# Patient Record
Sex: FEMALE | Race: WHITE | NOT HISPANIC OR LATINO | Employment: OTHER | URBAN - METROPOLITAN AREA
[De-identification: names, ages, dates, MRNs, and addresses within clinical notes are randomized per-mention and may not be internally consistent; named-entity substitution may affect disease eponyms.]

---

## 2017-01-25 ENCOUNTER — HOSPITAL ENCOUNTER (OUTPATIENT)
Dept: LAB | Facility: MEDICAL CENTER | Age: 77
End: 2017-01-25
Attending: INTERNAL MEDICINE
Payer: MEDICARE

## 2017-01-25 DIAGNOSIS — Z98.890 HISTORY OF PANCREATIC SURGERY: ICD-10-CM

## 2017-01-25 LAB
ALBUMIN SERPL BCP-MCNC: 4.9 G/DL (ref 3.2–4.9)
ALBUMIN/GLOB SERPL: 1.4 G/DL
ALP SERPL-CCNC: 63 U/L (ref 30–99)
ALT SERPL-CCNC: 30 U/L (ref 2–50)
ANION GAP SERPL CALC-SCNC: 7 MMOL/L (ref 0–11.9)
AST SERPL-CCNC: 21 U/L (ref 12–45)
BILIRUB SERPL-MCNC: 1 MG/DL (ref 0.1–1.5)
BUN SERPL-MCNC: 26 MG/DL (ref 8–22)
CALCIUM SERPL-MCNC: 9.8 MG/DL (ref 8.5–10.5)
CHLORIDE SERPL-SCNC: 106 MMOL/L (ref 96–112)
CO2 SERPL-SCNC: 25 MMOL/L (ref 20–33)
CREAT SERPL-MCNC: 1.2 MG/DL (ref 0.5–1.4)
EST. AVERAGE GLUCOSE BLD GHB EST-MCNC: 143 MG/DL
GLOBULIN SER CALC-MCNC: 3.4 G/DL (ref 1.9–3.5)
GLUCOSE SERPL-MCNC: 140 MG/DL (ref 65–99)
HBA1C MFR BLD: 6.6 % (ref 0–5.6)
POTASSIUM SERPL-SCNC: 4.3 MMOL/L (ref 3.6–5.5)
PROT SERPL-MCNC: 8.3 G/DL (ref 6–8.2)
SODIUM SERPL-SCNC: 138 MMOL/L (ref 135–145)

## 2017-01-25 PROCEDURE — 83036 HEMOGLOBIN GLYCOSYLATED A1C: CPT

## 2017-01-25 PROCEDURE — 36415 COLL VENOUS BLD VENIPUNCTURE: CPT

## 2017-01-25 PROCEDURE — 80053 COMPREHEN METABOLIC PANEL: CPT

## 2017-02-06 ENCOUNTER — OFFICE VISIT (OUTPATIENT)
Dept: MEDICAL GROUP | Facility: PHYSICIAN GROUP | Age: 77
End: 2017-02-06
Payer: MEDICARE

## 2017-02-06 VITALS
SYSTOLIC BLOOD PRESSURE: 150 MMHG | HEART RATE: 96 BPM | RESPIRATION RATE: 16 BRPM | WEIGHT: 169 LBS | HEIGHT: 64 IN | TEMPERATURE: 98.6 F | BODY MASS INDEX: 28.85 KG/M2 | DIASTOLIC BLOOD PRESSURE: 98 MMHG | OXYGEN SATURATION: 96 %

## 2017-02-06 DIAGNOSIS — E03.9 HYPOTHYROIDISM, UNSPECIFIED TYPE: ICD-10-CM

## 2017-02-06 DIAGNOSIS — N18.30 CHRONIC KIDNEY DISEASE (CKD), STAGE III (MODERATE) (HCC): ICD-10-CM

## 2017-02-06 DIAGNOSIS — R73.01 IFG (IMPAIRED FASTING GLUCOSE): ICD-10-CM

## 2017-02-06 PROCEDURE — 3014F SCREEN MAMMO DOC REV: CPT | Performed by: FAMILY MEDICINE

## 2017-02-06 PROCEDURE — G8432 DEP SCR NOT DOC, RNG: HCPCS | Performed by: FAMILY MEDICINE

## 2017-02-06 PROCEDURE — 1101F PT FALLS ASSESS-DOCD LE1/YR: CPT | Performed by: FAMILY MEDICINE

## 2017-02-06 PROCEDURE — G8482 FLU IMMUNIZE ORDER/ADMIN: HCPCS | Performed by: FAMILY MEDICINE

## 2017-02-06 PROCEDURE — 1036F TOBACCO NON-USER: CPT | Performed by: FAMILY MEDICINE

## 2017-02-06 PROCEDURE — 3017F COLORECTAL CA SCREEN DOC REV: CPT | Mod: 8P | Performed by: FAMILY MEDICINE

## 2017-02-06 PROCEDURE — G8420 CALC BMI NORM PARAMETERS: HCPCS | Performed by: FAMILY MEDICINE

## 2017-02-06 PROCEDURE — 4040F PNEUMOC VAC/ADMIN/RCVD: CPT | Performed by: FAMILY MEDICINE

## 2017-02-06 PROCEDURE — 99214 OFFICE O/P EST MOD 30 MIN: CPT | Performed by: FAMILY MEDICINE

## 2017-02-06 NOTE — PROGRESS NOTES
Subjective:     Chief Complaint   Patient presents with   • Follow-Up       Roya Muniz is a 76 y.o. female here today for follow-up on chronic medical conditions.    Impaired fasting glucose: Patient has a history of elevated blood sugars. Most recent A1c was 6.6. Patient denies chest pain, numbness and tingling in hands and feet, change in sensation in hands or feet, sores on feet, change in urine,  or change in vision. Patient states she understands how to eat healthy and will try harder. She would like to wait 3 months and try diet and lifestyle modifications before starting medication. Patient was working out with curves but curves recently closed and she has not exercised. Patient states she'll start new exercise routine.      Patient is currently taking the levothyroxine each morning on a  empty stomach with a glass of water one hour before eating. Patient denies symptoms of weight changes, heat or cold intolerance, diarrhea, constipation, heart palpitations, or loss of hair. TSH 3.180    Patient has a history of CKD stage III. She is not followed by nephrology. Most recent GFR 44, which has been relatively stable. Nephrotoxic medications will continue to be avoided.      PT has order for DEXA and mammo, Pt will get FOB from digestive     Allergies   Allergen Reactions   • Cephalexin [Keflex] Hives, Rash and Itching     Photosensitivity       Current medicines (including changes today)  Current Outpatient Prescriptions   Medication Sig Dispense Refill   • losartan (COZAAR) 100 MG Tab Take 1 Tab by mouth every day. TAKE ONE TABLET BY MOUTH DAILY 90 Tab 1   • amlodipine (NORVASC) 10 MG Tab Take 1 Tab by mouth every day. TAKE ONE TABLET BY MOUTH DAILY 90 Tab 1   • levothyroxine (SYNTHROID) 112 MCG Tab Take 1 Tab by mouth Every morning on an empty stomach. 90 Tab 4   • Loperamide HCl (IMODIUM PO) Take 1 Tab by mouth every evening.     • Psyllium (METAMUCIL PO) Take 1 Packet by mouth every morning.    "    No current facility-administered medications for this visit.     Social History   Substance Use Topics   • Smoking status: Never Smoker    • Smokeless tobacco: Never Used      Comment: avoid all tobacco products   • Alcohol Use: 0.0 oz/week     0 Standard drinks or equivalent per week      Comment: occasionally     Family Status   Relation Status Death Age   • Mother     • Father     • Sister Alive    • Brother       Family History   Problem Relation Age of Onset   • Heart Disease Mother      enlarged heart   • Hyperlipidemia Mother    • Cancer Father      liver   • Cancer Paternal Aunt      breast cancer    • No Known Problems Sister    • Heart Disease Brother    • Stroke Neg Hx    • Hypertension Neg Hx      She    has a past medical history of Hyperlipidemia; History of DVT of lower extremity (2014); and Hypertension.        ROS   GEN: no weight loss, fevers, or chills  HEENT: no blurry vision or change in vision, no ear pain, no difficulty swallowing, no throat pain, no runny nose, no nasal congestion  Resp: no shortness of breath, no cough  CV: no racing heart, no irregular beats, no chest pain  Abd: no nausea, no vomiting, no diarrhea, no constipation, no blood in stool, no dark stools, no incontinence  : no dysuria, no hematuria, no urinary incontinence, no increased frequency  MSK: no muscle aches, no joint pain, no limited motion  Neuro: no headaches, no dizziness, no LOC, no weakness, no numbness/tingling       Objective:     Blood pressure 150/98, pulse 96, temperature 37 °C (98.6 °F), resp. rate 16, height 1.613 m (5' 3.5\"), weight 76.658 kg (169 lb), SpO2 96 %. Body mass index is 29.46 kg/(m^2). Physical Exam:  Constitutional: Alert, no distress.  Skin: Warm, dry, good turgor, no rashes in visible areas.  Eye: Equal, round and reactive, conjunctiva clear, lids normal.  ENMT: Lips without lesions, good dentition, oropharynx non-erythematous, no exudate, moist oral mucosa, " bilateral tympanic membranes: No bulging, no retraction, no fluid, nonerythematous, positive light reflex, external auditory canals: Clear, scant cerumen, nonerythematous  Neck: Trachea midline, no masses, no thyromegaly. No cervical or supraclavicular lymphadenopathy. Full ROM  Respiratory: Unlabored respiratory effort, good air movement, lungs clear to auscultation, no wheezes, no ronchi.  Cardiovascular:RRR, +S1, S2, no murmur, no peripheral edema, pedal and radial pulses equal and intact bilat  Abdomen: Soft, non-tender, no masses, no hepatosplenomegaly.  MSK:5/5 muscle strength in upper extremities as well as lower extremity bilaterally  Psych: Alert and oriented x3, appropriate affect and mood.  Neuro: CN2-12 intact, no gross motor or sensory deficits      Assessment and Plan:   The following treatment plan was discussed    1. IFG (impaired fasting glucose)  Reported by history. Most recent A1c meets criteria for diabetes. This was discussed extensively with patient. As above patient would like to try lifestyle modifications for 3 months. We'll recheck in 3 months. If there is no improvement in A1c will recommend 30 metformin. Risks and benefits before been discussed with patient per patient's elected diet less than modifications.  - HEMOGLOBIN A1C; Future  - COMP METABOLIC PANEL; Future  - CBC WITH DIFFERENTIAL; Future    2. Hypothyroidism, unspecified type  Chronic: Stable continue Synthroid  - TSH WITH REFLEX TO FT4; Future    3. Chronic kidney disease (CKD), stage III (moderate)  Chronic: Stable recommend continuing to avoid nephrotoxic medications.      Followup: Return in about 3 months (around 5/6/2017) for IFG.    Please note that this dictation was created using voice recognition software. I have made every reasonable attempt to correct obvious errors, but I expect that there are errors of grammar and possibly content that I did not discover before finalizing the note.

## 2017-02-06 NOTE — MR AVS SNAPSHOT
"        Roya Muniz   2017 8:00 AM   Office Visit   MRN: [id number]    Department:  [facility]   Dept Phone:  [phone]    Description:  Female : 1940   Provider:  Mayra Grace D.O.           Reason for Visit     Follow-Up           Allergies as of 2017     Allergen Noted Reactions    Cephalexin [Keflex] 2015   Hives, Rash, Itching    Photosensitivity        You were diagnosed with     IFG (impaired fasting glucose)   [128823]       Hypothyroidism, unspecified type   [2900477]       Chronic kidney disease (CKD), stage III (moderate)   [412403]         Vital Signs     Blood Pressure Pulse Temperature Respirations Height Weight    150/98 mmHg 96 37 °C (98.6 °F) 16 1.613 m (5' 3.5\") 76.658 kg (169 lb)    Body Mass Index Oxygen Saturation Smoking Status             29.46 kg/m2 96% Never Smoker          Basic Information     Date Of Birth Sex Race Ethnicity Preferred Language    1940 Female White Non- English      Your appointments     May 23, 2017  9:30 AM   PACER CHECK ONLY with PACER PHONE CHECK   [facility] (--)    [address], [facility] [address]  [place] [address] [phone]              Problem List              ICD-10-CM Priority Class Noted - Resolved    Hyperlipidemia E78.5 Low  10/21/2010 - Present    Chronic kidney disease (CKD), stage III (moderate) N18.3 Medium  2011 - Present    Vitamin D deficiency E55.9   2011 - Present    IFG (impaired fasting glucose) R73.01   2014 - Present    Sinus node dysfunction (CMS-HCC) I49.5 High  2014 - Present    Cardiac pacemaker in situ Z95.0 High  2014 - Present    History of pancreatitis Z87.19   2014 - Present    History of pancreatic surgery Z98.890   2014 - Present    History of DVT of lower extremity Z86.718   2014 - Present    Other chronic pulmonary heart diseases (CMS-HCC) I27.89 High  2015 - Present    Atherosclerosis of aorta " (CMS-HCC) I70.0 High  6/18/2015 - Present    Hypothyroidism E03.9   8/28/2015 - Present      Health Maintenance        Date Due Completion Dates    BONE DENSITY 10/6/2007 ---    IMM INFLUENZA (1) 9/1/2016 11/1/2015, 11/1/2013    COLON CANCER SCREENING ANNUAL FIT 1/16/2017 1/16/2016    MAMMOGRAM 2/24/2017 2/24/2016, 2/4/2015, 12/11/2013, 11/22/2011, 10/6/2010, 9/30/2009, 9/22/2009, 9/22/2009    IMM DTaP/Tdap/Td Vaccine (2 - Td) 10/24/2022 10/24/2012            Current Immunizations     13-VALENT PCV PREVNAR 11/1/2015  1:25 PM    Influenza TIV (IM) 11/1/2013    Influenza Vaccine Adult HD 11/10/2016, 11/1/2015  1:18 PM    Pneumococcal Vaccine (UF)Historical Data 11/1/2013    Pneumococcal polysaccharide vaccine (PPSV-23) 9/26/2014    SHINGLES VACCINE 10/24/2012    Tdap Vaccine 10/24/2012      Below and/or attached are the medications your provider expects you to take. Review all of your home medications and newly ordered medications with your provider and/or pharmacist. Follow medication instructions as directed by your provider and/or pharmacist. Please keep your medication list with you and share with your provider. Update the information when medications are discontinued, doses are changed, or new medications (including over-the-counter products) are added; and carry medication information at all times in the event of emergency situations     Allergies:  CEPHALEXIN - Hives,Rash,Itching               Medications  Valid as of: February 06, 2017 - 10:26 AM    Generic Name Brand Name Tablet Size Instructions for use    AmLODIPine Besylate (Tab) NORVASC 10 MG Take 1 Tab by mouth every day. TAKE ONE TABLET BY MOUTH DAILY        Levothyroxine Sodium (Tab) SYNTHROID 112 MCG Take 1 Tab by mouth Every morning on an empty stomach.        Loperamide HCl   Take 1 Tab by mouth every evening.        Losartan Potassium (Tab) COZAAR 100 MG Take 1 Tab by mouth every day. TAKE ONE TABLET BY MOUTH DAILY        Psyllium   Take 1 Packet  by mouth every morning.        .                 Medicines prescribed today were sent to:     \[facility]\"" [facility] #[id number] - [place] - [address]    [address] [place] [address]    Phone: [phone] Fax: [phone]    Open 24 Hours?: No      Medication refill instructions:       If your prescription bottle indicates you have medication refills left, it is not necessary to call your provider’s office. Please contact your pharmacy and they will refill your medication.    If your prescription bottle indicates you do not have any refills left, you may request refills at any time through one of the following ways: The online Diversion system (except Urgent Care), by calling your provider’s office, or by asking your pharmacy to contact your provider’s office with a refill request. Medication refills are processed only during regular business hours and may not be available until the next business day. Your provider may request additional information or to have a follow-up visit with you prior to refilling your medication.   *Please Note: Medication refills are assigned a new Rx number when refilled electronically. Your pharmacy may indicate that no refills were authorized even though a new prescription for the same medication is available at the pharmacy. Please request the medicine by name with the pharmacy before contacting your provider for a refill.        Your To Do List     Future Labs/Procedures Complete By Expires    CBC WITH DIFFERENTIAL  5/7/2017 2/6/2018    COMP METABOLIC PANEL  5/7/2017 2/6/2018    HEMOGLOBIN A1C  5/7/2017 2/6/2018    TSH WITH REFLEX TO FT4  5/7/2017 2/6/2018         MyChart Access Code: Activation code not generated  Current Diversion Status: Active

## 2017-05-04 ENCOUNTER — PATIENT OUTREACH (OUTPATIENT)
Dept: HEALTH INFORMATION MANAGEMENT | Facility: OTHER | Age: 77
End: 2017-05-04

## 2017-05-04 NOTE — PROGRESS NOTES
Attempt #:2    WebIZ Checked & Epic Updated: yes  HealthConnect Verified: yes  Verify PCP: yes    Communication Preference Obtained: yes     Review Care Team: yes    Annual Wellness Visit Scheduling  1. Scheduling Status:Scheduled        Care Gap Scheduling (Attempt to Schedule EACH Overdue Care Gap!)- Unable to discuss Care Gaps     Health Maintenance Due   Topic Date Due   • BONE DENSITY  10/06/2007   • COLON CANCER SCREENING ANNUAL FIT  01/16/2017   • MAMMOGRAM  02/24/2017         [employer] Activation: already active  [employer] [place]: no  Virtual Visits: no  Opt In to Text Messages: no

## 2017-05-04 NOTE — PROGRESS NOTES
Outcome: Left Message    WebIZ Checked & Epic Updated:  no    HealthConnect Verified: yes    Attempt # 1

## 2017-05-18 RX ORDER — LEVOTHYROXINE SODIUM 112 UG/1
TABLET ORAL
Qty: 90 TAB | Refills: 0 | Status: SHIPPED | OUTPATIENT
Start: 2017-05-18 | End: 2017-08-21 | Stop reason: SDUPTHER

## 2017-05-18 NOTE — TELEPHONE ENCOUNTER
Was the patient seen in the last year in this department? Yes     Does patient have an active prescription for medications requested? No     Received Request Via: Pharmacy      Pt met protocol?: Yes    LAST OV 02/06/2017    LAST TSH 04/28/2016

## 2017-05-30 ENCOUNTER — TELEPHONE (OUTPATIENT)
Dept: MEDICAL GROUP | Facility: PHYSICIAN GROUP | Age: 77
End: 2017-05-30

## 2017-05-30 NOTE — TELEPHONE ENCOUNTER
ANNUAL WELLNESS VISIT PRE-VISIT PLANNING     1.  Reviewed note from last office visit with PCP: YES    2.  If any orders were placed at last visit, do we have Results/Consult Notes?        •  Labs - Labs ordered, NOT completed. Patient advised to complete prior to next appointment.       •  Imaging - Imaging was not ordered at last office visit.       •  Referrals - No referrals were ordered at last office visit.    3.  Immunizations were updated in [facility] using WebIZ?: Yes       •  WebIZ Recommendations: Patient is up to date on all vaccines       •  Is patient due for Tdap? NO       •  Is patient due for Shingles? NO     4.  Patient is due for the following Health Maintenance Topics:   Health Maintenance Due   Topic Date Due   • BONE DENSITY  10/06/2007   • COLON CANCER SCREENING ANNUAL FIT  01/16/2017   • MAMMOGRAM  02/24/2017       5.  Reviewed/Updated the following with patient:       •   Preferred Pharmacy? YES       •   Preferred Lab? YES       •   Medications? YES. Was Abstract Encounter opened and chart updated? YES       •   Social History? YES. Was Abstract Encounter opened and chart updated? YES       •   Family History? YES. Was Abstract Encounter opened and chart updated? YES    6.  Care Team Updated:       •   DME Company (gait device, O2, CPAP, etc.): N\A       •   Other Specialists (eye doctor, derm, GYN, cardiology, endo, etc): YES    7.  Patient has the following Care Path diagnoses on Problem List:  NONE    8.  Specialty Comments was updated with diagnosis information provided by [facility]: YES    9.  Patient was advised: “This is a free wellness visit. The provider will screen for medical conditions to help you stay healthy. If you have other concerns to address you may be asked to discuss these at a separate visit or there may be an additional fee.”     10.  Patient was informed to arrive 15 min prior to their scheduled appointment and bring in their medication bottles?  YES

## 2017-06-02 ENCOUNTER — HOSPITAL ENCOUNTER (OUTPATIENT)
Dept: LAB | Facility: MEDICAL CENTER | Age: 77
End: 2017-06-02
Attending: FAMILY MEDICINE
Payer: MEDICARE

## 2017-06-02 DIAGNOSIS — R73.01 IFG (IMPAIRED FASTING GLUCOSE): ICD-10-CM

## 2017-06-02 DIAGNOSIS — E03.9 HYPOTHYROIDISM, UNSPECIFIED TYPE: ICD-10-CM

## 2017-06-02 LAB
ALBUMIN SERPL BCP-MCNC: 4.4 G/DL (ref 3.2–4.9)
ALBUMIN/GLOB SERPL: 1.4 G/DL
ALP SERPL-CCNC: 65 U/L (ref 30–99)
ALT SERPL-CCNC: 27 U/L (ref 2–50)
ANION GAP SERPL CALC-SCNC: 6 MMOL/L (ref 0–11.9)
AST SERPL-CCNC: 22 U/L (ref 12–45)
BASOPHILS # BLD AUTO: 0.4 % (ref 0–1.8)
BASOPHILS # BLD: 0.02 K/UL (ref 0–0.12)
BILIRUB SERPL-MCNC: 1.3 MG/DL (ref 0.1–1.5)
BUN SERPL-MCNC: 28 MG/DL (ref 8–22)
CALCIUM SERPL-MCNC: 9.5 MG/DL (ref 8.5–10.5)
CHLORIDE SERPL-SCNC: 108 MMOL/L (ref 96–112)
CO2 SERPL-SCNC: 20 MMOL/L (ref 20–33)
CREAT SERPL-MCNC: 1.19 MG/DL (ref 0.5–1.4)
EOSINOPHIL # BLD AUTO: 0.15 K/UL (ref 0–0.51)
EOSINOPHIL NFR BLD: 3.1 % (ref 0–6.9)
ERYTHROCYTE [DISTWIDTH] IN BLOOD BY AUTOMATED COUNT: 44.7 FL (ref 35.9–50)
EST. AVERAGE GLUCOSE BLD GHB EST-MCNC: 128 MG/DL
GFR SERPL CREATININE-BSD FRML MDRD: 44 ML/MIN/1.73 M 2
GLOBULIN SER CALC-MCNC: 3.2 G/DL (ref 1.9–3.5)
GLUCOSE SERPL-MCNC: 96 MG/DL (ref 65–99)
HBA1C MFR BLD: 6.1 % (ref 0–5.6)
HCT VFR BLD AUTO: 45.2 % (ref 37–47)
HGB BLD-MCNC: 15.3 G/DL (ref 12–16)
IMM GRANULOCYTES # BLD AUTO: 0.01 K/UL (ref 0–0.11)
IMM GRANULOCYTES NFR BLD AUTO: 0.2 % (ref 0–0.9)
LYMPHOCYTES # BLD AUTO: 1.46 K/UL (ref 1–4.8)
LYMPHOCYTES NFR BLD: 30.2 % (ref 22–41)
MCH RBC QN AUTO: 31.7 PG (ref 27–33)
MCHC RBC AUTO-ENTMCNC: 33.8 G/DL (ref 33.6–35)
MCV RBC AUTO: 93.8 FL (ref 81.4–97.8)
MONOCYTES # BLD AUTO: 0.51 K/UL (ref 0–0.85)
MONOCYTES NFR BLD AUTO: 10.6 % (ref 0–13.4)
NEUTROPHILS # BLD AUTO: 2.68 K/UL (ref 2–7.15)
NEUTROPHILS NFR BLD: 55.5 % (ref 44–72)
NRBC # BLD AUTO: 0 K/UL
NRBC BLD AUTO-RTO: 0 /100 WBC
PLATELET # BLD AUTO: 237 K/UL (ref 164–446)
PMV BLD AUTO: 10.6 FL (ref 9–12.9)
POTASSIUM SERPL-SCNC: 4 MMOL/L (ref 3.6–5.5)
PROT SERPL-MCNC: 7.6 G/DL (ref 6–8.2)
RBC # BLD AUTO: 4.82 M/UL (ref 4.2–5.4)
SODIUM SERPL-SCNC: 134 MMOL/L (ref 135–145)
TSH SERPL DL<=0.005 MIU/L-ACNC: 3.37 UIU/ML (ref 0.3–3.7)
WBC # BLD AUTO: 4.8 K/UL (ref 4.8–10.8)

## 2017-06-02 PROCEDURE — 36415 COLL VENOUS BLD VENIPUNCTURE: CPT

## 2017-06-02 PROCEDURE — 80053 COMPREHEN METABOLIC PANEL: CPT

## 2017-06-02 PROCEDURE — 85025 COMPLETE CBC W/AUTO DIFF WBC: CPT

## 2017-06-02 PROCEDURE — 84443 ASSAY THYROID STIM HORMONE: CPT

## 2017-06-02 PROCEDURE — 83036 HEMOGLOBIN GLYCOSYLATED A1C: CPT

## 2017-06-05 ENCOUNTER — NON-PROVIDER VISIT (OUTPATIENT)
Dept: CARDIOLOGY | Facility: MEDICAL CENTER | Age: 77
End: 2017-06-05
Payer: MEDICARE

## 2017-06-05 DIAGNOSIS — I49.5 SICK SINUS SYNDROME (HCC): ICD-10-CM

## 2017-06-05 DIAGNOSIS — Z95.0 CARDIAC PACEMAKER IN SITU: ICD-10-CM

## 2017-06-05 PROCEDURE — 93280 PM DEVICE PROGR EVAL DUAL: CPT | Performed by: INTERNAL MEDICINE

## 2017-06-06 ENCOUNTER — OFFICE VISIT (OUTPATIENT)
Dept: MEDICAL GROUP | Facility: PHYSICIAN GROUP | Age: 77
End: 2017-06-06
Payer: MEDICARE

## 2017-06-06 VITALS
DIASTOLIC BLOOD PRESSURE: 70 MMHG | SYSTOLIC BLOOD PRESSURE: 124 MMHG | HEART RATE: 70 BPM | WEIGHT: 160 LBS | BODY MASS INDEX: 27.31 KG/M2 | OXYGEN SATURATION: 96 % | HEIGHT: 64 IN | TEMPERATURE: 98.4 F

## 2017-06-06 DIAGNOSIS — Z00.00 MEDICARE ANNUAL WELLNESS VISIT, SUBSEQUENT: ICD-10-CM

## 2017-06-06 DIAGNOSIS — E03.9 HYPOTHYROIDISM, UNSPECIFIED TYPE: ICD-10-CM

## 2017-06-06 DIAGNOSIS — Z13.6 SCREENING FOR CARDIOVASCULAR CONDITION: ICD-10-CM

## 2017-06-06 DIAGNOSIS — N81.4 PROLAPSED UTERUS: ICD-10-CM

## 2017-06-06 DIAGNOSIS — Z12.31 ENCOUNTER FOR SCREENING MAMMOGRAM FOR MALIGNANT NEOPLASM OF BREAST: ICD-10-CM

## 2017-06-06 DIAGNOSIS — I49.5 SINUS NODE DYSFUNCTION (HCC): ICD-10-CM

## 2017-06-06 DIAGNOSIS — Z12.11 SCREENING FOR COLON CANCER: ICD-10-CM

## 2017-06-06 DIAGNOSIS — Z78.0 POST-MENOPAUSAL: ICD-10-CM

## 2017-06-06 DIAGNOSIS — R73.01 IFG (IMPAIRED FASTING GLUCOSE): ICD-10-CM

## 2017-06-06 DIAGNOSIS — N18.30 CHRONIC KIDNEY DISEASE (CKD), STAGE III (MODERATE) (HCC): ICD-10-CM

## 2017-06-06 DIAGNOSIS — E78.00 PURE HYPERCHOLESTEROLEMIA: ICD-10-CM

## 2017-06-06 DIAGNOSIS — I70.0 ATHEROSCLEROSIS OF AORTA (HCC): ICD-10-CM

## 2017-06-06 DIAGNOSIS — E55.9 VITAMIN D DEFICIENCY: ICD-10-CM

## 2017-06-06 PROCEDURE — G0439 PPPS, SUBSEQ VISIT: HCPCS | Performed by: FAMILY MEDICINE

## 2017-06-06 ASSESSMENT — PAIN SCALES - GENERAL: PAINLEVEL: NO PAIN

## 2017-06-06 ASSESSMENT — PATIENT HEALTH QUESTIONNAIRE - PHQ9: CLINICAL INTERPRETATION OF PHQ2 SCORE: 0

## 2017-06-06 NOTE — PROGRESS NOTES
Chief Complaint   Patient presents with   • Annual Wellness Visit         HPI:  Roya Muniz is a 76 y.o. female here for Medicare Annual Wellness Visit        Patient Active Problem List    Diagnosis Date Noted   • Other chronic pulmonary heart diseases 06/18/2015     Priority: High   • Atherosclerosis of aorta (CMS-HCC) 06/18/2015     Priority: High   • Cardiac pacemaker in situ 02/25/2014     Priority: High   • Sinus node dysfunction (CMS-HCC) 02/24/2014     Priority: High   • Chronic kidney disease (CKD), stage III (moderate) 07/22/2011     Priority: Medium   • Hyperlipidemia 10/21/2010     Priority: Low   • Hypothyroidism 08/28/2015   • History of pancreatitis 06/05/2014   • History of pancreatic surgery 06/05/2014   • History of DVT of lower extremity 06/05/2014   • IFG (impaired fasting glucose) 01/14/2014   • Vitamin D deficiency 11/17/2011       Current Outpatient Prescriptions   Medication Sig Dispense Refill   • levothyroxine (SYNTHROID) 112 MCG Tab TAKE ONE TABLET BY MOUTH EVERY MORNING ON AN EMPTY STOMACH 90 Tab 0   • Loperamide HCl (IMODIUM PO) Take 1 Tab by mouth every evening.     • losartan (COZAAR) 100 MG Tab Take 1 Tab by mouth every day. TAKE ONE TABLET BY MOUTH DAILY 90 Tab 1   • amlodipine (NORVASC) 10 MG Tab Take 1 Tab by mouth every day. TAKE ONE TABLET BY MOUTH DAILY 90 Tab 1   • Psyllium (METAMUCIL PO) Take 1 Packet by mouth every morning.       No current facility-administered medications for this visit.        Patient is taking medications as noted in medication list.  Current supplements as per medication list.   Chronic narcotic pain medicines: no    Allergies: Cephalexin    Current social contact/activities: Gnosticism functions / bunco with friends     Is patient current with immunizations?  Yes.     She  reports that she has never smoked. She has never used smokeless tobacco. She reports that she drinks alcohol. She reports that she does not use illicit drugs.  Counseling  given: Yes        DPA/Advanced Directive:  Patient has Advanced Directive on file.     ROS:    Gait: Uses no assistive device   Ostomy: no   Other tubes: no   Amputations: no   Chronic oxygen use: no   Last eye exam: 2 yrs ago   Wears hearing aids: no   : Denies incontinence.       Depression Screening    Little interest or pleasure in doing things?  0 - not at all  Feeling down, depressed, or hopeless?  0 - not at all  Patient Health Questionnaire Score: 0  If depressive symptoms identified deferred to follow up visit unless specifically addressed in assessment and plan.    Interpretation of PHQ-9 Total Score   Score Severity   1-4 Minimal Depression   5-9 Mild Depression   10-14 Moderate Depression   15-19 Moderately Severe Depression   20-27 Severe Depression    Screening for Cognitive Impairment    Three Minute Recall (banana, sunrise, fence)  2/3    Draws clock face with all 12 numbers and sets the hands to show 10 minutes past 10 o'clock  1 5/5  If cognitive concerns identified deferred to follow up visit unless specifically addressed in assessment and plan.    Fall Risk Assessment    Has the patient had two or more falls in the last year or any fall with injury in the last year?  No  If Fall Risk identified deferred to follow up visit unless specifically addressed in assessment and plan.    Safety Assessment    Throw rugs on floor.  Yes  Handrails on all stairs.  Yes  Good lighting in all hallways.  Yes  Difficulty hearing.  No  Patient counseled about all safety risks that were identified.    Functional Assessment ADLs    Are there any barriers preventing you from cooking for yourself or meeting nutritional needs?  No.    Are there any barriers preventing you from driving safely or obtaining transportation?  No.    Are there any barriers preventing you from using a telephone or calling for help?  No.    Are there any barriers preventing you from shopping?  No.    Are there any barriers preventing you from  taking care of your own finances?  No.    Are there any barriers preventing you from managing your medications?  No.    Are currently engaging any exercise or physical activity?  Yes.  Stationary bike everyday for about 10 mins / hike every evening / yard work    Health Maintenance Summary                BONE DENSITY Overdue 10/6/2007     COLON CANCER SCREENING ANNUAL FIT Overdue 1/16/2017      Done 1/16/2016 OCCULT BLOOD FECES IMMUNOASSAY    MAMMOGRAM Overdue 2/24/2017      Done 2/24/2016 MA-SCREEN MAMMO W/CAD-BILAT     Patient has more history with this topic...    Annual Wellness Visit Next Due 10/4/2017      Done 10/3/2016 Visit Dx: Medicare annual wellness visit, initial    IMM DTaP/Tdap/Td Vaccine Next Due 10/24/2022      Done 10/24/2012 Imm Admin: Tdap Vaccine          Patient Care Team:  Mayra Grace D.O. as PCP - General (Family Medicine)  Quintin Schultz M.D. as Consulting Physician (Cardiology)  Yousfi Islas Jr., M.D. as Consulting Physician (Gastroenterology)    Social History   Substance Use Topics   • Smoking status: Never Smoker    • Smokeless tobacco: Never Used      Comment: avoid all tobacco products   • Alcohol Use: 0.0 oz/week     0 Standard drinks or equivalent per week      Comment: occasionally     Family History   Problem Relation Age of Onset   • Heart Disease Mother      enlarged heart   • Cancer Father      liver   • Cancer Paternal Aunt      breast cancer    • Thyroid Sister    • Heart Disease Brother      heart failure   • Lung Disease Brother      heavy smoker   • Alcohol/Drug Brother      etoh   • Stroke Neg Hx    • Hypertension Neg Hx      She  has a past medical history of Hyperlipidemia; History of DVT of lower extremity (6/5/2014); and Hypertension.   Past Surgical History   Procedure Laterality Date   • Exploratory laparotomy  2/10/2014     Performed by Rigo Garcia M.D. at SURGERY [facility]   • Cholecystectomy  2/10/2014     Performed by  "Rigo Garcia M.D. at SURGERY [facility]   • Pancreatectomy  2/10/2014     Performed by Rigo Garcia M.D. at SURGERY [facility]   • Gastrostomy feeding  2/10/2014     Performed by Rigo Garcia M.D. at SURGERY [facility]   • Gastroscopy-endo  2/20/2014     Performed by Antonio Espinoza M.D. at ENDOSCOPY [facility]   • Other orthopedic surgery       foot surgery   • Gastroscopy-endo  2/21/2014     Performed by Steve Islas Jr., M.D. at ENDOSCOPY [facility]           Exam:     Blood pressure 124/70, pulse 70, temperature 36.9 °C (98.4 °F), height 1.626 m (5' 4\"), weight 72.576 kg (160 lb), SpO2 96 %. Body mass index is 27.45 kg/(m^2).    Hearing fair.    Dentition fair  Alert, oriented in no acute distress.  Eye contact is good, speech goal directed, affect calm      Assessment and Plan. The following treatment and monitoring plan is recommended:   1. Medicare annual wellness visit, subsequent      2. Atherosclerosis of aorta (CMS-HCC)  Chronic: Asymptomatic    3. Chronic kidney disease (CKD), stage III (moderate)  Chronic: Continue to avoid nephrotoxic medications    4. Sinus node dysfunction (CMS-HCC)  Chronic: Recommend continue follow-up with cardiology    5. IFG (impaired fasting glucose)  Labs reviewed: No treatment indicated at this time we'll continue to follow  - COMP METABOLIC PANEL; Future  - HEMOGLOBIN A1C; Future  - LIPID PROFILE; Future    6. Vitamin D deficiency  Chronic: Recommend continuing over-the-counter vitamin D supplementation    7. Hypothyroidism, unspecified type  Chronic: Recommend continuing the levothyroxine. Most recent TSH 3.3    8. Pure hypercholesterolemia  Chronic: Most recent lipid panel from 2015 shows cholesterol well controlled    9. Prolapsed uterus  Reported by patient: Recommend referral to OB/GYN  - REFERRAL TO OB/GYN SURGERY    10. Post-menopausal  - DS-BONE DENSITY STUDY (DEXA); Future    11. Encounter for screening " mammogram for malignant neoplasm of breast  - MA-SCREEN MAMMO W/CAD-BILAT; Future    12. Screening for colon cancer  - OCCULT BLOOD FECES IMMUNOASSAY; Future    13. Screening for cardiovascular condition  - LIPID PROFILE; Future        Services suggested: No services needed at this time  Health Care Screening recommendations as per orders if indicated.  Referrals offered: PT/OT/Nutrition counseling/Behavioral Health/Smoking cessation as per orders if indicated.    Discussion today about general wellness and lifestyle habits:    · Prevent falls and reduce trip hazards; Cautioned about securing or removing rugs.  · Have a working fire alarm and carbon monoxide detector;   · Engage in regular physical activity and social activities       Follow-up: No Follow-up on file.

## 2017-06-06 NOTE — MR AVS SNAPSHOT
"        Roya Muniz   2017 9:20 AM   Office Visit   MRN: [id number]    Department:  [facility]   Dept Phone:  [phone]    Description:  Female : 1940   Provider:  Mayra Grace D.O.; [facility]            Reason for Visit     Annual Wellness Visit           Allergies as of 2017     Allergen Noted Reactions    Cephalexin [Keflex] 2015   Hives, Rash, Itching    Photosensitivity        You were diagnosed with     Medicare annual wellness visit, subsequent   [125974]       Atherosclerosis of aorta (CMS-HCC)   [440.0.ICD-9-CM]       Chronic kidney disease (CKD), stage III (moderate)   [402350]       Sinus node dysfunction (CMS-HCC)   [625992]       IFG (impaired fasting glucose)   [001375]       Vitamin D deficiency   [1717230]       Hypothyroidism, unspecified type   [2936950]       Pure hypercholesterolemia   [272.0.ICD-9-CM]       Prolapsed uterus   [581345]       Post-menopausal   [772000]       Encounter for screening mammogram for malignant neoplasm of breast   [418614]       Screening for colon cancer   [932046]       Screening for cardiovascular condition   [567910]         Vital Signs     Blood Pressure Pulse Temperature Height Weight Body Mass Index    124/70 mmHg 70 36.9 °C (98.4 °F) 1.626 m (5' 4\") 72.576 kg (160 lb) 27.45 kg/m2    Oxygen Saturation Smoking Status                96% Never Smoker           Basic Information     Date Of Birth Sex Race Ethnicity Preferred Language    1940 Female White Non- English      Your appointments     Dec 27, 2017 10:30 AM   PACER CHECK ONLY with PACER CHECK-CAM B   [facility] (--)    [address], [address]  [place] [address]   [phone]            Dec 27, 2017 11:00 AM   FOLLOW UP with Quintin Schultz M.D.   [facility] (--)    [address], [address]  [place] [address]   [phone]              Problem List    "          ICD-10-CM Priority Class Noted - Resolved    Hyperlipidemia E78.5 Low  10/21/2010 - Present    Chronic kidney disease (CKD), stage III (moderate) N18.3 Medium  7/22/2011 - Present    Vitamin D deficiency E55.9   11/17/2011 - Present    IFG (impaired fasting glucose) R73.01   1/14/2014 - Present    Sinus node dysfunction (CMS-HCC) I49.5 High  2/24/2014 - Present    Cardiac pacemaker in situ Z95.0 High  2/25/2014 - Present    History of pancreatitis Z87.19   6/5/2014 - Present    History of pancreatic surgery Z98.890   6/5/2014 - Present    History of DVT of lower extremity Z86.718   6/5/2014 - Present    Other chronic pulmonary heart diseases I27.89 High  6/18/2015 - Present    Atherosclerosis of aorta (CMS-HCC) I70.0 High  6/18/2015 - Present    Hypothyroidism E03.9   8/28/2015 - Present      Health Maintenance        Date Due Completion Dates    BONE DENSITY 10/6/2007 ---    COLON CANCER SCREENING ANNUAL FIT 1/16/2017 1/16/2016    MAMMOGRAM 2/24/2017 2/24/2016, 2/4/2015, 12/11/2013, 11/22/2011, 10/6/2010, 9/30/2009, 9/22/2009, 9/22/2009    IMM DTaP/Tdap/Td Vaccine (2 - Td) 10/24/2022 10/24/2012            Current Immunizations     13-VALENT PCV PREVNAR 11/1/2015  1:25 PM    Influenza TIV (IM) 11/1/2013    Influenza Vaccine Adult HD 11/10/2016, 11/1/2015  1:18 PM    Pneumococcal polysaccharide vaccine (PPSV-23) 9/26/2014, 11/7/2013    SHINGLES VACCINE 10/24/2012    Tdap Vaccine 10/24/2012      Below and/or attached are the medications your provider expects you to take. Review all of your home medications and newly ordered medications with your provider and/or pharmacist. Follow medication instructions as directed by your provider and/or pharmacist. Please keep your medication list with you and share with your provider. Update the information when medications are discontinued, doses are changed, or new medications (including over-the-counter products) are added; and carry medication information at all times  in the event of emergency situations     Allergies:  CEPHALEXIN - Hives,Rash,Itching               Medications  Valid as of: June 06, 2017 - 12:45 PM    Generic Name Brand Name Tablet Size Instructions for use    AmLODIPine Besylate (Tab) NORVASC 10 MG Take 1 Tab by mouth every day. TAKE ONE TABLET BY MOUTH DAILY        Levothyroxine Sodium (Tab) SYNTHROID 112 MCG TAKE ONE TABLET BY MOUTH EVERY MORNING ON AN EMPTY STOMACH        Loperamide HCl   Take 1 Tab by mouth every evening.        Losartan Potassium (Tab) COZAAR 100 MG Take 1 Tab by mouth every day. TAKE ONE TABLET BY MOUTH DAILY        Psyllium   Take 1 Packet by mouth every morning.        .                 Medicines prescribed today were sent to:     [facility] #[id number] - [place] - [address]    [address] [place] [address]    Phone: [phone] Fax: [phone]    Open 24 Hours?: No      Medication refill instructions:       If your prescription bottle indicates you have medication refills left, it is not necessary to call your provider’s office. Please contact your pharmacy and they will refill your medication.    If your prescription bottle indicates you do not have any refills left, you may request refills at any time through one of the following ways: The online [employer] system (except Urgent Care), by calling your provider’s office, or by asking your pharmacy to contact your provider’s office with a refill request. Medication refills are processed only during regular business hours and may not be available until the next business day. Your provider may request additional information or to have a follow-up visit with you prior to refilling your medication.   *Please Note: Medication refills are assigned a new Rx number when refilled electronically. Your pharmacy may indicate that no refills were authorized even though a new prescription for the same medication is available at the pharmacy. Please request the medicine by name with the pharmacy  before contacting your provider for a refill.        Your To Do List     Future Labs/Procedures Complete By Expires    COMP METABOLIC PANEL  12/3/2017 6/6/2018    HEMOGLOBIN A1C  12/3/2017 6/6/2018    LIPID PROFILE  12/3/2017 6/6/2018    DS-BONE DENSITY STUDY (DEXA)  As directed 6/6/2018    MA-SCREEN MAMMO W/CAD-BILAT  As directed 6/6/2018    OCCULT BLOOD FECES IMMUNOASSAY  As directed 6/6/2018      Referral     A referral request has been sent to our patient care coordination department. Please allow 3-5 business days for us to process this request and contact you either by phone or mail. If you do not hear from us by the 5th business day, please call us at [phone].           [employer] Access Code: Activation code not generated  Current [employer] Status: Active

## 2017-06-28 RX ORDER — LOSARTAN POTASSIUM 100 MG/1
TABLET ORAL
Qty: 90 TAB | Refills: 1 | Status: SHIPPED | OUTPATIENT
Start: 2017-06-28 | End: 2017-12-27

## 2017-06-28 RX ORDER — AMLODIPINE BESYLATE 10 MG/1
TABLET ORAL
Qty: 90 TAB | Refills: 1 | Status: SHIPPED | OUTPATIENT
Start: 2017-06-28 | End: 2017-12-26 | Stop reason: SDUPTHER

## 2017-06-28 NOTE — TELEPHONE ENCOUNTER
Was the patient seen in the last year in this department? Yes     Does patient have an active prescription for medications requested? No     Received Request Via: Pharmacy      Pt met protocol?: Yes    BP Readings from Last 1 Encounters:   06/06/17 124/70

## 2017-06-29 NOTE — TELEPHONE ENCOUNTER
Refill X 6 months, sent to pharmacy.Pt. Seen in the last 6 months per protocol.   Lab Results   Component Value Date/Time    SODIUM 134* 06/02/2017 07:44 AM    POTASSIUM 4.0 06/02/2017 07:44 AM    CHLORIDE 108 06/02/2017 07:44 AM    CO2 20 06/02/2017 07:44 AM    GLUCOSE 96 06/02/2017 07:44 AM    BUN 28* 06/02/2017 07:44 AM    CREATININE 1.19 06/02/2017 07:44 AM    BUN-CREATININE RATIO 21 02/10/2011 08:40 AM    GLOM FILT RATE, EST 32* 02/10/2011 08:40 AM

## 2017-07-12 ENCOUNTER — OFFICE VISIT (OUTPATIENT)
Dept: URGENT CARE | Facility: PHYSICIAN GROUP | Age: 77
End: 2017-07-12
Payer: MEDICARE

## 2017-07-12 VITALS
DIASTOLIC BLOOD PRESSURE: 76 MMHG | BODY MASS INDEX: 27.66 KG/M2 | TEMPERATURE: 99 F | RESPIRATION RATE: 18 BRPM | SYSTOLIC BLOOD PRESSURE: 122 MMHG | HEIGHT: 64 IN | OXYGEN SATURATION: 96 % | WEIGHT: 162 LBS | HEART RATE: 77 BPM

## 2017-07-12 DIAGNOSIS — N39.0 URINARY TRACT INFECTION WITHOUT HEMATURIA, SITE UNSPECIFIED: ICD-10-CM

## 2017-07-12 DIAGNOSIS — R30.0 DYSURIA: ICD-10-CM

## 2017-07-12 DIAGNOSIS — R30.9 URINARY PAIN: ICD-10-CM

## 2017-07-12 LAB
APPEARANCE UR: ABNORMAL
BILIRUB UR STRIP-MCNC: NEGATIVE MG/DL
COLOR UR AUTO: YELLOW
GLUCOSE UR STRIP.AUTO-MCNC: NEGATIVE MG/DL
KETONES UR STRIP.AUTO-MCNC: NEGATIVE MG/DL
LEUKOCYTE ESTERASE UR QL STRIP.AUTO: ABNORMAL
NITRITE UR QL STRIP.AUTO: NEGATIVE
PH UR STRIP.AUTO: 6 [PH] (ref 5–8)
PROT UR QL STRIP: NEGATIVE MG/DL
RBC UR QL AUTO: ABNORMAL
SP GR UR STRIP.AUTO: 1.01
UROBILINOGEN UR STRIP-MCNC: NEGATIVE MG/DL

## 2017-07-12 PROCEDURE — 81002 URINALYSIS NONAUTO W/O SCOPE: CPT | Performed by: NURSE PRACTITIONER

## 2017-07-12 PROCEDURE — 99213 OFFICE O/P EST LOW 20 MIN: CPT | Performed by: NURSE PRACTITIONER

## 2017-07-12 RX ORDER — NITROFURANTOIN 25; 75 MG/1; MG/1
100 CAPSULE ORAL EVERY 12 HOURS
Qty: 10 CAP | Refills: 0 | Status: SHIPPED | OUTPATIENT
Start: 2017-07-12 | End: 2017-07-17

## 2017-07-12 NOTE — MR AVS SNAPSHOT
"        Roya Rima Flavio   2017 10:40 AM   Office Visit   MRN: [id number]    Department:  [facility]   Dept Phone:  [phone]    Description:  Female : 1940   Provider:  RAYMUNDO Kumari           Reason for Visit     Urinary Pain C/o dysuria, frequency x2 wks      Allergies as of 2017     Allergen Noted Reactions    Cephalexin [Keflex] 2015   Hives, Rash, Itching    Photosensitivity        You were diagnosed with     Urinary pain   [015510]       Dysuria   [788.1.ICD-9-CM]       Urinary tract infection without hematuria, site unspecified   [1138011]         Vital Signs     Blood Pressure Pulse Temperature Respirations Height Weight    122/76 mmHg 77 37.2 °C (99 °F) 18 1.626 m (5' 4.02\") 73.483 kg (162 lb)    Body Mass Index Oxygen Saturation Smoking Status             27.79 kg/m2 96% Never Smoker          Basic Information     Date Of Birth Sex Race Ethnicity Preferred Language    1940 Female White Non- English      Your appointments     2017  1:00 PM   BONE DENSITY (DEXASCAN) with RBHC BD 1   [facility] ([address])    [address] [place] [address] [address]  [facility] [id number] [phone]           No calcium supplements 24 hours prior to exam, including antacids or multivitamins w/calcium.  Pt may eat and drink normally.  No injection procedures prior to Dexa on the same day.  No barium contrast, no CTs with IV or oral contrast, no Pet/CTs and no Nuc Med injections for 7 days prior to a Dexa (including Barium Swallow, Upper GI, Small Bowel Series).  If scheduling a Dexa on the same day as a CT with IV or oral contrast, any test with barium, Pet/CT or a Nuc Med with injection, always schedule the Dexa before the other study.            2017  1:30 PM   MA SCRN10 with RBHC MG 3   [facility] ([address])    [address]  [facility] [id number] [phone]           No deodorant, powder, " perfume or lotion under the arm or breast area.            Aug 18, 2017  1:30 PM   New Patient with Ivonne Botello M.D.   [facility] ([facility])    [address]  [place] [address]   [phone]            Dec 27, 2017 10:30 AM   PACER CHECK ONLY with PACER CHECK-CAM B   [facility] (--)    [address]  [place] [address]   [phone]            Dec 27, 2017 11:00 AM   FOLLOW UP with Quintin Schultz M.D.   [facility] (--)    [address]  [place] [address]   [phone]              Problem List              ICD-10-CM Priority Class Noted - Resolved    Hyperlipidemia E78.5 Low  10/21/2010 - Present    Chronic kidney disease (CKD), stage III (moderate) N18.3 Medium  7/22/2011 - Present    Vitamin D deficiency E55.9   11/17/2011 - Present    IFG (impaired fasting glucose) R73.01   1/14/2014 - Present    Sinus node dysfunction (CMS-HCC) I49.5 High  2/24/2014 - Present    Cardiac pacemaker in situ Z95.0 High  2/25/2014 - Present    History of pancreatitis Z87.19   6/5/2014 - Present    History of pancreatic surgery Z98.890   6/5/2014 - Present    History of DVT of lower extremity Z86.718   6/5/2014 - Present    Other chronic pulmonary heart diseases I27.89 High  6/18/2015 - Present    Atherosclerosis of aorta (CMS-HCC) I70.0 High  6/18/2015 - Present    Hypothyroidism E03.9   8/28/2015 - Present      Health Maintenance        Date Due Completion Dates    BONE DENSITY 10/6/2007 ---    COLON CANCER SCREENING ANNUAL FIT 1/16/2017 1/16/2016    MAMMOGRAM 2/24/2017 2/24/2016, 2/4/2015, 12/11/2013, 11/22/2011, 10/6/2010, 9/30/2009, 9/22/2009, 9/22/2009    IMM INFLUENZA (1) 9/1/2017 11/10/2016, 11/1/2015, 11/1/2013    IMM DTaP/Tdap/Td Vaccine (2 - Td) 10/24/2022 10/24/2012            Results     POCT Urinalysis      Component Value Standard Range & Units    POC Color Yellow  Negative    POC Appearance Cloudy Negative    POC Leukocyte Esterase 3+ Negative    POC Nitrites Negative Negative    POC Urobiligen Negative Negative (0.2) mg/dL    POC Protein Negative Negative mg/dL    POC Urine PH 6.0 5.0 - 8.0    POC Blood Trace Negative    POC Specific Gravity 1.010 <1.005 - >1.030    POC Ketones Negative Negative mg/dL    POC Biliruben Negative Negative mg/dL    POC Glucose Negative Negative mg/dL                        Current Immunizations     13-VALENT PCV PREVNAR 11/1/2015  1:25 PM    Influenza TIV (IM) 11/1/2013    Influenza Vaccine Adult HD 11/10/2016, 11/1/2015  1:18 PM    Pneumococcal polysaccharide vaccine (PPSV-23) 9/26/2014, 11/7/2013    SHINGLES VACCINE 10/24/2012    Tdap Vaccine 10/24/2012      Below and/or attached are the medications your provider expects you to take. Review all of your home medications and newly ordered medications with your provider and/or pharmacist. Follow medication instructions as directed by your provider and/or pharmacist. Please keep your medication list with you and share with your provider. Update the information when medications are discontinued, doses are changed, or new medications (including over-the-counter products) are added; and carry medication information at all times in the event of emergency situations     Allergies:  CEPHALEXIN - Hives,Rash,Itching               Medications  Valid as of: July 12, 2017 - 11:15 AM    Generic Name Brand Name Tablet Size Instructions for use    AmLODIPine Besylate (Tab) NORVASC 10 MG TAKE ONE TABLET BY MOUTH DAILY        Cranberry-Vitamin C-Probiotic   Take  by mouth.        Levothyroxine Sodium (Tab) SYNTHROID 112 MCG TAKE ONE TABLET BY MOUTH EVERY MORNING ON AN EMPTY STOMACH        Loperamide HCl   Take 1 Tab by mouth every evening.        Losartan Potassium (Tab) COZAAR 100 MG TAKE ONE TABLET BY MOUTH DAILY (GENERIC FOR COZAAR)        Nitrofurantoin Monohyd Macro (Cap) MACROBID 100 MG Take 1 Cap by mouth  every 12 hours for 5 days.        Psyllium   Take 1 Packet by mouth every morning.        .                 Medicines prescribed today were sent to:     [facility] #[id number] - [place] [address]    Phone: [phone] Fax: [phone]    Open 24 Hours?: No      Medication refill instructions:       If your prescription bottle indicates you have medication refills left, it is not necessary to call your provider’s office. Please contact your pharmacy and they will refill your medication.    If your prescription bottle indicates you do not have any refills left, you may request refills at any time through one of the following ways: The online [employer] system (except Urgent Care), by calling your provider’s office, or by asking your pharmacy to contact your provider’s office with a refill request. Medication refills are processed only during regular business hours and may not be available until the next business day. Your provider may request additional information or to have a follow-up visit with you prior to refilling your medication.   *Please Note: Medication refills are assigned a new Rx number when refilled electronically. Your pharmacy may indicate that no refills were authorized even though a new prescription for the same medication is available at the pharmacy. Please request the medicine by name with the pharmacy before contacting your provider for a refill.           [employer] Access Code: Activation code not generated  Current [employer] Status: Active

## 2017-07-12 NOTE — PROGRESS NOTES
"Subjective:      Roya Muniz is a 76 y.o. female who presents with Urinary Pain            HPI New problem. 76 year old female with week long history of burning with urination, mostly at end of stream. She denies fever, chills, abdominal or back pain. She denies urgency, frequency, nausea or vomiting. She has been taking azo for her symptoms with no relief.  Allergies, medications and history reviewed by me today      ROS  Please see HPI         Objective:     /76 mmHg  Pulse 77  Temp(Src) 37.2 °C (99 °F)  Resp 18  Ht 1.626 m (5' 4.02\")  Wt 73.483 kg (162 lb)  BMI 27.79 kg/m2  SpO2 96%     Physical Exam   Constitutional: She is oriented to person, place, and time. She appears well-developed and well-nourished. No distress.   Cardiovascular: Normal rate, regular rhythm and normal heart sounds.    No murmur heard.  Pulmonary/Chest: Effort normal and breath sounds normal. No respiratory distress.   Abdominal: Soft. Bowel sounds are normal. There is tenderness in the suprapubic area. There is no CVA tenderness.   Musculoskeletal: Normal range of motion.   Moves all 4 extremities normally   Neurological: She is alert and oriented to person, place, and time.   Skin: Skin is warm and dry.   Psychiatric: She has a normal mood and affect. Her behavior is normal. Thought content normal.   Nursing note and vitals reviewed.              Assessment/Plan:     1. Urinary pain  POCT Urinalysis   2. Dysuria  CANCELED: POCT Urinalysis   3. Urinary tract infection without hematuria, site unspecified [N39.0]  nitrofurantoin monohydr macro (MACROBID) 100 MG Cap     Differential diagnosis, natural history, supportive care, and indications for immediate follow-up discussed at length.     "

## 2017-07-14 ENCOUNTER — HOSPITAL ENCOUNTER (OUTPATIENT)
Dept: RADIOLOGY | Facility: MEDICAL CENTER | Age: 77
End: 2017-07-14
Attending: FAMILY MEDICINE
Payer: MEDICARE

## 2017-07-14 DIAGNOSIS — Z78.0 POST-MENOPAUSAL: ICD-10-CM

## 2017-07-14 DIAGNOSIS — Z12.31 ENCOUNTER FOR SCREENING MAMMOGRAM FOR MALIGNANT NEOPLASM OF BREAST: ICD-10-CM

## 2017-07-14 PROCEDURE — 77063 BREAST TOMOSYNTHESIS BI: CPT

## 2017-07-14 PROCEDURE — 77080 DXA BONE DENSITY AXIAL: CPT

## 2017-07-17 ENCOUNTER — HOSPITAL ENCOUNTER (OUTPATIENT)
Facility: MEDICAL CENTER | Age: 77
End: 2017-07-17
Attending: FAMILY MEDICINE
Payer: MEDICARE

## 2017-07-17 PROCEDURE — 82274 ASSAY TEST FOR BLOOD FECAL: CPT

## 2017-07-19 DIAGNOSIS — Z12.11 SCREENING FOR COLON CANCER: ICD-10-CM

## 2017-07-19 LAB — HEMOCCULT STL QL IA: NEGATIVE

## 2017-08-18 ENCOUNTER — GYNECOLOGY VISIT (OUTPATIENT)
Dept: OBGYN | Facility: CLINIC | Age: 77
End: 2017-08-18
Payer: MEDICARE

## 2017-08-18 VITALS
WEIGHT: 163 LBS | SYSTOLIC BLOOD PRESSURE: 128 MMHG | HEIGHT: 64 IN | BODY MASS INDEX: 27.83 KG/M2 | DIASTOLIC BLOOD PRESSURE: 78 MMHG

## 2017-08-18 DIAGNOSIS — N81.4 UTERINE PROLAPSE: ICD-10-CM

## 2017-08-18 PROCEDURE — 99203 OFFICE O/P NEW LOW 30 MIN: CPT | Performed by: OBSTETRICS & GYNECOLOGY

## 2017-08-18 ASSESSMENT — ENCOUNTER SYMPTOMS
TINGLING: 0
HEADACHES: 0
BLURRED VISION: 0
COUGH: 0
MYALGIAS: 0
HEARTBURN: 0
WEIGHT LOSS: 0
DOUBLE VISION: 0
BRUISES/BLEEDS EASILY: 0
NAUSEA: 0
CHILLS: 0
FEVER: 0
DIZZINESS: 0
DEPRESSION: 0

## 2017-08-18 NOTE — MR AVS SNAPSHOT
"        Roya Muniz   2017 1:30 PM   Gynecology Visit   MRN: [id number]    Department[facility]  [facility]   Dept Phone:  [phone]    Description:  Female : 1940   Provider:  Ivonne Botello M.D.           Reason for Visit     New Patient           Allergies as of 2017     Allergen Noted Reactions    Cephalexin [Keflex] 2015   Hives, Rash, Itching    Photosensitivity        Vital Signs     Blood Pressure Height Weight Body Mass Index Breastfeeding? Smoking Status    128/78 mmHg 1.626 m (5' 4\") 73.936 kg (163 lb) 27.97 kg/m2 No Never Smoker       Basic Information     Date Of Birth Sex Race Ethnicity Preferred Language    1940 Female White Non- English      Your appointments     Dec 27, 2017 10:30 AM   PACER CHECK ONLY with PACER CHECK-CAM B   [facility] (--)    [address]  [place] [address]   [phone]            Dec 27, 2017 11:00 AM   FOLLOW UP with Quintin Schultz M.D.   [facility] (--)    [address]  [place] [address]   [phone]              Problem List              ICD-10-CM Priority Class Noted - Resolved    Hyperlipidemia E78.5 Low  10/21/2010 - Present    Chronic kidney disease (CKD), stage III (moderate) N18.3 Medium  2011 - Present    Vitamin D deficiency E55.9   2011 - Present    IFG (impaired fasting glucose) R73.01   2014 - Present    Sinus node dysfunction (CMS-HCC) I49.5 High  2014 - Present    Cardiac pacemaker in situ Z95.0 High  2014 - Present    History of pancreatitis Z87.19   2014 - Present    History of pancreatic surgery Z98.890   2014 - Present    History of DVT of lower extremity Z86.718   2014 - Present    Other chronic pulmonary heart diseases I27.89 High  2015 - Present    Atherosclerosis of aorta (CMS-HCC) I70.0 High  2015 - Present    Hypothyroidism E03.9   2015 " - Present      Health Maintenance        Date Due Completion Dates    IMM INFLUENZA (1) 9/1/2017 11/10/2016, 11/1/2015, 11/1/2013    MAMMOGRAM 7/14/2018 7/14/2017, 2/24/2016, 2/4/2015, 12/11/2013, 11/22/2011, 10/6/2010, 9/30/2009, 9/22/2009, 9/22/2009    COLON CANCER SCREENING ANNUAL FIT 7/17/2018 7/17/2017, 1/16/2016    BONE DENSITY 7/14/2022 7/14/2017    IMM DTaP/Tdap/Td Vaccine (2 - Td) 10/24/2022 10/24/2012            Current Immunizations     13-VALENT PCV PREVNAR 11/1/2015  1:25 PM    Influenza TIV (IM) 11/1/2013    Influenza Vaccine Adult HD 11/10/2016, 11/1/2015  1:18 PM    Pneumococcal polysaccharide vaccine (PPSV-23) 9/26/2014, 11/7/2013    SHINGLES VACCINE 10/24/2012    Tdap Vaccine 10/24/2012      Below and/or attached are the medications your provider expects you to take. Review all of your home medications and newly ordered medications with your provider and/or pharmacist. Follow medication instructions as directed by your provider and/or pharmacist. Please keep your medication list with you and share with your provider. Update the information when medications are discontinued, doses are changed, or new medications (including over-the-counter products) are added; and carry medication information at all times in the event of emergency situations     Allergies:  CEPHALEXIN - Hives,Rash,Itching               Medications  Valid as of: August 18, 2017 -  2:09 PM    Generic Name Brand Name Tablet Size Instructions for use    AmLODIPine Besylate (Tab) NORVASC 10 MG TAKE ONE TABLET BY MOUTH DAILY        Cranberry-Vitamin C-Probiotic   Take  by mouth.        Levothyroxine Sodium (Tab) SYNTHROID 112 MCG TAKE ONE TABLET BY MOUTH EVERY MORNING ON AN EMPTY STOMACH        Loperamide HCl   Take 1 Tab by mouth every evening.        Losartan Potassium (Tab) COZAAR 100 MG TAKE ONE TABLET BY MOUTH DAILY (GENERIC FOR COZAAR)        Psyllium   Take 1 Packet by mouth every morning.        .                 Medicines  prescribed today were sent to:     [facility] #[id number] - [place] - [address] [place] [address]    Phone: [phone] Fax: [phone]    Open 24 Hours?: No      Medication refill instructions:       If your prescription bottle indicates you have medication refills left, it is not necessary to call your provider’s office. Please contact your pharmacy and they will refill your medication.    If your prescription bottle indicates you do not have any refills left, you may request refills at any time through one of the following ways: The online [employer] system (except Urgent Care), by calling your provider’s office, or by asking your pharmacy to contact your provider’s office with a refill request. Medication refills are processed only during regular business hours and may not be available until the next business day. Your provider may request additional information or to have a follow-up visit with you prior to refilling your medication.   *Please Note: Medication refills are assigned a new Rx number when refilled electronically. Your pharmacy may indicate that no refills were authorized even though a new prescription for the same medication is available at the pharmacy. Please request the medicine by name with the pharmacy before contacting your provider for a refill.           [employer] Access Code: Activation code not generated  Current [employer] Status: Active

## 2017-08-18 NOTE — PROGRESS NOTES
"Subjective:      Roya Muniz is a 76 y.o. female who presents with New Patient            HPI   76-year-old G3 3 P3 who is here today complaining of uterine prolapse. Patient states she had noticed prolapse for years. She states it seems to cause a little bit worse over the last few years but otherwise she is doing well. She reports no vaginal bleeding and her last menstrual cycle was greater than 25 years ago. Patient denies pelvic pain and pressure or vaginal dryness or vaginal discharge.    Review of Systems   Constitutional: Negative for fever, chills, weight loss and malaise/fatigue.   Eyes: Negative for blurred vision and double vision.   Respiratory: Negative for cough.    Cardiovascular: Negative for chest pain.   Gastrointestinal: Negative for heartburn and nausea.   Genitourinary: Negative for dysuria.   Musculoskeletal: Negative for myalgias.   Skin: Negative for rash.   Neurological: Negative for dizziness, tingling and headaches.   Endo/Heme/Allergies: Does not bruise/bleed easily.   Psychiatric/Behavioral: Negative for depression.          Objective:     /78 mmHg  Ht 1.626 m (5' 4\")  Wt 73.936 kg (163 lb)  BMI 27.97 kg/m2  Breastfeeding? No     Physical Exam   Constitutional: She is oriented to person, place, and time. She appears well-developed and well-nourished.   HENT:   Head: Normocephalic and atraumatic.   Neck: Normal range of motion. Neck supple.   Cardiovascular: Normal rate and regular rhythm.    Pulmonary/Chest: Effort normal and breath sounds normal. Right breast exhibits no inverted nipple, no mass, no nipple discharge, no skin change and no tenderness. Left breast exhibits no inverted nipple, no mass, no nipple discharge, no skin change and no tenderness. Breasts are symmetrical. There is no breast swelling.   Abdominal: Soft. Bowel sounds are normal.   Genitourinary: Rectal exam shows no external hemorrhoid. No breast tenderness, discharge or bleeding. Pelvic exam " was performed with patient supine. No labial fusion. There is no rash, tenderness, lesion or injury on the right labia. There is no rash, tenderness, lesion or injury on the left labia. Uterus is not deviated, not enlarged, not fixed and not tender. Cervix exhibits no motion tenderness, no discharge and no friability. Right adnexum displays no mass, no tenderness and no fullness. Left adnexum displays no mass, no tenderness and no fullness. No erythema, tenderness or bleeding in the vagina. No foreign body around the vagina. No signs of injury around the vagina. No vaginal discharge found.   Patient has second to third-degree apical prolapse with second to third-degree cystocele and rectocele and widened introitus   Lymphadenopathy:        Right: No inguinal adenopathy present.        Left: No inguinal adenopathy present.   Neurological: She is alert and oriented to person, place, and time.   Skin: Skin is warm and dry.   Psychiatric: She has a normal mood and affect.   Nursing note and vitals reviewed.              Assessment/Plan:     1. Uterine prolapse  Discussed with patient urine prolapse and mechanism of prolapse  Also discussed with patient treatments which can be expectant management, pessary, surgical intervention  Detailed discussion was had regarding each management option especially surgical management which would require patient having a total vaginal hysterectomy and A&P repair possible vault suspension and perineoplasty  Patient had an opportunity to ask questions about all management options  Patient will consider management choices at this time  Follow-up as needed

## 2017-08-22 NOTE — TELEPHONE ENCOUNTER
Was the patient seen in the last year in this department? Yes     Does patient have an active prescription for medications requested? No     Received Request Via: Pharmacy      Pt met protocol?: Yes, OV and TSH checked 6/17 (within range)

## 2017-08-23 RX ORDER — LEVOTHYROXINE SODIUM 112 UG/1
TABLET ORAL
Qty: 90 TAB | Refills: 3 | Status: SHIPPED | OUTPATIENT
Start: 2017-08-23 | End: 2018-02-16 | Stop reason: SDUPTHER

## 2017-08-29 ENCOUNTER — OFFICE VISIT (OUTPATIENT)
Dept: MEDICAL GROUP | Facility: PHYSICIAN GROUP | Age: 77
End: 2017-08-29
Payer: MEDICARE

## 2017-08-29 VITALS
WEIGHT: 163 LBS | SYSTOLIC BLOOD PRESSURE: 126 MMHG | HEART RATE: 76 BPM | BODY MASS INDEX: 27.83 KG/M2 | DIASTOLIC BLOOD PRESSURE: 72 MMHG | HEIGHT: 64 IN | OXYGEN SATURATION: 94 % | TEMPERATURE: 97.6 F | RESPIRATION RATE: 16 BRPM

## 2017-08-29 DIAGNOSIS — I10 ESSENTIAL HYPERTENSION: ICD-10-CM

## 2017-08-29 DIAGNOSIS — I49.5 SINUS NODE DYSFUNCTION (HCC): ICD-10-CM

## 2017-08-29 DIAGNOSIS — E03.9 HYPOTHYROIDISM, UNSPECIFIED TYPE: ICD-10-CM

## 2017-08-29 PROCEDURE — 99214 OFFICE O/P EST MOD 30 MIN: CPT | Performed by: FAMILY MEDICINE

## 2017-08-29 NOTE — PROGRESS NOTES
Subjective:     Chief Complaint   Patient presents with   • Other     DMV       Roya Muniz is a 76 y.o. female here today for follow-up on chronic conditions and DMV evaluation. Patient is currently taking all medications as prescribed. She has no difficulty with motor skills, muscle weakness, or memory changes.    Her blood pressure is well controlled. She is taking levothyroxine daily without side effects. She denies any chest pain or heart palpitations. She has a cardiac pacemaker due to sinus node dysfunction.    Allergies   Allergen Reactions   • Cephalexin [Keflex] Hives, Rash and Itching     Photosensitivity       Current medicines (including changes today)  Current Outpatient Prescriptions   Medication Sig Dispense Refill   • levothyroxine (SYNTHROID) 112 MCG Tab TAKE ONE TABLET BY MOUTH EVERY MORNING ON AN EMPTY STOMACH (GENERIC FOR SYNTHROID) 90 Tab 3   • amlodipine (NORVASC) 10 MG Tab TAKE ONE TABLET BY MOUTH DAILY 90 Tab 1   • losartan (COZAAR) 100 MG Tab TAKE ONE TABLET BY MOUTH DAILY (GENERIC FOR COZAAR) 90 Tab 1     No current facility-administered medications for this visit.      Social History   Substance Use Topics   • Smoking status: Never Smoker   • Smokeless tobacco: Never Used      Comment: avoid all tobacco products   • Alcohol use 0.0 oz/week      Comment: occasionally     Family Status   Relation Status   • Mother    • Father    • Sister Alive   • Brother    • Paternal Aunt    • Neg Hx      Family History   Problem Relation Age of Onset   • Heart Disease Mother      enlarged heart   • Cancer Father      liver   • Thyroid Sister    • Heart Disease Brother      heart failure   • Lung Disease Brother      heavy smoker   • Alcohol/Drug Brother      etoh   • Cancer Paternal Aunt      breast cancer    • Stroke Neg Hx    • Hypertension Neg Hx      She    has a past medical history of History of DVT of lower extremity (2014); Hyperlipidemia; and  "Hypertension.        ROS   GEN: no weight loss, fevers, or chills  HEENT: no blurry vision or change in vision, no ear pain, no difficulty swallowing, no throat pain, no runny nose, no nasal congestion  Resp: no shortness of breath, no cough  CV: no racing heart, no irregular beats, no chest pain  Abd: no nausea, no vomiting, no diarrhea, no constipation, no blood in stool, no dark stools, no incontinence  : no dysuria, no hematuria, no urinary incontinence, no increased frequency  MSK: no muscle aches, no joint pain, no limited motion  Neuro: no headaches, no dizziness, no LOC, no weakness, no numbness/tingling       Objective:     Blood pressure 126/72, pulse 76, temperature 36.4 °C (97.6 °F), resp. rate 16, height 1.626 m (5' 4\"), weight 73.9 kg (163 lb), SpO2 94 %. Body mass index is 27.98 kg/m².   Physical Exam:  Constitutional: Alert, no distress.  Skin: Warm, dry, good turgor, no rashes in visible areas.  Eye: Equal, round and reactive, conjunctiva clear, lids normal.  ENMT: Lips without lesions, oropharynx non-erythematous, no exudate, moist oral mucosa, bilateral tympanic membranes: No bulging, no retraction, no fluid, nonerythematous, positive light reflex, external auditory canals: Clear, scant cerumen, nonerythematous  Neck: Trachea midline, no masses, no thyromegaly. No cervical or supraclavicular lymphadenopathy. Full ROM  Respiratory: Unlabored respiratory effort, good air movement, lungs clear to auscultation, no wheezes, no ronchi.  Cardiovascular:RRR, +S1, S2, no murmur, no peripheral edema, pedal and radial pulses equal and intact bilat  Abdomen: Soft, non-tender, no masses, no hepatosplenomegaly.  MSK:5/5 muscle strength in upper extremities as well as lower extremity bilaterally  Psych: Alert and oriented, appropriate affect and mood.  Neuro: CN2-12 intact, no gross motor or sensory deficits      Assessment and Plan:   The following treatment plan was discussed    1. Hypothyroidism, " unspecified type     2. Essential hypertension     3. Sinus node dysfunction (CMS-HCC)       Chronic conditions are well controlled. Patient is on no medications that would inhibit her driving ability. Patient has no chronic medical conditions would inhibit her ability to drive. DMV form signed.      Followup: No Follow-up on file.    Please note that this dictation was created using voice recognition software. I have made every reasonable attempt to correct obvious errors, but I expect that there are errors of grammar and possibly content that I did not discover before finalizing the note.

## 2017-09-20 ENCOUNTER — PATIENT MESSAGE (OUTPATIENT)
Dept: HEALTH INFORMATION MANAGEMENT | Facility: OTHER | Age: 77
End: 2017-09-20

## 2017-12-26 DIAGNOSIS — I10 ESSENTIAL HYPERTENSION: ICD-10-CM

## 2017-12-27 RX ORDER — LOSARTAN POTASSIUM 100 MG/1
TABLET ORAL
Qty: 90 TAB | Refills: 1 | Status: SHIPPED | OUTPATIENT
Start: 2017-12-27 | End: 2018-02-16 | Stop reason: SDUPTHER

## 2017-12-27 RX ORDER — AMLODIPINE BESYLATE 10 MG/1
TABLET ORAL
Qty: 90 TAB | Refills: 1 | Status: SHIPPED | OUTPATIENT
Start: 2017-12-27 | End: 2018-02-16 | Stop reason: SDUPTHER

## 2017-12-27 NOTE — TELEPHONE ENCOUNTER
Was the patient seen in the last year in this department? Yes     Does patient have an active prescription for medications requested? No     Received Request Via: Pharmacy      Pt met protocol?: Yes, OV 8/17   BP Readings from Last 1 Encounters:   08/29/17 126/72

## 2017-12-27 NOTE — TELEPHONE ENCOUNTER
Refill X 6 months, sent to pharmacy.Pt. Seen in the last 6 months per protocol.   Lab Results   Component Value Date/Time    SODIUM 134 (L) 06/02/2017 07:44 AM    POTASSIUM 4.0 06/02/2017 07:44 AM    CHLORIDE 108 06/02/2017 07:44 AM    CO2 20 06/02/2017 07:44 AM    GLUCOSE 96 06/02/2017 07:44 AM    BUN 28 (H) 06/02/2017 07:44 AM    CREATININE 1.19 06/02/2017 07:44 AM    CREATININE 1.59 (H) 02/10/2011 08:40 AM    BUNCREATRAT 21 02/10/2011 08:40 AM    GLOMRATE 32 (L) 02/10/2011 08:40 AM

## 2018-01-03 ENCOUNTER — OFFICE VISIT (OUTPATIENT)
Dept: CARDIOLOGY | Facility: MEDICAL CENTER | Age: 78
End: 2018-01-03
Payer: MEDICARE

## 2018-01-03 ENCOUNTER — NON-PROVIDER VISIT (OUTPATIENT)
Dept: CARDIOLOGY | Facility: MEDICAL CENTER | Age: 78
End: 2018-01-03
Payer: MEDICARE

## 2018-01-03 VITALS
HEIGHT: 64 IN | HEART RATE: 66 BPM | DIASTOLIC BLOOD PRESSURE: 80 MMHG | WEIGHT: 179 LBS | OXYGEN SATURATION: 96 % | SYSTOLIC BLOOD PRESSURE: 150 MMHG | BODY MASS INDEX: 30.56 KG/M2

## 2018-01-03 DIAGNOSIS — Z95.0 CARDIAC PACEMAKER IN SITU: ICD-10-CM

## 2018-01-03 DIAGNOSIS — I10 ESSENTIAL HYPERTENSION: ICD-10-CM

## 2018-01-03 DIAGNOSIS — N18.30 CHRONIC KIDNEY DISEASE (CKD), STAGE III (MODERATE) (HCC): ICD-10-CM

## 2018-01-03 DIAGNOSIS — I49.5 SINUS NODE DYSFUNCTION (HCC): ICD-10-CM

## 2018-01-03 PROCEDURE — 93280 PM DEVICE PROGR EVAL DUAL: CPT | Performed by: INTERNAL MEDICINE

## 2018-01-03 PROCEDURE — 99213 OFFICE O/P EST LOW 20 MIN: CPT | Performed by: INTERNAL MEDICINE

## 2018-01-03 ASSESSMENT — ENCOUNTER SYMPTOMS
SHORTNESS OF BREATH: 0
FEVER: 0
BLOOD IN STOOL: 0
NEUROLOGICAL NEGATIVE: 1
BRUISES/BLEEDS EASILY: 0
CHILLS: 0
WEIGHT LOSS: 0
PALPITATIONS: 0

## 2018-01-03 NOTE — PROGRESS NOTES
Subjective:   Roya Muniz is a 89y.o. female who presents today with for follow-up of permanent pacemaker and high blood pressure. Pacemaker interrogation today demonstrates normal function with battery life of about 8 years. Her blood pressures at home have been in the 130 range. No other interval problems. She is due for lab work in the near future and has a lab slip.  Her  recently lost eyesight in one eye and is having a difficult time coping with this.  Past Medical History:   Diagnosis Date   • History of DVT of lower extremity 6/5/2014   • Hyperlipidemia    • Hypertension      Past Surgical History:   Procedure Laterality Date   • GASTROSCOPY-ENDO  2/21/2014    Performed by Steve Islas Jr., M.D. at ENDOSCOPY [facility]   • GASTROSCOPY-ENDO  2/20/2014    Performed by Antonio Espinoza M.D. at ENDOSCOPY [facility]   • EXPLORATORY LAPAROTOMY  2/10/2014    Performed by Rigo Garcia M.D. at SURGERY [facility]   • CHOLECYSTECTOMY  2/10/2014    Performed by Rigo Garcia M.D. at SURGERY [facility]   • PANCREATECTOMY  2/10/2014    Performed by Rigo Garcia M.D. at SURGERY [facility]   • GASTROSTOMY FEEDING  2/10/2014    Performed by Rigo Garcia M.D. at SURGERY [facility]   • OTHER ORTHOPEDIC SURGERY      foot surgery     Family History   Problem Relation Age of Onset   • Heart Disease Mother      enlarged heart   • Cancer Father      liver   • Thyroid Sister    • Heart Disease Brother      heart failure   • Lung Disease Brother      heavy smoker   • Alcohol/Drug Brother      etoh   • Cancer Paternal Aunt      breast cancer    • Stroke Neg Hx    • Hypertension Neg Hx      History   Smoking Status   • Never Smoker   Smokeless Tobacco   • Never Used     Comment: avoid all tobacco products     Allergies   Allergen Reactions   • Cephalexin [Keflex] Hives, Rash and Itching     Photosensitivity       Outpatient Encounter  "Prescriptions as of 1/3/2018   Medication Sig Dispense Refill   • amlodipine (NORVASC) 10 MG Tab TAKE ONE TABLET BY MOUTH DAILY 90 Tab 1   • losartan (COZAAR) 100 MG Tab TAKE ONE TABLET BY MOUTH DAILY (GENERIC FOR COZAAR) 90 Tab 1   • levothyroxine (SYNTHROID) 112 MCG Tab TAKE ONE TABLET BY MOUTH EVERY MORNING ON AN EMPTY STOMACH (GENERIC FOR SYNTHROID) 90 Tab 3     No facility-administered encounter medications on file as of 1/3/2018.      Review of Systems   Constitutional: Negative for chills, fever, malaise/fatigue and weight loss.   Respiratory: Negative for shortness of breath.    Cardiovascular: Negative for chest pain and palpitations.   Gastrointestinal: Negative for blood in stool.   Neurological: Negative.    Endo/Heme/Allergies: Does not bruise/bleed easily.        Objective:   /80   Pulse 66   Ht 1.626 m (5' 4.02\")   Wt 81.2 kg (179 lb)   SpO2 96%   BMI 30.71 kg/m²     Physical Exam   Constitutional: She is oriented to person, place, and time. She appears well-developed and well-nourished. No distress.   HENT:   Head: Normocephalic and atraumatic.   Eyes: Conjunctivae and EOM are normal. Pupils are equal, round, and reactive to light.   Neck: Neck supple. No JVD present.   Cardiovascular: Normal rate and regular rhythm.    No murmur heard.  Pulmonary/Chest: Effort normal and breath sounds normal. No respiratory distress. She has no wheezes. She has no rales.   Pacemaker present in left upper chest without evidence   of erosion, drainage,or erythema.    Abdominal: Soft. There is no tenderness.   Midline surgical scar   Musculoskeletal: She exhibits no edema.   Neurological: She is alert and oriented to person, place, and time.   Skin: Skin is warm and dry. She is not diaphoretic.   Psychiatric: She has a normal mood and affect.       Assessment:     1. Cardiac pacemaker in situ     2. Sinus node dysfunction (CMS-HCC)     3. Chronic kidney disease (CKD), stage III (moderate)     4. Essential " hypertension         Medical Decision Making:  Today's Assessment / Status / Plan:     Her pacemaker is functioning normally with good battery life. Blood pressure has been under good control. It is a bit higher today. She does have chronic kidney disease stage III and is due for lab work. Her most recent GFR was 44. Return for routine pacemaker surveillance in to see me in one year,

## 2018-02-15 ENCOUNTER — HOSPITAL ENCOUNTER (OUTPATIENT)
Dept: LAB | Facility: MEDICAL CENTER | Age: 78
End: 2018-02-15
Attending: FAMILY MEDICINE
Payer: MEDICARE

## 2018-02-15 DIAGNOSIS — R73.01 IFG (IMPAIRED FASTING GLUCOSE): ICD-10-CM

## 2018-02-15 DIAGNOSIS — Z13.6 SCREENING FOR CARDIOVASCULAR CONDITION: ICD-10-CM

## 2018-02-15 LAB
ALBUMIN SERPL BCP-MCNC: 4.7 G/DL (ref 3.2–4.9)
ALBUMIN/GLOB SERPL: 1.6 G/DL
ALP SERPL-CCNC: 52 U/L (ref 30–99)
ALT SERPL-CCNC: 26 U/L (ref 2–50)
ANION GAP SERPL CALC-SCNC: 9 MMOL/L (ref 0–11.9)
AST SERPL-CCNC: 24 U/L (ref 12–45)
BILIRUB SERPL-MCNC: 1.2 MG/DL (ref 0.1–1.5)
BUN SERPL-MCNC: 31 MG/DL (ref 8–22)
CALCIUM SERPL-MCNC: 9.8 MG/DL (ref 8.5–10.5)
CHLORIDE SERPL-SCNC: 103 MMOL/L (ref 96–112)
CHOLEST SERPL-MCNC: 213 MG/DL (ref 100–199)
CO2 SERPL-SCNC: 22 MMOL/L (ref 20–33)
CREAT SERPL-MCNC: 1.21 MG/DL (ref 0.5–1.4)
EST. AVERAGE GLUCOSE BLD GHB EST-MCNC: 137 MG/DL
GLOBULIN SER CALC-MCNC: 2.9 G/DL (ref 1.9–3.5)
GLUCOSE SERPL-MCNC: 125 MG/DL (ref 65–99)
HBA1C MFR BLD: 6.4 % (ref 0–5.6)
HDLC SERPL-MCNC: 53 MG/DL
LDLC SERPL CALC-MCNC: 133 MG/DL
POTASSIUM SERPL-SCNC: 4.7 MMOL/L (ref 3.6–5.5)
PROT SERPL-MCNC: 7.6 G/DL (ref 6–8.2)
SODIUM SERPL-SCNC: 134 MMOL/L (ref 135–145)
TRIGL SERPL-MCNC: 134 MG/DL (ref 0–149)

## 2018-02-15 PROCEDURE — 80053 COMPREHEN METABOLIC PANEL: CPT

## 2018-02-15 PROCEDURE — 36415 COLL VENOUS BLD VENIPUNCTURE: CPT

## 2018-02-15 PROCEDURE — 83036 HEMOGLOBIN GLYCOSYLATED A1C: CPT

## 2018-02-15 PROCEDURE — 80061 LIPID PANEL: CPT

## 2018-02-16 ENCOUNTER — OFFICE VISIT (OUTPATIENT)
Dept: MEDICAL GROUP | Facility: PHYSICIAN GROUP | Age: 78
End: 2018-02-16
Payer: MEDICARE

## 2018-02-16 VITALS
BODY MASS INDEX: 30.39 KG/M2 | DIASTOLIC BLOOD PRESSURE: 82 MMHG | HEART RATE: 83 BPM | WEIGHT: 178 LBS | OXYGEN SATURATION: 95 % | TEMPERATURE: 98.7 F | HEIGHT: 64 IN | SYSTOLIC BLOOD PRESSURE: 142 MMHG

## 2018-02-16 DIAGNOSIS — N18.30 CHRONIC KIDNEY DISEASE (CKD), STAGE III (MODERATE) (HCC): ICD-10-CM

## 2018-02-16 DIAGNOSIS — Z95.0 CARDIAC PACEMAKER IN SITU: ICD-10-CM

## 2018-02-16 DIAGNOSIS — R73.01 IFG (IMPAIRED FASTING GLUCOSE): ICD-10-CM

## 2018-02-16 DIAGNOSIS — I10 ESSENTIAL HYPERTENSION: ICD-10-CM

## 2018-02-16 DIAGNOSIS — K58.0 IRRITABLE BOWEL SYNDROME WITH DIARRHEA: ICD-10-CM

## 2018-02-16 DIAGNOSIS — E78.00 PURE HYPERCHOLESTEROLEMIA: ICD-10-CM

## 2018-02-16 DIAGNOSIS — E03.9 HYPOTHYROIDISM, UNSPECIFIED TYPE: ICD-10-CM

## 2018-02-16 DIAGNOSIS — I49.5 SINUS NODE DYSFUNCTION (HCC): ICD-10-CM

## 2018-02-16 PROCEDURE — 99214 OFFICE O/P EST MOD 30 MIN: CPT | Performed by: PHYSICIAN ASSISTANT

## 2018-02-16 RX ORDER — LOSARTAN POTASSIUM 100 MG/1
TABLET ORAL
Qty: 90 TAB | Refills: 1 | Status: SHIPPED | OUTPATIENT
Start: 2018-02-16 | End: 2018-12-27 | Stop reason: SDUPTHER

## 2018-02-16 RX ORDER — AMLODIPINE BESYLATE 10 MG/1
10 TABLET ORAL DAILY
Qty: 90 TAB | Refills: 1 | Status: SHIPPED | OUTPATIENT
Start: 2018-02-16 | End: 2018-12-27 | Stop reason: SDUPTHER

## 2018-02-16 RX ORDER — LEVOTHYROXINE SODIUM 112 UG/1
TABLET ORAL
Qty: 90 TAB | Refills: 3 | Status: SHIPPED | OUTPATIENT
Start: 2018-02-16 | End: 2019-02-11 | Stop reason: SDUPTHER

## 2018-02-16 NOTE — PROGRESS NOTES
Subjective:   Roya Muniz is a 77 y.o. female here today for follow-up on hypertension, hypothyroidism and other chronic conditions. Is a new patient to me and is also establishing care today.    Previous PCP: Mayra Grace DO    HPI: Patient has the following current medical problems:    Cardiac pacemaker in situ  History of sinus node dysfunction. Pacemaker placed in 2014. Has checked every 6 months.    Sinus node dysfunction  Follows with Dr. Rosado in [facility] cardiology. Sees annually. Has pacemaker.    Hypothyroidism  Take levothyroxine 112 mg daily. Last TSH in 6/2017. Is needing refills of her levothyroxine.    Chronic kidney disease (CKD), stage III (moderate)  Chronic issue. Patient states she used to follow with Dr. Hilliard but not anymore.     IFG (impaired fasting glucose)  Patient states her blood sugars have been an ongoing issue. Admits to worsening of diet over the holidays, has been trying to get back on track. Plans to start exercise regimen soon.    Hypertension  Patient currently on amlodipine 10 mg daily and losartan 100 mg daily. She checks her blood pressures periodically at home and states they run in the 130s over 70s. She denies any concerns regarding her blood pressures. She specifically denies any blurry vision, headaches, chest pain, palpitations, or dizziness. She is needing refills of both of her blood pressure medications.    Hyperlipidemia  Chronic issue, not currently on any medication.    Irritable bowel syndrome with diarrhea  Patient endorses chronic IBS-D. States she was previously prescribed cholestyramine which was not helpful. Found an OTC probiotic called Digestive Alignment which she states has worked extremely well in controlling her symptoms.       Current medicines (including changes today)  Current Outpatient Prescriptions   Medication Sig Dispense Refill   • aspirin 81 MG tablet Take 81 mg by mouth every day.     • amLODIPine (NORVASC) 10 MG Tab Take 1 Tab by  "mouth every day. 90 Tab 1   • losartan (COZAAR) 100 MG Tab TAKE ONE TABLET BY MOUTH DAILY (GENERIC FOR COZAAR) 90 Tab 1   • levothyroxine (SYNTHROID) 112 MCG Tab TAKE ONE TABLET BY MOUTH EVERY MORNING ON AN EMPTY STOMACH (GENERIC FOR SYNTHROID) 90 Tab 3     No current facility-administered medications for this visit.      She  has a past medical history of Cardiac pacemaker in situ; History of DVT of lower extremity (6/5/2014); Hyperlipidemia; Hypertension; Pancreatic cyst; Pancreatitis; Sinus node dysfunction (CMS-HCC); and Thyroid disease.    ROS  As per HPI.       Objective:     Blood pressure 142/82, pulse 83, temperature 37.1 °C (98.7 °F), height 1.626 m (5' 4.02\"), weight 80.7 kg (178 lb), SpO2 95 %. Body mass index is 30.53 kg/m².   Physical Exam:  Constitutional: Alert, pleasant, well-appearing, no distress.  Skin: No rashes in visible areas.  Eye: Conjunctiva clear, lids normal.  ENMT: Lips without lesions, moist mucus membranes.  Neck: No masses. No submandibular or cervical lymphadenopathy. No palpable thyromegaly.  Respiratory: Unlabored respiratory effort, lungs clear to auscultation, no wheezes, no rhonchi.  Cardiovascular: Normal S1, S2, no murmur, no lower extremity edema.      Assessment and Plan:   The following treatment plan was discussed    1. Essential hypertension  Chronic issue, historically well controlled on current medication regimen. Blood pressure today in the office is 142/82. Continue amlodipine and losartan. Refills ordered. I did review her recent metabolic panel that she had done yesterday. Minimally decreased sodium of 134, elevated fasting glucose, stable renal function, otherwise unremarkable.   - amLODIPine (NORVASC) 10 MG Tab; Take 1 Tab by mouth every day.  Dispense: 90 Tab; Refill: 1  - losartan (COZAAR) 100 MG Tab; TAKE ONE TABLET BY MOUTH DAILY (GENERIC FOR COZAAR)  Dispense: 90 Tab; Refill: 1    2. IFG (impaired fasting glucose)  Chronic issue, not currently on medication. " Patient has been trying to control with lifestyle modification. She is motivated to decrease carbohydrate and sugar consumption in her diet and does have plans to start a routine exercise regimen. Recent fasting glucose was elevated at 125. A1c has gone up to 6.4 from 6.1 back in July of last year. We'll continue to monitor.    3. Hypothyroidism, unspecified type  Chronic issue, well controlled on levothyroxine. TSH from June of last year reviewed which was normal. We'll continue at current dose. Refills ordered.   - levothyroxine (SYNTHROID) 112 MCG Tab; TAKE ONE TABLET BY MOUTH EVERY MORNING ON AN EMPTY STOMACH (GENERIC FOR SYNTHROID)  Dispense: 90 Tab; Refill: 3    4. Chronic kidney disease (CKD), stage III (moderate)  Chronic issue, stable per lab review. No longer seeing nephrology. We'll continue to monitor.     5. Pure hypercholesterolemia  Chronic issue, minimal elevation of total cholesterol and LDL. Not currently on medication. Patient has managed with lifestyle modification.    6. Cardiac pacemaker in situ  Chronic issue, stable, will continue pacemaker checks every 6 months and will continue to follow with cardiology.    7. Sinus node dysfunction (CMS-HCC)  Chronic issue, well-controlled with pacemaker. We'll continue to follow with cardiology.    8. Irritable bowel syndrome with diarrhea  Chronic issue, currently well-controlled since starting OTC probiotic which she plans to continue.      Followup: Return in about 6 months (around 8/16/2018) for DIA Gupta.    Lacey Kruse P.A.-C.

## 2018-02-16 NOTE — ASSESSMENT & PLAN NOTE
Patient endorses chronic IBS-D. States she was previously prescribed cholestyramine which was not helpful. Found an OTC probiotic called Digestive Alignment which she states has worked extremely well in controlling her symptoms.

## 2018-02-16 NOTE — ASSESSMENT & PLAN NOTE
Patient currently on amlodipine 10 mg daily and losartan 100 mg daily. She checks her blood pressures periodically at home and states they run in the 130s over 70s. She denies any concerns regarding her blood pressures. She specifically denies any blurry vision, headaches, chest pain, palpitations, or dizziness. She is needing refills of both of her blood pressure medications.

## 2018-02-16 NOTE — ASSESSMENT & PLAN NOTE
Patient states her blood sugars have been an ongoing issue. Admits to worsening of diet over the holidays, has been trying to get back on track. Plans to start exercise regimen soon.

## 2018-02-22 ENCOUNTER — TELEPHONE (OUTPATIENT)
Dept: MEDICAL GROUP | Facility: PHYSICIAN GROUP | Age: 78
End: 2018-02-22

## 2018-02-22 PROBLEM — R73.03 PREDIABETES: Status: ACTIVE | Noted: 2018-02-22

## 2018-02-22 NOTE — TELEPHONE ENCOUNTER
----- Message from Genie Castrejon M.D. sent at 2/22/2018  1:55 PM PST -----  Please contact pt to schedule follow up appointment with soonest available provider to discuss results

## 2018-07-09 ENCOUNTER — NON-PROVIDER VISIT (OUTPATIENT)
Dept: CARDIOLOGY | Facility: MEDICAL CENTER | Age: 78
End: 2018-07-09
Payer: MEDICARE

## 2018-07-09 DIAGNOSIS — Z95.0 CARDIAC PACEMAKER IN SITU: ICD-10-CM

## 2018-07-09 DIAGNOSIS — I49.5 SINUS NODE DYSFUNCTION (HCC): ICD-10-CM

## 2018-07-09 PROCEDURE — 93280 PM DEVICE PROGR EVAL DUAL: CPT | Performed by: INTERNAL MEDICINE

## 2018-07-20 ENCOUNTER — OFFICE VISIT (OUTPATIENT)
Dept: URGENT CARE | Facility: PHYSICIAN GROUP | Age: 78
End: 2018-07-20
Payer: MEDICARE

## 2018-07-20 ENCOUNTER — HOSPITAL ENCOUNTER (OUTPATIENT)
Facility: MEDICAL CENTER | Age: 78
End: 2018-07-20
Attending: NURSE PRACTITIONER
Payer: MEDICARE

## 2018-07-20 VITALS
BODY MASS INDEX: 28.68 KG/M2 | WEIGHT: 168 LBS | HEIGHT: 64 IN | OXYGEN SATURATION: 95 % | HEART RATE: 87 BPM | SYSTOLIC BLOOD PRESSURE: 140 MMHG | TEMPERATURE: 99.3 F | DIASTOLIC BLOOD PRESSURE: 80 MMHG

## 2018-07-20 DIAGNOSIS — R30.0 DYSURIA: ICD-10-CM

## 2018-07-20 DIAGNOSIS — R35.0 URINARY FREQUENCY: ICD-10-CM

## 2018-07-20 DIAGNOSIS — N30.01 ACUTE CYSTITIS WITH HEMATURIA: ICD-10-CM

## 2018-07-20 DIAGNOSIS — R39.15 URINARY URGENCY: ICD-10-CM

## 2018-07-20 LAB
APPEARANCE UR: NORMAL
BILIRUB UR STRIP-MCNC: NORMAL MG/DL
COLOR UR AUTO: YELLOW
GLUCOSE UR STRIP.AUTO-MCNC: NORMAL MG/DL
KETONES UR STRIP.AUTO-MCNC: NORMAL MG/DL
LEUKOCYTE ESTERASE UR QL STRIP.AUTO: NORMAL
NITRITE UR QL STRIP.AUTO: NORMAL
PH UR STRIP.AUTO: 6 [PH] (ref 5–8)
PROT UR QL STRIP: 300 MG/DL
RBC UR QL AUTO: NORMAL
SP GR UR STRIP.AUTO: 1.02
UROBILINOGEN UR STRIP-MCNC: NORMAL MG/DL

## 2018-07-20 PROCEDURE — 87186 SC STD MICRODIL/AGAR DIL: CPT

## 2018-07-20 PROCEDURE — 99214 OFFICE O/P EST MOD 30 MIN: CPT | Performed by: NURSE PRACTITIONER

## 2018-07-20 PROCEDURE — 81002 URINALYSIS NONAUTO W/O SCOPE: CPT | Performed by: NURSE PRACTITIONER

## 2018-07-20 PROCEDURE — 87077 CULTURE AEROBIC IDENTIFY: CPT

## 2018-07-20 PROCEDURE — 87086 URINE CULTURE/COLONY COUNT: CPT

## 2018-07-20 RX ORDER — CIPROFLOXACIN 500 MG/1
500 TABLET, FILM COATED ORAL EVERY 12 HOURS
Qty: 14 TAB | Refills: 0 | Status: CANCELLED | OUTPATIENT
Start: 2018-07-20 | End: 2018-07-27

## 2018-07-20 RX ORDER — NITROFURANTOIN 25; 75 MG/1; MG/1
100 CAPSULE ORAL EVERY 12 HOURS
Qty: 10 CAP | Refills: 0 | Status: SHIPPED | OUTPATIENT
Start: 2018-07-20 | End: 2018-07-25

## 2018-07-20 ASSESSMENT — ENCOUNTER SYMPTOMS
WEAKNESS: 0
CONSTIPATION: 0
PALPITATIONS: 0
ORTHOPNEA: 0
SHORTNESS OF BREATH: 0
DIARRHEA: 0
FEVER: 0
ABDOMINAL PAIN: 0
CHILLS: 0
VOMITING: 0
DIZZINESS: 0
BACK PAIN: 0
FLANK PAIN: 0
NAUSEA: 0
HEADACHES: 0
MYALGIAS: 0

## 2018-07-20 NOTE — PROGRESS NOTES
"Subjective:      Roya Muniz is a 77 y.o. female who presents with UTI (uncomfortable urination, frequent, urgency, x2 days )            HPI  Roya is here for dysuria, urgency/frequency x 2 days. Denies fever, n/v, abdominal/back pain, hematuria or recent diarrhea. H/o chronic kidney disease. Last UTI last year, placed on Macrobid, requesting same med, states \"knocked it right out\".     PMH:  has a past medical history of Cardiac pacemaker in situ; History of DVT of lower extremity (6/5/2014); Hyperlipidemia; Hypertension; Pancreatic cyst; Pancreatitis; Sinus node dysfunction (HCC); and Thyroid disease.  MEDS:   Current Outpatient Prescriptions:   •  nitrofurantoin monohydr macro (MACROBID) 100 MG Cap, Take 1 Cap by mouth every 12 hours for 5 days., Disp: 10 Cap, Rfl: 0  •  aspirin 81 MG tablet, Take 81 mg by mouth every day., Disp: , Rfl:   •  amLODIPine (NORVASC) 10 MG Tab, Take 1 Tab by mouth every day., Disp: 90 Tab, Rfl: 1  •  losartan (COZAAR) 100 MG Tab, TAKE ONE TABLET BY MOUTH DAILY (GENERIC FOR COZAAR), Disp: 90 Tab, Rfl: 1  •  levothyroxine (SYNTHROID) 112 MCG Tab, TAKE ONE TABLET BY MOUTH EVERY MORNING ON AN EMPTY STOMACH (GENERIC FOR SYNTHROID), Disp: 90 Tab, Rfl: 3  ALLERGIES:   Allergies   Allergen Reactions   • Cephalexin [Keflex] Hives, Rash and Itching     Photosensitivity       SURGHX:   Past Surgical History:   Procedure Laterality Date   • GASTROSCOPY-ENDO  2/21/2014    Performed by Steve Islas Jr., M.D. at ENDOSCOPY [facility]   • GASTROSCOPY-ENDO  2/20/2014    Performed by Antonio Espinoza M.D. at ENDOSCOPY [facility]   • EXPLORATORY LAPAROTOMY  2/10/2014    Performed by Rigo Garcia M.D. at SURGERY [facility]   • CHOLECYSTECTOMY  2/10/2014    Performed by Rigo Garcia M.D. at SURGERY [facility]   • PANCREATECTOMY  2/10/2014    Performed by Rigo Garcia M.D. at SURGERY [facility]   • GASTROSTOMY FEEDING  2/10/2014    " "Performed by Rigo Garcia M.D. at SURGERY [facility]   • OTHER ORTHOPEDIC SURGERY      foot surgery     SOCHX:  reports that she has never smoked. She has never used smokeless tobacco. She reports that she drinks alcohol. She reports that she does not use drugs.  FH: Family history was reviewed, no pertinent findings to report    Review of Systems   Constitutional: Negative for chills, fever and malaise/fatigue.   Respiratory: Negative for shortness of breath.    Cardiovascular: Negative for chest pain, palpitations, orthopnea and leg swelling.   Gastrointestinal: Negative for abdominal pain, constipation, diarrhea, nausea and vomiting.   Genitourinary: Positive for dysuria, frequency and urgency. Negative for flank pain and hematuria.   Musculoskeletal: Negative for back pain and myalgias.   Neurological: Negative for dizziness, weakness and headaches.   All other systems reviewed and are negative.         Objective:     /80   Pulse 87   Temp 37.4 °C (99.3 °F)   Ht 1.626 m (5' 4.02\")   Wt 76.2 kg (168 lb)   SpO2 95%   BMI 28.82 kg/m²      Physical Exam   Constitutional: She is oriented to person, place, and time. Vital signs are normal. She appears well-developed and well-nourished. She is active and cooperative.  Non-toxic appearance. She does not have a sickly appearance. She does not appear ill. No distress.   HENT:   Head: Normocephalic.   Eyes: Conjunctivae and EOM are normal. Pupils are equal, round, and reactive to light.   Neck: Normal range of motion. Neck supple.   Cardiovascular: Normal rate and regular rhythm.    Pulmonary/Chest: Effort normal and breath sounds normal.   Abdominal: Soft. Bowel sounds are normal. She exhibits no distension. There is no tenderness. There is no rigidity, no rebound, no guarding and no CVA tenderness.   Musculoskeletal: Normal range of motion.   Neurological: She is alert and oriented to person, place, and time.   Skin: Skin is warm and dry. She " is not diaphoretic.   Vitals reviewed.    POC Color YELLOW  Negative Final   POC Appearance CLOUDY  Negative Final   POC Leukocyte Esterase MOD  Negative Final   POC Nitrites NEG  Negative Final   POC Urobiligen NEG  Negative (0.2) mg/dL Final   POC Protein 300  Negative mg/dL Final   POC Urine PH 6.0  5.0 - 8.0 Final   POC Blood LARGE, HEMO  Negative Final   POC Specific Gravity 1.025  <1.005 - >1.030 Final   POC Ketones NEG  Negative mg/dL Final   POC Bilirubin NEG  Negative mg/dL Final   POC Glucose NEG  Negative mg/dL Final             -Similar findings on last UA with comparable GFR value from last year- will treat with Macrobid.  - Does not see an urologist at this time, managed through primary care for chronic kidney disease.  -Due for follow up with primary per last note in 2/2018 with blood work- notified patient of this and to schedule this appointment at d/c and send PCP request for follow up blood work prior to appt via [employer]- patient agrees to this plan of care.  Assessment/Plan:     1. Dysuria    - POCT Urinalysis    2. Urinary frequency    - POCT Urinalysis    3. Urinary urgency    - POCT Urinalysis    4. Acute cystitis with hematuria    - URINE CULTURE(NEW); Future  - nitrofurantoin monohydr macro (MACROBID) 100 MG Cap; Take 1 Cap by mouth every 12 hours for 5 days.  Dispense: 10 Cap; Refill: 0    Increase water intake  Urinate more frequently and empty bladder completely  Practice good toileting hygiene after bowel movements and sexual intercourse, refrain from sexual intercourse until infection cleared  Monitor for back/flank pain, difficulty urinating, blood in urine- need re-evaluation    May leave message on number regarding urine culture

## 2018-07-21 DIAGNOSIS — N30.01 ACUTE CYSTITIS WITH HEMATURIA: ICD-10-CM

## 2018-07-23 ENCOUNTER — TELEPHONE (OUTPATIENT)
Dept: URGENT CARE | Facility: PHYSICIAN GROUP | Age: 78
End: 2018-07-23

## 2018-07-23 LAB
BACTERIA UR CULT: ABNORMAL
BACTERIA UR CULT: ABNORMAL
SIGNIFICANT IND 70042: ABNORMAL
SITE SITE: ABNORMAL
SOURCE SOURCE: ABNORMAL

## 2018-07-23 NOTE — TELEPHONE ENCOUNTER
Called and left message on phone number regarding urine culture results to be E. Coli, on appropriate abx, finish completely.

## 2018-07-30 ENCOUNTER — TELEPHONE (OUTPATIENT)
Dept: MEDICAL GROUP | Facility: PHYSICIAN GROUP | Age: 78
End: 2018-07-30

## 2018-07-30 NOTE — TELEPHONE ENCOUNTER
Future Appointments       Provider Department Center    8/10/2018 10:45 AM Lacey Kruse P.A.-C.; [facility]  [facility] [place]    12/6/2018 9:45 AM PACER CHECK-[facility]     12/6/2018 10:20 AM Quintin Schultz M.D. [facility]         ANNUAL WELLNESS VISIT PRE-VISIT PLANNING WITHOUT OUTREACH    1.  Reviewed note from last office visit with PCP: YES    2.  If any orders were placed at last visit, do we have Results/Consult Notes?        •  Labs - Labs were not ordered at last office visit.       •  Imaging - Imaging ordered, completed and results are in chart.       •  Referrals - Referral ordered, patient has NOT been seen.    3.  Immunizations were updated in [employer] using WebIZ?: Yes       •  WebIZ Recommendations: FLU, TD and SHINGRIX (Shingles)        •  Is patient due for Tdap? NO       •  Is patient due for Shingles? YES. Patient was notified of copay/out of pocket cost.     4.  Patient is due for the following Health Maintenance Topics:   Health Maintenance Due   Topic Date Due   • IMM ZOSTER VACCINES (2 of 3) 12/19/2012   • Annual Wellness Visit  06/07/2018   • MAMMOGRAM  07/14/2018   • COLON CANCER SCREENING ANNUAL FIT  07/17/2018       5.  Reviewed/Updated the following with patient:       •   Preferred Pharmacy? YES       •   Preferred Lab? YES       •   Preferred Communication? YES       •   Allergies? YES       •   Medications? YES. Was Abstract Encounter opened and chart updated? YES       •   Social History? YES. Was Abstract Encounter opened and chart updated? YES       •   Family History (document living status of immediate family members and if + hx of  cancer, diabetes, hypertension, hyperlipidemia, heart attack, stroke) YES. Was Abstract Encounter opened and chart updated? YES    6.  Care Team Updated:       •   DME Company (gait device, O2, CPAP, etc.): YES       •    Other Specialists (eye doctor, derm, GYN, cardiology, endo, etc): YES    7.  Patient has the following Care Path diagnoses on Problem List:  NONE    8.  MDX printed and highlighted for Provider? YES    9.  Patient was advised: “This is a free wellness visit. The provider will screen for medical conditions to help you stay healthy. If you have other concerns to address you may be asked to discuss these at a separate visit or there may be an additional fee.”     10.  Patient was informed to arrive 15 min prior to their scheduled appointment and bring in their medication bottles.

## 2018-08-10 ENCOUNTER — OFFICE VISIT (OUTPATIENT)
Dept: MEDICAL GROUP | Facility: PHYSICIAN GROUP | Age: 78
End: 2018-08-10
Payer: MEDICARE

## 2018-08-10 VITALS
OXYGEN SATURATION: 96 % | TEMPERATURE: 98.3 F | WEIGHT: 167 LBS | SYSTOLIC BLOOD PRESSURE: 124 MMHG | HEART RATE: 69 BPM | DIASTOLIC BLOOD PRESSURE: 78 MMHG | BODY MASS INDEX: 28.51 KG/M2 | HEIGHT: 64 IN

## 2018-08-10 DIAGNOSIS — Z23 NEED FOR VACCINATION: ICD-10-CM

## 2018-08-10 DIAGNOSIS — Z95.0 CARDIAC PACEMAKER IN SITU: ICD-10-CM

## 2018-08-10 DIAGNOSIS — I49.5 SINUS NODE DYSFUNCTION (HCC): ICD-10-CM

## 2018-08-10 DIAGNOSIS — R73.03 PREDIABETES: ICD-10-CM

## 2018-08-10 DIAGNOSIS — E03.9 HYPOTHYROIDISM, UNSPECIFIED TYPE: ICD-10-CM

## 2018-08-10 DIAGNOSIS — I70.0 ATHEROSCLEROSIS OF AORTA (HCC): ICD-10-CM

## 2018-08-10 DIAGNOSIS — I10 ESSENTIAL HYPERTENSION: ICD-10-CM

## 2018-08-10 DIAGNOSIS — Z00.00 MEDICARE ANNUAL WELLNESS VISIT, SUBSEQUENT: ICD-10-CM

## 2018-08-10 DIAGNOSIS — E55.9 VITAMIN D DEFICIENCY: ICD-10-CM

## 2018-08-10 DIAGNOSIS — I27.20 PULMONARY HYPERTENSION (HCC): ICD-10-CM

## 2018-08-10 DIAGNOSIS — Z12.11 SCREENING FOR COLORECTAL CANCER: ICD-10-CM

## 2018-08-10 DIAGNOSIS — N18.30 CHRONIC KIDNEY DISEASE (CKD), STAGE III (MODERATE) (HCC): ICD-10-CM

## 2018-08-10 DIAGNOSIS — R73.01 IFG (IMPAIRED FASTING GLUCOSE): ICD-10-CM

## 2018-08-10 DIAGNOSIS — E78.00 PURE HYPERCHOLESTEROLEMIA: ICD-10-CM

## 2018-08-10 DIAGNOSIS — K58.0 IRRITABLE BOWEL SYNDROME WITH DIARRHEA: ICD-10-CM

## 2018-08-10 DIAGNOSIS — Z12.12 SCREENING FOR COLORECTAL CANCER: ICD-10-CM

## 2018-08-10 PROCEDURE — G0439 PPPS, SUBSEQ VISIT: HCPCS | Performed by: PHYSICIAN ASSISTANT

## 2018-08-10 PROCEDURE — 99213 OFFICE O/P EST LOW 20 MIN: CPT | Mod: 25 | Performed by: PHYSICIAN ASSISTANT

## 2018-08-10 RX ORDER — AMITRIPTYLINE HYDROCHLORIDE 10 MG/1
10 TABLET, FILM COATED ORAL
Qty: 30 TAB | Refills: 1 | Status: SHIPPED | OUTPATIENT
Start: 2018-08-10 | End: 2018-09-07 | Stop reason: SINTOL

## 2018-08-10 ASSESSMENT — PATIENT HEALTH QUESTIONNAIRE - PHQ9: CLINICAL INTERPRETATION OF PHQ2 SCORE: 0

## 2018-08-10 ASSESSMENT — ACTIVITIES OF DAILY LIVING (ADL): BATHING_REQUIRES_ASSISTANCE: 0

## 2018-08-10 ASSESSMENT — ENCOUNTER SYMPTOMS: GENERAL WELL-BEING: EXCELLENT

## 2018-08-10 NOTE — PROGRESS NOTES
Chief Complaint   Patient presents with   • Annual Wellness Visit         HPI:  Roya is a 77 y.o. here for Medicare Annual Wellness Visit. Is an established patient of mine.        Patient Active Problem List    Diagnosis Date Noted   • Pulmonary hypertension (HCC) 06/18/2015     Priority: High   • Atherosclerosis of aorta (HCC) 06/18/2015     Priority: High   • Cardiac pacemaker in situ 02/25/2014     Priority: High   • Sinus node dysfunction (HCC) 02/24/2014     Priority: High   • Chronic kidney disease (CKD), stage III (moderate) 07/22/2011     Priority: Medium   • Hyperlipidemia 10/21/2010     Priority: Low   • Prediabetes 02/22/2018   • Irritable bowel syndrome with diarrhea 02/16/2018   • Hypertension    • Hypothyroidism 08/28/2015   • IFG (impaired fasting glucose) 01/14/2014   • Vitamin D deficiency 11/17/2011       Current Outpatient Prescriptions   Medication Sig Dispense Refill   • NON SPECIFIED 0.5 mL by Intramuscular route Once for 1 dose. 1 Each 1   • amitriptyline (ELAVIL) 10 MG Tab Take 1 Tab by mouth every bedtime. 30 Tab 1   • aspirin 81 MG tablet Take 81 mg by mouth every day.     • amLODIPine (NORVASC) 10 MG Tab Take 1 Tab by mouth every day. 90 Tab 1   • losartan (COZAAR) 100 MG Tab TAKE ONE TABLET BY MOUTH DAILY (GENERIC FOR COZAAR) 90 Tab 1   • levothyroxine (SYNTHROID) 112 MCG Tab TAKE ONE TABLET BY MOUTH EVERY MORNING ON AN EMPTY STOMACH (GENERIC FOR SYNTHROID) 90 Tab 3     No current facility-administered medications for this visit.         Patient is taking medications as noted in medication list.  Current supplements as per medication list.     Allergies: Cephalexin [keflex]    Current social contact/activities: Visit with family and friends, sarah     Is patient current with immunizations? No, due for SHINGRIX (Shingles). Patient is interested in receiving NONE today.    She  reports that she has never smoked. She has never used smokeless tobacco. She reports that she drinks alcohol.  She reports that she does not use drugs.  Counseling given: Not Answered        DPA/Advanced directive: Patient has Advanced Directive, but it is not on file. Instructed to bring in a copy to scan into their chart.    ROS:    Gait: Uses no assistive device   Ostomy: No   Other tubes: No   Amputations: No   Chronic oxygen use No   Last eye exam 1 year    Wears hearing aids: No   : Denies any urinary leakage during the last 6 months    Depression Screening    Little interest or pleasure in doing things?  0 - not at all  Feeling down, depressed, or hopeless? 0 - not at all  Patient Health Questionnaire Score: 0    If depressive symptoms identified deferred to follow up visit unless specifically addressed in assessment and plan.    Interpretation of PHQ-9 Total Score   Score Severity   1-4 No Depression   5-9 Mild Depression   10-14 Moderate Depression   15-19 Moderately Severe Depression   20-27 Severe Depression    Screening for Cognitive Impairment    Three Minute Recall (leader, season, table)  2/3 Leader season table   [place] clock face with all 12 numbers and set the hands to show 10 past 11.  Yes 11:10 5/5  If cognitive concerns identified, deferred for follow up unless specifically addressed in assessment and plan.    Fall Risk Assessment    Has the patient had two or more falls in the last year or any fall with injury in the last year?  No  If fall risk identified, deferred for follow up unless specifically addressed in assessment and plan.    Safety Assessment    Throw rugs on floor.  Yes  Handrails on all stairs.  Yes  Good lighting in all hallways.  Yes  Difficulty hearing.  No  Patient counseled about all safety risks that were identified.    Functional Assessment ADLs    Are there any barriers preventing you from cooking for yourself or meeting nutritional needs?  No.    Are there any barriers preventing you from driving safely or obtaining transportation?  No.    Are there any barriers preventing you from  using a telephone or calling for help?  No.    Are there any barriers preventing you from shopping?  No.    Are there any barriers preventing you from taking care of your own finances?  No.    Are there any barriers preventing you from managing your medications?  No.    Are there any barriers preventing you from showering, bathing or dressing yourself?  No.    Are you currently engaging in any exercise or physical activity?  Yes.  Biking, gardening, house keeping   What is your perception of your health?  Excellent.    Health Maintenance Summary                IMM ZOSTER VACCINES Overdue 12/19/2012      Done 10/24/2012 Imm Admin: Zoster Vaccine Live (ZVL) (Zostavax)    Annual Wellness Visit Overdue 6/7/2018      Done 6/6/2017 Visit Dx: Medicare annual wellness visit, subsequent     Patient has more history with this topic...    MAMMOGRAM Overdue 7/14/2018      Done 7/14/2017 MA-MAMMO SCREENING BILAT W/TOMOSYNTHESIS W/CAD     Patient has more history with this topic...    COLON CANCER SCREENING ANNUAL FIT Overdue 7/17/2018      Done 7/17/2017 OCCULT BLOOD FECES IMMUNOASSAY     Patient has more history with this topic...    IMM INFLUENZA Next Due 9/1/2018      Done 10/18/2017 Imm Admin: Influenza Vaccine Adult HD     Patient has more history with this topic...    BONE DENSITY Next Due 7/14/2022      Done 7/14/2017 DS-BONE DENSITY STUDY (DEXA)    IMM DTaP/Tdap/Td Vaccine Next Due 10/24/2022      Done 10/24/2012 Imm Admin: Tdap Vaccine          Patient Care Team:  Lacey Kruse P.A.-C. as PCP - General (Physician Assistant)  Quintin Schultz M.D. as Consulting Physician (Cardiology)  Yousif Islas Jr., M.D. as Consulting Physician (Gastroenterology)    Social History   Substance Use Topics   • Smoking status: Never Smoker   • Smokeless tobacco: Never Used      Comment: avoid all tobacco products   • Alcohol use 0.0 oz/week      Comment: occasionally     Family History   Problem Relation Age of Onset  "  • Heart Disease Mother         enlarged heart   • Cancer Father         liver   • Thyroid Sister    • Heart Disease Brother         heart failure   • Lung Disease Brother         heavy smoker   • Alcohol/Drug Brother         etoh   • Cancer Paternal Aunt         breast cancer    • Cancer Maternal Grandmother         liver    • Heart Disease Maternal Grandfather         CHF    • No Known Problems Paternal Grandmother    • No Known Problems Paternal Grandfather    • Stroke Neg Hx      She  has a past medical history of Cardiac pacemaker in situ; History of DVT of lower extremity (6/5/2014); Hyperlipidemia; Hypertension; Pancreatic cyst; Pancreatitis; Sinus node dysfunction (HCC); and Thyroid disease.   Past Surgical History:   Procedure Laterality Date   • GASTROSCOPY-ENDO  2/21/2014    Performed by Steve Islas Jr., M.D. at ENDOSCOPY [facility]   • GASTROSCOPY-ENDO  2/20/2014    Performed by Antonio Espinoza M.D. at ENDOSCOPY [facility]   • EXPLORATORY LAPAROTOMY  2/10/2014    Performed by Rigo Garcia M.D. at SURGERY [facility]   • CHOLECYSTECTOMY  2/10/2014    Performed by Rigo Garcia M.D. at SURGERY [facility]   • PANCREATECTOMY  2/10/2014    Performed by Rigo Garcia M.D. at SURGERY [facility]   • GASTROSTOMY FEEDING  2/10/2014    Performed by Rigo Garcia M.D. at SURGERY [facility]   • OTHER ORTHOPEDIC SURGERY      foot surgery           Exam:     Blood pressure 124/78, pulse 69, temperature 36.8 °C (98.3 °F), height 1.626 m (5' 4\"), weight 75.8 kg (167 lb), SpO2 96 %. Body mass index is 28.67 kg/m².    Hearing good.    Dentition good  Alert, oriented in no acute distress.  Eye contact is good, speech goal directed, affect calm  Moist mucus membranes  Normal S1/S2, RRR  Lungs CTA bilat  Normoactive bowel sounds. Abdomen is soft, non-distended, non-tender throughout      Assessment and Plan. The following treatment and monitoring " plan is recommended:      Cardiac pacemaker in situ  Chronic issue, stable. Inserted d/t sinus node dysfunction. Has checked every 6 months. Due next in 10/2018. Continue current management.    Sinus node dysfunction  Chronic issue, well-controlled with pacemaker. Follows annually with cardiology. Continue current management.    Atherosclerosis of aorta  Chronic issue noted on previous imaging. Compliant with daily baby aspirin. Continue current management.    Chronic kidney disease (CKD), stage III (moderate)  Chronic issue, stable per review. Will continue to monitor.    Hyperlipidemia  Chronic issue, stable, not currently on medication and managing with lifestyle. Most recent lipid panel shows elevation of total cholesterol and LDL. Will discuss medication at next office visit.    Cholesterol,Tot 213   100 - 199 mg/dL Final   Triglycerides 134  0 - 149 mg/dL Final   HDL 53  >=40 mg/dL Final      <100 mg/dL Final         Hypertension  Chronic issue, well controlled with daily amlodipine and losartan. Blood pressure today in the office is 124/78. Continue current management.    Hypothyroidism  Chronic issue, well controlled with daily levothyroxine. Last TSH done in 6/2017 and normal. Due for repeat TSH which I have ordered. In the meantime, continue current dose of levothyroxine.    IFG (impaired fasting glucose)  Chronic issue, stable, known prediabetes. Last A1c reviewed which was 6.4. Due for repeat which I have ordered. We'll discuss results at next office visit.    Prediabetes  Chronic issue, stable. Last A1c reviewed which was 6.4. Due for repeat which I have ordered. We'll discuss results at next office visit.    Vitamin D deficiency  History of deficiency on prior lab work. Patient not currently on vitamin D supplement which I recommended she start. Advised 5000 international units daily.    Irritable bowel syndrome with diarrhea  Chronic issue which is currently uncontrolled. Patient would like to  discuss management of this today. She was previously using cholestyramine and Imodium, both of which she did not find helpful at all. For a while, she was taking an over-the-counter probiotic which was helping quite a bit, but in recent months, this has stopped working. She endorses frequent diarrhea and fecal urgency. She occasionally has leakage and has to wear a pad. This has been interfering with her lifestyle, as she is sometimes afraid to leave the house due to the diarrhea. She denies any significant abdominal discomfort associated with the diarrhea. Has not had any problems with constipation. Denies any nausea, vomiting, or anorexia. No chest pain or shortness of breath. She is willing to try another medication that may help her symptoms. Recommend initiation of low-dose amitriptyline. Side effects discussed. Patient was provided with a handout on the medication to read through. Recommend follow-up with me in 4-6 weeks to reevaluate symptoms. Also discussed keeping a food journal to keep track of certain foods that may exacerbate the diarrhea.    Pulmonary hypertension (HCC)  Noted on previous ECHO from 1/2014. RV systolic pressure of 36-46 mmHg. Asymptomatic, clinically stable, will continue to follow with cardiology.        Services suggested: No services needed at this time  Health Care Screening recommendations as per orders if indicated.  Referrals offered: PT/OT/Nutrition counseling/Behavioral Health/Smoking cessation as per orders if indicated.    Discussion today about general wellness and lifestyle habits:    · Prevent falls and reduce trip hazards; Cautioned about securing or removing rugs.  · Have a working fire alarm and carbon monoxide detector;   · Engage in regular physical activity and social activities       Follow-up: Return in about 6 weeks (around 9/21/2018) for f/u IBS-D, labs; Short.     Lacey Kruse P.A.-C.

## 2018-08-10 NOTE — ASSESSMENT & PLAN NOTE
Chronic issue, well-controlled with pacemaker. Follows annually with cardiology. Continue current management.

## 2018-08-10 NOTE — ASSESSMENT & PLAN NOTE
Chronic issue, stable. Inserted d/t sinus node dysfunction. Has checked every 6 months. Due next in 10/2018. Continue current management.

## 2018-08-10 NOTE — ASSESSMENT & PLAN NOTE
Chronic issue, stable. Last A1c reviewed which was 6.4. Due for repeat which I have ordered. We'll discuss results at next office visit.

## 2018-08-10 NOTE — ASSESSMENT & PLAN NOTE
Chronic issue, well controlled with daily levothyroxine. Last TSH done in 6/2017 and normal. Due for repeat TSH which I have ordered. In the meantime, continue current dose of levothyroxine.

## 2018-08-10 NOTE — ASSESSMENT & PLAN NOTE
Chronic issue, stable, known prediabetes. Last A1c reviewed which was 6.4. Due for repeat which I have ordered. We'll discuss results at next office visit.

## 2018-08-10 NOTE — PATIENT INSTRUCTIONS
Amitriptyline tablets  What is this medicine?  AMITRIPTYLINE (a deborah TRIP ti jacquelin) is used to treat depression.  This medicine may be used for other purposes; ask your health care provider or pharmacist if you have questions.  COMMON BRAND NAME(S): León Guzman  What should I tell my health care provider before I take this medicine?  They need to know if you have any of these conditions:  -an alcohol problem  -asthma, difficulty breathing  -bipolar disorder or schizophrenia  -difficulty passing urine, prostate trouble  -glaucoma  -heart disease or previous heart attack  -liver disease  -over active thyroid  -seizures  -thoughts or plans of suicide, a previous suicide attempt, or family history of suicide attempt  -an unusual or allergic reaction to amitriptyline, other medicines, foods, dyes, or preservatives  -pregnant or trying to get pregnant  -breast-feeding  How should I use this medicine?  Take this medicine by mouth with a drink of water. Follow the directions on the prescription label. You can take the tablets with or without food. Take your medicine at regular intervals. Do not take it more often than directed. Do not stop taking this medicine suddenly except upon the advice of your doctor. Stopping this medicine too quickly may cause serious side effects or your condition may worsen.  A special MedGuide will be given to you by the pharmacist with each prescription and refill. Be sure to read this information carefully each time.  Talk to your pediatrician regarding the use of this medicine in children. Special care may be needed.  Overdosage: If you think you have taken too much of this medicine contact a poison control center or emergency room at once.  NOTE: This medicine is only for you. Do not share this medicine with others.  What if I miss a dose?  If you miss a dose, take it as soon as you can. If it is almost time for your next dose, take only that dose. Do not take double or extra doses.  What  may interact with this medicine?  Do not take this medicine with any of the following medications:  -arsenic trioxide  -certain medicines used to regulate abnormal heartbeat or to treat other heart conditions  -cisapride  -droperidol  -halofantrine  -linezolid  -MAOIs like Carbex, Eldepryl, Marplan, Nardil, and Parnate  -methylene blue  -other medicines for mental depression  -phenothiazines like perphenazine, thioridazine and chlorpromazine  -pimozide  -probucol  -procarbazine  -sparfloxacin  -Shelly's Wort  -ziprasidone  This medicine may also interact with the following medications:  -atropine and related drugs like hyoscyamine, scopolamine, tolterodine and others  -barbiturate medicines for inducing sleep or treating seizures, like phenobarbital  -cimetidine  -disulfiram  -ethchlorvynol  -thyroid hormones such as levothyroxine  This list may not describe all possible interactions. Give your health care provider a list of all the medicines, herbs, non-prescription drugs, or dietary supplements you use. Also tell them if you smoke, drink alcohol, or use illegal drugs. Some items may interact with your medicine.  What should I watch for while using this medicine?  Tell your doctor if your symptoms do not get better or if they get worse. Visit your doctor or health care professional for regular checks on your progress. Because it may take several weeks to see the full effects of this medicine, it is important to continue your treatment as prescribed by your doctor.  Patients and their families should watch out for new or worsening thoughts of suicide or depression. Also watch out for sudden changes in feelings such as feeling anxious, agitated, panicky, irritable, hostile, aggressive, impulsive, severely restless, overly excited and hyperactive, or not being able to sleep. If this happens, especially at the beginning of treatment or after a change in dose, call your health care professional.  You may get drowsy or  dizzy. Do not drive, use machinery, or do anything that needs mental alertness until you know how this medicine affects you. Do not stand or sit up quickly, especially if you are an older patient. This reduces the risk of dizzy or fainting spells. Alcohol may interfere with the effect of this medicine. Avoid alcoholic drinks.  Do not treat yourself for coughs, colds, or allergies without asking your doctor or health care professional for advice. Some ingredients can increase possible side effects.  Your mouth may get dry. Chewing sugarless gum or sucking hard candy, and drinking plenty of water will help. Contact your doctor if the problem does not go away or is severe.  This medicine may cause dry eyes and blurred vision. If you wear contact lenses you may feel some discomfort. Lubricating drops may help. See your eye doctor if the problem does not go away or is severe.  This medicine can cause constipation. Try to have a bowel movement at least every 2 to 3 days. If you do not have a bowel movement for 3 days, call your doctor or health care professional.  This medicine can make you more sensitive to the sun. Keep out of the sun. If you cannot avoid being in the sun, wear protective clothing and use sunscreen. Do not use sun lamps or tanning beds/booths.  What side effects may I notice from receiving this medicine?  Side effects that you should report to your doctor or health care professional as soon as possible:  -allergic reactions like skin rash, itching or hives, swelling of the face, lips, or tongue  -anxious  -breathing problems  -changes in vision  -confusion  -elevated mood, decreased need for sleep, racing thoughts, impulsive behavior  -eye pain  -fast, irregular heartbeat  -feeling faint or lightheaded, falls  -feeling agitated, angry, or irritable  -fever with increased sweating  -hallucination, loss of contact with reality  -seizures  -stiff muscles  -suicidal thoughts or other mood  changes  -tingling, pain, or numbness in the feet or hands  -trouble passing urine or change in the amount of urine  -trouble sleeping  -unusually weak or tired  -vomiting  -yellowing of the eyes or skin  Side effects that usually do not require medical attention (report to your doctor or health care professional if they continue or are bothersome):  -change in sex drive or performance  -change in appetite or weight  -constipation  -dizziness  -dry mouth  -nausea  -tired  -tremors  -upset stomach  This list may not describe all possible side effects. Call your doctor for medical advice about side effects. You may report side effects to FDA at 4-633-FDA-8373.  Where should I keep my medicine?  Keep out of the reach of children.  Store at room temperature between 20 and 25 degrees C (68 and 77 degrees F). Throw away any unused medicine after the expiration date.  NOTE: This sheet is a summary. It may not cover all possible information. If you have questions about this medicine, talk to your doctor, pharmacist, or health care provider.  © 2018 [employer]/Gold Standard (2017-05-19 12:14:15)

## 2018-08-10 NOTE — ASSESSMENT & PLAN NOTE
Chronic issue which is currently uncontrolled. Patient would like to discuss management of this today. She was previously using cholestyramine and Imodium, both of which she did not find helpful at all. For a while, she was taking an over-the-counter probiotic which was helping quite a bit, but in recent months, this has stopped working. She endorses frequent diarrhea and fecal urgency. She occasionally has leakage and has to wear a pad. This has been interfering with her lifestyle, as she is sometimes afraid to leave the house due to the diarrhea. She denies any significant abdominal discomfort associated with the diarrhea. Has not had any problems with constipation. Denies any nausea, vomiting, or anorexia. No chest pain or shortness of breath. She is willing to try another medication that may help her symptoms. Recommend initiation of low-dose amitriptyline. Side effects discussed. Patient was provided with a handout on the medication to read through. Recommend follow-up with me in 4-6 weeks to reevaluate symptoms. Also discussed keeping a food journal to keep track of certain foods that may exacerbate the diarrhea.

## 2018-08-10 NOTE — ASSESSMENT & PLAN NOTE
History of deficiency on prior lab work. Patient not currently on vitamin D supplement which I recommended she start. Advised 5000 international units daily.

## 2018-08-10 NOTE — ASSESSMENT & PLAN NOTE
Chronic issue noted on previous imaging. Compliant with daily baby aspirin. Continue current management.

## 2018-08-10 NOTE — ASSESSMENT & PLAN NOTE
Chronic issue, well controlled with daily amlodipine and losartan. Blood pressure today in the office is 124/78. Continue current management.

## 2018-08-10 NOTE — ASSESSMENT & PLAN NOTE
Noted on previous ECHO from 1/2014. RV systolic pressure of 36-46 mmHg. Asymptomatic, clinically stable, will continue to follow with cardiology.

## 2018-08-10 NOTE — ASSESSMENT & PLAN NOTE
Chronic issue, stable, not currently on medication and managing with lifestyle. Most recent lipid panel shows elevation of total cholesterol and LDL. Will discuss medication at next office visit.    Cholesterol,Tot 213   100 - 199 mg/dL Final   Triglycerides 134  0 - 149 mg/dL Final   HDL 53  >=40 mg/dL Final      <100 mg/dL Final

## 2018-08-15 ENCOUNTER — HOSPITAL ENCOUNTER (OUTPATIENT)
Facility: MEDICAL CENTER | Age: 78
End: 2018-08-15
Attending: PHYSICIAN ASSISTANT
Payer: MEDICARE

## 2018-08-15 PROCEDURE — 82274 ASSAY TEST FOR BLOOD FECAL: CPT

## 2018-08-17 ENCOUNTER — OFFICE VISIT (OUTPATIENT)
Dept: URGENT CARE | Facility: PHYSICIAN GROUP | Age: 78
End: 2018-08-17
Payer: MEDICARE

## 2018-08-17 ENCOUNTER — HOSPITAL ENCOUNTER (OUTPATIENT)
Facility: MEDICAL CENTER | Age: 78
End: 2018-08-17
Attending: PHYSICIAN ASSISTANT
Payer: MEDICARE

## 2018-08-17 ENCOUNTER — HOSPITAL ENCOUNTER (OUTPATIENT)
Dept: LAB | Facility: MEDICAL CENTER | Age: 78
End: 2018-08-17
Attending: PHYSICIAN ASSISTANT
Payer: MEDICARE

## 2018-08-17 ENCOUNTER — TELEPHONE (OUTPATIENT)
Dept: URGENT CARE | Facility: PHYSICIAN GROUP | Age: 78
End: 2018-08-17

## 2018-08-17 VITALS
BODY MASS INDEX: 28.51 KG/M2 | SYSTOLIC BLOOD PRESSURE: 132 MMHG | DIASTOLIC BLOOD PRESSURE: 76 MMHG | WEIGHT: 167 LBS | HEIGHT: 64 IN | HEART RATE: 80 BPM | OXYGEN SATURATION: 98 % | TEMPERATURE: 98.8 F

## 2018-08-17 DIAGNOSIS — E03.9 HYPOTHYROIDISM, UNSPECIFIED TYPE: ICD-10-CM

## 2018-08-17 DIAGNOSIS — N30.00 ACUTE CYSTITIS WITHOUT HEMATURIA: ICD-10-CM

## 2018-08-17 DIAGNOSIS — R73.01 IFG (IMPAIRED FASTING GLUCOSE): ICD-10-CM

## 2018-08-17 DIAGNOSIS — R73.03 PREDIABETES: ICD-10-CM

## 2018-08-17 LAB
APPEARANCE UR: NORMAL
BILIRUB UR STRIP-MCNC: NORMAL MG/DL
COLOR UR AUTO: YELLOW
EST. AVERAGE GLUCOSE BLD GHB EST-MCNC: 148 MG/DL
GLUCOSE UR STRIP.AUTO-MCNC: NORMAL MG/DL
HBA1C MFR BLD: 6.8 % (ref 0–5.6)
KETONES UR STRIP.AUTO-MCNC: NORMAL MG/DL
LEUKOCYTE ESTERASE UR QL STRIP.AUTO: NORMAL
NITRITE UR QL STRIP.AUTO: NORMAL
PH UR STRIP.AUTO: 5.5 [PH] (ref 5–8)
PROT UR QL STRIP: NORMAL MG/DL
RBC UR QL AUTO: NORMAL
SP GR UR STRIP.AUTO: <1.005
TSH SERPL DL<=0.005 MIU/L-ACNC: 3.25 UIU/ML (ref 0.38–5.33)
UROBILINOGEN UR STRIP-MCNC: 0.2 MG/DL

## 2018-08-17 PROCEDURE — 87186 SC STD MICRODIL/AGAR DIL: CPT

## 2018-08-17 PROCEDURE — 36415 COLL VENOUS BLD VENIPUNCTURE: CPT

## 2018-08-17 PROCEDURE — 87086 URINE CULTURE/COLONY COUNT: CPT

## 2018-08-17 PROCEDURE — 99214 OFFICE O/P EST MOD 30 MIN: CPT | Performed by: PHYSICIAN ASSISTANT

## 2018-08-17 PROCEDURE — 81002 URINALYSIS NONAUTO W/O SCOPE: CPT | Performed by: PHYSICIAN ASSISTANT

## 2018-08-17 PROCEDURE — 83036 HEMOGLOBIN GLYCOSYLATED A1C: CPT

## 2018-08-17 PROCEDURE — 87077 CULTURE AEROBIC IDENTIFY: CPT

## 2018-08-17 PROCEDURE — 84443 ASSAY THYROID STIM HORMONE: CPT

## 2018-08-17 RX ORDER — SULFAMETHOXAZOLE AND TRIMETHOPRIM 800; 160 MG/1; MG/1
1 TABLET ORAL 2 TIMES DAILY
Qty: 10 TAB | Refills: 0 | Status: SHIPPED | OUTPATIENT
Start: 2018-08-17 | End: 2018-08-22

## 2018-08-17 ASSESSMENT — ENCOUNTER SYMPTOMS
VOMITING: 0
FEVER: 0
CHILLS: 0
ABDOMINAL PAIN: 0
FLANK PAIN: 0
DIARRHEA: 0
MUSCULOSKELETAL NEGATIVE: 1
SHORTNESS OF BREATH: 0
NAUSEA: 0
DIZZINESS: 0

## 2018-08-17 NOTE — TELEPHONE ENCOUNTER
Please mail patient the information I have printed. Too many pages to attach to [employer].  Lacey Kruse P.A.-C.

## 2018-08-17 NOTE — PROGRESS NOTES
"Subjective:      Roya Muniz is a 77 y.o. female who presents with Dysuria (Was seen for UTI on 7/20/18, Pt feels like UTI has returned )            Dysuria    This is a new problem. The current episode started in the past 7 days (4 days). The problem occurs every urination. The problem has been unchanged. The quality of the pain is described as burning. The pain is at a severity of 2/10. The pain is mild. There has been no fever. She is not sexually active. There is no history of pyelonephritis. Associated symptoms include frequency and urgency. Pertinent negatives include no chills, flank pain, hematuria, nausea, possible pregnancy or vomiting. She has tried nothing for the symptoms. There is no history of catheterization, kidney stones, recurrent UTIs or a urological procedure.     Patient was seen in urgent care for the same problem 1 month ago. She was started on a 5 day course of macrobid and all symptoms resolved. Urine culture from that time was positive for E coli, pan sensitive. Her symptoms returned about 4 days ago. No fevers, chills, body aches, nausea, vomiting, abdominal pain or flank pain.     Review of Systems   Constitutional: Negative for chills and fever.   HENT: Negative for congestion.    Respiratory: Negative for shortness of breath.    Cardiovascular: Negative for chest pain.   Gastrointestinal: Negative for abdominal pain, diarrhea, nausea and vomiting.   Genitourinary: Positive for dysuria, frequency and urgency. Negative for flank pain and hematuria.   Musculoskeletal: Negative.    Skin: Negative for rash.   Neurological: Negative for dizziness.        Objective:     /76   Pulse 80   Temp 37.1 °C (98.8 °F)   Ht 1.626 m (5' 4\")   Wt 75.8 kg (167 lb)   SpO2 98%   BMI 28.67 kg/m²      Physical Exam   Constitutional: She is oriented to person, place, and time. She appears well-developed and well-nourished. No distress.   HENT:   Head: Normocephalic and atraumatic. "   Eyes: Pupils are equal, round, and reactive to light.   Neck: Normal range of motion.   Cardiovascular: Normal rate.    Pulmonary/Chest: Effort normal.   Abdominal: Soft. Bowel sounds are normal. She exhibits no distension. There is no tenderness. There is no guarding.   No CVAT bilaterally   Musculoskeletal: Normal range of motion.   Neurological: She is alert and oriented to person, place, and time.   Skin: Skin is warm and dry. She is not diaphoretic.   Psychiatric: She has a normal mood and affect. Her behavior is normal.   Nursing note and vitals reviewed.         PMH:  has a past medical history of Cardiac pacemaker in situ; History of DVT of lower extremity (6/5/2014); Hyperlipidemia; Hypertension; Pancreatic cyst; Pancreatitis; Sinus node dysfunction (HCC); and Thyroid disease.  MEDS:   Current Outpatient Prescriptions:   •  sulfamethoxazole-trimethoprim (BACTRIM DS) 800-160 MG tablet, Take 1 Tab by mouth 2 times a day for 5 days., Disp: 10 Tab, Rfl: 0  •  aspirin 81 MG tablet, Take 81 mg by mouth every day., Disp: , Rfl:   •  amLODIPine (NORVASC) 10 MG Tab, Take 1 Tab by mouth every day., Disp: 90 Tab, Rfl: 1  •  losartan (COZAAR) 100 MG Tab, TAKE ONE TABLET BY MOUTH DAILY (GENERIC FOR COZAAR), Disp: 90 Tab, Rfl: 1  •  levothyroxine (SYNTHROID) 112 MCG Tab, TAKE ONE TABLET BY MOUTH EVERY MORNING ON AN EMPTY STOMACH (GENERIC FOR SYNTHROID), Disp: 90 Tab, Rfl: 3  •  amitriptyline (ELAVIL) 10 MG Tab, Take 1 Tab by mouth every bedtime., Disp: 30 Tab, Rfl: 1  ALLERGIES:   Allergies   Allergen Reactions   • Cephalexin [Keflex] Hives, Rash and Itching     Photosensitivity       SURGHX:   Past Surgical History:   Procedure Laterality Date   • GASTROSCOPY-ENDO  2/21/2014    Performed by Steve Islas Jr., M.D. at ENDOSCOPY [facility]   • GASTROSCOPY-ENDO  2/20/2014    Performed by Antonio Espinoza M.D. at ENDOSCOPY [facility]   • EXPLORATORY LAPAROTOMY  2/10/2014    Performed by Rigo Garcia  M.D. at SURGERY [facility]   • CHOLECYSTECTOMY  2/10/2014    Performed by Rigo Garcia M.D. at SURGERY [facility]   • PANCREATECTOMY  2/10/2014    Performed by Rigo Garcia M.D. at SURGERY [facility]   • GASTROSTOMY FEEDING  2/10/2014    Performed by Rigo Garcia M.D. at SURGERY [facility]   • OTHER ORTHOPEDIC SURGERY      foot surgery     SOCHX:  reports that she has never smoked. She has never used smokeless tobacco. She reports that she drinks alcohol. She reports that she does not use drugs.  FH: family history includes Alcohol/Drug in her brother; Cancer in her father, maternal grandmother, and paternal aunt; Heart Disease in her brother, maternal grandfather, and mother; Lung Disease in her brother; No Known Problems in her paternal grandfather and paternal grandmother; Thyroid in her sister.    POCT Urinalysis:  Component Results     Component Value Ref Range & Units Status   POC Color Yellow  Negative Final   POC Appearance Cloudy  Negative Final   POC Leukocyte Esterase Mod  Negative Final   POC Nitrites Neg  Negative Final   POC Urobiligen 0.2  Negative (0.2) mg/dL Final   POC Protein Trace  Negative mg/dL Final   POC Urine PH 5.5  5.0 - 8.0 Final   POC Blood Mod  Negative Final   POC Specific Gravity <1.005  <1.005 - >1.030 Final   POC Ketones Neg  Negative mg/dL Final   POC Bilirubin Neg  Negative mg/dL Final   POC Glucose Neg  Negative mg/dL Final   Last Resulted Time   Fri Aug 17, 2018  9:54 AM          Assessment/Plan:     1. Acute cystitis without hematuria  - POCT Urinalysis --> + leuks, + blood   - URINE CULTURE(NEW); Future  - sulfamethoxazole-trimethoprim (BACTRIM DS) 800-160 MG tablet; Take 1 Tab by mouth 2 times a day for 5 days.  Dispense: 10 Tab; Refill: 0   - Complete full course of antibiotics as prescribed     - Pt educated on preventative measures for avoiding future UTIs  - Advised to increase fluid intake  - OTC Pyridium (Azo) for  symptomatic relief, advised that it will turn urine orange in color  - Pending urine culture  - ER precautions given regarding pyelonephritis including fevers greater than 101 and, vomiting and dehydration, increased back pain.

## 2018-08-17 NOTE — TELEPHONE ENCOUNTER
1. Caller Name: blayne                                         Call Back Number: [phone] (home)         Patient approves a detailed voicemail message: N\A Pt states it is ok to send [employer] message    Pt states at her last appointment you were going to give her a handout on IBS and a good diet to follow. She is requesting you send her this information through [employer]. Please advise.

## 2018-08-20 ENCOUNTER — TELEPHONE (OUTPATIENT)
Dept: URGENT CARE | Facility: PHYSICIAN GROUP | Age: 78
End: 2018-08-20

## 2018-08-20 DIAGNOSIS — Z12.12 SCREENING FOR COLORECTAL CANCER: ICD-10-CM

## 2018-08-20 DIAGNOSIS — Z12.11 SCREENING FOR COLORECTAL CANCER: ICD-10-CM

## 2018-08-20 LAB — AMBIGUOUS DTTM AMBI4: NORMAL

## 2018-08-20 NOTE — TELEPHONE ENCOUNTER
Left message for patient informing her of positive urine culture results for E. Coli, susceptible to current course of Bactrim. Advised to finish full course of antibiotics and to return my call with any questions or concerns.

## 2018-08-21 LAB — HEMOCCULT STL QL IA: NEGATIVE

## 2018-08-25 ENCOUNTER — HOSPITAL ENCOUNTER (OUTPATIENT)
Dept: RADIOLOGY | Facility: MEDICAL CENTER | Age: 78
End: 2018-08-25
Attending: PHYSICIAN ASSISTANT
Payer: MEDICARE

## 2018-08-25 DIAGNOSIS — Z12.31 SCREENING MAMMOGRAM, ENCOUNTER FOR: ICD-10-CM

## 2018-08-25 PROCEDURE — 77067 SCR MAMMO BI INCL CAD: CPT

## 2018-08-28 ENCOUNTER — HOSPITAL ENCOUNTER (OUTPATIENT)
Dept: RADIOLOGY | Facility: MEDICAL CENTER | Age: 78
End: 2018-08-28
Attending: PHYSICIAN ASSISTANT
Payer: MEDICARE

## 2018-08-28 DIAGNOSIS — R92.8 ABNORMAL MAMMOGRAM: ICD-10-CM

## 2018-08-28 DIAGNOSIS — R92.8 ABNORMAL FINDING ON BREAST IMAGING: ICD-10-CM

## 2018-08-28 PROCEDURE — 77066 DX MAMMO INCL CAD BI: CPT

## 2018-08-28 PROCEDURE — 76642 ULTRASOUND BREAST LIMITED: CPT | Mod: RT

## 2018-09-05 DIAGNOSIS — R92.8 ABNORMAL FINDING ON BREAST IMAGING: ICD-10-CM

## 2018-09-06 ENCOUNTER — TELEPHONE (OUTPATIENT)
Dept: MEDICAL GROUP | Facility: PHYSICIAN GROUP | Age: 78
End: 2018-09-06

## 2018-09-06 NOTE — TELEPHONE ENCOUNTER
Future Appointments       Provider Department Center    9/7/2018 11:05 AM Lacey Kruse P.A.-C. [facility]    12/4/2018 9:40 AM Quintin Schultz M.D. [facility]     12/6/2018 9:45 AM PACER CHECK-[facility] 2 [facility]         ESTABLISHED PATIENT PRE-VISIT PLANNING     Note: Patient will not be contacted if there is no indication to call.     1.  Reviewed notes from the last few office visits within the medical group: Yes    2.  If any orders were placed at last visit or intended to be done for this visit (i.e. 6 mos follow-up), do we have Results/Consult Notes?        •  Labs - Labs ordered, completed on 08/20/18 and results are in chart.       •  Imaging - Imaging ordered, NOT completed. Patient advised to complete prior to next appointment.       •  Referrals - No referrals were ordered at last office visit.    3. Is this appointment scheduled as a Hospital Follow-Up? No    4.  Immunizations were updated in [facility] using Web[employer]?: Yes       •  Web [employer] Recommendations: FLU, TD and ZOSTAVAX (Shingles)    5.  Patient is due for the following Health Maintenance Topics:   Health Maintenance Due   Topic Date Due   • IMM ZOSTER VACCINES (2 of 3) 12/19/2012   • IMM INFLUENZA (1) 09/01/2018       6.  MDX printed for Provider? NO complete and compliant on 08/10/18    7.  Patient was informed to arrive 15 min prior to their scheduled appointment and bring in their medication bottles. Confirmed through automated call

## 2018-09-07 ENCOUNTER — OFFICE VISIT (OUTPATIENT)
Dept: MEDICAL GROUP | Facility: PHYSICIAN GROUP | Age: 78
End: 2018-09-07
Payer: MEDICARE

## 2018-09-07 VITALS
HEART RATE: 80 BPM | BODY MASS INDEX: 28.56 KG/M2 | RESPIRATION RATE: 16 BRPM | TEMPERATURE: 97.8 F | OXYGEN SATURATION: 97 % | HEIGHT: 64 IN | WEIGHT: 167.3 LBS | SYSTOLIC BLOOD PRESSURE: 132 MMHG | DIASTOLIC BLOOD PRESSURE: 70 MMHG

## 2018-09-07 DIAGNOSIS — K58.0 IRRITABLE BOWEL SYNDROME WITH DIARRHEA: ICD-10-CM

## 2018-09-07 DIAGNOSIS — F32.A ANXIETY AND DEPRESSION: ICD-10-CM

## 2018-09-07 DIAGNOSIS — E11.9 TYPE 2 DIABETES MELLITUS WITHOUT COMPLICATION, WITHOUT LONG-TERM CURRENT USE OF INSULIN (HCC): ICD-10-CM

## 2018-09-07 DIAGNOSIS — F41.9 ANXIETY AND DEPRESSION: ICD-10-CM

## 2018-09-07 DIAGNOSIS — E78.00 PURE HYPERCHOLESTEROLEMIA: ICD-10-CM

## 2018-09-07 PROCEDURE — 99214 OFFICE O/P EST MOD 30 MIN: CPT | Performed by: PHYSICIAN ASSISTANT

## 2018-09-07 RX ORDER — VITAMIN E 200 UNIT
CAPSULE ORAL
COMMUNITY
End: 2020-08-10

## 2018-09-07 RX ORDER — ESCITALOPRAM OXALATE 5 MG/1
5 TABLET ORAL DAILY
Qty: 30 TAB | Refills: 2 | Status: SHIPPED | OUTPATIENT
Start: 2018-09-07 | End: 2019-02-04

## 2018-09-07 NOTE — ASSESSMENT & PLAN NOTE
Here today for follow-up on IBS-D. Patient had expressed concerns at her recent wellness visit about uncontrolled diarrhea unresponsive to cholestyramine and loperamide. I started her on low-dose amitriptyline at the last visit, 10 mg at bedtime. Patient states that she tried the medication for 4 nights, but was unable to tolerate side effects. States that the medication did not help at all with the diarrhea but rather made it worse. She does not want to continue to medication and would like to know if there are any alternatives.

## 2018-09-07 NOTE — ASSESSMENT & PLAN NOTE
New problem. Recent A1c last month came back elevated at 6.8. Patient not currently on medication for diabetes.

## 2018-09-07 NOTE — PATIENT INSTRUCTIONS
Escitalopram tablets  What is this medicine?  ESCITALOPRAM (es sye SONDRA oh pram) is used to treat depression and certain types of anxiety.  This medicine may be used for other purposes; ask your health care provider or pharmacist if you have questions.  COMMON BRAND NAME(S): Lexapro  What should I tell my health care provider before I take this medicine?  They need to know if you have any of these conditions:  -bipolar disorder or a family history of bipolar disorder  -diabetes  -glaucoma  -heart disease  -kidney or liver disease  -receiving electroconvulsive therapy  -seizures (convulsions)  -suicidal thoughts, plans, or attempt by you or a family member  -an unusual or allergic reaction to escitalopram, the related drug citalopram, other medicines, foods, dyes, or preservatives  -pregnant or trying to become pregnant  -breast-feeding  How should I use this medicine?  Take this medicine by mouth with a glass of water. Follow the directions on the prescription label. You can take it with or without food. If it upsets your stomach, take it with food. Take your medicine at regular intervals. Do not take it more often than directed. Do not stop taking this medicine suddenly except upon the advice of your doctor. Stopping this medicine too quickly may cause serious side effects or your condition may worsen.  A special MedGuide will be given to you by the pharmacist with each prescription and refill. Be sure to read this information carefully each time.  Talk to your pediatrician regarding the use of this medicine in children. Special care may be needed.  Overdosage: If you think you have taken too much of this medicine contact a poison control center or emergency room at once.  NOTE: This medicine is only for you. Do not share this medicine with others.  What if I miss a dose?  If you miss a dose, take it as soon as you can. If it is almost time for your next dose, take only that dose. Do not take double or extra  doses.  What may interact with this medicine?  Do not take this medicine with any of the following medications:  -certain medicines for fungal infections like fluconazole, itraconazole, ketoconazole, posaconazole, voriconazole  -cisapride  -citalopram  -dofetilide  -dronedarone  -linezolid  -MAOIs like Carbex, Eldepryl, Marplan, Nardil, and Parnate  -methylene blue (injected into a vein)  -pimozide  -thioridazine  -ziprasidone  This medicine may also interact with the following medications:  -alcohol  -amphetamines  -aspirin and aspirin-like medicines  -carbamazepine  -certain medicines for depression, anxiety, or psychotic disturbances  -certain medicines for migraine headache like almotriptan, eletriptan, frovatriptan, naratriptan, rizatriptan, sumatriptan, zolmitriptan  -certain medicines for sleep  -certain medicines that treat or prevent blood clots like warfarin, enoxaparin, dalteparin  -cimetidine  -diuretics  -fentanyl  -furazolidone  -isoniazid  -lithium  -metoprolol  -NSAIDs, medicines for pain and inflammation, like ibuprofen or naproxen  -other medicines that prolong the QT interval (cause an abnormal heart rhythm)  -procarbazine  -rasagiline  -supplements like [place]'s wort, kava kava, valerian  -tramadol  -tryptophan  This list may not describe all possible interactions. Give your health care provider a list of all the medicines, herbs, non-prescription drugs, or dietary supplements you use. Also tell them if you smoke, drink alcohol, or use illegal drugs. Some items may interact with your medicine.  What should I watch for while using this medicine?  Tell your doctor if your symptoms do not get better or if they get worse. Visit your doctor or health care professional for regular checks on your progress. Because it may take several weeks to see the full effects of this medicine, it is important to continue your treatment as prescribed by your doctor.  Patients and their families should watch out for  new or worsening thoughts of suicide or depression. Also watch out for sudden changes in feelings such as feeling anxious, agitated, panicky, irritable, hostile, aggressive, impulsive, severely restless, overly excited and hyperactive, or not being able to sleep. If this happens, especially at the beginning of treatment or after a change in dose, call your health care professional.  You may get drowsy or dizzy. Do not drive, use machinery, or do anything that needs mental alertness until you know how this medicine affects you. Do not stand or sit up quickly, especially if you are an older patient. This reduces the risk of dizzy or fainting spells. Alcohol may interfere with the effect of this medicine. Avoid alcoholic drinks.  Your mouth may get dry. Chewing sugarless gum or sucking hard candy, and drinking plenty of water may help. Contact your doctor if the problem does not go away or is severe.  What side effects may I notice from receiving this medicine?  Side effects that you should report to your doctor or health care professional as soon as possible:  -allergic reactions like skin rash, itching or hives, swelling of the face, lips, or tongue  -anxious  -black, tarry stools  -changes in vision  -confusion  -elevated mood, decreased need for sleep, racing thoughts, impulsive behavior  -eye pain  -fast, irregular heartbeat  -feeling faint or lightheaded, falls  -feeling agitated, angry, or irritable  -hallucination, loss of contact with reality  -loss of balance or coordination  -loss of memory  -painful or prolonged erections  -restlessness, pacing, inability to keep still  -seizures  -stiff muscles  -suicidal thoughts or other mood changes  -trouble sleeping  -unusual bleeding or bruising  -unusually weak or tired  -vomiting  Side effects that usually do not require medical attention (report to your doctor or health care professional if they continue or are bothersome):  -changes in appetite  -change in sex  drive or performance  -headache  -increased sweating  -indigestion, nausea  -tremors  This list may not describe all possible side effects. Call your doctor for medical advice about side effects. You may report side effects to FDA at 6-847-WRQ-0709.  Where should I keep my medicine?  Keep out of reach of children.  Store at room temperature between 15 and 30 degrees C (59 and 86 degrees F). Throw away any unused medicine after the expiration date.  NOTE: This sheet is a summary. It may not cover all possible information. If you have questions about this medicine, talk to your doctor, pharmacist, or health care provider.  © 2018 [employer]vier/Gold Standard (2017-05-22 13:20:23)

## 2018-09-10 NOTE — ASSESSMENT & PLAN NOTE
Elevated but not currently on medication. Trying to manage with lifestyle. Last lipid panel in 2/2018.

## 2018-09-10 NOTE — PROGRESS NOTES
Subjective:   Roya Muniz is a 77 y.o. female here today for follow-up IBS, discuss lab results. Is an established patient of mine.    HPI:     Type 2 diabetes mellitus without complication, without long-term current use of insulin ([facility])  New problem. Recent A1c last month came back elevated at 6.8. Patient not currently on medication for diabetes.     Irritable bowel syndrome with diarrhea  Here today for follow-up on IBS-D. Patient had expressed concerns at her recent wellness visit about uncontrolled diarrhea unresponsive to cholestyramine and loperamide. I started her on low-dose amitriptyline at the last visit, 10 mg at bedtime. Patient states that she tried the medication for 4 nights, but was unable to tolerate side effects. States that the medication did not help at all with the diarrhea but rather made it worse. She does not want to continue to medication and would like to know if there are any alternatives.    Hyperlipidemia  Elevated but not currently on medication. Trying to manage with lifestyle. Last lipid panel in 2/2018.    Anxiety and depression  Patient tells me today that she feels her IBS symptoms have historically coincided with when her stress and anxiety are at their highest. She has struggled with anxiety as well as depression for quite awhile. She is not currently on any medication for symptoms.        Current medicines (including changes today)  Current Outpatient Prescriptions   Medication Sig Dispense Refill   • MEGARED OMEGA-3 KRILL  MG Cap Take  by mouth.     • escitalopram (LEXAPRO) 5 MG tablet Take 1 Tab by mouth every day. 30 Tab 2   • aspirin 81 MG tablet Take 81 mg by mouth every day.     • amLODIPine (NORVASC) 10 MG Tab Take 1 Tab by mouth every day. 90 Tab 1   • losartan (COZAAR) 100 MG Tab TAKE ONE TABLET BY MOUTH DAILY (GENERIC FOR COZAAR) 90 Tab 1   • levothyroxine (SYNTHROID) 112 MCG Tab TAKE ONE TABLET BY MOUTH EVERY MORNING ON AN EMPTY STOMACH (GENERIC  "FOR SYNTHROID) 90 Tab 3     No current facility-administered medications for this visit.      She  has a past medical history of Cardiac pacemaker in situ; History of DVT of lower extremity (6/5/2014); Hyperlipidemia; Hypertension; Pancreatic cyst; Pancreatitis; Sinus node dysfunction (HCC); and Thyroid disease.    ROS  Gastrointestinal ROS: Positive for diarrhea   Psychiatric ROS: Positive for anxiety, depression. No suicidal ideation       Objective:     Blood pressure 132/70, pulse 80, temperature 36.6 °C (97.8 °F), resp. rate 16, height 1.626 m (5' 4\"), weight 75.9 kg (167 lb 4.8 oz), SpO2 97 %. Body mass index is 28.72 kg/m².     Physical Exam:  Constitutional: Alert, well-appearing, no distress.  Psychiatric: Fully oriented with fluent speech. Pleasant and conversational. Normal affect with euthymic mood.  Skin: No rashes in visible areas.  Eye: Conjunctiva clear, lids normal.  ENMT: Lips without lesions, moist mucus membranes.        Assessment and Plan:   The following treatment plan was discussed    1. Irritable bowel syndrome with diarrhea  Established problem, still uncontrolled. Will d/c amitriptyline given unable to tolerate. Since there seems to be strong psychologic component to her symptoms, will trial on SSRI. Lexapro prescribed. Advised patient to start with 1/2 tablet and increase to full tablet after 2 weeks if tolerating OK. Possible side effects discussed. F/u in 2 months.  - escitalopram (LEXAPRO) 5 MG tablet; Take 1 Tab by mouth every day.  Dispense: 30 Tab; Refill: 2    2. Type 2 diabetes mellitus without complication, without long-term current use of insulin (HCC)  New problem. Recent A1c was 6.8 which I explained to patient is consistent with type 2 diabetes. Discussed importance of lifestyle modification to control BS, including carb/sweet limitation and regular exercise. Will hold off on medication right now. Metformin in particular likely to exacerbate her diarrhea. Repeat A1c in 6 " months.  - HEMOGLOBIN A1C; Future    3. Pure hypercholesterolemia  Established problem, uncontrolled per last lipid panel. Will have her repeat in 6 months along with A1c, and discuss further at that time. Not currently on medication, but would recommend given new diabetic status.  - LIPID PROFILE; Future    4. Anxiety and depression  New problems to me, but per patient have been ongoing for quite awhile. Will be trialing Lexapro in the hopes that it will improve IBS-D as well as her anxiety and depression. Will re-assess symptoms at next office visit.  - escitalopram (LEXAPRO) 5 MG tablet; Take 1 Tab by mouth every day.  Dispense: 30 Tab; Refill: 2      Followup: Return in about 2 months (around 11/7/2018) for f/u IBS; Short.    Lacey Kruse P.A.-C.

## 2018-09-10 NOTE — ASSESSMENT & PLAN NOTE
Patient tells me today that she feels her IBS symptoms have historically coincided with when her stress and anxiety are at their highest. She has struggled with anxiety as well as depression for quite awhile. She is not currently on any medication for symptoms.

## 2018-10-15 ENCOUNTER — PATIENT OUTREACH (OUTPATIENT)
Dept: HEALTH INFORMATION MANAGEMENT | Facility: OTHER | Age: 78
End: 2018-10-15

## 2018-10-15 NOTE — PROGRESS NOTES
Completed AWV and will be receiving her influenza today at [facility] pharmacy and ask about the zoster vaccine as well if she is able to receive the hep B. She stated she is not diabetic, she stated she is on verge of being diabetic but Lacey has ordered some labs for her to complete to just make sure.    Please transfer to Patient Outreach Team at [phone] when patient returns call.    WebIZ Checked & Epic Updated:  yes  Td (adult), adsorbed   Influenza w/preserv.   Zoster [place] (Shingrix)     HealthConnect Verified: yes  Effective Date: 1/1/2018     Attempt # 1    .

## 2018-12-04 ENCOUNTER — OFFICE VISIT (OUTPATIENT)
Dept: CARDIOLOGY | Facility: MEDICAL CENTER | Age: 78
End: 2018-12-04
Payer: MEDICARE

## 2018-12-04 ENCOUNTER — NON-PROVIDER VISIT (OUTPATIENT)
Dept: CARDIOLOGY | Facility: MEDICAL CENTER | Age: 78
End: 2018-12-04
Payer: MEDICARE

## 2018-12-04 VITALS
HEIGHT: 64 IN | OXYGEN SATURATION: 97 % | HEART RATE: 83 BPM | DIASTOLIC BLOOD PRESSURE: 80 MMHG | SYSTOLIC BLOOD PRESSURE: 138 MMHG | WEIGHT: 167 LBS | BODY MASS INDEX: 28.51 KG/M2

## 2018-12-04 DIAGNOSIS — Z95.0 CARDIAC PACEMAKER IN SITU: ICD-10-CM

## 2018-12-04 DIAGNOSIS — I10 ESSENTIAL HYPERTENSION: ICD-10-CM

## 2018-12-04 DIAGNOSIS — I49.5 SINUS NODE DYSFUNCTION (HCC): ICD-10-CM

## 2018-12-04 DIAGNOSIS — I27.20 PULMONARY HYPERTENSION (HCC): ICD-10-CM

## 2018-12-04 PROCEDURE — 93280 PM DEVICE PROGR EVAL DUAL: CPT | Performed by: INTERNAL MEDICINE

## 2018-12-04 PROCEDURE — 99213 OFFICE O/P EST LOW 20 MIN: CPT | Performed by: INTERNAL MEDICINE

## 2018-12-04 ASSESSMENT — ENCOUNTER SYMPTOMS
FEVER: 0
CHILLS: 0
SHORTNESS OF BREATH: 0
WEIGHT LOSS: 0
BRUISES/BLEEDS EASILY: 0
BLURRED VISION: 0
PALPITATIONS: 0
NEUROLOGICAL NEGATIVE: 1
BLOOD IN STOOL: 0

## 2018-12-04 NOTE — PROGRESS NOTES
Chief Complaint   Patient presents with   • HTN (Uncontrolled)     F/V HTN       Subjective:   Roya Muniz is a 78 y.o. female who presents today primarily for follow-up of pacemaker.  She has history of hypertension.  She also has history of mildly elevated pulmonary pressures by echo in 2014.  At that time her PA pressure was approximately 40 mmHg.  She has no exertional dyspnea, no chest pain and no palpitations.  She is due for pacer check today.  Her blood pressure at home generally in the 120 range.  As low as 110 and as high as 130.   Past Medical History:   Diagnosis Date   • Cardiac pacemaker in situ    • History of DVT of lower extremity 6/5/2014   • Hyperlipidemia    • Hypertension    • Pancreatic cyst    • Pancreatitis    • Sinus node dysfunction (HCC)    • Thyroid disease     hypothyroidism     Past Surgical History:   Procedure Laterality Date   • GASTROSCOPY-ENDO  2/21/2014    Performed by Steve Islas Jr., M.D. at ENDOSCOPY [facility]   • GASTROSCOPY-ENDO  2/20/2014    Performed by Antonio Espinoza M.D. at ENDOSCOPY [facility]   • EXPLORATORY LAPAROTOMY  2/10/2014    Performed by Rigo Garcia M.D. at SURGERY [facility]   • CHOLECYSTECTOMY  2/10/2014    Performed by Rigo Garcia M.D. at SURGERY [facility]   • PANCREATECTOMY  2/10/2014    Performed by Rigo Garcia M.D. at SURGERY [facility]   • GASTROSTOMY FEEDING  2/10/2014    Performed by Rigo Garcia M.D. at SURGERY [facility]   • OTHER ORTHOPEDIC SURGERY      foot surgery     Family History   Problem Relation Age of Onset   • Heart Disease Mother         enlarged heart   • Cancer Father         liver   • Thyroid Sister    • Heart Disease Brother         heart failure   • Lung Disease Brother         heavy smoker   • Alcohol/Drug Brother         etoh   • Cancer Paternal Aunt         breast cancer    • Cancer Maternal Grandmother         liver    • Heart  Disease Maternal Grandfather         CHF    • No Known Problems Paternal Grandmother    • No Known Problems Paternal Grandfather    • Stroke Neg Hx      Social History     Social History   • Marital status:      Spouse name: N/A   • Number of children: N/A   • Years of education: N/A     Occupational History   • Not on file.     Social History Main Topics   • Smoking status: Never Smoker   • Smokeless tobacco: Never Used      Comment: avoid all tobacco products   • Alcohol use 0.0 oz/week      Comment: occasionally   • Drug use: No   • Sexual activity: Yes     Partners: Male     Other Topics Concern   • Not on file     Social History Narrative   • No narrative on file     Allergies   Allergen Reactions   • Cephalexin [Keflex] Hives, Rash and Itching     Photosensitivity       Outpatient Encounter Prescriptions as of 12/4/2018   Medication Sig Dispense Refill   • vitamin D (CHOLECALCIFEROL) 1000 UNIT Tab Take 1,000 Units by mouth every day.     • MEGARED OMEGA-3 KRILL  MG Cap Take  by mouth.     • escitalopram (LEXAPRO) 5 MG tablet Take 1 Tab by mouth every day. 30 Tab 2   • aspirin 81 MG tablet Take 81 mg by mouth every day.     • amLODIPine (NORVASC) 10 MG Tab Take 1 Tab by mouth every day. 90 Tab 1   • losartan (COZAAR) 100 MG Tab TAKE ONE TABLET BY MOUTH DAILY (GENERIC FOR COZAAR) 90 Tab 1   • levothyroxine (SYNTHROID) 112 MCG Tab TAKE ONE TABLET BY MOUTH EVERY MORNING ON AN EMPTY STOMACH (GENERIC FOR SYNTHROID) 90 Tab 3     No facility-administered encounter medications on file as of 12/4/2018.      Review of Systems   Constitutional: Negative for chills, fever, malaise/fatigue and weight loss.   HENT: Negative.    Eyes: Negative for blurred vision.   Respiratory: Negative for shortness of breath.    Cardiovascular: Negative for chest pain and palpitations.   Gastrointestinal: Negative for blood in stool.   Neurological: Negative.    Endo/Heme/Allergies: Does not bruise/bleed easily.         "Objective:   /80 (BP Location: Left arm, Patient Position: Sitting, BP Cuff Size: Adult)   Pulse 83   Ht 1.626 m (5' 4\")   Wt 75.8 kg (167 lb)   SpO2 97%   BMI 28.67 kg/m²     Physical Exam   Constitutional: She is oriented to person, place, and time. No distress.   HENT:   Head: Normocephalic and atraumatic.   Eyes: Pupils are equal, round, and reactive to light. No scleral icterus.   Neck: No JVD present.   Cardiovascular: Normal rate and regular rhythm.    Murmur heard.   Systolic murmur is present with a grade of 1/6   Pulmonary/Chest: No respiratory distress. She has no wheezes.   Pacemaker present in left upper chest without evidence   of erosion, drainage,or erythema.   Abdominal: Soft. She exhibits no distension. There is no tenderness.   Musculoskeletal: She exhibits no edema.   Neurological: She is alert and oriented to person, place, and time.   Skin: Skin is warm.   Psychiatric: She has a normal mood and affect.       Assessment:     1. Cardiac pacemaker in situ     2. Pulmonary hypertension (HCC)     3. Sinus node dysfunction (HCC)     4. Essential hypertension         Medical Decision Making:  Today's Assessment / Status / Plan:   Status post permanent pacemaker: Patient will have her pacemaker interrogated today.    Essential hypertension: Blood pressure at home is been under good control.    Poorly hypertension: Mild by prior echo.  Consider repeat echo next visit.    Aortic sclerosis: Patient is murmur very sclerosis this was noted on prior echo.  "

## 2018-12-27 DIAGNOSIS — I10 ESSENTIAL HYPERTENSION: ICD-10-CM

## 2018-12-31 RX ORDER — LOSARTAN POTASSIUM 100 MG/1
TABLET ORAL
Qty: 90 TAB | Refills: 1 | Status: SHIPPED | OUTPATIENT
Start: 2018-12-31 | End: 2019-06-25 | Stop reason: SDUPTHER

## 2018-12-31 RX ORDER — AMLODIPINE BESYLATE 10 MG/1
TABLET ORAL
Qty: 90 TAB | Refills: 1 | Status: SHIPPED | OUTPATIENT
Start: 2018-12-31 | End: 2019-06-25 | Stop reason: SDUPTHER

## 2018-12-31 NOTE — TELEPHONE ENCOUNTER
Refill X 6 months, sent to pharmacy.Pt. Seen in the last 6 months per protocol.   Lab Results   Component Value Date/Time    SODIUM 134 (L) 02/15/2018 08:29 AM    POTASSIUM 4.7 02/15/2018 08:29 AM    CHLORIDE 103 02/15/2018 08:29 AM    CO2 22 02/15/2018 08:29 AM    GLUCOSE 125 (H) 02/15/2018 08:29 AM    BUN 31 (H) 02/15/2018 08:29 AM    CREATININE 1.21 02/15/2018 08:29 AM    CREATININE 1.59 (H) 02/10/2011 08:40 AM    BUNCREATRAT 21 02/10/2011 08:40 AM    GLOMRATE 32 (L) 02/10/2011 08:40 AM

## 2019-01-30 ENCOUNTER — HOSPITAL ENCOUNTER (OUTPATIENT)
Dept: LAB | Facility: MEDICAL CENTER | Age: 79
End: 2019-01-30
Attending: PHYSICIAN ASSISTANT
Payer: MEDICARE

## 2019-01-30 DIAGNOSIS — E11.9 TYPE 2 DIABETES MELLITUS WITHOUT COMPLICATION, WITHOUT LONG-TERM CURRENT USE OF INSULIN (HCC): ICD-10-CM

## 2019-01-30 DIAGNOSIS — E78.00 PURE HYPERCHOLESTEROLEMIA: ICD-10-CM

## 2019-01-30 LAB
CHOLEST SERPL-MCNC: 202 MG/DL (ref 100–199)
EST. AVERAGE GLUCOSE BLD GHB EST-MCNC: 146 MG/DL
HBA1C MFR BLD: 6.7 % (ref 0–5.6)
HDLC SERPL-MCNC: 61 MG/DL
LDLC SERPL CALC-MCNC: 119 MG/DL
TRIGL SERPL-MCNC: 112 MG/DL (ref 0–149)

## 2019-01-30 PROCEDURE — 83036 HEMOGLOBIN GLYCOSYLATED A1C: CPT

## 2019-01-30 PROCEDURE — 36415 COLL VENOUS BLD VENIPUNCTURE: CPT

## 2019-01-30 PROCEDURE — 80061 LIPID PANEL: CPT

## 2019-02-01 ENCOUNTER — TELEPHONE (OUTPATIENT)
Dept: MEDICAL GROUP | Facility: PHYSICIAN GROUP | Age: 79
End: 2019-02-01

## 2019-02-01 NOTE — TELEPHONE ENCOUNTER
Future Appointments       Provider Department Center    2/4/2019 8:45 AM Lacey Kruse P.A.-C. [facility] [place]    5/29/2019 9:15 AM PACER CHECK-[facility] 2 [facility]     5/29/2019 10:00 AM Quintin Schultz M.D. [facility]         ESTABLISHED PATIENT PRE-VISIT PLANNING     Patient was contacted to complete PVP.     Note: Patient will not be contacted if there is no indication to call.     1.  Reviewed notes from the last few office visits within the medical group: Yes    2.  If any orders were placed at last visit or intended to be done for this visit (i.e. 6 mos follow-up), do we have Results/Consult Notes?        •  Labs - Labs ordered, completed on 01/30/19 and results are in chart.       •  Imaging - Imaging ordered, NOT completed. Patient advised to complete prior to next appointment.       •  Referrals - No referrals were ordered at last office visit.    3. Is this appointment scheduled as a Hospital Follow-Up? No    4.  Immunizations were updated in [employer] using WebIZ?: Yes       •  Web Iz Recommendations: TD and SHINGRIX (Shingles)    5.  Patient is due for the following Health Maintenance Topics:   Health Maintenance Due   Topic Date Due   • DIABETES MONOFILAMENT / LE EXAM  04/06/1941   • RETINAL SCREENING  10/06/1960   • URINE ACR / MICROALBUMIN  10/06/1960   • IMM HEP B VACCINE (1 of 3 - Risk 3-dose series) 10/06/1961   • IMM ZOSTER VACCINES (2 of 3) 12/19/2012       6. Orders for overdue Health Maintenance topics pended in Pre-Charting? YES    7.  AHA (MDX) form printed for Provider? YES    8.  Patient was informed to arrive 15 min prior to their scheduled appointment and bring in their medication bottles.

## 2019-02-04 ENCOUNTER — OFFICE VISIT (OUTPATIENT)
Dept: MEDICAL GROUP | Facility: PHYSICIAN GROUP | Age: 79
End: 2019-02-04
Payer: MEDICARE

## 2019-02-04 VITALS
DIASTOLIC BLOOD PRESSURE: 92 MMHG | OXYGEN SATURATION: 94 % | TEMPERATURE: 99 F | BODY MASS INDEX: 28.34 KG/M2 | WEIGHT: 166 LBS | HEART RATE: 72 BPM | HEIGHT: 64 IN | SYSTOLIC BLOOD PRESSURE: 132 MMHG

## 2019-02-04 DIAGNOSIS — E11.9 TYPE 2 DIABETES MELLITUS WITHOUT COMPLICATION, WITHOUT LONG-TERM CURRENT USE OF INSULIN (HCC): ICD-10-CM

## 2019-02-04 DIAGNOSIS — E78.00 PURE HYPERCHOLESTEROLEMIA: ICD-10-CM

## 2019-02-04 DIAGNOSIS — I70.0 ATHEROSCLEROSIS OF AORTA (HCC): ICD-10-CM

## 2019-02-04 DIAGNOSIS — R92.8 ABNORMAL FINDING ON BREAST IMAGING: ICD-10-CM

## 2019-02-04 DIAGNOSIS — I27.20 PULMONARY HYPERTENSION (HCC): ICD-10-CM

## 2019-02-04 DIAGNOSIS — K58.0 IRRITABLE BOWEL SYNDROME WITH DIARRHEA: ICD-10-CM

## 2019-02-04 DIAGNOSIS — N18.30 CHRONIC KIDNEY DISEASE (CKD), STAGE III (MODERATE) (HCC): ICD-10-CM

## 2019-02-04 DIAGNOSIS — Z23 NEED FOR VACCINATION: ICD-10-CM

## 2019-02-04 LAB — RETINAL SCREEN: NEGATIVE

## 2019-02-04 PROCEDURE — 92250 FUNDUS PHOTOGRAPHY W/I&R: CPT | Mod: TC | Performed by: PHYSICIAN ASSISTANT

## 2019-02-04 PROCEDURE — 99214 OFFICE O/P EST MOD 30 MIN: CPT | Mod: 25 | Performed by: PHYSICIAN ASSISTANT

## 2019-02-04 PROCEDURE — 8041 PR SCP AHA: Performed by: PHYSICIAN ASSISTANT

## 2019-02-04 PROCEDURE — 90746 HEPB VACCINE 3 DOSE ADULT IM: CPT | Performed by: PHYSICIAN ASSISTANT

## 2019-02-04 PROCEDURE — G0010 ADMIN HEPATITIS B VACCINE: HCPCS | Performed by: PHYSICIAN ASSISTANT

## 2019-02-04 RX ORDER — MONTELUKAST SODIUM 4 MG/1
2 TABLET, CHEWABLE ORAL 2 TIMES DAILY
Qty: 60 TAB | Refills: 5 | Status: SHIPPED | OUTPATIENT
Start: 2019-02-04 | End: 2019-09-03

## 2019-02-04 ASSESSMENT — PATIENT HEALTH QUESTIONNAIRE - PHQ9
SUM OF ALL RESPONSES TO PHQ QUESTIONS 1-9: 2
7. TROUBLE CONCENTRATING ON THINGS, SUCH AS READING THE NEWSPAPER OR WATCHING TELEVISION: SEVERAL DAYS
2. FEELING DOWN, DEPRESSED, IRRITABLE, OR HOPELESS: SEVERAL DAYS
6. FEELING BAD ABOUT YOURSELF - OR THAT YOU ARE A FAILURE OR HAVE LET YOURSELF OR YOUR FAMILY DOWN: NOT AL ALL
1. LITTLE INTEREST OR PLEASURE IN DOING THINGS: NOT AT ALL
4. FEELING TIRED OR HAVING LITTLE ENERGY: NOT AT ALL
8. MOVING OR SPEAKING SO SLOWLY THAT OTHER PEOPLE COULD HAVE NOTICED. OR THE OPPOSITE, BEING SO FIGETY OR RESTLESS THAT YOU HAVE BEEN MOVING AROUND A LOT MORE THAN USUAL: NOT AT ALL
3. TROUBLE FALLING OR STAYING ASLEEP OR SLEEPING TOO MUCH: NOT AT ALL
5. POOR APPETITE OR OVEREATING: NOT AT ALL
SUM OF ALL RESPONSES TO PHQ9 QUESTIONS 1 AND 2: 1
9. THOUGHTS THAT YOU WOULD BE BETTER OFF DEAD, OR OF HURTING YOURSELF: NOT AT ALL

## 2019-02-04 NOTE — PROGRESS NOTES
Subjective:     Roya Muniz is a 78 y.o. female here today for breast exam, recent lab results, IBS, and Annual Health Assessment. Is an established patient of mine.    HPI:    Patient presents today needing breast exam.  When she had breast imaging done back in August, she was found to have some asymmetry of the right breast along with complicated cyst of the left breast.  Repeat diagnostic right mammogram and left ultrasound are recommended in 6 months which she has not yet scheduled.  Patient has not noticed any changes to her breasts.  She denies any palpable lumps, discomfort, or any other concerns.    She continues to have problems with her IBS-D. States that diarrhea is waking her up up 4:30 every morning and she will continue to wake up every 45 minutes until the afternoon. I started her on Lexapro at previous appointment which she is still taking, but does not think it has helped with her symptoms at all. At this point, in addition to the Lexapro, she has tried amitriptyline, cholestyramine, colestipol, and Imodium AD--none of which she believes were very helpful with the exception of colestipol which did greatly help her for a period of time but she believes the effects worse off. She has seen GI in the past but not for several years. She does endorse several food triggers including beef which she avoids. She does not feel her diarrhea has necessarily gotten any worse--it just hasn't gotten any better which is frustrating for her.      Health Maintenance Summary                RETINAL SCREENING Overdue 10/6/1960     URINE ACR / MICROALBUMIN Overdue 10/6/1960     IMM ZOSTER VACCINES Overdue 12/19/2012      Done 10/24/2012 Imm Admin: Zoster Vaccine Live (ZVL) (Zostavax)    SERUM CREATININE Next Due 2/15/2019      Done 2/15/2018 COMP METABOLIC PANEL      Patient has more history with this topic...    IMM HEP B VACCINE Next Due 3/4/2019      Done 2/4/2019 Imm Admin: Hepatitis B Vaccine Recombivax  (Adol/Adult)    A1C SCREENING Next Due 7/30/2019      Done 1/30/2019 HEMOGLOBIN A1C      Patient has more history with this topic...    Annual Wellness Visit Next Due 8/11/2019      Done 8/10/2018 Visit Dx: Medicare annual wellness visit, subsequent     Patient has more history with this topic...    COLON CANCER SCREENING ANNUAL FIT Next Due 8/15/2019      Done 8/15/2018 OCCULT BLOOD FECES IMMUNOASSAY     Patient has more history with this topic...    MAMMOGRAM Next Due 8/28/2019      Done 8/28/2018 MA-DIAGNOSTIC MAMMO W/O CAD-BILAT     Patient has more history with this topic...    FASTING LIPID PROFILE Next Due 1/30/2020      Done 1/30/2019 LIPID PROFILE      Patient has more history with this topic...    DIABETES MONOFILAMENT / LE EXAM Next Due 2/4/2020      Done 2/4/2019 AMB DIABETIC MONOFILAMENT LOWER EXTREMITY EXAM    BONE DENSITY Next Due 7/14/2022      Done 7/14/2017 DS-BONE DENSITY STUDY (DEXA)    IMM DTaP/Tdap/Td Vaccine Next Due 10/24/2022      Done 10/24/2012 Imm Admin: Tdap Vaccine           Annual Health Assessment Questions:     1.  Are you currently engaging in any exercise or physical activity? Yes (Gardening, walking)    2.  How would you describe your mood or emotional well-being today? anxious    3.  Have you had any falls in the last year? No    4.  Have you noticed any problems with your balance or had difficulty walking? No    5.  In the last six months have you experienced any leakage of urine? No    6. DPA/Advanced Directive: Patient has Advanced Directive, but it is not on file. Instructed to bring in a copy to scan into their chart.    Current medicines (including changes today)  Current Outpatient Prescriptions   Medication Sig Dispense Refill   • colestipol (COLESTID) 1 GM Tab Take 2 Tabs by mouth 2 times a day. 60 Tab 5   • losartan (COZAAR) 100 MG Tab TAKE ONE TABLET BY MOUTH DAILY 90 Tab 1   • amLODIPine (NORVASC) 10 MG Tab TAKE ONE TABLET BY MOUTH DAILY 90 Tab 1   • vitamin D  "(CHOLECALCIFEROL) 1000 UNIT Tab Take 1,000 Units by mouth every day.     • MEGARED OMEGA-3 KRILL  MG Cap Take  by mouth.     • aspirin 81 MG tablet Take 81 mg by mouth every day.     • levothyroxine (SYNTHROID) 112 MCG Tab TAKE ONE TABLET BY MOUTH EVERY MORNING ON AN EMPTY STOMACH (GENERIC FOR SYNTHROID) 90 Tab 3     No current facility-administered medications for this visit.        She  has a past medical history of Cardiac pacemaker in situ; History of DVT of lower extremity (6/5/2014); Hyperlipidemia; Hypertension; Pancreatic cyst; Pancreatitis; Sinus node dysfunction (HCC); and Thyroid disease.    Cephalexin [keflex]    She  reports that she has never smoked. She has never used smokeless tobacco. She reports that she drinks alcohol. She reports that she does not use drugs.  Counseling given: Not Answered      ROS   No chest pain, no shortness of breath       Objective:     Physical Exam:  Blood pressure 132/92, pulse 72, temperature 37.2 °C (99 °F), height 1.626 m (5' 4\"), weight 75.3 kg (166 lb), SpO2 94 %, not currently breastfeeding. Body mass index is 28.49 kg/m².     Constitutional: Alert, well-appearing, no distress.  Skin: Warm, dry, good turgor, no rashes in visible areas.  Eye: Equal, round and reactive, conjunctiva clear, lids normal.  ENMT: Lips without lesions, good dentition, oropharynx clear.  Breast: Breasts are normal-appearing and symmetric bilaterally. No skin abnormalities visualized. There is poorly-defined area of induration palpable at 11-12 o'clock position left breast which is mildly tender. No definite solid mass palpated. No other masses palpated, No visible nipple discharge. No axillary or supraclavicular lymphadenopathy palpated.  Respiratory: Unlabored respiratory effort, lungs clear to auscultation, no wheezes, no rhonchi.  Cardiovascular: Normal S1, S2, no murmur, no edema.  Psych: Alert and oriented x3, normal affect and mood.    A chaperone was offered to the patient " during today's exam. Patient declined chaperone.      Assessment and Plan:     1. Abnormal finding on breast imaging  Established problem, stable per exam.  I have previously ordered the recommended repeat breast imaging which patient was advised to schedule.    2. Irritable bowel syndrome with diarrhea  Chronic issue, still uncontrolled.  I am recommending that she restart colestipol which was previously very helpful for her.  She has tried the other options I would recommend without much improvement.  If the colestipol does not seem to be helping, advised patient to follow-up again with her GI provider.  - colestipol (COLESTID) 1 GM Tab; Take 2 Tabs by mouth 2 times a day.  Dispense: 60 Tab; Refill: 5    3. Pure hypercholesterolemia  Chronic issue, improving but LDL still above goal given her type 2 diabetes.  Had discussion with patient today regarding medication which she would like to avoid.  She will continue to work on lifestyle modification.    4. Type 2 diabetes mellitus without complication, without long-term current use of insulin (HCC)  Chronic issue, well controlled without the use of diabetic medication at this time.  She is due for several preventative screenings which I have ordered.  - Diabetic Monofilament LE Exam  - POCT Retinal Eye Exam  - MICROALBUMIN CREAT RATIO URINE; Future  - COMP METABOLIC PANEL; Future    5. Atherosclerosis of aorta (HCC)  Chronic, stable. Incidental finding on previous imaging. Continue aspirin.    6. Pulmonary hypertension (HCC)  Chronic issue, stable per cardiology who will continue to monitor via echocardiogram.    7. Chronic kidney disease (CKD), stage III (moderate) (HCC)  Chronic issue, stable. Most recent GFR in 2/2018 was 43. Due for re-check which I have ordered.    8. Need for vaccination  - Hepatitis B Vaccine Adult IM      Discussion today about general wellness and lifestyle habits:    · Engage in regular physical activity and social activities.  · Prevent  falls and reduce trip hazards; using ambulatory aides, hearing and vision testing if appropriate.  · Steps to improve urinary incontinence.  · Advanced care planning.    Follow-Up: Return in about 6 months (around 8/4/2019).         PLEASE NOTE: This dictation was created using voice recognition software. I have made every reasonable attempt to correct obvious errors, but I expect that there are errors of grammar and possibly content that I did not discover before finalizing the note.

## 2019-02-04 NOTE — ASSESSMENT & PLAN NOTE
Chronic issue, stable and well-controlled without medication. Most recent A1c last month was 6.7. Continue lifestyle management.

## 2019-02-11 DIAGNOSIS — E03.9 HYPOTHYROIDISM, UNSPECIFIED TYPE: ICD-10-CM

## 2019-02-13 RX ORDER — LEVOTHYROXINE SODIUM 112 UG/1
TABLET ORAL
Qty: 90 TAB | Refills: 1 | Status: SHIPPED | OUTPATIENT
Start: 2019-02-13 | End: 2019-08-04 | Stop reason: SDUPTHER

## 2019-02-20 ENCOUNTER — HOSPITAL ENCOUNTER (OUTPATIENT)
Dept: LAB | Facility: MEDICAL CENTER | Age: 79
End: 2019-02-20
Attending: PHYSICIAN ASSISTANT
Payer: MEDICARE

## 2019-02-20 DIAGNOSIS — E11.9 TYPE 2 DIABETES MELLITUS WITHOUT COMPLICATION, WITHOUT LONG-TERM CURRENT USE OF INSULIN (HCC): ICD-10-CM

## 2019-02-20 LAB
ALBUMIN SERPL BCP-MCNC: 4.6 G/DL (ref 3.2–4.9)
ALBUMIN/GLOB SERPL: 1.6 G/DL
ALP SERPL-CCNC: 58 U/L (ref 30–99)
ALT SERPL-CCNC: 23 U/L (ref 2–50)
ANION GAP SERPL CALC-SCNC: 8 MMOL/L (ref 0–11.9)
AST SERPL-CCNC: 18 U/L (ref 12–45)
BILIRUB SERPL-MCNC: 1.4 MG/DL (ref 0.1–1.5)
BUN SERPL-MCNC: 23 MG/DL (ref 8–22)
CALCIUM SERPL-MCNC: 9.8 MG/DL (ref 8.5–10.5)
CHLORIDE SERPL-SCNC: 107 MMOL/L (ref 96–112)
CO2 SERPL-SCNC: 23 MMOL/L (ref 20–33)
CREAT SERPL-MCNC: 1.24 MG/DL (ref 0.5–1.4)
CREAT UR-MCNC: 151.1 MG/DL
GLOBULIN SER CALC-MCNC: 2.9 G/DL (ref 1.9–3.5)
GLUCOSE SERPL-MCNC: 118 MG/DL (ref 65–99)
MICROALBUMIN UR-MCNC: 3.8 MG/DL
MICROALBUMIN/CREAT UR: 25 MG/G (ref 0–30)
POTASSIUM SERPL-SCNC: 4.2 MMOL/L (ref 3.6–5.5)
PROT SERPL-MCNC: 7.5 G/DL (ref 6–8.2)
SODIUM SERPL-SCNC: 138 MMOL/L (ref 135–145)

## 2019-02-20 PROCEDURE — 82043 UR ALBUMIN QUANTITATIVE: CPT

## 2019-02-20 PROCEDURE — 80053 COMPREHEN METABOLIC PANEL: CPT

## 2019-02-20 PROCEDURE — 82570 ASSAY OF URINE CREATININE: CPT

## 2019-02-20 PROCEDURE — 36415 COLL VENOUS BLD VENIPUNCTURE: CPT

## 2019-03-13 ENCOUNTER — HOSPITAL ENCOUNTER (OUTPATIENT)
Dept: RADIOLOGY | Facility: MEDICAL CENTER | Age: 79
End: 2019-03-13
Attending: PHYSICIAN ASSISTANT
Payer: MEDICARE

## 2019-03-13 DIAGNOSIS — R92.8 ABNORMAL FINDING ON BREAST IMAGING: ICD-10-CM

## 2019-03-13 PROCEDURE — 76642 ULTRASOUND BREAST LIMITED: CPT | Mod: LT

## 2019-03-13 PROCEDURE — G0279 TOMOSYNTHESIS, MAMMO: HCPCS

## 2019-05-29 ENCOUNTER — OFFICE VISIT (OUTPATIENT)
Dept: CARDIOLOGY | Facility: MEDICAL CENTER | Age: 79
End: 2019-05-29
Payer: MEDICARE

## 2019-05-29 ENCOUNTER — NON-PROVIDER VISIT (OUTPATIENT)
Dept: CARDIOLOGY | Facility: MEDICAL CENTER | Age: 79
End: 2019-05-29
Payer: MEDICARE

## 2019-05-29 VITALS
DIASTOLIC BLOOD PRESSURE: 72 MMHG | WEIGHT: 170 LBS | BODY MASS INDEX: 29.02 KG/M2 | HEART RATE: 74 BPM | SYSTOLIC BLOOD PRESSURE: 128 MMHG | HEIGHT: 64 IN

## 2019-05-29 DIAGNOSIS — E78.2 MIXED HYPERLIPIDEMIA: ICD-10-CM

## 2019-05-29 DIAGNOSIS — I10 ESSENTIAL HYPERTENSION: ICD-10-CM

## 2019-05-29 DIAGNOSIS — Z95.0 CARDIAC PACEMAKER IN SITU: ICD-10-CM

## 2019-05-29 DIAGNOSIS — I49.5 SICK SINUS SYNDROME (HCC): ICD-10-CM

## 2019-05-29 DIAGNOSIS — E11.9 TYPE 2 DIABETES MELLITUS WITHOUT COMPLICATION, WITHOUT LONG-TERM CURRENT USE OF INSULIN (HCC): ICD-10-CM

## 2019-05-29 PROCEDURE — 99214 OFFICE O/P EST MOD 30 MIN: CPT | Mod: 25 | Performed by: INTERNAL MEDICINE

## 2019-05-29 PROCEDURE — 93280 PM DEVICE PROGR EVAL DUAL: CPT | Performed by: INTERNAL MEDICINE

## 2019-05-29 RX ORDER — ATORVASTATIN CALCIUM 10 MG/1
10 TABLET, FILM COATED ORAL DAILY
Qty: 90 TAB | Refills: 3 | Status: SHIPPED | OUTPATIENT
Start: 2019-05-29 | End: 2019-09-03 | Stop reason: SINTOL

## 2019-05-29 ASSESSMENT — ENCOUNTER SYMPTOMS
BLOOD IN STOOL: 0
BLURRED VISION: 0
FEVER: 0
ABDOMINAL PAIN: 1
NEUROLOGICAL NEGATIVE: 1
BRUISES/BLEEDS EASILY: 0
SHORTNESS OF BREATH: 0
CHILLS: 0
WEIGHT LOSS: 0
PALPITATIONS: 0

## 2019-05-29 NOTE — PROGRESS NOTES
Chief Complaint   Patient presents with   • HTN (Controlled)       Subjective:   Roya Muniz is a 78 y.o. female who presents today primarily for pacemaker check.  The pacemaker was implanted in January, 2014 for sick sinus syndrome.  Interrogation today demonstrates that the device is functioning normally and there is good battery life left estimated at 5 and half years.  Recent lab was reviewed.  LDL is 119.  Her glycohemoglobin is elevated at 6.7 and she has evidence of chronic kidney disease stage III.  The patient has hypertension that is treated with losartan.  Past Medical History:   Diagnosis Date   • Cardiac pacemaker in situ    • History of DVT of lower extremity 6/5/2014   • Hyperlipidemia    • Hypertension    • Pancreatic cyst    • Pancreatitis    • Sinus node dysfunction (HCC)    • Thyroid disease     hypothyroidism     Past Surgical History:   Procedure Laterality Date   • GASTROSCOPY-ENDO  2/21/2014    Performed by Steve Islas Jr., M.D. at ENDOSCOPY [facility]   • GASTROSCOPY-ENDO  2/20/2014    Performed by Antonio Espinoza M.D. at ENDOSCOPY [facility]   • EXPLORATORY LAPAROTOMY  2/10/2014    Performed by Rigo Garcia M.D. at SURGERY [facility]   • CHOLECYSTECTOMY  2/10/2014    Performed by Rigo Garcia M.D. at SURGERY [facility]   • PANCREATECTOMY  2/10/2014    Performed by Rigo Garcia M.D. at SURGERY [facility]   • GASTROSTOMY FEEDING  2/10/2014    Performed by Rigo Garcia M.D. at SURGERY [facility]   • OTHER ORTHOPEDIC SURGERY      foot surgery     Family History   Problem Relation Age of Onset   • Heart Disease Mother         enlarged heart   • Cancer Father         liver   • Thyroid Sister    • Heart Disease Brother         heart failure   • Lung Disease Brother         heavy smoker   • Alcohol/Drug Brother         etoh   • Cancer Paternal Aunt         breast cancer    • Cancer Maternal Grandmother          liver    • Heart Disease Maternal Grandfather         CHF    • No Known Problems Paternal Grandmother    • No Known Problems Paternal Grandfather    • Stroke Neg Hx      Social History     Social History   • Marital status:      Spouse name: N/A   • Number of children: N/A   • Years of education: N/A     Occupational History   • Not on file.     Social History Main Topics   • Smoking status: Never Smoker   • Smokeless tobacco: Never Used      Comment: avoid all tobacco products   • Alcohol use 0.0 oz/week      Comment: occasionally   • Drug use: No   • Sexual activity: Yes     Partners: Male     Other Topics Concern   • Not on file     Social History Narrative   • No narrative on file     Allergies   Allergen Reactions   • Cephalexin [Keflex] Hives, Rash and Itching     Photosensitivity       Outpatient Encounter Prescriptions as of 5/29/2019   Medication Sig Dispense Refill   • atorvastatin (LIPITOR) 10 MG Tab Take 1 Tab by mouth every day. 90 Tab 3   • levothyroxine (SYNTHROID) 112 MCG Tab TAKE ONE TABLET BY MOUTH EVERY MORNING ON AN EMPTY STOMACH 90 Tab 1   • colestipol (COLESTID) 1 GM Tab Take 2 Tabs by mouth 2 times a day. 60 Tab 5   • losartan (COZAAR) 100 MG Tab TAKE ONE TABLET BY MOUTH DAILY 90 Tab 1   • amLODIPine (NORVASC) 10 MG Tab TAKE ONE TABLET BY MOUTH DAILY 90 Tab 1   • vitamin D (CHOLECALCIFEROL) 1000 UNIT Tab Take 1,000 Units by mouth every day.     • aspirin 81 MG tablet Take 81 mg by mouth every day.     • MEGARED OMEGA-3 KRILL  MG Cap Take  by mouth.       No facility-administered encounter medications on file as of 5/29/2019.      Review of Systems   Constitutional: Negative for chills, fever, malaise/fatigue and weight loss.   HENT: Negative.    Eyes: Negative for blurred vision.   Respiratory: Negative for shortness of breath.    Cardiovascular: Negative for chest pain and palpitations.   Gastrointestinal: Positive for abdominal pain. Negative for blood in stool.         "History of irritable bowel   Neurological: Negative.    Endo/Heme/Allergies: Does not bruise/bleed easily.        Objective:   /72 (BP Location: Left arm, Patient Position: Sitting, BP Cuff Size: Adult)   Pulse 74   Ht 1.626 m (5' 4\")   Wt 77.1 kg (170 lb)   BMI 29.18 kg/m²     Physical Exam    Assessment:     1. Cardiac pacemaker in situ  Lipid Profile    Comp Metabolic Panel    HEMOGLOBIN A1C (Glycohemoglobin GHB Total/A1C with MBG Estimate)   2. Type 2 diabetes mellitus without complication, without long-term current use of insulin (HCC)  Lipid Profile    Comp Metabolic Panel    HEMOGLOBIN A1C (Glycohemoglobin GHB Total/A1C with MBG Estimate)   3. Mixed hyperlipidemia  Lipid Profile    Comp Metabolic Panel    HEMOGLOBIN A1C (Glycohemoglobin GHB Total/A1C with MBG Estimate)   4. Essential hypertension         Medical Decision Making:  Today's Assessment / Status / Plan:   Status post permanent pacemaker: Pacemaker is functioning normally.  Thresholds are good.  Battery life is over 5 years.    Hypertension: Under good control on current regimen.    Hyperlipidemia: Modestly elevated.  She is on colestipol for irritable bowel.  Since it appears that she is diabetic with a hemoglobin A1c of 6.7, she should be on statin therapy to reduce her LDL less than 100 per ACC/AHA guidelines.  To be started on atorvastatin 10 mg a day with lab in about 2 months.  Routine pacemaker surveillance again in 6 months and follow-up with me in 1 year.  "

## 2019-05-29 NOTE — LETTER
[facility]   [address]  [place] [address]  Phone: [phone]  Fax: [phone]              Roya Muniz  1940    Encounter Date: 5/29/2019    Quintin Schultz M.D.          PROGRESS NOTE:  Chief Complaint   Patient presents with   • HTN (Controlled)       Subjective:   Roya Muniz is a 78 y.o. female who presents today primarily for pacemaker check.  The pacemaker was implanted in January, 2014 for sick sinus syndrome.  Interrogation today demonstrates that the device is functioning normally and there is good battery life left estimated at 5 and half years.  Recent lab was reviewed.  LDL is 119.  Her glycohemoglobin is elevated at 6.7 and she has evidence of chronic kidney disease stage III.  The patient has hypertension that is treated with losartan.  Past Medical History:   Diagnosis Date   • Cardiac pacemaker in situ    • History of DVT of lower extremity 6/5/2014   • Hyperlipidemia    • Hypertension    • Pancreatic cyst    • Pancreatitis    • Sinus node dysfunction (HCC)    • Thyroid disease     hypothyroidism     Past Surgical History:   Procedure Laterality Date   • GASTROSCOPY-ENDO  2/21/2014    Performed by Steve Islas Jr., M.D. at ENDOSCOPY [facility]   • GASTROSCOPY-ENDO  2/20/2014    Performed by Antonio Espinoza M.D. at ENDOSCOPY [facility]   • EXPLORATORY LAPAROTOMY  2/10/2014    Performed by Rigo Garcia M.D. at SURGERY [facility]   • CHOLECYSTECTOMY  2/10/2014    Performed by Rigo Garcia M.D. at SURGERY [facility]   • PANCREATECTOMY  2/10/2014    Performed by Rigo Garcia M.D. at SURGERY [facility]   • GASTROSTOMY FEEDING  2/10/2014    Performed by Rigo Garcia M.D. at SURGERY [facility]   • OTHER ORTHOPEDIC SURGERY      foot surgery     Family History   Problem Relation Age of Onset   • Heart Disease Mother         enlarged  heart   • Cancer Father         liver   • Thyroid Sister    • Heart Disease Brother         heart failure   • Lung Disease Brother         heavy smoker   • Alcohol/Drug Brother         etoh   • Cancer Paternal Aunt         breast cancer    • Cancer Maternal Grandmother         liver    • Heart Disease Maternal Grandfather         CHF    • No Known Problems Paternal Grandmother    • No Known Problems Paternal Grandfather    • Stroke Neg Hx      Social History     Social History   • Marital status:      Spouse name: N/A   • Number of children: N/A   • Years of education: N/A     Occupational History   • Not on file.     Social History Main Topics   • Smoking status: Never Smoker   • Smokeless tobacco: Never Used      Comment: avoid all tobacco products   • Alcohol use 0.0 oz/week      Comment: occasionally   • Drug use: No   • Sexual activity: Yes     Partners: Male     Other Topics Concern   • Not on file     Social History Narrative   • No narrative on file     Allergies   Allergen Reactions   • Cephalexin [Keflex] Hives, Rash and Itching     Photosensitivity       Outpatient Encounter Prescriptions as of 5/29/2019   Medication Sig Dispense Refill   • atorvastatin (LIPITOR) 10 MG Tab Take 1 Tab by mouth every day. 90 Tab 3   • levothyroxine (SYNTHROID) 112 MCG Tab TAKE ONE TABLET BY MOUTH EVERY MORNING ON AN EMPTY STOMACH 90 Tab 1   • colestipol (COLESTID) 1 GM Tab Take 2 Tabs by mouth 2 times a day. 60 Tab 5   • losartan (COZAAR) 100 MG Tab TAKE ONE TABLET BY MOUTH DAILY 90 Tab 1   • amLODIPine (NORVASC) 10 MG Tab TAKE ONE TABLET BY MOUTH DAILY 90 Tab 1   • vitamin D (CHOLECALCIFEROL) 1000 UNIT Tab Take 1,000 Units by mouth every day.     • aspirin 81 MG tablet Take 81 mg by mouth every day.     • MEGARED OMEGA-3 KRILL  MG Cap Take  by mouth.       No facility-administered encounter medications on file as of 5/29/2019.      Review of Systems   Constitutional: Negative for chills, fever,  "malaise/fatigue and weight loss.   HENT: Negative.    Eyes: Negative for blurred vision.   Respiratory: Negative for shortness of breath.    Cardiovascular: Negative for chest pain and palpitations.   Gastrointestinal: Positive for abdominal pain. Negative for blood in stool.        History of irritable bowel   Neurological: Negative.    Endo/Heme/Allergies: Does not bruise/bleed easily.        Objective:   /72 (BP Location: Left arm, Patient Position: Sitting, BP Cuff Size: Adult)   Pulse 74   Ht 1.626 m (5' 4\")   Wt 77.1 kg (170 lb)   BMI 29.18 kg/m²      Physical Exam    Assessment:     1. Cardiac pacemaker in situ  Lipid Profile    Comp Metabolic Panel    HEMOGLOBIN A1C (Glycohemoglobin GHB Total/A1C with MBG Estimate)   2. Type 2 diabetes mellitus without complication, without long-term current use of insulin (HCC)  Lipid Profile    Comp Metabolic Panel    HEMOGLOBIN A1C (Glycohemoglobin GHB Total/A1C with MBG Estimate)   3. Mixed hyperlipidemia  Lipid Profile    Comp Metabolic Panel    HEMOGLOBIN A1C (Glycohemoglobin GHB Total/A1C with MBG Estimate)   4. Essential hypertension         Medical Decision Making:  Today's Assessment / Status / Plan:   Status post permanent pacemaker: Pacemaker is functioning normally.  Thresholds are good.  Battery life is over 5 years.    Hypertension: Under good control on current regimen.    Hyperlipidemia: Modestly elevated.  She is on colestipol for irritable bowel.  Since it appears that she is diabetic with a hemoglobin A1c of 6.7, she should be on statin therapy to reduce her LDL less than 100 per ACC/AHA guidelines.  To be started on atorvastatin 10 mg a day with lab in about 2 months.  Routine pacemaker surveillance again in 6 months and follow-up with me in 1 year.      ELIOT Pisano.-C.  [address]  [place] [address]  VIA In Basket                 "

## 2019-06-25 DIAGNOSIS — I10 ESSENTIAL HYPERTENSION: ICD-10-CM

## 2019-06-25 NOTE — TELEPHONE ENCOUNTER
**100 DAY SUPPLY**    Was the patient seen in the last year in this department? Yes    Does patient have an active prescription for medications requested? No     Received Request Via: Pharmacy      Pt met protocol?: Yes, last ov 2/19  Last labs 2/19  BP Readings from Last 1 Encounters:   05/29/19 128/72

## 2019-06-26 RX ORDER — AMLODIPINE BESYLATE 10 MG/1
TABLET ORAL
Qty: 100 TAB | Refills: 1 | Status: SHIPPED | OUTPATIENT
Start: 2019-06-26 | End: 2019-12-24

## 2019-06-26 RX ORDER — LOSARTAN POTASSIUM 100 MG/1
TABLET ORAL
Qty: 100 TAB | Refills: 1 | Status: SHIPPED | OUTPATIENT
Start: 2019-06-26 | End: 2020-01-15

## 2019-06-26 NOTE — TELEPHONE ENCOUNTER
Refill X 6 months, sent to pharmacy.Pt. Seen in the last 6 months per protocol.   Lab Results   Component Value Date/Time    SODIUM 138 02/20/2019 06:45 AM    POTASSIUM 4.2 02/20/2019 06:45 AM    CHLORIDE 107 02/20/2019 06:45 AM    CO2 23 02/20/2019 06:45 AM    GLUCOSE 118 (H) 02/20/2019 06:45 AM    BUN 23 (H) 02/20/2019 06:45 AM    CREATININE 1.24 02/20/2019 06:45 AM    CREATININE 1.59 (H) 02/10/2011 08:40 AM    BUNCREATRAT 21 02/10/2011 08:40 AM    GLOMRATE 32 (L) 02/10/2011 08:40 AM

## 2019-08-04 DIAGNOSIS — E03.9 HYPOTHYROIDISM, UNSPECIFIED TYPE: ICD-10-CM

## 2019-08-05 NOTE — TELEPHONE ENCOUNTER
Was the patient seen in the last year in this department? Yes    Does patient have an active prescription for medications requested? No     Received Request Via: Pharmacy      Pt met protocol?: Yes    LAST OV 02/04/2019    Lab Results   Component Value Date/Time    TSHULTRASEN 3.250 08/17/2018 1031     Lab Results   Component Value Date/Time    TSH 1.980 02/10/2011 0840

## 2019-08-06 RX ORDER — LEVOTHYROXINE SODIUM 112 UG/1
TABLET ORAL
Qty: 90 TAB | Refills: 1 | Status: SHIPPED | OUTPATIENT
Start: 2019-08-06 | End: 2020-02-10

## 2019-08-28 ENCOUNTER — TELEPHONE (OUTPATIENT)
Dept: MEDICAL GROUP | Facility: PHYSICIAN GROUP | Age: 79
End: 2019-08-28

## 2019-08-28 ENCOUNTER — HOSPITAL ENCOUNTER (OUTPATIENT)
Dept: LAB | Facility: MEDICAL CENTER | Age: 79
End: 2019-08-28
Attending: INTERNAL MEDICINE
Payer: MEDICARE

## 2019-08-28 DIAGNOSIS — Z95.0 CARDIAC PACEMAKER IN SITU: ICD-10-CM

## 2019-08-28 DIAGNOSIS — E78.2 MIXED HYPERLIPIDEMIA: ICD-10-CM

## 2019-08-28 DIAGNOSIS — E11.9 TYPE 2 DIABETES MELLITUS WITHOUT COMPLICATION, WITHOUT LONG-TERM CURRENT USE OF INSULIN (HCC): ICD-10-CM

## 2019-08-28 LAB
ALBUMIN SERPL BCP-MCNC: 4.7 G/DL (ref 3.2–4.9)
ALBUMIN/GLOB SERPL: 1.4 G/DL
ALP SERPL-CCNC: 71 U/L (ref 30–99)
ALT SERPL-CCNC: 21 U/L (ref 2–50)
ANION GAP SERPL CALC-SCNC: 11 MMOL/L (ref 0–11.9)
AST SERPL-CCNC: 18 U/L (ref 12–45)
BILIRUB SERPL-MCNC: 1.4 MG/DL (ref 0.1–1.5)
BUN SERPL-MCNC: 22 MG/DL (ref 8–22)
CALCIUM SERPL-MCNC: 10.1 MG/DL (ref 8.5–10.5)
CHLORIDE SERPL-SCNC: 105 MMOL/L (ref 96–112)
CHOLEST SERPL-MCNC: 227 MG/DL (ref 100–199)
CO2 SERPL-SCNC: 20 MMOL/L (ref 20–33)
CREAT SERPL-MCNC: 1.19 MG/DL (ref 0.5–1.4)
EST. AVERAGE GLUCOSE BLD GHB EST-MCNC: 143 MG/DL
FASTING STATUS PATIENT QL REPORTED: NORMAL
GLOBULIN SER CALC-MCNC: 3.4 G/DL (ref 1.9–3.5)
GLUCOSE SERPL-MCNC: 122 MG/DL (ref 65–99)
HBA1C MFR BLD: 6.6 % (ref 0–5.6)
HDLC SERPL-MCNC: 55 MG/DL
LDLC SERPL CALC-MCNC: 124 MG/DL
POTASSIUM SERPL-SCNC: 3.7 MMOL/L (ref 3.6–5.5)
PROT SERPL-MCNC: 8.1 G/DL (ref 6–8.2)
SODIUM SERPL-SCNC: 136 MMOL/L (ref 135–145)
TRIGL SERPL-MCNC: 241 MG/DL (ref 0–149)

## 2019-08-28 PROCEDURE — 36415 COLL VENOUS BLD VENIPUNCTURE: CPT

## 2019-08-28 PROCEDURE — 83036 HEMOGLOBIN GLYCOSYLATED A1C: CPT

## 2019-08-28 PROCEDURE — 80061 LIPID PANEL: CPT

## 2019-08-28 PROCEDURE — 80053 COMPREHEN METABOLIC PANEL: CPT

## 2019-08-28 NOTE — TELEPHONE ENCOUNTER
Future Appointments       Provider Department Center    9/3/2019 1:45 PM Lacey Kruse P.A.-C. [facility], located within [facility]    11/27/2019 9:00 AM PACER CHECK-[facility]         ESTABLISHED PATIENT PRE-VISIT PLANNING     Patient was NOT contacted to complete PVP.     Note: Patient will not be contacted if there is no indication to call.     1.  Reviewed notes from the last few office visits within the medical group: Yes    2.  If any orders were placed at last visit or intended to be done for this visit (i.e. 6 mos follow-up), do we have Results/Consult Notes?        •  Labs - Labs ordered, completed on 02/20/2019 and results are in chart.   Note: If patient appointment is for lab review and patient did not complete labs, check with provider if OK to reschedule patient until labs completed.       •  Imaging - Imaging ordered, completed and results are in chart.       •  Referrals - No referrals were ordered at last office visit.    3. Is this appointment scheduled as a Hospital Follow-Up? No    4.  Immunizations were updated in [employer] using Web[employer]?: Yes       •  [employer] Recommendations: FLU, HEPATITIS A , HEPATITIS B, TD, VARICELLA (Chicken Pox)  and SHINGRIX (Shingles)    5.  Patient is due for the following Health Maintenance Topics:   Health Maintenance Due   Topic Date Due   • IMM ZOSTER VACCINES (2 of 3) 12/19/2012   • IMM HEP B VACCINE (2 of 3 - Risk 3-dose series) 03/04/2019   • A1C SCREENING  07/30/2019   • Annual Wellness Visit  08/11/2019   • COLON CANCER SCREENING ANNUAL FIT  08/15/2019       6. Orders for overdue Health Maintenance topics pended in Pre-Charting? YES    7.  AHA (MDX) form printed for Provider? No, already completed    8.  Patient was informed to arrive 15 min prior to their scheduled appointment and bring in their medication bottles.

## 2019-08-30 ENCOUNTER — TELEPHONE (OUTPATIENT)
Dept: CARDIOLOGY | Facility: MEDICAL CENTER | Age: 79
End: 2019-08-30

## 2019-08-30 NOTE — TELEPHONE ENCOUNTER
----- Message from Quintin Schultz M.D. sent at 8/30/2019 10:08 AM PDT -----  LDL is elevated at 124.  As she has elevated fasting glucose.  Her target for LDL should be 100.  Recommend she increase her atorvastatin to 20 mg a day.

## 2019-08-30 NOTE — TELEPHONE ENCOUNTER
"Called pt. To advise. Anton fernandez she syopped Atorvastatin 10mg in June \"because I felt like my head was going to explode\". Headache resolved when she stopped med.  Fyi to Dr. Schultz. Can she try another med?  "

## 2019-09-03 ENCOUNTER — OFFICE VISIT (OUTPATIENT)
Dept: MEDICAL GROUP | Facility: PHYSICIAN GROUP | Age: 79
End: 2019-09-03
Payer: MEDICARE

## 2019-09-03 VITALS
OXYGEN SATURATION: 95 % | SYSTOLIC BLOOD PRESSURE: 122 MMHG | TEMPERATURE: 99 F | BODY MASS INDEX: 28.17 KG/M2 | DIASTOLIC BLOOD PRESSURE: 78 MMHG | HEART RATE: 75 BPM | HEIGHT: 64 IN | WEIGHT: 165 LBS

## 2019-09-03 DIAGNOSIS — R92.8 ABNORMAL FINDING ON BREAST IMAGING: ICD-10-CM

## 2019-09-03 DIAGNOSIS — E78.2 MIXED HYPERLIPIDEMIA: ICD-10-CM

## 2019-09-03 DIAGNOSIS — E11.9 TYPE 2 DIABETES MELLITUS WITHOUT COMPLICATION, WITHOUT LONG-TERM CURRENT USE OF INSULIN (HCC): ICD-10-CM

## 2019-09-03 PROCEDURE — 99214 OFFICE O/P EST MOD 30 MIN: CPT | Performed by: PHYSICIAN ASSISTANT

## 2019-09-03 RX ORDER — ROSUVASTATIN CALCIUM 10 MG/1
10 TABLET, COATED ORAL EVERY EVENING
Qty: 30 TAB | Refills: 11 | Status: SHIPPED | OUTPATIENT
Start: 2019-09-03 | End: 2019-09-05 | Stop reason: SDUPTHER

## 2019-09-03 NOTE — TELEPHONE ENCOUNTER
Left message with  for pt. To call.    Patient Call      Quintin Schultz M.D.  You 3 days ago      Try rosuvastatin 10 mg  A day

## 2019-09-03 NOTE — PROGRESS NOTES
Subjective:   Roya Muniz is a 78 y.o. female here today for follow-up on hyperlipidemia, breast exam. Is an established patient of mine.    HPI:    Patient presents to the office today for routine follow-up.  She would like to discuss her recent lab results.  I last saw her back in February.  She was started on low-dose atorvastatin 10 mg daily back in May by her cardiologist.  She stopped taking this in June because she felt it was causing her to have a bad headache which has since gone away. Recent lipid profile done last month shows LDL to be elevated at 124. She states she contacted her cardiologist regarding an alternative medication but has not yet heard back.    She has type 2 diabetes. No current medication. Recent A1c last month was 6.6.    She also is needing breast exam today.  She has a complicated cyst in her left breast.  Has been undergoing six-month follow-ups in the breast center recommended that she have a clinical breast exam by her PCP prior to having the imaging done again. She has not noticed any new lumps in her breasts. Sometimes feels a bit of tenderness.      Current medicines (including changes today)  Current Outpatient Medications   Medication Sig Dispense Refill   • rosuvastatin (CRESTOR) 10 MG Tab Take 1 Tab by mouth every evening. 30 Tab 11   • levothyroxine (SYNTHROID) 112 MCG Tab TAKE ONE TABLET BY MOUTH EVERY MORNING ON AN EMPTY STOMACH 90 Tab 1   • losartan (COZAAR) 100 MG Tab TAKE ONE TABLET BY MOUTH DAILY 100 Tab 1   • amLODIPine (NORVASC) 10 MG Tab TAKE ONE TABLET BY MOUTH DAILY 100 Tab 1   • vitamin D (CHOLECALCIFEROL) 1000 UNIT Tab Take 1,000 Units by mouth every day.     • MEGARED OMEGA-3 KRILL  MG Cap Take  by mouth.     • aspirin 81 MG tablet Take 81 mg by mouth every day.       No current facility-administered medications for this visit.      She  has a past medical history of Cardiac pacemaker in situ, History of DVT of lower extremity (6/5/2014),  "Hyperlipidemia, Hypertension, Pancreatic cyst, Pancreatitis, Sinus node dysfunction (HCC), and Thyroid disease.    ROS  As per HPI.       Objective:     /78 (BP Location: Left arm, Patient Position: Sitting, BP Cuff Size: Adult)   Pulse 75   Temp 37.2 °C (99 °F)   Ht 1.626 m (5' 4\")   Wt 74.8 kg (165 lb)   SpO2 95%  Body mass index is 28.32 kg/m².     Physical Exam:  Constitutional: Alert, well-appearing, very pleasant, no distress.  Skin: Warm, dry, good turgor, no rashes in visible areas.  Eye: Pupils are equal and round, conjunctiva clear, lids normal.  ENMT: Lips without lesions, moist mucus membranes.  Breast: Breasts are normal-appearing and symmetric bilaterally. No skin abnormalities visualized. There are no masses palpated, no tenderness. No visible nipple discharge. No axillary or supraclavicular lymphadenopathy palpated.      Assessment and Plan:   The following treatment plan was discussed    1. Mixed hyperlipidemia  Established problem, uncontrolled given no current statin. Relayed message from Dr. Schultz to patient regarding recommendation to try Crestor as alternative. I have prescribed this for her. Will re-check lipids and LFTs in 6 months.  - rosuvastatin (CRESTOR) 10 MG Tab; Take 1 Tab by mouth every evening.  Dispense: 30 Tab; Refill: 11  - Comp Metabolic Panel; Future  - Lipid Profile; Future    2. Type 2 diabetes mellitus without complication, without long-term current use of insulin (HCC)  Established problem, well-controlled without medications. A1c has been stable, even slightly improved on last check. Advised to continue diabetic diet/lifestyle management. Will re-check labs including A1c in 6 months.  - HEMOGLOBIN A1C; Future  - Comp Metabolic Panel; Future  - MICROALBUMIN CREAT RATIO URINE; Future    3. Abnormal finding on breast imaging  Established problem needing follow-up. Breast exam today is normal. I have ordered her needed breast ultrasound to re-evaluate cyst.  - " US-BREAST LIMITED-LEFT; Future      Followup: Return for AWV.    Lacey Kruse P.A.-C.

## 2019-09-05 DIAGNOSIS — E78.2 MIXED HYPERLIPIDEMIA: ICD-10-CM

## 2019-09-05 NOTE — TELEPHONE ENCOUNTER
**100 DAY SUPPLY**    Was the patient seen in the last year in this department? Yes    Does patient have an active prescription for medications requested? Yes    Received Request Via: Pharmacy      Pt met protocol?: Yes, last ov 9/19  Last labs 8/19  Lab Results   Component Value Date/Time    CHOLSTRLTOT 227 (H) 08/28/2019 0804    TRIGLYCERIDE 241 (H) 08/28/2019 0804    HDL 55 08/28/2019 0804     (H) 08/28/2019 0804     [facility]

## 2019-09-05 NOTE — TELEPHONE ENCOUNTER
Spoke with pt. She saw PCP, Lacey Kruse, on 9-3-19 who RX'r Rosuvastatin 10mg daily. Pt. Will call if she has any problems.

## 2019-09-06 RX ORDER — ROSUVASTATIN CALCIUM 10 MG/1
10 TABLET, COATED ORAL EVERY EVENING
Qty: 100 TAB | Refills: 1 | Status: SHIPPED | OUTPATIENT
Start: 2019-09-06 | End: 2020-11-06

## 2019-09-06 NOTE — TELEPHONE ENCOUNTER
Resending Rx from 9/3/19 for #100 w/ 1 RF. [facility] Lacey aguilera waiting for Dr. Schultz's opinion.

## 2019-09-10 ENCOUNTER — HOSPITAL ENCOUNTER (OUTPATIENT)
Dept: LAB | Facility: MEDICAL CENTER | Age: 79
End: 2019-09-10
Attending: INTERNAL MEDICINE
Payer: MEDICARE

## 2019-09-10 PROCEDURE — 36415 COLL VENOUS BLD VENIPUNCTURE: CPT

## 2019-09-10 PROCEDURE — 82784 ASSAY IGA/IGD/IGG/IGM EACH: CPT

## 2019-09-10 PROCEDURE — 83516 IMMUNOASSAY NONANTIBODY: CPT

## 2019-09-12 LAB
IGA SERPL-MCNC: 174 MG/DL (ref 68–408)
TTG IGA SER IA-ACNC: 1 U/ML (ref 0–3)

## 2019-09-26 ENCOUNTER — HOSPITAL ENCOUNTER (OUTPATIENT)
Dept: RADIOLOGY | Facility: MEDICAL CENTER | Age: 79
End: 2019-09-26
Attending: PHYSICIAN ASSISTANT
Payer: MEDICARE

## 2019-09-26 DIAGNOSIS — R92.8 ABNORMAL FINDING ON BREAST IMAGING: ICD-10-CM

## 2019-09-26 PROCEDURE — 76642 ULTRASOUND BREAST LIMITED: CPT | Mod: LT

## 2019-09-30 ENCOUNTER — HOSPITAL ENCOUNTER (OUTPATIENT)
Facility: MEDICAL CENTER | Age: 79
End: 2019-09-30
Attending: INTERNAL MEDICINE
Payer: MEDICARE

## 2019-09-30 LAB
C DIFF DNA SPEC QL NAA+PROBE: NEGATIVE
C DIFF TOX GENS STL QL NAA+PROBE: NEGATIVE
G LAMBLIA+C PARVUM AG STL QL RAPID: NORMAL
SIGNIFICANT IND 70042: NORMAL
SITE SITE: NORMAL
SOURCE SOURCE: NORMAL

## 2019-09-30 PROCEDURE — 87046 STOOL CULTR AEROBIC BACT EA: CPT

## 2019-09-30 PROCEDURE — 87899 AGENT NOS ASSAY W/OPTIC: CPT

## 2019-09-30 PROCEDURE — 87493 C DIFF AMPLIFIED PROBE: CPT

## 2019-09-30 PROCEDURE — 87329 GIARDIA AG IA: CPT

## 2019-09-30 PROCEDURE — 87328 CRYPTOSPORIDIUM AG IA: CPT

## 2019-09-30 PROCEDURE — 87045 FECES CULTURE AEROBIC BACT: CPT

## 2019-09-30 PROCEDURE — 83520 IMMUNOASSAY QUANT NOS NONAB: CPT

## 2019-10-01 LAB
E COLI SXT1+2 STL IA: NORMAL
SIGNIFICANT IND 70042: NORMAL
SITE SITE: NORMAL
SOURCE SOURCE: NORMAL

## 2019-10-03 LAB
BACTERIA STL CULT: NORMAL
E COLI SXT1+2 STL IA: NORMAL
ELASTASE PANC STL-MCNT: 138 UG/G
SIGNIFICANT IND 70042: NORMAL
SITE SITE: NORMAL
SOURCE SOURCE: NORMAL

## 2019-11-27 ENCOUNTER — NON-PROVIDER VISIT (OUTPATIENT)
Dept: CARDIOLOGY | Facility: MEDICAL CENTER | Age: 79
End: 2019-11-27
Payer: MEDICARE

## 2019-11-27 DIAGNOSIS — Z95.0 CARDIAC PACEMAKER IN SITU: ICD-10-CM

## 2019-11-27 PROCEDURE — 93280 PM DEVICE PROGR EVAL DUAL: CPT | Performed by: INTERNAL MEDICINE

## 2019-12-22 DIAGNOSIS — I10 ESSENTIAL HYPERTENSION: ICD-10-CM

## 2019-12-24 RX ORDER — AMLODIPINE BESYLATE 10 MG/1
TABLET ORAL
Qty: 90 TAB | Refills: 1 | Status: SHIPPED | OUTPATIENT
Start: 2019-12-24 | End: 2020-06-18

## 2019-12-24 NOTE — TELEPHONE ENCOUNTER
Refill X 6 months, sent to pharmacy.Pt. Seen in the last 6 months per protocol.   Lab Results   Component Value Date/Time    SODIUM 136 08/28/2019 08:04 AM    POTASSIUM 3.7 08/28/2019 08:04 AM    CHLORIDE 105 08/28/2019 08:04 AM    CO2 20 08/28/2019 08:04 AM    GLUCOSE 122 (H) 08/28/2019 08:04 AM    BUN 22 08/28/2019 08:04 AM    CREATININE 1.19 08/28/2019 08:04 AM    CREATININE 1.59 (H) 02/10/2011 08:40 AM    BUNCREATRAT 21 02/10/2011 08:40 AM    GLOMRATE 32 (L) 02/10/2011 08:40 AM

## 2020-01-13 DIAGNOSIS — I10 ESSENTIAL HYPERTENSION: ICD-10-CM

## 2020-01-15 RX ORDER — LOSARTAN POTASSIUM 100 MG/1
TABLET ORAL
Qty: 100 TAB | Refills: 1 | Status: SHIPPED | OUTPATIENT
Start: 2020-01-15 | End: 2020-08-03

## 2020-01-15 NOTE — TELEPHONE ENCOUNTER
*MEDICATION IS ON SHORTAGE**PER NEW INS PROTOCOL, NEEDS TO BE DONE FOR 100DAYS SUPPLY*    Was the patient seen in the last year in this department? Yes    Does patient have an active prescription for medications requested? No     Received Request Via: Pharmacy      Pt met protocol?: Yes    LAST OV 09/03/2019    BP Readings from Last 1 Encounters:   09/03/19 122/78     Lab Results   Component Value Date/Time    CHOLSTRLTOT 227 (H) 08/28/2019 08:04 AM     (H) 08/28/2019 08:04 AM    HDL 55 08/28/2019 08:04 AM    TRIGLYCERIDE 241 (H) 08/28/2019 08:04 AM       Lab Results   Component Value Date/Time    SODIUM 136 08/28/2019 08:04 AM    POTASSIUM 3.7 08/28/2019 08:04 AM    CHLORIDE 105 08/28/2019 08:04 AM    CO2 20 08/28/2019 08:04 AM    GLUCOSE 122 (H) 08/28/2019 08:04 AM    BUN 22 08/28/2019 08:04 AM    CREATININE 1.19 08/28/2019 08:04 AM    CREATININE 1.59 (H) 02/10/2011 08:40 AM    BUNCREATRAT 21 02/10/2011 08:40 AM    GLOMRATE 32 (L) 02/10/2011 08:40 AM     Lab Results   Component Value Date/Time    ALKPHOSPHAT 71 08/28/2019 08:04 AM    ASTSGOT 18 08/28/2019 08:04 AM    ALTSGPT 21 08/28/2019 08:04 AM    TBILIRUBIN 1.4 08/28/2019 08:04 AM

## 2020-01-29 ENCOUNTER — TELEPHONE (OUTPATIENT)
Dept: MEDICAL GROUP | Facility: PHYSICIAN GROUP | Age: 80
End: 2020-01-29

## 2020-01-29 RX ORDER — INFLUENZA A VIRUS A/MICHIGAN/45/2015 X-275 (H1N1) ANTIGEN (FORMALDEHYDE INACTIVATED), INFLUENZA A VIRUS A/SINGAPORE/INFIMH-16-0019/2016 IVR-186 (H3N2) ANTIGEN (FORMALDEHYDE INACTIVATED), AND INFLUENZA B VIRUS B/MARYLAND/15/2016 BX-69A (A B/COLORADO/6/2017-LIKE VIRUS) ANTIGEN (FORMALDEHYDE INACTIVATED) 60; 60; 60 UG/.5ML; UG/.5ML; UG/.5ML
INJECTION, SUSPENSION INTRAMUSCULAR
COMMUNITY
Start: 2019-11-15 | End: 2021-03-12

## 2020-01-29 NOTE — TELEPHONE ENCOUNTER
ANNUAL WELLNESS VISIT PRE-VISIT PLANNING WITHOUT OUTREACH    1.  Reviewed note from last office visit with PCP: YES    2.  If any orders were placed at last visit, do we have Results/Consult Notes?        •  Labs - Labs ordered, NOT completed. Patient advised to complete prior to next appointment.       •  Imaging - Imaging ordered, completed and results are in chart.       •  Referrals - No referrals were ordered at last office visit.  3.  Immunizations were updated in [facility] using WebIZ?: Yes       •  WebIZ Recommendations: HEPATITIS A , HEPATITIS B, TD, SHINGRIX (Shingles) and Varicella        •  Is patient due for Tdap? NO       •  Is patient due for Shingrix? YES. Patient was not notified of copay/out of pocket cost.     4.  Patient is due for the following Health Maintenance Topics:   Health Maintenance Due   Topic Date Due   • IMM ZOSTER VACCINES (2 of 3) 12/19/2012   • IMM HEP B VACCINE (2 of 3 - Risk 3-dose series) 03/04/2019   • Annual Wellness Visit  08/11/2019   • COLON CANCER SCREENING ANNUAL FIT  08/15/2019   • RETINAL SCREENING  02/04/2020   • DIABETES MONOFILAMENT / LE EXAM  02/04/2020   • URINE ACR / MICROALBUMIN  02/20/2020   • A1C SCREENING  02/28/2020     5.  Reviewed/Updated the following with patient:       •   Preferred Pharmacy? YES       •   Preferred Lab? YES       •   Preferred Communication? YES       •   Allergies? YES       •   Medications? YES. Was Abstract Encounter opened and chart updated? YES       •   Social History? YES. Was Abstract Encounter opened and chart updated? YES       •   Family History (document living status of immediate family members and if + hx of  cancer, diabetes, hypertension, hyperlipidemia, heart attack, stroke) YES. Was Abstract Encounter opened and chart updated? YES    6.  Care Team Updated:       •   DME Company (gait device, O2, CPAP, etc.): YES       •   Other Specialists (eye doctor, derm, GYN, cardiology, endo, etc): YES    7. Orders for overdue Health  Maintenance topics pended in Pre-Charting? NO    8.  Patient has the following Care Path diagnoses on Problem List:  DIABETES    DEPRESSION     9.  Patient was advised: “This is a free wellness visit. The provider will screen for medical conditions to help you stay healthy. If you have other concerns to address you may be asked to discuss these at a separate visit or there may be an additional fee.”     10.  Patient was informed to arrive 15 min prior to their scheduled appointment and bring in their medication bottles.

## 2020-01-31 ENCOUNTER — OFFICE VISIT (OUTPATIENT)
Dept: MEDICAL GROUP | Facility: PHYSICIAN GROUP | Age: 80
End: 2020-01-31
Payer: MEDICARE

## 2020-01-31 VITALS
DIASTOLIC BLOOD PRESSURE: 78 MMHG | RESPIRATION RATE: 16 BRPM | TEMPERATURE: 98.2 F | SYSTOLIC BLOOD PRESSURE: 148 MMHG | HEIGHT: 64 IN | HEART RATE: 84 BPM | WEIGHT: 167.3 LBS | BODY MASS INDEX: 28.56 KG/M2 | OXYGEN SATURATION: 96 %

## 2020-01-31 DIAGNOSIS — N18.30 CHRONIC KIDNEY DISEASE (CKD), STAGE III (MODERATE): ICD-10-CM

## 2020-01-31 DIAGNOSIS — L84 CALLUS OF FOOT: ICD-10-CM

## 2020-01-31 DIAGNOSIS — E11.9 TYPE 2 DIABETES MELLITUS WITHOUT COMPLICATION, WITHOUT LONG-TERM CURRENT USE OF INSULIN (HCC): ICD-10-CM

## 2020-01-31 DIAGNOSIS — E55.9 VITAMIN D DEFICIENCY: ICD-10-CM

## 2020-01-31 DIAGNOSIS — E78.2 MIXED HYPERLIPIDEMIA: ICD-10-CM

## 2020-01-31 DIAGNOSIS — I27.20 PULMONARY HYPERTENSION (HCC): ICD-10-CM

## 2020-01-31 DIAGNOSIS — K58.0 IRRITABLE BOWEL SYNDROME WITH DIARRHEA: ICD-10-CM

## 2020-01-31 DIAGNOSIS — I10 ESSENTIAL HYPERTENSION: ICD-10-CM

## 2020-01-31 DIAGNOSIS — F32.A ANXIETY AND DEPRESSION: ICD-10-CM

## 2020-01-31 DIAGNOSIS — Z00.00 MEDICARE ANNUAL WELLNESS VISIT, SUBSEQUENT: ICD-10-CM

## 2020-01-31 DIAGNOSIS — H02.9 LESION OF LEFT UPPER EYELID: ICD-10-CM

## 2020-01-31 DIAGNOSIS — B35.1 TOENAIL FUNGUS: ICD-10-CM

## 2020-01-31 DIAGNOSIS — F41.9 ANXIETY AND DEPRESSION: ICD-10-CM

## 2020-01-31 DIAGNOSIS — Z95.0 CARDIAC PACEMAKER IN SITU: ICD-10-CM

## 2020-01-31 DIAGNOSIS — I70.0 ATHEROSCLEROSIS OF AORTA (HCC): ICD-10-CM

## 2020-01-31 DIAGNOSIS — E03.9 HYPOTHYROIDISM, UNSPECIFIED TYPE: ICD-10-CM

## 2020-01-31 PROBLEM — R92.8 ABNORMAL FINDING ON BREAST IMAGING: Status: RESOLVED | Noted: 2018-08-28 | Resolved: 2020-01-31

## 2020-01-31 PROCEDURE — G0439 PPPS, SUBSEQ VISIT: HCPCS | Performed by: PHYSICIAN ASSISTANT

## 2020-01-31 PROCEDURE — 8041 PR SCP AHA: Performed by: PHYSICIAN ASSISTANT

## 2020-01-31 ASSESSMENT — PATIENT HEALTH QUESTIONNAIRE - PHQ9: CLINICAL INTERPRETATION OF PHQ2 SCORE: 0

## 2020-01-31 ASSESSMENT — ACTIVITIES OF DAILY LIVING (ADL): BATHING_REQUIRES_ASSISTANCE: 0

## 2020-01-31 ASSESSMENT — ENCOUNTER SYMPTOMS: GENERAL WELL-BEING: GOOD

## 2020-01-31 NOTE — PROGRESS NOTES
Chief Complaint   Patient presents with   • Annual Wellness Visit         HPI:  Roya is a 79 y.o. here for Medicare Annual Wellness Visit. Is an established patient of mine.    HPI:      Patient Active Problem List    Diagnosis Date Noted   • Pulmonary hypertension (HCC) 06/18/2015     Priority: High   • Atherosclerosis of aorta (HCC) 06/18/2015     Priority: High   • Cardiac pacemaker in situ 02/25/2014     Priority: High   • Chronic kidney disease (CKD), stage III (moderate) ([facility]) 07/22/2011     Priority: Medium   • Hyperlipidemia 10/21/2010     Priority: Low   • Lesion of left upper eyelid 01/31/2020   • Callus of foot 01/31/2020   • Toenail fungus 01/31/2020   • Anxiety and depression 09/07/2018   • Type 2 diabetes mellitus without complication, without long-term current use of insulin ([facility]) 02/22/2018   • Irritable bowel syndrome with diarrhea 02/16/2018   • Hypertension    • Hypothyroidism 08/28/2015   • Vitamin D deficiency 11/17/2011       Current Outpatient Medications   Medication Sig Dispense Refill   • FLUZONE HIGH-DOSE 0.5 ML Suspension Prefilled Syringe injection      • losartan (COZAAR) 100 MG Tab TAKE ONE TABLET BY MOUTH DAILY 100 Tab 1   • amLODIPine (NORVASC) 10 MG Tab TAKE ONE TABLET BY MOUTH DAILY 90 Tab 1   • rosuvastatin (CRESTOR) 10 MG Tab Take 1 Tab by mouth every evening. 100 Tab 1   • levothyroxine (SYNTHROID) 112 MCG Tab TAKE ONE TABLET BY MOUTH EVERY MORNING ON AN EMPTY STOMACH 90 Tab 1   • vitamin D (CHOLECALCIFEROL) 1000 UNIT Tab Take 1,000 Units by mouth every day.     • MEGARED OMEGA-3 KRILL  MG Cap Take  by mouth.     • aspirin 81 MG tablet Take 81 mg by mouth every day.       No current facility-administered medications for this visit.         Patient is taking medications as noted in medication list.  Current supplements as per medication list.     Allergies: Cephalexin [keflex]    Current social contact/activities:  Visiting with friends and family, women group,  ceremonies, Zoroastrian    Is patient current with immunizations? No, due for HEPATITIS B. Patient is interested in receiving NONE today.    She  reports that she has never smoked. She has never used smokeless tobacco. She reports previous alcohol use. She reports that she does not use drugs.  Counseling given: Not Answered  Comment: avoid all tobacco products        DPA/Advanced directive: Patient has Advanced Directive on file.     ROS:    Gait: Uses no assistive device   Ostomy: No   Other tubes: No   Amputations: No   Chronic oxygen use No   Last eye exam 2019   Wears hearing aids: No   : Denies any urinary leakage during the last 6 months    Annual Health Assessment Questions:    1.  Are you currently engaging in any exercise or physical activity? Yes    2.  How would you describe your mood or emotional well-being today? good    3.  Have you had any falls in the last year? No    4.  Have you noticed any problems with your balance or had difficulty walking? No    5.  In the last six months have you experienced any leakage of urine? No    6. DPA/Advanced Directive: Patient has Advanced Directive on file.     Screening:      Depression Screening    Little interest or pleasure in doing things?  0 - not at all  Feeling down, depressed, or hopeless? 0 - not at all  Patient Health Questionnaire Score: 0    If depressive symptoms identified deferred to follow up visit unless specifically addressed in assessment and plan.    Interpretation of PHQ-9 Total Score   Score Severity   1-4 No Depression   5-9 Mild Depression   10-14 Moderate Depression   15-19 Moderately Severe Depression   20-27 Severe Depression    Screening for Cognitive Impairment    Three Minute Recall (village, kitchen, baby)  3/3 Village Kitchen Baby  [place] clock face with all 12 numbers and set the hands to show 10 past 10.  Yes 10:10  5/5  If cognitive concerns identified, deferred for follow up unless specifically addressed in assessment and  plan.    Fall Risk Assessment    Has the patient had two or more falls in the last year or any fall with injury in the last year?  No  If fall risk identified, deferred for follow up unless specifically addressed in assessment and plan.    Safety Assessment    Throw rugs on floor.  Yes  Handrails on all stairs.  Yes  Good lighting in all hallways.  Yes  Difficulty hearing.  No  Patient counseled about all safety risks that were identified.    Functional Assessment ADLs    Are there any barriers preventing you from cooking for yourself or meeting nutritional needs?  No.    Are there any barriers preventing you from driving safely or obtaining transportation?  No.    Are there any barriers preventing you from using a telephone or calling for help?  No.    Are there any barriers preventing you from shopping?  No.    Are there any barriers preventing you from taking care of your own finances?  No.    Are there any barriers preventing you from managing your medications?  No.    Are there any barriers preventing you from showering, bathing or dressing yourself?  No.    Are you currently engaging in any exercise or physical activity?  Yes.  Stationary Bike, yard work  What is your perception of your health?  Good.    Health Maintenance Summary                IMM ZOSTER VACCINES Overdue 12/19/2012      Done 10/24/2012 Imm Admin: Zoster Vaccine Live (ZVL) (Zostavax)    IMM HEP B VACCINE Overdue 3/4/2019      Done 2/4/2019 Imm Admin: Hepatitis B Vaccine Recombivax (Adol/Adult)    Annual Wellness Visit Overdue 8/11/2019      Done 8/10/2018 Visit Dx: Medicare annual wellness visit, subsequent     Patient has more history with this topic...    RETINAL SCREENING Next Due 2/4/2020      Done 2/4/2019 POCT RETINAL EYE EXAM    DIABETES MONOFILAMENT / LE EXAM Next Due 2/4/2020      Done 2/4/2019 AMB DIABETIC MONOFILAMENT LOWER EXTREMITY EXAM    URINE ACR / MICROALBUMIN Next Due 2/20/2020      Done 2/20/2019 MICROALBUMIN CREAT RATIO  URINE    A1C SCREENING Next Due 2/28/2020      Done 8/28/2019 HEMOGLOBIN A1C     Patient has more history with this topic...    MAMMOGRAM Next Due 3/13/2020      Done 3/13/2019 MA-DIAGNOSTIC MAMMO BILAT W/TOMOSYNTHESIS W/CAD     Patient has more history with this topic...    FASTING LIPID PROFILE Next Due 8/28/2020      Done 8/28/2019 LIPID PROFILE     Patient has more history with this topic...    SERUM CREATININE Next Due 8/28/2020      Done 8/28/2019 COMP METABOLIC PANEL     Patient has more history with this topic...    BONE DENSITY Next Due 7/14/2022      Done 7/14/2017 DS-BONE DENSITY STUDY (DEXA)    IMM DTaP/Tdap/Td Vaccine Next Due 10/24/2022      Done 10/24/2012 Imm Admin: Tdap Vaccine    COLONOSCOPY Next Due 10/18/2029      Done 10/18/2019 REFERRAL TO GI FOR COLONOSCOPY          Patient Care Team:  Lacey Kruse P.A.-C. as PCP - General (Physician Assistant)  Quintin Schultz M.D. as Consulting Physician (Cardiology)  Yousif Islas Jr., M.D. as Consulting Physician (Gastroenterology)  [facility]    Social History     Tobacco Use   • Smoking status: Never Smoker   • Smokeless tobacco: Never Used   • Tobacco comment: avoid all tobacco products   Substance Use Topics   • Alcohol use: Not Currently     Alcohol/week: 0.0 oz     Comment: occasionally   • Drug use: No     Family History   Problem Relation Age of Onset   • Heart Disease Mother         enlarged heart, heart failure   • Cancer Father         liver   • No Known Problems Sister    • Heart Disease Brother         heart failure   • Lung Disease Brother         heavy smoker   • Alcohol/Drug Brother         etoh   • Cancer Paternal Aunt         breast cancer    • Cancer Maternal Grandmother         liver    • Heart Disease Maternal Grandfather         CHF    • No Known Problems Paternal Grandmother    • No Known Problems Paternal Grandfather    • No Known Problems Sister    • No Known Problems Sister    • No Known  "Problems Sister    • No Known Problems Son    • No Known Problems Son    • No Known Problems Son    • Stroke Neg Hx      She  has a past medical history of Cardiac pacemaker in situ, History of DVT of lower extremity (6/5/2014), Hyperlipidemia, Hypertension, Pancreatic cyst, Pancreatitis, Sinus node dysfunction (HCC), and Thyroid disease.   Past Surgical History:   Procedure Laterality Date   • GASTROSCOPY-ENDO  2/21/2014    Performed by Steve Islas Jr., M.D. at ENDOSCOPY [facility]   • GASTROSCOPY-ENDO  2/20/2014    Performed by Antonio Espinoza M.D. at ENDOSCOPY [facility]   • EXPLORATORY LAPAROTOMY  2/10/2014    Performed by Rigo Garcia M.D. at SURGERY [facility]   • CHOLECYSTECTOMY  2/10/2014    Performed by Rigo Garcia M.D. at SURGERY [facility]   • PANCREATECTOMY  2/10/2014    Performed by Rigo Garcia M.D. at SURGERY [facility]   • GASTROSTOMY FEEDING  2/10/2014    Performed by Rigo Garcia M.D. at SURGERY [facility]   • OTHER ORTHOPEDIC SURGERY      foot surgery           Exam:     /78 (BP Location: Left arm, Patient Position: Sitting, BP Cuff Size: Adult)   Pulse 84   Temp 36.8 °C (98.2 °F) (Temporal)   Resp 16   Ht 1.626 m (5' 4\")   Wt 75.9 kg (167 lb 4.8 oz)   SpO2 96%  Body mass index is 28.72 kg/m².    Hearing good.    Dentition good  Alert, oriented in no acute distress.  Eye contact is good, speech goal directed, affect calm    Monofilament testing with a 10 gram force: sensation intact: intact bilaterally  Visual Inspection: Feet with extensive callus formation bilaterally, including on the bottoms of the feet, sides of the great toes, and dorsal aspects of the bilateral fifth toes.  Bilateral bunions are present.  Toenails appear thickened and mycotic.  No maceration, ulcers, fissures.  Pedal pulses: intact bilaterally      Assessment and Plan. The following treatment and monitoring plan is recommended:  "     Anxiety and depression  Established problem, well-controlled without medication per patient. Denies functional limitation. Feels improved when she stays active. Will continue to monitor.    Atherosclerosis of aorta  Established problem, presumed stable. Found incidentally on prior imaging. Recommend continuation of baby aspirin.    Cardiac pacemaker in situ  Established problem, stable. H/o sinus node dysfunction. Will continue to follow with cardiology for pacer checks.     Chronic kidney disease (CKD), stage III (moderate)  Established problem, stable per review. Most recent eGFR in 8/2019 was 44. Will continue to monitor at least annually.    Hyperlipidemia  Established problem, well-controlled with Crestor 10 mg daily. Has not yet had lipids re-checked since medication change. Reminded her to have labs done.    Hypertension  Established problem, well-controlled with amlodipine 10 mg daily and losartan 100 mg daily. BP today is 148/78. Typically runs lower--states runs 120s-130 systolic at home. Continue current management.    Hypothyroidism  Established problem, well-controlled with levothyroxine 112 mcg daily. Last TSH in 8/2018 was normal. Due for re-check which I have ordered. Continue current management in the meantime.    Irritable bowel syndrome with diarrhea  Established problem, uncontrolled but trying to manage with diet. Has seen GI. 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 Had colonoscopy in 10/2019 which was unremarkable. Will continue dietary control.    Pulmonary hypertension (HCC)  Established problem, stable. Follows with cardiology. Will have periodic echocardiogram monitoring.    Type 2 diabetes mellitus without complication, without long-term current use of insulin (HCC)  Established problem, stable per review. Last A1c in 8/2019 was 6.6. Continue lifestyle modification.    Vitamin D  deficiency  Established problem, well-controlled with OTC supplement 2000 IU daily. Continue current management.    Callus of foot  Patient requesting referral to podiatry for evaluation/treatment of multiple calluses and toenail fungus. Referral ordered.    Toenail fungus  Patient requesting referral to podiatry for evaluation/treatment of multiple calluses and toenail fungus. Referral ordered.    Lesion of left upper eyelid  Patient requesting referral to ophthalmology for evaluation of lesion on left medial upper eyelid. States has been there for years, but has noticed growth and starting to cause pain and discomfort. Referral ordered.        Services suggested: No services needed at this time  Health Care Screening recommendations as per orders if indicated.  Referrals offered: PT/OT/Nutrition counseling/Behavioral Health/Smoking cessation as per orders if indicated.    Discussion today about general wellness and lifestyle habits:    · Prevent falls and reduce trip hazards; Cautioned about securing or removing rugs.  · Have a working fire alarm and carbon monoxide detector;   · Engage in regular physical activity and social activities       Follow-up: Return in about 6 months (around 7/31/2020).     Lacey Kruse P.A.-C.

## 2020-01-31 NOTE — ASSESSMENT & PLAN NOTE
Established problem, uncontrolled but trying to manage with diet. Has seen GI. 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 Had colonoscopy in 10/2019 which was unremarkable. Will continue dietary control.

## 2020-01-31 NOTE — ASSESSMENT & PLAN NOTE
Patient requesting referral to ophthalmology for evaluation of lesion on left medial upper eyelid. States has been there for years, but has noticed growth and starting to cause pain and discomfort. Referral ordered.

## 2020-01-31 NOTE — ASSESSMENT & PLAN NOTE
Established problem, well-controlled with levothyroxine 112 mcg daily. Last TSH in 8/2018 was normal. Due for re-check which I have ordered. Continue current management in the meantime.

## 2020-01-31 NOTE — ASSESSMENT & PLAN NOTE
Established problem, stable per review. Most recent eGFR in 8/2019 was 44. Will continue to monitor at least annually.

## 2020-01-31 NOTE — ASSESSMENT & PLAN NOTE
Established problem, well-controlled without medication per patient. Denies functional limitation. Feels improved when she stays active. Will continue to monitor.

## 2020-01-31 NOTE — ASSESSMENT & PLAN NOTE
Established problem, stable. H/o sinus node dysfunction. Will continue to follow with cardiology for pacer checks.

## 2020-01-31 NOTE — ASSESSMENT & PLAN NOTE
Established problem, well-controlled with Crestor 10 mg daily. Has not yet had lipids re-checked since medication change. Reminded her to have labs done.

## 2020-01-31 NOTE — ASSESSMENT & PLAN NOTE
Established problem, presumed stable. Found incidentally on prior imaging. Recommend continuation of baby aspirin.

## 2020-01-31 NOTE — ASSESSMENT & PLAN NOTE
Established problem, stable. Follows with cardiology. Will have periodic echocardiogram monitoring.

## 2020-01-31 NOTE — ASSESSMENT & PLAN NOTE
Patient requesting referral to podiatry for evaluation/treatment of multiple calluses and toenail fungus. Referral ordered.

## 2020-01-31 NOTE — ASSESSMENT & PLAN NOTE
Established problem, well-controlled with amlodipine 10 mg daily and losartan 100 mg daily. BP today is 148/78. Typically runs lower--states runs 120s-130 systolic at home. Continue current management.

## 2020-01-31 NOTE — ASSESSMENT & PLAN NOTE
Established problem, stable per review. Last A1c in 8/2019 was 6.6. Continue lifestyle modification.

## 2020-01-31 NOTE — ASSESSMENT & PLAN NOTE
Established problem, well-controlled with OTC supplement 2000 IU daily. Continue current management.

## 2020-02-07 DIAGNOSIS — E03.9 HYPOTHYROIDISM, UNSPECIFIED TYPE: ICD-10-CM

## 2020-02-10 RX ORDER — LEVOTHYROXINE SODIUM 112 UG/1
TABLET ORAL
Qty: 90 TAB | Refills: 0 | Status: SHIPPED | OUTPATIENT
Start: 2020-02-10 | End: 2020-05-05

## 2020-02-10 NOTE — TELEPHONE ENCOUNTER
Was the patient seen in the last year in this department? Yes    Does patient have an active prescription for medications requested? No     Received Request Via: Pharmacy      Pt met protocol?: Yes, OV last month, TSH checked 8/18

## 2020-03-06 ENCOUNTER — HOSPITAL ENCOUNTER (OUTPATIENT)
Dept: LAB | Facility: MEDICAL CENTER | Age: 80
End: 2020-03-06
Attending: PODIATRIST
Payer: MEDICARE

## 2020-03-06 ENCOUNTER — HOSPITAL ENCOUNTER (OUTPATIENT)
Dept: LAB | Facility: MEDICAL CENTER | Age: 80
End: 2020-03-06
Attending: PHYSICIAN ASSISTANT
Payer: MEDICARE

## 2020-03-06 DIAGNOSIS — E78.2 MIXED HYPERLIPIDEMIA: ICD-10-CM

## 2020-03-06 DIAGNOSIS — E03.9 HYPOTHYROIDISM, UNSPECIFIED TYPE: ICD-10-CM

## 2020-03-06 DIAGNOSIS — E11.9 TYPE 2 DIABETES MELLITUS WITHOUT COMPLICATION, WITHOUT LONG-TERM CURRENT USE OF INSULIN (HCC): ICD-10-CM

## 2020-03-06 LAB
ALBUMIN SERPL BCP-MCNC: 4.4 G/DL (ref 3.2–4.9)
ALBUMIN SERPL BCP-MCNC: 4.6 G/DL (ref 3.2–4.9)
ALBUMIN/GLOB SERPL: 1.4 G/DL
ALP SERPL-CCNC: 61 U/L (ref 30–99)
ALP SERPL-CCNC: 61 U/L (ref 30–99)
ALT SERPL-CCNC: 25 U/L (ref 2–50)
ALT SERPL-CCNC: 27 U/L (ref 2–50)
ANION GAP SERPL CALC-SCNC: 12 MMOL/L (ref 0–11.9)
AST SERPL-CCNC: 22 U/L (ref 12–45)
AST SERPL-CCNC: 25 U/L (ref 12–45)
BILIRUB CONJ SERPL-MCNC: 0.2 MG/DL (ref 0.1–0.5)
BILIRUB INDIRECT SERPL-MCNC: 0.9 MG/DL (ref 0–1)
BILIRUB SERPL-MCNC: 1.1 MG/DL (ref 0.1–1.5)
BILIRUB SERPL-MCNC: 1.1 MG/DL (ref 0.1–1.5)
BUN SERPL-MCNC: 28 MG/DL (ref 8–22)
CALCIUM SERPL-MCNC: 9.6 MG/DL (ref 8.5–10.5)
CHLORIDE SERPL-SCNC: 108 MMOL/L (ref 96–112)
CHOLEST SERPL-MCNC: 141 MG/DL (ref 100–199)
CO2 SERPL-SCNC: 18 MMOL/L (ref 20–33)
CREAT SERPL-MCNC: 1.41 MG/DL (ref 0.5–1.4)
CREAT UR-MCNC: 208.3 MG/DL
EST. AVERAGE GLUCOSE BLD GHB EST-MCNC: 174 MG/DL
FASTING STATUS PATIENT QL REPORTED: NORMAL
GLOBULIN SER CALC-MCNC: 3.2 G/DL (ref 1.9–3.5)
GLUCOSE SERPL-MCNC: 149 MG/DL (ref 65–99)
HBA1C MFR BLD: 7.7 % (ref 0–5.6)
HDLC SERPL-MCNC: 56 MG/DL
LDLC SERPL CALC-MCNC: 56 MG/DL
MICROALBUMIN UR-MCNC: 3.8 MG/DL
MICROALBUMIN/CREAT UR: 18 MG/G (ref 0–30)
POTASSIUM SERPL-SCNC: 4.1 MMOL/L (ref 3.6–5.5)
PROT SERPL-MCNC: 7.6 G/DL (ref 6–8.2)
PROT SERPL-MCNC: 7.7 G/DL (ref 6–8.2)
SODIUM SERPL-SCNC: 138 MMOL/L (ref 135–145)
TRIGL SERPL-MCNC: 147 MG/DL (ref 0–149)
TSH SERPL DL<=0.005 MIU/L-ACNC: 3.4 UIU/ML (ref 0.38–5.33)

## 2020-03-06 PROCEDURE — 84443 ASSAY THYROID STIM HORMONE: CPT

## 2020-03-06 PROCEDURE — 80076 HEPATIC FUNCTION PANEL: CPT

## 2020-03-06 PROCEDURE — 80061 LIPID PANEL: CPT

## 2020-03-06 PROCEDURE — 83036 HEMOGLOBIN GLYCOSYLATED A1C: CPT

## 2020-03-06 PROCEDURE — 36415 COLL VENOUS BLD VENIPUNCTURE: CPT

## 2020-03-06 PROCEDURE — 82043 UR ALBUMIN QUANTITATIVE: CPT

## 2020-03-06 PROCEDURE — 80053 COMPREHEN METABOLIC PANEL: CPT

## 2020-03-06 PROCEDURE — 82570 ASSAY OF URINE CREATININE: CPT

## 2020-05-03 DIAGNOSIS — E03.9 HYPOTHYROIDISM, UNSPECIFIED TYPE: ICD-10-CM

## 2020-05-05 RX ORDER — LEVOTHYROXINE SODIUM 112 UG/1
TABLET ORAL
Qty: 90 TAB | Refills: 3 | Status: SHIPPED | OUTPATIENT
Start: 2020-05-05 | End: 2021-05-07 | Stop reason: SDUPTHER

## 2020-05-05 NOTE — TELEPHONE ENCOUNTER
Was the patient seen in the last year in this department? Yes    Does patient have an active prescription for medications requested? No     Received Request Via: Pharmacy      Pt met protocol?: Yes    LAST OV 01/31/2020    Lab Results   Component Value Date/Time    TSHULTRASEN 3.400 03/06/2020 0813     Lab Results   Component Value Date/Time    TSH 1.980 02/10/2011 0840

## 2020-06-02 ENCOUNTER — NON-PROVIDER VISIT (OUTPATIENT)
Dept: CARDIOLOGY | Facility: MEDICAL CENTER | Age: 80
End: 2020-06-02
Payer: MEDICARE

## 2020-06-02 DIAGNOSIS — Z95.0 CARDIAC PACEMAKER IN SITU: ICD-10-CM

## 2020-06-02 PROCEDURE — 93280 PM DEVICE PROGR EVAL DUAL: CPT | Performed by: INTERNAL MEDICINE

## 2020-06-03 ENCOUNTER — PATIENT OUTREACH (OUTPATIENT)
Dept: HEALTH INFORMATION MANAGEMENT | Facility: OTHER | Age: 80
End: 2020-06-03

## 2020-06-03 SDOH — ECONOMIC STABILITY: FOOD INSECURITY: WITHIN THE PAST 12 MONTHS, YOU WORRIED THAT YOUR FOOD WOULD RUN OUT BEFORE YOU GOT MONEY TO BUY MORE.: NEVER TRUE

## 2020-06-03 SDOH — ECONOMIC STABILITY: TRANSPORTATION INSECURITY
IN THE PAST 12 MONTHS, HAS THE LACK OF TRANSPORTATION KEPT YOU FROM MEDICAL APPOINTMENTS OR FROM GETTING MEDICATIONS?: NO

## 2020-06-03 SDOH — ECONOMIC STABILITY: FOOD INSECURITY: WITHIN THE PAST 12 MONTHS, THE FOOD YOU BOUGHT JUST DIDN'T LAST AND YOU DIDN'T HAVE MONEY TO GET MORE.: NEVER TRUE

## 2020-06-03 SDOH — ECONOMIC STABILITY: TRANSPORTATION INSECURITY
IN THE PAST 12 MONTHS, HAS LACK OF TRANSPORTATION KEPT YOU FROM MEETINGS, WORK, OR FROM GETTING THINGS NEEDED FOR DAILY LIVING?: NO

## 2020-06-03 NOTE — PROGRESS NOTES
1. [facility] Verified: yes    2. Verify PCP: yes    3. Review and add  to Care Team: yes      5. Reviewed/Updated the following with patient:       •   Communication Preference Obtained? YES  • MyChart Activation: already active       •   E-Mail Address Obtained? YES       •   Appointment Day and Time Preferences? YES       •   Preferred Pharmacy? YES       •   Preferred Lab? YES      Called member to introduce myself as their SCP  and schedule AHA/AWV. Member had no questions at this time, direct phone number given for future contact.

## 2020-06-17 DIAGNOSIS — I10 ESSENTIAL HYPERTENSION: ICD-10-CM

## 2020-06-18 RX ORDER — AMLODIPINE BESYLATE 10 MG/1
TABLET ORAL
Qty: 90 TAB | Refills: 1 | Status: SHIPPED | OUTPATIENT
Start: 2020-06-18 | End: 2020-11-09

## 2020-06-18 NOTE — TELEPHONE ENCOUNTER
Last seen by PCP 01/31/2020.   Last Blood Pressure reading was 148/78 on 1/31/2020    Lab Results   Component Value Date/Time    SODIUM 138 03/06/2020 08:13 AM    POTASSIUM 4.1 03/06/2020 08:13 AM    CHLORIDE 108 03/06/2020 08:13 AM    CO2 18 (L) 03/06/2020 08:13 AM    GLUCOSE 149 (H) 03/06/2020 08:13 AM    BUN 28 (H) 03/06/2020 08:13 AM    CREATININE 1.41 (H) 03/06/2020 08:13 AM    CREATININE 1.59 (H) 02/10/2011 08:40 AM    BUNCREATRAT 21 02/10/2011 08:40 AM    GLOMRATE 32 (L) 02/10/2011 08:40 AM       Will send 6 month(s) to the pharmacy.

## 2020-07-24 ENCOUNTER — TELEPHONE (OUTPATIENT)
Dept: MEDICAL GROUP | Facility: PHYSICIAN GROUP | Age: 80
End: 2020-07-24

## 2020-07-24 NOTE — TELEPHONE ENCOUNTER
ESTABLISHED PATIENT PRE-VISIT PLANNING     Patient was NOT contacted to complete PVP.    1.  Reviewed notes from the last few office visits within the medical group: Yes    2.  If any orders were placed at last visit or intended to be done for this visit (i.e. 6 mos follow-up), do we have Results/Consult Notes?        •  Labs - Labs ordered, completed on 3/6/20 and results are in chart.       •  Imaging - Imaging was not ordered at last office visit.       •  Referrals - Referral ordered, patient was seen and consult notes are in chart. Care Teams updated  YES.    3. Is this appointment scheduled as a Hospital Follow-Up? No    4.  Immunizations were updated in Epic using WebIZ?: UNABLE TO CHECK WEBIZ       •  Web Iz Recommendations:     5.  Patient is due for the following Health Maintenance Topics:   Health Maintenance Due   Topic Date Due   • IMM ZOSTER VACCINES (2 of 3) 12/19/2012   • RETINAL SCREENING  02/04/2020   • MAMMOGRAM  03/13/2020       6. Orders for overdue Health Maintenance topics pended in Pre-Charting? NO    7.  AHA (MDX) form printed for Provider? No, already completed    8.  Patient was NOT informed to arrive 15 min prior to their scheduled appointment and bring in their medication bottles.

## 2020-07-31 DIAGNOSIS — I10 ESSENTIAL HYPERTENSION: ICD-10-CM

## 2020-08-03 RX ORDER — LOSARTAN POTASSIUM 100 MG/1
TABLET ORAL
Qty: 100 TAB | Refills: 0 | Status: SHIPPED | OUTPATIENT
Start: 2020-08-03 | End: 2020-11-09

## 2020-08-10 ENCOUNTER — OFFICE VISIT (OUTPATIENT)
Dept: MEDICAL GROUP | Facility: PHYSICIAN GROUP | Age: 80
End: 2020-08-10
Payer: MEDICARE

## 2020-08-10 VITALS
DIASTOLIC BLOOD PRESSURE: 70 MMHG | HEIGHT: 64 IN | TEMPERATURE: 97.8 F | OXYGEN SATURATION: 95 % | WEIGHT: 162.3 LBS | BODY MASS INDEX: 27.71 KG/M2 | HEART RATE: 82 BPM | SYSTOLIC BLOOD PRESSURE: 110 MMHG

## 2020-08-10 DIAGNOSIS — E03.9 HYPOTHYROIDISM, UNSPECIFIED TYPE: ICD-10-CM

## 2020-08-10 DIAGNOSIS — H02.9 LESION OF LEFT UPPER EYELID: ICD-10-CM

## 2020-08-10 DIAGNOSIS — Z12.31 ENCOUNTER FOR SCREENING MAMMOGRAM FOR MALIGNANT NEOPLASM OF BREAST: ICD-10-CM

## 2020-08-10 DIAGNOSIS — N18.30 CHRONIC KIDNEY DISEASE (CKD), STAGE III (MODERATE): ICD-10-CM

## 2020-08-10 DIAGNOSIS — E78.2 MIXED HYPERLIPIDEMIA: ICD-10-CM

## 2020-08-10 DIAGNOSIS — E11.9 TYPE 2 DIABETES MELLITUS WITHOUT COMPLICATION, WITHOUT LONG-TERM CURRENT USE OF INSULIN (HCC): ICD-10-CM

## 2020-08-10 DIAGNOSIS — I10 ESSENTIAL HYPERTENSION: ICD-10-CM

## 2020-08-10 PROCEDURE — 99214 OFFICE O/P EST MOD 30 MIN: CPT | Performed by: PHYSICIAN ASSISTANT

## 2020-08-10 NOTE — PROGRESS NOTES
Subjective:   Roya Muniz is a 79 y.o. female here today for follow-up on hypertension and other medical problems. Is an established patient of mine.    HPI:    Patient presents to the office today for routine follow-up on chronic medical problems.  Last visit with me was in January 2020. Since that time, she did follow-up with ophthalmology regarding the lesion on her left upper eyelid.  She was referred to another eye doctor in [facility] who offered to remove, but said that it would need to be done as an outpatient surgery.  Given that the lesion is close to her tear duct, it would involve likely having a drain in place for a while.  She is not sure yet if she wants to go through with this or not. She otherwise denies any new health concerns. States she's been feeling really well.    Hypertension is treated with losartan 100 mg daily and amlodipine 10 mg daily. BP today in the office is 110/70.  She does check her blood pressure regularly at home and states it runs around 135 over low 80s. She states that she has noticed improvement in her blood pressures since she started taking both of her medications in the evening (rather than in the morning) ~3 months ago.    She has type 2 diabetes which historically has been diet-controlled. No current medications. Last A1c in 3/2020 showed A1c of 7.7.  She does not believe her diet has significantly changed at all.  She does admit to more than ideal carbohydrate consumption.    Has stage 3 CKD. Last renal labs in 3/2020 showed mild worsening with jump in creatinine to 1.41 and decrease in GFR to 36.  She already avoids NSAIDs and only takes Tylenol if she needs to take something for pain.    Hyperlipidemia is treated with rosuvastatin 10 mg daily. Last lipid profile in 3/2020 was normal.    Finally, she continues on levothyroxine 112 mcg daily for hypothyroidism.  TSH done in 3/2020 was normal.    Current medicines (including changes today)  Current Outpatient  "Medications   Medication Sig Dispense Refill   • losartan (COZAAR) 100 MG Tab TAKE ONE TABLET BY MOUTH DAILY 100 Tab 0   • amLODIPine (NORVASC) 10 MG Tab TAKE ONE TABLET BY MOUTH DAILY 90 Tab 1   • levothyroxine (SYNTHROID) 112 MCG Tab TAKE ONE TABLET BY MOUTH EVERY MORNING ON AN EMPTY STOMACH 90 Tab 3   • rosuvastatin (CRESTOR) 10 MG Tab Take 1 Tab by mouth every evening. 100 Tab 1   • vitamin D (CHOLECALCIFEROL) 1000 UNIT Tab Take 1,000 Units by mouth every day.     • aspirin 81 MG tablet Take 81 mg by mouth every day.     • FLUZONE HIGH-DOSE 0.5 ML Suspension Prefilled Syringe injection        No current facility-administered medications for this visit.      She  has a past medical history of Cardiac pacemaker in situ, History of DVT of lower extremity (6/5/2014), Hyperlipidemia, Hypertension, Pancreatic cyst, Pancreatitis, Sinus node dysfunction (HCC), and Thyroid disease.    ROS  No chest pain, edema  No shortness of breath  No dizziness/lightheadedness       Objective:     /70 (BP Location: Left arm, Patient Position: Sitting, BP Cuff Size: Adult)   Pulse 82   Temp 36.6 °C (97.8 °F) (Temporal)   Ht 1.626 m (5' 4\")   Wt 73.6 kg (162 lb 4.8 oz)   SpO2 95%  Body mass index is 27.86 kg/m².     Physical Exam:  Constitutional: Alert, well-appearing, very pleasant, no distress.  Skin: Warm, dry, good turgor, no rashes in visible areas.  Eye: Pupils are equal and round, conjunctiva clear, lids normal.  ENMT: Lips without lesions, moist mucus membranes.  Neck: No masses. No submandibular or cervical lymphadenopathy. No palpable thyromegaly.  Respiratory: Unlabored respiratory effort, lungs clear to auscultation, no wheezes, no rhonchi.  Cardiovascular: Normal S1, S2, no murmur, no lower extremity edema.      Assessment and Plan:   The following treatment plan was discussed    1. Essential hypertension  Established problem, well-controlled with losartan and amlodipine.  Blood pressure remains at goal.  " Continue current management.    2. Type 2 diabetes mellitus without complication, without long-term current use of insulin (HCC)  Established problem, uncontrolled.  Has been worsening in her A1c from 6.6-7.7 since last year.  She does not believe she is significantly changed her diet at all but she does admit to high carbohydrate consumption.  She will work on decreasing this.  In the meantime, I would like her to have another A1c done as she is due.  If it has gone up any further, would recommend starting medication.  - HEMOGLOBIN A1C; Future    3. Lesion of left upper eyelid  Established problem, uncontrolled.  She is still contemplating whether or not she wants to go through with eyelid surgery.  If she decides to pursue this, will follow-up with ophthalmology.    4. Chronic kidney disease (CKD), stage III (moderate) (HCC)  Established problem with slight worsening since last visit.  Is already compliant with NSAID avoidance.  Recommend continued checks every 6 months.    5. Mixed hyperlipidemia  Established problem, well-controlled with rosuvastatin 10 mg daily.  Her lipids are at goal.  Continue current management.    6. Hypothyroidism, unspecified type  Established problem, well-controlled with levothyroxine 112 mcg daily.  Recent TSH is normal so patient advised to continue current dose.    7. Encounter for screening mammogram for malignant neoplasm of breast  - MA-SCREENING MAMMO BILAT W/CAD; Future    Patient declines Shingrix vaccination at this time. She believes she can get without co-pay at her pharmacy.      Followup: Return for establish care with new PCP.    Lacey Kruse P.A.-C.

## 2020-10-21 ENCOUNTER — HOSPITAL ENCOUNTER (OUTPATIENT)
Dept: RADIOLOGY | Facility: MEDICAL CENTER | Age: 80
End: 2020-10-21
Attending: PHYSICIAN ASSISTANT
Payer: MEDICARE

## 2020-10-21 DIAGNOSIS — Z12.31 ENCOUNTER FOR SCREENING MAMMOGRAM FOR MALIGNANT NEOPLASM OF BREAST: ICD-10-CM

## 2020-10-21 PROCEDURE — 77067 SCR MAMMO BI INCL CAD: CPT

## 2020-11-04 DIAGNOSIS — E78.2 MIXED HYPERLIPIDEMIA: ICD-10-CM

## 2020-11-06 RX ORDER — ROSUVASTATIN CALCIUM 10 MG/1
TABLET, COATED ORAL
Qty: 100 TAB | Refills: 0 | Status: SHIPPED | OUTPATIENT
Start: 2020-11-06 | End: 2021-02-16 | Stop reason: SDUPTHER

## 2020-11-06 NOTE — TELEPHONE ENCOUNTER
Lab Results   Component Value Date/Time    CHOLSTRLTOT 141 03/06/2020 08:13 AM    LDL 56 03/06/2020 08:13 AM    HDL 56 03/06/2020 08:13 AM    TRIGLYCERIDE 147 03/06/2020 08:13 AM       Lab Results   Component Value Date/Time    ALKPHOSPHAT 61 03/06/2020 08:13 AM    ALKPHOSPHAT 61 03/06/2020 08:13 AM    ASTSGOT 22 03/06/2020 08:13 AM    ASTSGOT 25 03/06/2020 08:13 AM    ALTSGPT 25 03/06/2020 08:13 AM    ALTSGPT 27 03/06/2020 08:13 AM    TBILIRUBIN 1.1 03/06/2020 08:13 AM    TBILIRUBIN 1.1 03/06/2020 08:13 AM      Last seen by PCP 08/10/2020. Will send 3 month(s) to the pharmacy.  Needs new pcp, Pt to make appt prior to more refills.

## 2020-11-08 DIAGNOSIS — I10 ESSENTIAL HYPERTENSION: ICD-10-CM

## 2020-11-10 RX ORDER — LOSARTAN POTASSIUM 100 MG/1
TABLET ORAL
Qty: 100 TAB | Refills: 1 | Status: SHIPPED | OUTPATIENT
Start: 2020-11-10 | End: 2021-03-12 | Stop reason: SDUPTHER

## 2020-11-10 RX ORDER — AMLODIPINE BESYLATE 10 MG/1
TABLET ORAL
Qty: 90 TAB | Refills: 1 | Status: SHIPPED | OUTPATIENT
Start: 2020-11-10 | End: 2021-03-12 | Stop reason: SDUPTHER

## 2020-11-10 NOTE — TELEPHONE ENCOUNTER
Refill X 6 months, sent to pharmacy.Pt. Seen in the last 6 months per protocol.   Lab Results   Component Value Date/Time    SODIUM 138 03/06/2020 08:13 AM    POTASSIUM 4.1 03/06/2020 08:13 AM    CHLORIDE 108 03/06/2020 08:13 AM    CO2 18 (L) 03/06/2020 08:13 AM    GLUCOSE 149 (H) 03/06/2020 08:13 AM    BUN 28 (H) 03/06/2020 08:13 AM    CREATININE 1.41 (H) 03/06/2020 08:13 AM    CREATININE 1.59 (H) 02/10/2011 08:40 AM    BUNCREATRAT 21 02/10/2011 08:40 AM    GLOMRATE 32 (L) 02/10/2011 08:40 AM

## 2020-11-20 ENCOUNTER — HOSPITAL ENCOUNTER (OUTPATIENT)
Dept: LAB | Facility: MEDICAL CENTER | Age: 80
End: 2020-11-20
Attending: PHYSICIAN ASSISTANT
Payer: MEDICARE

## 2020-11-20 DIAGNOSIS — E11.9 TYPE 2 DIABETES MELLITUS WITHOUT COMPLICATION, WITHOUT LONG-TERM CURRENT USE OF INSULIN (HCC): ICD-10-CM

## 2020-11-20 PROCEDURE — 83036 HEMOGLOBIN GLYCOSYLATED A1C: CPT

## 2020-11-20 PROCEDURE — 36415 COLL VENOUS BLD VENIPUNCTURE: CPT

## 2020-11-22 LAB
EST. AVERAGE GLUCOSE BLD GHB EST-MCNC: 163 MG/DL
HBA1C MFR BLD: 7.3 % (ref 0–5.6)

## 2020-11-24 ENCOUNTER — NON-PROVIDER VISIT (OUTPATIENT)
Dept: CARDIOLOGY | Facility: MEDICAL CENTER | Age: 80
End: 2020-11-24
Payer: MEDICARE

## 2020-11-24 ENCOUNTER — OFFICE VISIT (OUTPATIENT)
Dept: CARDIOLOGY | Facility: MEDICAL CENTER | Age: 80
End: 2020-11-24
Payer: MEDICARE

## 2020-11-24 VITALS
WEIGHT: 164.6 LBS | RESPIRATION RATE: 14 BRPM | HEART RATE: 82 BPM | DIASTOLIC BLOOD PRESSURE: 86 MMHG | BODY MASS INDEX: 28.1 KG/M2 | OXYGEN SATURATION: 97 % | HEIGHT: 64 IN | SYSTOLIC BLOOD PRESSURE: 138 MMHG

## 2020-11-24 DIAGNOSIS — Z95.0 CARDIAC PACEMAKER IN SITU: ICD-10-CM

## 2020-11-24 DIAGNOSIS — E78.2 MIXED HYPERLIPIDEMIA: ICD-10-CM

## 2020-11-24 DIAGNOSIS — I10 ESSENTIAL HYPERTENSION: ICD-10-CM

## 2020-11-24 PROCEDURE — 99214 OFFICE O/P EST MOD 30 MIN: CPT | Mod: 25 | Performed by: INTERNAL MEDICINE

## 2020-11-24 PROCEDURE — 93280 PM DEVICE PROGR EVAL DUAL: CPT | Performed by: INTERNAL MEDICINE

## 2020-11-24 RX ORDER — A/SINGAPORE/GP1908/2015 IVR-180 (AN A/MICHIGAN/45/2015 (H1N1)PDM09-LIKE VIRUS, A/HONG KONG/4801/2014, NYMC X-263B (H3N2) (AN A/HONG KONG/4801/2014-LIKE VIRUS), AND B/BRISBANE/60/2008, WILD TYPE (A B/BRISBANE/60/2008-LIKE VIRUS) 15; 15; 15 UG/.5ML; UG/.5ML; UG/.5ML
INJECTION, SUSPENSION INTRAMUSCULAR
COMMUNITY
Start: 2020-10-03 | End: 2021-03-12

## 2020-11-24 ASSESSMENT — ENCOUNTER SYMPTOMS
CONSTITUTIONAL NEGATIVE: 1
MUSCULOSKELETAL NEGATIVE: 1
GASTROINTESTINAL NEGATIVE: 1
CARDIOVASCULAR NEGATIVE: 1
RESPIRATORY NEGATIVE: 1
NEUROLOGICAL NEGATIVE: 1

## 2020-11-24 NOTE — PROGRESS NOTES
Chief Complaint   Patient presents with   • Hyperlipidemia   • Hypertension       Subjective:   Roya Muniz is a 80 y.o. female who presents today for follow-up of permanent pacer replacement in 2014, hypertension and hyperlipidemia.  She has had an uneventful year.  Pacemaker was checked today and is functioning normally.  Thresholds are good as his battery life.  Lab from March, 2020 was reviewed.  LDL is 56.  Most recent glycohemoglobin from November 20 was elevated at 7.3.    Past Medical History:   Diagnosis Date   • Cardiac pacemaker in situ    • History of DVT of lower extremity 6/5/2014   • Hyperlipidemia    • Hypertension    • Pancreatic cyst    • Pancreatitis    • Sinus node dysfunction (HCC)    • Thyroid disease     hypothyroidism     Past Surgical History:   Procedure Laterality Date   • GASTROSCOPY-ENDO  2/21/2014    Performed by Steve Islas Jr., M.D. at ENDOSCOPY [facility] [place]   • GASTROSCOPY-ENDO  2/20/2014    Performed by Antonio Espinoza M.D. at ENDOSCOPY [facility] [place]   • EXPLORATORY LAPAROTOMY  2/10/2014    Performed by Rigo Garcia M.D. at SURGERY [facility]   • CHOLECYSTECTOMY  2/10/2014    Performed by Rigo Garcia M.D. at SURGERY [facility]   • PANCREATECTOMY  2/10/2014    Performed by Rigo Garcia M.D. at SURGERY [facility]   • GASTROSTOMY FEEDING  2/10/2014    Performed by Rigo Garcia M.D. at SURGERY [facility]   • OTHER ORTHOPEDIC SURGERY      foot surgery     Family History   Problem Relation Age of Onset   • Heart Disease Mother         enlarged heart, heart failure   • Cancer Father         liver   • No Known Problems Sister    • Heart Disease Brother         heart failure   • Lung Disease Brother         heavy smoker   • Alcohol/Drug Brother         etoh   • Cancer Paternal Aunt         breast cancer    • Cancer Maternal Grandmother         liver    • Heart Disease Maternal Grandfather         CHF     • No Known Problems Paternal Grandmother    • No Known Problems Paternal Grandfather    • No Known Problems Sister    • No Known Problems Sister    • No Known Problems Sister    • No Known Problems Son    • No Known Problems Son    • No Known Problems Son    • Stroke Neg Hx      Social History     Socioeconomic History   • Marital status:      Spouse name: Not on file   • Number of children: Not on file   • Years of education: Not on file   • Highest education level: Not on file   Occupational History   • Not on file   Social Needs   • Financial resource strain: Not on file   • Food insecurity     Worry: Never true     Inability: Never true   • Transportation needs     Medical: No     Non-medical: No   Tobacco Use   • Smoking status: Never Smoker   • Smokeless tobacco: Never Used   • Tobacco comment: avoid all tobacco products   Substance and Sexual Activity   • Alcohol use: Not Currently     Alcohol/week: 0.0 oz     Comment: occasionally   • Drug use: No   • Sexual activity: Yes     Partners: Male   Lifestyle   • Physical activity     Days per week: Not on file     Minutes per session: Not on file   • Stress: Not on file   Relationships   • Social connections     Talks on phone: Not on file     Gets together: Not on file     Attends Yarsani service: Not on file     Active member of club or organization: Not on file     Attends meetings of clubs or organizations: Not on file     Relationship status: Not on file   • Intimate partner violence     Fear of current or ex partner: Not on file     Emotionally abused: Not on file     Physically abused: Not on file     Forced sexual activity: Not on file   Other Topics Concern   • Not on file   Social History Narrative   • Not on file     Allergies   Allergen Reactions   • Cephalexin [Keflex] Hives, Rash and Itching     Photosensitivity       Outpatient Encounter Medications as of 11/24/2020   Medication Sig Dispense Refill   • FLUAD QUADRIVALENT 0.5 ML Prefilled  "Syringe PHARMACY ADMINISTERED     • losartan (COZAAR) 100 MG Tab TAKE ONE TABLET BY MOUTH DAILY 100 Tab 1   • amLODIPine (NORVASC) 10 MG Tab TAKE ONE TABLET BY MOUTH DAILY 90 Tab 1   • levothyroxine (SYNTHROID) 112 MCG Tab TAKE ONE TABLET BY MOUTH EVERY MORNING ON AN EMPTY STOMACH 90 Tab 3   • FLUZONE HIGH-DOSE 0.5 ML Suspension Prefilled Syringe injection      • vitamin D (CHOLECALCIFEROL) 1000 UNIT Tab Take 1,000 Units by mouth every day.     • aspirin 81 MG tablet Take 81 mg by mouth every day.     • rosuvastatin (CRESTOR) 10 MG Tab TAKE ONE TABLET BY MOUTH EVERY EVENING 100 Tab 0     No facility-administered encounter medications on file as of 11/24/2020.      Review of Systems   Constitutional: Negative.    HENT: Negative.    Respiratory: Negative.    Cardiovascular: Negative.    Gastrointestinal: Negative.    Musculoskeletal: Negative.    Skin: Negative.    Neurological: Negative.    Endo/Heme/Allergies: Negative.         Objective:   /86 (BP Location: Left arm, Patient Position: Sitting, BP Cuff Size: Adult)   Pulse 82   Resp 14   Ht 1.626 m (5' 4\")   Wt 74.7 kg (164 lb 9.6 oz)   SpO2 97%   BMI 28.25 kg/m²     Physical Exam   Constitutional: She is oriented to person, place, and time. No distress.   HENT:   Head: Normocephalic and atraumatic.   Eyes: Pupils are equal, round, and reactive to light. No scleral icterus.   Neck: No JVD present.   Cardiovascular: Normal rate and regular rhythm.   No murmur heard.  Pulmonary/Chest: No respiratory distress. She has no wheezes.   Permanent pacer is present left upper chest without erosion or erythema.  No pain to palpation.   Abdominal: Soft. She exhibits no distension. There is no abdominal tenderness.   Musculoskeletal:         General: No edema.   Neurological: She is alert and oriented to person, place, and time.   Skin: Skin is warm.   Psychiatric: She has a normal mood and affect.       Assessment:     1. Cardiac pacemaker in situ     2. Essential " hypertension     3. Mixed hyperlipidemia         Medical Decision Making:  Today's Assessment / Status / Plan:   Status post permanent pacemaker: Pacemaker functioning normally.  Battery life is good thresholds are stable    Hypertension: Under good control on current medications.    Hyperlipidemia: LDL treated to target on current medications.    Return for routine pacemaker surveillance 6 months return for face-to-face visit 1 year

## 2021-01-11 DIAGNOSIS — Z23 NEED FOR VACCINATION: ICD-10-CM

## 2021-02-16 DIAGNOSIS — E78.2 MIXED HYPERLIPIDEMIA: ICD-10-CM

## 2021-02-16 RX ORDER — ROSUVASTATIN CALCIUM 10 MG/1
TABLET, COATED ORAL
Qty: 100 TABLET | Refills: 0 | Status: SHIPPED | OUTPATIENT
Start: 2021-02-16 | End: 2021-03-12 | Stop reason: SDUPTHER

## 2021-03-12 ENCOUNTER — OFFICE VISIT (OUTPATIENT)
Dept: MEDICAL GROUP | Facility: PHYSICIAN GROUP | Age: 81
End: 2021-03-12
Payer: MEDICARE

## 2021-03-12 VITALS
BODY MASS INDEX: 27.25 KG/M2 | SYSTOLIC BLOOD PRESSURE: 152 MMHG | OXYGEN SATURATION: 95 % | DIASTOLIC BLOOD PRESSURE: 92 MMHG | TEMPERATURE: 97.9 F | HEIGHT: 64 IN | WEIGHT: 159.6 LBS | HEART RATE: 91 BPM

## 2021-03-12 DIAGNOSIS — I27.20 PULMONARY HYPERTENSION (HCC): ICD-10-CM

## 2021-03-12 DIAGNOSIS — L98.9 SKIN LESION: ICD-10-CM

## 2021-03-12 DIAGNOSIS — I10 ESSENTIAL HYPERTENSION: ICD-10-CM

## 2021-03-12 DIAGNOSIS — Z95.0 CARDIAC PACEMAKER IN SITU: ICD-10-CM

## 2021-03-12 DIAGNOSIS — I70.0 ATHEROSCLEROSIS OF AORTA (HCC): ICD-10-CM

## 2021-03-12 DIAGNOSIS — Z11.59 NEED FOR HEPATITIS C SCREENING TEST: ICD-10-CM

## 2021-03-12 DIAGNOSIS — R93.1 ABNORMAL ECHOCARDIOGRAM: ICD-10-CM

## 2021-03-12 DIAGNOSIS — I49.5 SICK SINUS SYNDROME (HCC): ICD-10-CM

## 2021-03-12 DIAGNOSIS — B35.1 TOENAIL FUNGUS: ICD-10-CM

## 2021-03-12 DIAGNOSIS — E11.9 TYPE 2 DIABETES MELLITUS WITHOUT COMPLICATION, WITHOUT LONG-TERM CURRENT USE OF INSULIN (HCC): ICD-10-CM

## 2021-03-12 DIAGNOSIS — H02.9 LESION OF LEFT UPPER EYELID: ICD-10-CM

## 2021-03-12 DIAGNOSIS — E78.2 MIXED HYPERLIPIDEMIA: ICD-10-CM

## 2021-03-12 DIAGNOSIS — M85.859 OSTEOPENIA OF NECK OF FEMUR, UNSPECIFIED LATERALITY: ICD-10-CM

## 2021-03-12 DIAGNOSIS — N18.32 STAGE 3B CHRONIC KIDNEY DISEASE: ICD-10-CM

## 2021-03-12 DIAGNOSIS — R22.1 NECK MASS: ICD-10-CM

## 2021-03-12 DIAGNOSIS — H54.7 VISION PROBLEM: ICD-10-CM

## 2021-03-12 DIAGNOSIS — N18.32 TYPE 2 DIABETES MELLITUS WITH STAGE 3B CHRONIC KIDNEY DISEASE, WITHOUT LONG-TERM CURRENT USE OF INSULIN (HCC): ICD-10-CM

## 2021-03-12 DIAGNOSIS — E03.9 HYPOTHYROIDISM, UNSPECIFIED TYPE: ICD-10-CM

## 2021-03-12 DIAGNOSIS — Z86.010 HISTORY OF COLON POLYPS: ICD-10-CM

## 2021-03-12 DIAGNOSIS — E11.22 TYPE 2 DIABETES MELLITUS WITH STAGE 3B CHRONIC KIDNEY DISEASE, WITHOUT LONG-TERM CURRENT USE OF INSULIN (HCC): ICD-10-CM

## 2021-03-12 PROBLEM — M85.80 OSTEOPENIA: Status: ACTIVE | Noted: 2021-03-12

## 2021-03-12 PROBLEM — Z86.0100 HISTORY OF COLON POLYPS: Status: ACTIVE | Noted: 2021-03-12

## 2021-03-12 PROCEDURE — G2212 PROLONG OUTPT/OFFICE VIS: HCPCS | Performed by: FAMILY MEDICINE

## 2021-03-12 PROCEDURE — 99215 OFFICE O/P EST HI 40 MIN: CPT | Performed by: FAMILY MEDICINE

## 2021-03-12 RX ORDER — AMLODIPINE BESYLATE 10 MG/1
TABLET ORAL
Qty: 90 TABLET | Refills: 1 | Status: SHIPPED | OUTPATIENT
Start: 2021-03-12 | End: 2021-11-03 | Stop reason: SDUPTHER

## 2021-03-12 RX ORDER — ROSUVASTATIN CALCIUM 10 MG/1
TABLET, COATED ORAL
Qty: 100 TABLET | Refills: 2 | Status: SHIPPED | OUTPATIENT
Start: 2021-03-12 | End: 2021-12-20

## 2021-03-12 RX ORDER — LOSARTAN POTASSIUM 100 MG/1
TABLET ORAL
Qty: 100 TABLET | Refills: 1 | Status: SHIPPED | OUTPATIENT
Start: 2021-03-12 | End: 2021-09-01

## 2021-03-12 ASSESSMENT — PATIENT HEALTH QUESTIONNAIRE - PHQ9
5. POOR APPETITE OR OVEREATING: NOT AT ALL
2. FEELING DOWN, DEPRESSED, IRRITABLE, OR HOPELESS: NOT AT ALL
8. MOVING OR SPEAKING SO SLOWLY THAT OTHER PEOPLE COULD HAVE NOTICED. OR THE OPPOSITE, BEING SO FIGETY OR RESTLESS THAT YOU HAVE BEEN MOVING AROUND A LOT MORE THAN USUAL: NOT AT ALL
SUM OF ALL RESPONSES TO PHQ9 QUESTIONS 1 AND 2: 0
1. LITTLE INTEREST OR PLEASURE IN DOING THINGS: NOT AT ALL
SUM OF ALL RESPONSES TO PHQ QUESTIONS 1-9: 2
6. FEELING BAD ABOUT YOURSELF - OR THAT YOU ARE A FAILURE OR HAVE LET YOURSELF OR YOUR FAMILY DOWN: NOT AL ALL
3. TROUBLE FALLING OR STAYING ASLEEP OR SLEEPING TOO MUCH: NOT AT ALL
7. TROUBLE CONCENTRATING ON THINGS, SUCH AS READING THE NEWSPAPER OR WATCHING TELEVISION: SEVERAL DAYS
9. THOUGHTS THAT YOU WOULD BE BETTER OFF DEAD, OR OF HURTING YOURSELF: NOT AT ALL
4. FEELING TIRED OR HAVING LITTLE ENERGY: SEVERAL DAYS

## 2021-03-12 NOTE — ASSESSMENT & PLAN NOTE
Wears glasses. Vision seems worse past 2wk, feels a little blurry but it improves when removes her glasses. Thinks she ruined her lenses when cleaned with dishsoap. I recommend she get in for an eye exam.

## 2021-03-12 NOTE — ASSESSMENT & PLAN NOTE
2014.  CONCLUSIONS     Left ventricle is small in size.   Normal left ventricular systolic function.   Grade I diastolic dysfunction is present. Mild concentric left   ventricular hypertrophy.   Left ventricular ejection fraction is 65% to 70%.   Significantly Enlarged right ventricular size.    Mildly  Reduced right ventricular systolic function.   Right ventricular systolic pressure is estimated to be 36-46mmHg.   Moderate pulmonary hypertension.   Trace mitral regurgitation.   Aortic annular calcification. Aortic sclerosis. No stenosis or   regurgitation seen.   Moderate tricuspid regurgitation.   Normal pericardium without effusion.     Ascending aorta not well visualized.   Echo findings suggestive of possible pulmonary embolus or hypertension.   Clinical correlation is recommended.

## 2021-03-12 NOTE — ASSESSMENT & PLAN NOTE
'17 dexa  FINDINGS:  The mean bone mineral density for the lumbar spine is 1.079 g/cm2, which corresponds to a T score of -0.8 and a Z score of 0.9.     The proximal left femur has a mean bone mineral density of 0.814 g/cm2, with a T score of -1.5 and a Z score of 0.2.

## 2021-03-12 NOTE — PROGRESS NOTES
Annual Health Assessment Questions:    1.  Are you currently engaging in any exercise or physical activity? Yes biking and gardening     2.  How would you describe your mood or emotional well-being today? fair    3.  Have you had any falls in the last year? No    4.  Have you noticed any problems with your balance or had difficulty walking? Yes    5.  In the last six months have you experienced any leakage of urine? No    6. DPA/Advanced Directive: Patient has Advanced Directive, but it is not on file. Instructed to bring in a copy to scan into their chart.

## 2021-03-12 NOTE — ASSESSMENT & PLAN NOTE
Sees her cardiologist yearly, gets her pacemaker checked every 6 months.  Was told it has about 8 years left on the battery.  This was placed during her hospitalization in 2014 when she had gallstone pancreatitis.

## 2021-03-12 NOTE — ASSESSMENT & PLAN NOTE
Saw an opthamologist Dr. Roach, she also saw another doctor in that office, didn't follow up in the past regarding this. Would like to refer back, bothers her though doesn't affect her vision.

## 2021-03-12 NOTE — PROGRESS NOTES
Subjective:     CC:    Chief Complaint   Patient presents with   • Establish Care   • Diabetes Follow-up       HISTORY OF THE PRESENT ILLNESS: Patient is a 80 y.o. female. This pleasant patient is here today to establish care and discuss her routine PMH. Her prior PCP was Lacey Kruse who left [facility].    Chronic kidney disease (CKD), stage III (moderate)  stage 3B chronic kidney disease.  Has seen a nephrologist Dr. Hilliard, her last visit was in 2013.  Denies any history of regular use of NSAIDs or heavy alcohol.  Is on losartan 100 mg with amlodipine 10 mg daily.  She is agreeable today to follow-up with her nephrologist.    Pulmonary hypertension (HCC)  Stable, continue current plan of care.   Moderate - Noted on '14 echo. She was unaware of this diagnosis. I will forward my note today to her cardiologist to make sure he doesn't thinks she needs further evaluation of this as she does not have any symptoms of sleep apnea.      Abnormal echocardiogram  2014.  CONCLUSIONS     Left ventricle is small in size.   Normal left ventricular systolic function.   Grade I diastolic dysfunction is present. Mild concentric left   ventricular hypertrophy.   Left ventricular ejection fraction is 65% to 70%.   Significantly Enlarged right ventricular size.    Mildly  Reduced right ventricular systolic function.   Right ventricular systolic pressure is estimated to be 36-46mmHg.   Moderate pulmonary hypertension.   Trace mitral regurgitation.   Aortic annular calcification. Aortic sclerosis. No stenosis or   regurgitation seen.   Moderate tricuspid regurgitation.   Normal pericardium without effusion.     Ascending aorta not well visualized.   Echo findings suggestive of possible pulmonary embolus or hypertension.   Clinical correlation is recommended.    Cardiac pacemaker in situ  Sees her cardiologist yearly, gets her pacemaker checked every 6 months.  Was told it has about 8 years left on the battery.  This was placed during her  hospitalization in 2014 when she had gallstone pancreatitis.    Atherosclerosis of aorta  Stable, continue current plan of care  LDL < 70 on statin    Osteopenia  '17 dexa  FINDINGS:  The mean bone mineral density for the lumbar spine is 1.079 g/cm2, which corresponds to a T score of -0.8 and a Z score of 0.9.     The proximal left femur has a mean bone mineral density of 0.814 g/cm2, with a T score of -1.5 and a Z score of 0.2.    Toenail fungus  Cleared w/ oral terbinafine per podiatry.     Lesion of left upper eyelid  Saw an opthamologist Dr. Roach, she also saw another doctor in that office, didn't follow up in the past regarding this. Would like to refer back, bothers her though doesn't affect her vision.     Hyperlipidemia  LDL 56 3/20 on crestor 10mg    Type 2 diabetes mellitus without complication, without long-term current use of insulin (HCC)  Has never been on a med for this. 7.3 a1c 11/20. She avoids sugars and carbs as much as she can. Uses a stationary bike 3x/wk for 1/2hr total daily.     Hypothyroidism  On 112mcg/d synthroid.     History of colon polyps  '19 had a colonoscopy, polyps removed, told she wouldn't need any further colonoscopies or other CRC testing.     Vision problem  Wears glasses. Vision seems worse past 2wk, feels a little blurry but it improves when removes her glasses. Thinks she ruined her lenses when cleaned with dishsoap. I recommend she get in for an eye exam.     Neck mass  Onset about 10d ago, bothersome, no warmth or redness.     Skin lesion  Hairdresser noted on R ear lobe. Bothers some as sleeps on that side.     Type 2 diabetes mellitus with stage 3b chronic kidney disease, without long-term current use of insulin (HCC)  Stable, continue current plan of care      Sick sinus syndrome (HCC)  Stable, continue current plan of care        Allergies: Cephalexin [keflex]    Current Outpatient Medications Ordered in Epic   Medication Sig Dispense Refill   • rosuvastatin (CRESTOR) 10  MG Tab TAKE ONE TABLET BY MOUTH EVERY EVENING 100 tablet 2   • amLODIPine (NORVASC) 10 MG Tab TAKE ONE TABLET BY MOUTH DAILY for Blood pressure 90 tablet 1   • losartan (COZAAR) 100 MG Tab TAKE ONE TABLET BY MOUTH DAILY for Blood Pressure 100 tablet 1   • levothyroxine (SYNTHROID) 112 MCG Tab TAKE ONE TABLET BY MOUTH EVERY MORNING ON AN EMPTY STOMACH 90 Tab 3   • vitamin D (CHOLECALCIFEROL) 1000 UNIT Tab Take 1,000 Units by mouth every day.     • aspirin 81 MG tablet Take 81 mg by mouth every day.       No current [facility]-ordered facility-administered medications on file.       Past Medical History:   Diagnosis Date   • Cardiac pacemaker in situ    • Diabetes (HCC)    • History of DVT of lower extremity 6/5/2014   • Hyperlipidemia    • Hypertension    • Pancreatic cyst    • Pancreatitis    • Sinus node dysfunction (HCC)    • Thyroid disease     hypothyroidism       Past Surgical History:   Procedure Laterality Date   • GASTROSCOPY-ENDO  2/21/2014    Performed by Steve Islas Jr., M.D. at ENDOSCOPY [facility]   • GASTROSCOPY-ENDO  2/20/2014    Performed by Antonio Espinoza M.D. at ENDOSCOPY [facility]   • EXPLORATORY LAPAROTOMY  2/10/2014    Performed by Rigo Garcia M.D. at SURGERY [facility]   • CHOLECYSTECTOMY  2/10/2014    Performed by Rigo Garcia M.D. at SURGERY [facility]   • PANCREATECTOMY  2/10/2014    Performed by Rigo Garcia M.D. at SURGERY [facility]   • GASTROSTOMY FEEDING  2/10/2014    Performed by Rigo Garcia M.D. 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 Is , has a son in [place], one in [place] and one in [place]. She enjoys riding quads with her  and gardening. She wears a helmet on her quad. No tob, drugs and only rare etoh.        Family History   Problem Relation Age of Onset   • Heart Disease Mother         enlarged heart, heart failure   • Cancer Father         liver   • No Known Problems Sister    • Heart Disease Brother         heart failure   • Lung Disease Brother         heavy smoker   • Alcohol/Drug Brother         etoh   • Cancer Paternal Aunt         breast cancer    • Cancer Maternal Grandmother         liver    • Heart Disease Maternal Grandfather         CHF    • No Known Problems Paternal Grandmother    • No Known Problems Paternal Grandfather    • No Known Problems Sister    • No Known Problems Sister    • Stroke Sister    • No Known Problems Son    • No Known Problems Son    • No Known Problems Son        Health Maintenance: Completed    ROS:   See HPI  Gen: no fevers/chills, no nightsweats, no changes in weight  Eyes: no changes in vision, no dry eyes  ENT: no sore throat, no dysphagia, no hearing loss, no bloody nose  Pulm: no sob, no cough, no wheezing  CV: no chest pain, no palpitations, no syncope  GI: no nausea/vomiting, no diarrhea/constipation, no abdominal pain, no blood in stool  : no dysuria, no incontinence  MSk: no myalgias, no weakness, no falls  Skin: no rash  Neuro: no headaches, no numbness/tingling  Heme/Lymph: no easy bruising  Psych: no depression, no anxiety, no hallucinations, no insomnia, no memory concerns      Objective:     Exam:   /92 (BP Location: Left arm, Patient Position: Sitting, BP Cuff Size: Adult) Comment (BP Cuff Size): Second Blood Pressure  Pulse 91   Temp 36.6 °C (97.9 °F) (Temporal)   Ht 1.626 m (5' 4\")   Wt 72.4 kg (159 lb 9.6 oz)   SpO2 95%   BMI 27.40 kg/m²   Body mass index is 27.4 kg/m².  Wt Readings from Last 4 " Encounters:   03/12/21 72.4 kg (159 lb 9.6 oz)   11/24/20 74.7 kg (164 lb 9.6 oz)   08/10/20 73.6 kg (162 lb 4.8 oz)   01/31/20 75.9 kg (167 lb 4.8 oz)     General: Normal appearing. No distress. Appropriately groomed.  HEENT: Normocephalic. Eyes conjunctiva clear lids without ptosis, pupils equal and reactive to light accommodation, ears normal shape and contour, canals are clear bilaterally, tympanic membranes are benign,   Neck: Supple without JVD or bruit. Thyroid is not enlarged. Does have a smooth mobile  Mass on bck of R neck 1.5x1.5cm  Pulmonary: Clear to ausculation.  Normal effort. No rales, ronchi, or wheezing.  Cardiovascular: Regular rate and rhythm without murmur. Carotid and radial pulses are intact and equal bilaterally.  Abdomen: Soft, nontender, nondistended. Normal bowel sounds. Liver and spleen are not palpable  Neurologic: Grossly nonfocal  Lymph: No cervical or supraclavicular lymph nodes are palpable  Skin: Warm and dry.  Tiny red lesion on R earlobe c/w AK  Musculoskeletal: Normal gait. No extremity cyanosis, clubbing, or edema.  Psych: Normal mood and affect. Alert and oriented x3. Judgment and insight is normal.    Monofilament testing with a 10 gram force: sensation intact: intact bilaterally  Visual Inspection: Feet without maceration, ulcers, fissures. Does have corns on both feet.  Pedal pulses: intact bilaterally      Assessment & Plan:   80 y.o. female with the following -  She doesn't want to adjust her BP meds today bc does fluctuate at home. Can re-establish w/ nephrology.  She is due for some blood work and a bone scan.  I will have her see a dermatologist to consider freezing what might be an AK on her right earlobe.  I am hesitant to do this because of the fragility of her skin there.  I would also like a second opinion on what appears to be a cyst at the back of her neck and whether it would be wise to remove it in dermatology clinic.  I did not notice any cervical or bilateral  axillary lymphadenopathy on exam.  I suggested she increase her exercise on the stationary bike to at least once daily if not several times daily.  She can wait about a month to recheck her A1c.    1. Stage 3b chronic kidney disease  - REFERRAL TO NEPHROLOGY    2. Pulmonary hypertension (HCC)    3. Abnormal echocardiogram    4. Cardiac pacemaker in situ    5. Atherosclerosis of aorta (HCC)    6. Osteopenia of neck of femur, unspecified laterality  - DS-BONE DENSITY STUDY (DEXA); Future  - VITAMIN D,25 HYDROXY; Future    7. Toenail fungus    8. Lesion of left upper eyelid  - REFERRAL TO OPTOMETRY    9. Mixed hyperlipidemia  - rosuvastatin (CRESTOR) 10 MG Tab; TAKE ONE TABLET BY MOUTH EVERY EVENING  Dispense: 100 tablet; Refill: 2    10. Type 2 diabetes mellitus without complication, without long-term current use of insulin (HCC)  - Diabetic Monofilament LE Exam  - CBC WITH DIFFERENTIAL; Future  - Comp Metabolic Panel; Future  - Lipid Profile; Future  - MICROALBUMIN CREAT RATIO URINE; Future  - HEMOGLOBIN A1C; Future    11. Hypothyroidism, unspecified type  - TSH; Future    12. History of colon polyps    13. Need for hepatitis C screening test  - HEP C VIRUS ANTIBODY; Future    14. Vision problem  - REFERRAL TO OPTOMETRY    15. Neck mass  - REFERRAL TO DERMATOLOGY    16. Skin lesion  - REFERRAL TO DERMATOLOGY    17. Essential hypertension  - amLODIPine (NORVASC) 10 MG Tab; TAKE ONE TABLET BY MOUTH DAILY for Blood pressure  Dispense: 90 tablet; Refill: 1  - losartan (COZAAR) 100 MG Tab; TAKE ONE TABLET BY MOUTH DAILY for Blood Pressure  Dispense: 100 tablet; Refill: 1    18. Type 2 diabetes mellitus with stage 3b chronic kidney disease, without long-term current use of insulin (HCC)    19. Sick sinus syndrome (HCC)     [id number] d/w patient re: above.   Return in about 6 weeks (around 4/23/2021), or dm.    Please note that this dictation was created using voice recognition software. I have made every reasonable attempt  to correct obvious errors, but I expect that there are errors of grammar and possibly content that I did not discover before finalizing the note.

## 2021-03-12 NOTE — ASSESSMENT & PLAN NOTE
stage 3B chronic kidney disease.  Has seen a nephrologist Dr. Hilliard, her last visit was in 2013.  Denies any history of regular use of NSAIDs or heavy alcohol.  Is on losartan 100 mg with amlodipine 10 mg daily.  She is agreeable today to follow-up with her nephrologist.

## 2021-03-12 NOTE — ASSESSMENT & PLAN NOTE
Stable, continue current plan of care.   Moderate - Noted on '14 echo. She was unaware of this diagnosis. I will forward my note today to her cardiologist to make sure he doesn't thinks she needs further evaluation of this as she does not have any symptoms of sleep apnea.

## 2021-03-12 NOTE — ASSESSMENT & PLAN NOTE
'19 had a colonoscopy, polyps removed, told she wouldn't need any further colonoscopies or other CRC testing.

## 2021-03-12 NOTE — ASSESSMENT & PLAN NOTE
Has never been on a med for this. 7.3 a1c 11/20. She avoids sugars and carbs as much as she can. Uses a stationary bike 3x/wk for 1/2hr total daily.

## 2021-03-17 PROBLEM — I49.5 SICK SINUS SYNDROME (HCC): Status: ACTIVE | Noted: 2021-03-17

## 2021-03-29 ENCOUNTER — HOSPITAL ENCOUNTER (OUTPATIENT)
Facility: MEDICAL CENTER | Age: 81
End: 2021-03-29
Attending: NURSE PRACTITIONER
Payer: MEDICARE

## 2021-03-29 ENCOUNTER — OFFICE VISIT (OUTPATIENT)
Dept: DERMATOLOGY | Facility: IMAGING CENTER | Age: 81
End: 2021-03-29
Payer: MEDICARE

## 2021-03-29 DIAGNOSIS — L08.9 SOFT TISSUE INFECTION: ICD-10-CM

## 2021-03-29 PROCEDURE — 87070 CULTURE OTHR SPECIMN AEROBIC: CPT

## 2021-03-29 PROCEDURE — 10060 I&D ABSCESS SIMPLE/SINGLE: CPT | Performed by: NURSE PRACTITIONER

## 2021-03-29 PROCEDURE — 87205 SMEAR GRAM STAIN: CPT

## 2021-03-29 RX ORDER — DOXYCYCLINE HYCLATE 100 MG
100 TABLET ORAL 2 TIMES DAILY
Qty: 20 TABLET | Refills: 0 | Status: SHIPPED | OUTPATIENT
Start: 2021-03-29 | End: 2021-04-06

## 2021-03-29 NOTE — PROGRESS NOTES
DERMATOLOGY NOTE  NEW VISIT       Chief complaint: Skin Lesion and Establish Care   HPI: cyst , swollen and drainage   Location: neck   Time present: first part of the month around 3/2/2021  Painful lesion: yes  Itching lesion: No  Enlarging lesion: yes  Anything make it better or worse?no     Has history of cysts of face    History of skin cancer: No  History of precancers/actinic keratoses: No  History of biopsies:No  History of blistering/severe sunburns:No  Family history of skin cancer:No  Family history of atypical moles:No          Allergies   Allergen Reactions   • Cephalexin [Keflex] Hives, Rash and Itching     Photosensitivity          MEDICATIONS:  Medications relevant to specialty reviewed.       REVIEW OF SYSTEMS:   Positive for skin (see HPI)  Negative for fevers and chills       EXAM:  There were no vitals taken for this visit.  Constitutional: Well-developed, well-nourished, and in no distress.     A focused skin exam was performed including the affected areas of the neck and face around mask. Notable findings on exam today listed below and/or in assessment/plan.     3cm in diameter right post neck-inflamed cyst    IMPRESSION / PLAN:    1. Soft tissue infection-infection of EIC  After appropriate consent, written and verbal    Procedure: I&D    Performed in normal sterile fashion with size 10 blade, 2% lidocaine with Epi  ~10ml of puss material oozed out  -packed with iodine gauze tape-pressure dressing applied  Patient tollerated procedure well.    Wound culture obtained.     - start doxycycline 100mg BID for 10 days. Instructed to take with food & water. Side effects including, but not limited to, GI upset, photosensitivity (and need for photoprotection) discussed.       - doxycycline (VIBRAMYCIN) 100 MG Tab; Take 1 tablet by mouth 2 times a day for 10 days. 1 tablet twice daily for 6 weeks, then decrease to 1 tablet daily  Dispense: 20 tablet; Refill: 0  - CULTURE WOUND W/ GRAM STAIN; Future        Return to clinic in: Return in about 2 days (around 3/31/2021) for wound check. and as needed for any new or changing skin lesions.

## 2021-03-30 LAB
GRAM STN SPEC: NORMAL
SIGNIFICANT IND 70042: NORMAL
SITE SITE: NORMAL
SOURCE SOURCE: NORMAL

## 2021-03-31 ENCOUNTER — OFFICE VISIT (OUTPATIENT)
Dept: DERMATOLOGY | Facility: IMAGING CENTER | Age: 81
End: 2021-03-31
Payer: MEDICARE

## 2021-03-31 DIAGNOSIS — L08.9 SOFT TISSUE INFECTION: ICD-10-CM

## 2021-03-31 PROCEDURE — 99024 POSTOP FOLLOW-UP VISIT: CPT | Performed by: NURSE PRACTITIONER

## 2021-03-31 NOTE — PROGRESS NOTES
DERMATOLOGY NOTE  FOLLOW UP VISIT       Chief complaint: Wound Check    Patient returns today for wound check. I&D of cyst at neck.  States she started taking Doxycycline two days ago. Improving.  changed bandage last night and possible pulled out packing.          From previous note:   HPI: cyst , swollen and drainage   Location: neck   Time present: first part of the month around 3/2/2021  Painful lesion: yes  Itching lesion: No  Enlarging lesion: yes  Anything make it better or worse?no     Has history of cysts of face    History of skin cancer: No  History of precancers/actinic keratoses: No  History of biopsies:No  History of blistering/severe sunburns:No  Family history of skin cancer:No  Family history of atypical moles:No          Allergies   Allergen Reactions   • Cephalexin [Keflex] Hives, Rash and Itching     Photosensitivity          MEDICATIONS:  Medications relevant to specialty reviewed.       REVIEW OF SYSTEMS:   Positive for skin (see HPI)  Negative for fevers and chills       EXAM:  There were no vitals taken for this visit.  Constitutional: Well-developed, well-nourished, and in no distress.     A focused skin exam was performed including the affected areas of the neck and face around mask. Notable findings on exam today listed below and/or in assessment/plan.     2.5 cm in diameter right posterior neck bruised mass(clinically consisitent with cyst) with small incision at center. Small amount of bloody purulent drainage noted  I&D done two days ago  IMPRESSION / PLAN:    1. Soft tissue infection-infection of EIC-improving after I&D with abx    New packing placed into incision and clean dressing placed.  rtc in 2 days for wound check and dressing change  Still waiting for C&S from lab.     - doxycycline (VIBRAMYCIN) 100 MG Tab; Take 1 tablet by mouth 2 times a day for 10 days. 1 tablet twice daily for 6 weeks, then decrease to 1 tablet daily  Dispense: 20 tablet; Refill: 0  - CULTURE WOUND  W/ GRAM STAIN; Future       Return to clinic in: Return in about 2 days (around 4/2/2021) for woudn check. and as needed for any new or changing skin lesions.

## 2021-04-02 ENCOUNTER — OFFICE VISIT (OUTPATIENT)
Dept: DERMATOLOGY | Facility: IMAGING CENTER | Age: 81
End: 2021-04-02
Payer: MEDICARE

## 2021-04-02 DIAGNOSIS — L08.9 SOFT TISSUE INFECTION: ICD-10-CM

## 2021-04-02 LAB
BACTERIA WND AEROBE CULT: NORMAL
GRAM STN SPEC: NORMAL
SIGNIFICANT IND 70042: NORMAL
SITE SITE: NORMAL
SOURCE SOURCE: NORMAL

## 2021-04-02 PROCEDURE — 99024 POSTOP FOLLOW-UP VISIT: CPT | Performed by: NURSE PRACTITIONER

## 2021-04-02 NOTE — PROGRESS NOTES
DERMATOLOGY NOTE  FOLLOW UP VISIT       Chief complaint: Wound Check    Patient returns today for wound check. I&D of cyst at neck on 3/29/2021 . Patient is taking Doxycycline 100mg BID is this is a 10 day course.    From previous note:   HPI: cyst , swollen and drainage   Location: neck   Time present: first part of the month around 3/2/2021  Painful lesion: yes  Itching lesion: No  Enlarging lesion: yes  Anything make it better or worse?no     Has history of cysts of face    History of skin cancer: No  History of precancers/actinic keratoses: No  History of biopsies:No  History of blistering/severe sunburns:No  Family history of skin cancer:No  Family history of atypical moles:No          Allergies   Allergen Reactions   • Cephalexin [Keflex] Hives, Rash and Itching     Photosensitivity          MEDICATIONS:  Medications relevant to specialty reviewed.       REVIEW OF SYSTEMS:   Positive for skin (see HPI)  Negative for fevers and chills       EXAM:  There were no vitals taken for this visit.  Constitutional: Well-developed, well-nourished, and in no distress.     A focused skin exam was performed including the affected areas of the neck and face around mask. Notable findings on exam today listed below and/or in assessment/plan.     2.0 cm in diameter right posterior neck bruised mass(clinically consisitent with cyst) with small incision at center. Improved from last visit.      IMPRESSION / PLAN:    1. Soft tissue infection-infection of EIC-improving after I&D and oral  abx    Stable and improving.   Continue abx as rx'd below  Dressing changed  Plan on cyst excision in one month    - doxycycline (VIBRAMYCIN) 100 MG Tab; Take 1 tablet by mouth 2 times a day for 10 days. 1 tablet twice daily for 6 weeks, then decrease to 1 tablet daily  Dispense: 20 tablet; Refill: 0  - CULTURE WOUND W/ GRAM STAIN; Future     I have performed a physical exam and reviewed and updated ROS and Plan today (4/2/2021). In review of  dermatology visit (3/31/2021), there are no changes except as documented above.         Return to clinic in: Return if symptoms worsen or fail to improve. and as needed for any new or changing skin lesions.

## 2021-04-05 ENCOUNTER — TELEPHONE (OUTPATIENT)
Dept: DERMATOLOGY | Facility: IMAGING CENTER | Age: 81
End: 2021-04-05

## 2021-04-05 NOTE — TELEPHONE ENCOUNTER
"Patient called for Vanda DE LA [facility]ARZA to report that her wound from previous I&D on 3/29/21. She reports that it is \"great\" with no pain although it is still not entirely closed yet.     She is wondering if doxycycline can have a side effect of altering her taste however? She states since starting the medication that she has a metallic taste in her mouth, even when drinking water. Is this a normal side effect? Please advise.   "

## 2021-04-06 RX ORDER — DOXYCYCLINE HYCLATE 100 MG
100 TABLET ORAL DAILY
Qty: 30 TABLET | Refills: 0 | Status: SHIPPED | OUTPATIENT
Start: 2021-04-06 | End: 2021-04-09

## 2021-04-06 NOTE — TELEPHONE ENCOUNTER
Please let her know that metallic taste is a side effect of doxy for some pts. She will need to stay on doxy 100 mg daily until her appt with Dr. Perkins for cyst removal. I have sent new rx to pharm on file.

## 2021-04-06 NOTE — TELEPHONE ENCOUNTER
Spoke with patient to inform her that it is a common side effect and that Vanda has sent a month long prescription of the doxy. Patient states she is having a very difficult time eating/ drinking anything due to the side effect. She is having difficulty drinking water because of the taste in her mouth and she is wondering if it is possible to be on a different antibiotic? Please advise.

## 2021-04-08 NOTE — TELEPHONE ENCOUNTER
Spoke with patient in regards to discontinuing doxy. She will call office if skin starts to look inflamed or infected again and she will call to start a different ABX.

## 2021-04-09 ENCOUNTER — HOSPITAL ENCOUNTER (INPATIENT)
Facility: MEDICAL CENTER | Age: 81
LOS: 3 days | DRG: 638 | End: 2021-04-12
Attending: EMERGENCY MEDICINE | Admitting: HOSPITALIST
Payer: MEDICARE

## 2021-04-09 ENCOUNTER — APPOINTMENT (OUTPATIENT)
Dept: RADIOLOGY | Facility: MEDICAL CENTER | Age: 81
DRG: 638 | End: 2021-04-09
Attending: EMERGENCY MEDICINE
Payer: MEDICARE

## 2021-04-09 DIAGNOSIS — E11.10 DIABETIC KETOACIDOSIS WITHOUT COMA ASSOCIATED WITH TYPE 2 DIABETES MELLITUS (HCC): ICD-10-CM

## 2021-04-09 PROBLEM — E87.0 HYPERNATREMIA: Status: ACTIVE | Noted: 2021-04-09

## 2021-04-09 PROBLEM — N17.9 AKI (ACUTE KIDNEY INJURY) (HCC): Status: ACTIVE | Noted: 2021-04-09

## 2021-04-09 LAB
ALBUMIN SERPL BCP-MCNC: 4.8 G/DL (ref 3.2–4.9)
ALBUMIN/GLOB SERPL: 1.3 G/DL
ALP SERPL-CCNC: 117 U/L (ref 30–99)
ALT SERPL-CCNC: 30 U/L (ref 2–50)
ANION GAP SERPL CALC-SCNC: 10 MMOL/L (ref 7–16)
ANION GAP SERPL CALC-SCNC: 22 MMOL/L (ref 7–16)
ANION GAP SERPL CALC-SCNC: 22 MMOL/L (ref 7–16)
ANION GAP SERPL CALC-SCNC: 30 MMOL/L (ref 7–16)
ANION GAP SERPL CALC-SCNC: 8 MMOL/L (ref 7–16)
APPEARANCE UR: CLEAR
AST SERPL-CCNC: 17 U/L (ref 12–45)
B-OH-BUTYR SERPL-MCNC: >8 MMOL/L (ref 0.02–0.27)
BASE EXCESS BLDV CALC-SCNC: -9 MMOL/L
BASOPHILS # BLD AUTO: 0.2 % (ref 0–1.8)
BASOPHILS # BLD: 0.02 K/UL (ref 0–0.12)
BILIRUB SERPL-MCNC: 1.3 MG/DL (ref 0.1–1.5)
BILIRUB UR QL STRIP.AUTO: NEGATIVE
BODY TEMPERATURE: ABNORMAL CENTIGRADE
BUN SERPL-MCNC: 28 MG/DL (ref 8–22)
BUN SERPL-MCNC: 29 MG/DL (ref 8–22)
BUN SERPL-MCNC: 30 MG/DL (ref 8–22)
BUN SERPL-MCNC: 33 MG/DL (ref 8–22)
BUN SERPL-MCNC: 37 MG/DL (ref 8–22)
CALCIUM SERPL-MCNC: 10.7 MG/DL (ref 8.5–10.5)
CALCIUM SERPL-MCNC: 9.1 MG/DL (ref 8.5–10.5)
CALCIUM SERPL-MCNC: 9.2 MG/DL (ref 8.5–10.5)
CALCIUM SERPL-MCNC: 9.3 MG/DL (ref 8.5–10.5)
CALCIUM SERPL-MCNC: 9.3 MG/DL (ref 8.5–10.5)
CHLORIDE SERPL-SCNC: 103 MMOL/L (ref 96–112)
CHLORIDE SERPL-SCNC: 103 MMOL/L (ref 96–112)
CHLORIDE SERPL-SCNC: 110 MMOL/L (ref 96–112)
CHLORIDE SERPL-SCNC: 113 MMOL/L (ref 96–112)
CHLORIDE SERPL-SCNC: 90 MMOL/L (ref 96–112)
CO2 SERPL-SCNC: 15 MMOL/L (ref 20–33)
CO2 SERPL-SCNC: 17 MMOL/L (ref 20–33)
CO2 SERPL-SCNC: 17 MMOL/L (ref 20–33)
CO2 SERPL-SCNC: 23 MMOL/L (ref 20–33)
CO2 SERPL-SCNC: 24 MMOL/L (ref 20–33)
COLOR UR: YELLOW
CREAT SERPL-MCNC: 1.2 MG/DL (ref 0.5–1.4)
CREAT SERPL-MCNC: 1.37 MG/DL (ref 0.5–1.4)
CREAT SERPL-MCNC: 1.38 MG/DL (ref 0.5–1.4)
CREAT SERPL-MCNC: 1.41 MG/DL (ref 0.5–1.4)
CREAT SERPL-MCNC: 1.59 MG/DL (ref 0.5–1.4)
EOSINOPHIL # BLD AUTO: 0 K/UL (ref 0–0.51)
EOSINOPHIL NFR BLD: 0 % (ref 0–6.9)
EPI CELLS #/AREA URNS HPF: ABNORMAL /HPF
ERYTHROCYTE [DISTWIDTH] IN BLOOD BY AUTOMATED COUNT: 42.7 FL (ref 35.9–50)
EST. AVERAGE GLUCOSE BLD GHB EST-MCNC: 479 MG/DL
GLOBULIN SER CALC-MCNC: 3.6 G/DL (ref 1.9–3.5)
GLUCOSE BLD-MCNC: 131 MG/DL (ref 65–99)
GLUCOSE BLD-MCNC: 268 MG/DL (ref 65–99)
GLUCOSE BLD-MCNC: 318 MG/DL (ref 65–99)
GLUCOSE BLD-MCNC: 439 MG/DL (ref 65–99)
GLUCOSE BLD-MCNC: 485 MG/DL (ref 65–99)
GLUCOSE BLD-MCNC: 515 MG/DL (ref 65–99)
GLUCOSE BLD-MCNC: 539 MG/DL (ref 65–99)
GLUCOSE BLD-MCNC: 573 MG/DL (ref 65–99)
GLUCOSE SERPL-MCNC: 143 MG/DL (ref 65–99)
GLUCOSE SERPL-MCNC: 352 MG/DL (ref 65–99)
GLUCOSE SERPL-MCNC: 560 MG/DL (ref 65–99)
GLUCOSE SERPL-MCNC: 610 MG/DL (ref 65–99)
GLUCOSE SERPL-MCNC: 853 MG/DL (ref 65–99)
GLUCOSE UR STRIP.AUTO-MCNC: >=1000 MG/DL
HBA1C MFR BLD: 18.3 % (ref 4–5.6)
HCO3 BLDV-SCNC: 16 MMOL/L (ref 24–28)
HCT VFR BLD AUTO: 50.3 % (ref 37–47)
HGB BLD-MCNC: 17.3 G/DL (ref 12–16)
IMM GRANULOCYTES # BLD AUTO: 0.11 K/UL (ref 0–0.11)
IMM GRANULOCYTES NFR BLD AUTO: 0.9 % (ref 0–0.9)
KETONES UR STRIP.AUTO-MCNC: 40 MG/DL
LACTATE BLD-SCNC: 2.4 MMOL/L (ref 0.5–2)
LACTATE BLD-SCNC: 2.7 MMOL/L (ref 0.5–2)
LACTATE BLD-SCNC: 3.2 MMOL/L (ref 0.5–2)
LEUKOCYTE ESTERASE UR QL STRIP.AUTO: NEGATIVE
LYMPHOCYTES # BLD AUTO: 1.07 K/UL (ref 1–4.8)
LYMPHOCYTES NFR BLD: 9.1 % (ref 22–41)
MAGNESIUM SERPL-MCNC: 2.3 MG/DL (ref 1.5–2.5)
MAGNESIUM SERPL-MCNC: 2.8 MG/DL (ref 1.5–2.5)
MCH RBC QN AUTO: 32.3 PG (ref 27–33)
MCHC RBC AUTO-ENTMCNC: 34.4 G/DL (ref 33.6–35)
MCV RBC AUTO: 94 FL (ref 81.4–97.8)
MICRO URNS: ABNORMAL
MONOCYTES # BLD AUTO: 0.7 K/UL (ref 0–0.85)
MONOCYTES NFR BLD AUTO: 5.9 % (ref 0–13.4)
NEUTROPHILS # BLD AUTO: 9.87 K/UL (ref 2–7.15)
NEUTROPHILS NFR BLD: 83.9 % (ref 44–72)
NITRITE UR QL STRIP.AUTO: NEGATIVE
NRBC # BLD AUTO: 0 K/UL
NRBC BLD-RTO: 0 /100 WBC
PCO2 BLDV: 32.2 MMHG (ref 41–51)
PH BLDV: 7.31 [PH] (ref 7.31–7.45)
PH UR STRIP.AUTO: 5 [PH] (ref 5–8)
PHOSPHATE SERPL-MCNC: 1.7 MG/DL (ref 2.5–4.5)
PHOSPHATE SERPL-MCNC: 6.6 MG/DL (ref 2.5–4.5)
PLATELET # BLD AUTO: 136 K/UL (ref 164–446)
PMV BLD AUTO: 12.9 FL (ref 9–12.9)
PO2 BLDV: 41.8 MMHG (ref 25–40)
POTASSIUM SERPL-SCNC: 3.8 MMOL/L (ref 3.6–5.5)
POTASSIUM SERPL-SCNC: 4 MMOL/L (ref 3.6–5.5)
POTASSIUM SERPL-SCNC: 4.2 MMOL/L (ref 3.6–5.5)
POTASSIUM SERPL-SCNC: 4.3 MMOL/L (ref 3.6–5.5)
POTASSIUM SERPL-SCNC: 5.2 MMOL/L (ref 3.6–5.5)
PROCALCITONIN SERPL-MCNC: <0.05 NG/ML
PROT SERPL-MCNC: 8.4 G/DL (ref 6–8.2)
PROT UR QL STRIP: 30 MG/DL
RBC # BLD AUTO: 5.35 M/UL (ref 4.2–5.4)
RBC # URNS HPF: ABNORMAL /HPF
RBC UR QL AUTO: NEGATIVE
SAO2 % BLDV: 75.6 %
SARS-COV-2 RNA RESP QL NAA+PROBE: NOTDETECTED
SODIUM SERPL-SCNC: 135 MMOL/L (ref 135–145)
SODIUM SERPL-SCNC: 142 MMOL/L (ref 135–145)
SODIUM SERPL-SCNC: 142 MMOL/L (ref 135–145)
SODIUM SERPL-SCNC: 143 MMOL/L (ref 135–145)
SODIUM SERPL-SCNC: 145 MMOL/L (ref 135–145)
SP GR UR STRIP.AUTO: 1.03
SPECIMEN SOURCE: NORMAL
TSH SERPL DL<=0.005 MIU/L-ACNC: 1.03 UIU/ML (ref 0.38–5.33)
UROBILINOGEN UR STRIP.AUTO-MCNC: 0.2 MG/DL
WBC # BLD AUTO: 11.8 K/UL (ref 4.8–10.8)
WBC #/AREA URNS HPF: ABNORMAL /HPF
YEAST BUDDING URNS QL: PRESENT /HPF

## 2021-04-09 PROCEDURE — 700105 HCHG RX REV CODE 258: Performed by: INTERNAL MEDICINE

## 2021-04-09 PROCEDURE — 83605 ASSAY OF LACTIC ACID: CPT | Mod: 91

## 2021-04-09 PROCEDURE — 71045 X-RAY EXAM CHEST 1 VIEW: CPT

## 2021-04-09 PROCEDURE — U0005 INFEC AGEN DETEC AMPLI PROBE: HCPCS

## 2021-04-09 PROCEDURE — 82010 KETONE BODYS QUAN: CPT

## 2021-04-09 PROCEDURE — 99291 CRITICAL CARE FIRST HOUR: CPT | Performed by: INTERNAL MEDICINE

## 2021-04-09 PROCEDURE — 700111 HCHG RX REV CODE 636 W/ 250 OVERRIDE (IP): Performed by: PSYCHIATRY & NEUROLOGY

## 2021-04-09 PROCEDURE — 700102 HCHG RX REV CODE 250 W/ 637 OVERRIDE(OP): Performed by: EMERGENCY MEDICINE

## 2021-04-09 PROCEDURE — 85025 COMPLETE CBC W/AUTO DIFF WBC: CPT

## 2021-04-09 PROCEDURE — 96375 TX/PRO/DX INJ NEW DRUG ADDON: CPT

## 2021-04-09 PROCEDURE — 99291 CRITICAL CARE FIRST HOUR: CPT

## 2021-04-09 PROCEDURE — U0003 INFECTIOUS AGENT DETECTION BY NUCLEIC ACID (DNA OR RNA); SEVERE ACUTE RESPIRATORY SYNDROME CORONAVIRUS 2 (SARS-COV-2) (CORONAVIRUS DISEASE [COVID-19]), AMPLIFIED PROBE TECHNIQUE, MAKING USE OF HIGH THROUGHPUT TECHNOLOGIES AS DESCRIBED BY CMS-2020-01-R: HCPCS

## 2021-04-09 PROCEDURE — 700111 HCHG RX REV CODE 636 W/ 250 OVERRIDE (IP): Performed by: INTERNAL MEDICINE

## 2021-04-09 PROCEDURE — 83735 ASSAY OF MAGNESIUM: CPT | Mod: 91

## 2021-04-09 PROCEDURE — 700102 HCHG RX REV CODE 250 W/ 637 OVERRIDE(OP): Performed by: INTERNAL MEDICINE

## 2021-04-09 PROCEDURE — 82962 GLUCOSE BLOOD TEST: CPT | Mod: 91

## 2021-04-09 PROCEDURE — 81001 URINALYSIS AUTO W/SCOPE: CPT

## 2021-04-09 PROCEDURE — 84145 PROCALCITONIN (PCT): CPT

## 2021-04-09 PROCEDURE — 83036 HEMOGLOBIN GLYCOSYLATED A1C: CPT

## 2021-04-09 PROCEDURE — 700105 HCHG RX REV CODE 258: Performed by: EMERGENCY MEDICINE

## 2021-04-09 PROCEDURE — 84443 ASSAY THYROID STIM HORMONE: CPT

## 2021-04-09 PROCEDURE — 770022 HCHG ROOM/CARE - ICU (200)

## 2021-04-09 PROCEDURE — 80048 BASIC METABOLIC PNL TOTAL CA: CPT | Mod: 91

## 2021-04-09 PROCEDURE — 82803 BLOOD GASES ANY COMBINATION: CPT

## 2021-04-09 PROCEDURE — 700102 HCHG RX REV CODE 250 W/ 637 OVERRIDE(OP): Performed by: HOSPITALIST

## 2021-04-09 PROCEDURE — 99223 1ST HOSP IP/OBS HIGH 75: CPT | Mod: AI | Performed by: HOSPITALIST

## 2021-04-09 PROCEDURE — A9270 NON-COVERED ITEM OR SERVICE: HCPCS | Performed by: HOSPITALIST

## 2021-04-09 PROCEDURE — 84100 ASSAY OF PHOSPHORUS: CPT | Mod: 91

## 2021-04-09 PROCEDURE — 700111 HCHG RX REV CODE 636 W/ 250 OVERRIDE (IP): Performed by: EMERGENCY MEDICINE

## 2021-04-09 PROCEDURE — 80053 COMPREHEN METABOLIC PANEL: CPT

## 2021-04-09 PROCEDURE — 96374 THER/PROPH/DIAG INJ IV PUSH: CPT

## 2021-04-09 RX ORDER — SODIUM CHLORIDE, SODIUM LACTATE, POTASSIUM CHLORIDE, CALCIUM CHLORIDE 600; 310; 30; 20 MG/100ML; MG/100ML; MG/100ML; MG/100ML
1000 INJECTION, SOLUTION INTRAVENOUS ONCE
Status: COMPLETED | OUTPATIENT
Start: 2021-04-09 | End: 2021-04-10

## 2021-04-09 RX ORDER — DEXTROSE, SODIUM CHLORIDE, SODIUM LACTATE, POTASSIUM CHLORIDE, AND CALCIUM CHLORIDE 5; .6; .31; .03; .02 G/100ML; G/100ML; G/100ML; G/100ML; G/100ML
INJECTION, SOLUTION INTRAVENOUS CONTINUOUS
Status: DISCONTINUED | OUTPATIENT
Start: 2021-04-09 | End: 2021-04-09

## 2021-04-09 RX ORDER — POTASSIUM CHLORIDE 7.45 MG/ML
10 INJECTION INTRAVENOUS ONCE
Status: COMPLETED | OUTPATIENT
Start: 2021-04-09 | End: 2021-04-09

## 2021-04-09 RX ORDER — MAGNESIUM SULFATE HEPTAHYDRATE 40 MG/ML
4 INJECTION, SOLUTION INTRAVENOUS
Status: DISCONTINUED | OUTPATIENT
Start: 2021-04-09 | End: 2021-04-09

## 2021-04-09 RX ORDER — SODIUM CHLORIDE, SODIUM LACTATE, POTASSIUM CHLORIDE, AND CALCIUM CHLORIDE .6; .31; .03; .02 G/100ML; G/100ML; G/100ML; G/100ML
2000 INJECTION, SOLUTION INTRAVENOUS ONCE
Status: DISCONTINUED | OUTPATIENT
Start: 2021-04-09 | End: 2021-04-09

## 2021-04-09 RX ORDER — POTASSIUM CHLORIDE 7.45 MG/ML
10 INJECTION INTRAVENOUS
Status: COMPLETED | OUTPATIENT
Start: 2021-04-09 | End: 2021-04-09

## 2021-04-09 RX ORDER — ACETAMINOPHEN 325 MG/1
650 TABLET ORAL EVERY 6 HOURS PRN
Status: DISCONTINUED | OUTPATIENT
Start: 2021-04-09 | End: 2021-04-09

## 2021-04-09 RX ORDER — SODIUM CHLORIDE, SODIUM LACTATE, POTASSIUM CHLORIDE, CALCIUM CHLORIDE 600; 310; 30; 20 MG/100ML; MG/100ML; MG/100ML; MG/100ML
INJECTION, SOLUTION INTRAVENOUS CONTINUOUS
Status: DISCONTINUED | OUTPATIENT
Start: 2021-04-09 | End: 2021-04-11

## 2021-04-09 RX ORDER — ONDANSETRON 4 MG/1
4 TABLET, ORALLY DISINTEGRATING ORAL EVERY 4 HOURS PRN
Status: DISCONTINUED | OUTPATIENT
Start: 2021-04-09 | End: 2021-04-09

## 2021-04-09 RX ORDER — ONDANSETRON 2 MG/ML
4 INJECTION INTRAMUSCULAR; INTRAVENOUS ONCE
Status: COMPLETED | OUTPATIENT
Start: 2021-04-09 | End: 2021-04-09

## 2021-04-09 RX ORDER — ONDANSETRON 2 MG/ML
4 INJECTION INTRAMUSCULAR; INTRAVENOUS EVERY 4 HOURS PRN
Status: DISCONTINUED | OUTPATIENT
Start: 2021-04-09 | End: 2021-04-12 | Stop reason: HOSPADM

## 2021-04-09 RX ORDER — DEXTROSE AND SODIUM CHLORIDE 10; .45 G/100ML; G/100ML
INJECTION, SOLUTION INTRAVENOUS CONTINUOUS
Status: ACTIVE | OUTPATIENT
Start: 2021-04-09 | End: 2021-04-10

## 2021-04-09 RX ORDER — ROSUVASTATIN CALCIUM 10 MG/1
10 TABLET, COATED ORAL EVERY EVENING
Status: DISCONTINUED | OUTPATIENT
Start: 2021-04-09 | End: 2021-04-12 | Stop reason: HOSPADM

## 2021-04-09 RX ORDER — ACETAMINOPHEN 325 MG/1
650 TABLET ORAL EVERY 6 HOURS PRN
Status: DISCONTINUED | OUTPATIENT
Start: 2021-04-09 | End: 2021-04-12 | Stop reason: HOSPADM

## 2021-04-09 RX ORDER — ENALAPRILAT 1.25 MG/ML
1.25 INJECTION INTRAVENOUS EVERY 6 HOURS PRN
Status: DISCONTINUED | OUTPATIENT
Start: 2021-04-09 | End: 2021-04-12 | Stop reason: HOSPADM

## 2021-04-09 RX ORDER — POTASSIUM CHLORIDE 7.45 MG/ML
10 INJECTION INTRAVENOUS
Status: COMPLETED | OUTPATIENT
Start: 2021-04-10 | End: 2021-04-10

## 2021-04-09 RX ORDER — MAGNESIUM SULFATE HEPTAHYDRATE 40 MG/ML
2 INJECTION, SOLUTION INTRAVENOUS
Status: DISCONTINUED | OUTPATIENT
Start: 2021-04-09 | End: 2021-04-09

## 2021-04-09 RX ORDER — AMLODIPINE BESYLATE 10 MG/1
10 TABLET ORAL
Status: DISCONTINUED | OUTPATIENT
Start: 2021-04-09 | End: 2021-04-12 | Stop reason: HOSPADM

## 2021-04-09 RX ORDER — BISACODYL 10 MG
10 SUPPOSITORY, RECTAL RECTAL
Status: DISCONTINUED | OUTPATIENT
Start: 2021-04-09 | End: 2021-04-12 | Stop reason: HOSPADM

## 2021-04-09 RX ORDER — INSULIN GLARGINE 100 [IU]/ML
10 INJECTION, SOLUTION SUBCUTANEOUS EVERY EVENING
Status: DISCONTINUED | OUTPATIENT
Start: 2021-04-09 | End: 2021-04-10

## 2021-04-09 RX ORDER — BISACODYL 10 MG
10 SUPPOSITORY, RECTAL RECTAL
Status: DISCONTINUED | OUTPATIENT
Start: 2021-04-09 | End: 2021-04-09

## 2021-04-09 RX ORDER — ONDANSETRON 4 MG/1
4 TABLET, ORALLY DISINTEGRATING ORAL EVERY 4 HOURS PRN
Status: DISCONTINUED | OUTPATIENT
Start: 2021-04-09 | End: 2021-04-12 | Stop reason: HOSPADM

## 2021-04-09 RX ORDER — LEVOTHYROXINE SODIUM 112 UG/1
112 TABLET ORAL
Status: DISCONTINUED | OUTPATIENT
Start: 2021-04-09 | End: 2021-04-12 | Stop reason: HOSPADM

## 2021-04-09 RX ORDER — AMOXICILLIN 250 MG
2 CAPSULE ORAL 2 TIMES DAILY
Status: DISCONTINUED | OUTPATIENT
Start: 2021-04-09 | End: 2021-04-12 | Stop reason: HOSPADM

## 2021-04-09 RX ORDER — ONDANSETRON 2 MG/ML
4 INJECTION INTRAMUSCULAR; INTRAVENOUS EVERY 4 HOURS PRN
Status: DISCONTINUED | OUTPATIENT
Start: 2021-04-09 | End: 2021-04-09

## 2021-04-09 RX ORDER — AMOXICILLIN 250 MG
2 CAPSULE ORAL 2 TIMES DAILY
Status: DISCONTINUED | OUTPATIENT
Start: 2021-04-09 | End: 2021-04-09

## 2021-04-09 RX ORDER — POLYETHYLENE GLYCOL 3350 17 G/17G
1 POWDER, FOR SOLUTION ORAL
Status: DISCONTINUED | OUTPATIENT
Start: 2021-04-09 | End: 2021-04-09

## 2021-04-09 RX ORDER — SODIUM CHLORIDE 9 MG/ML
2000 INJECTION, SOLUTION INTRAVENOUS ONCE
Status: DISCONTINUED | OUTPATIENT
Start: 2021-04-09 | End: 2021-04-09

## 2021-04-09 RX ORDER — SODIUM CHLORIDE 9 MG/ML
1000 INJECTION, SOLUTION INTRAVENOUS ONCE
Status: COMPLETED | OUTPATIENT
Start: 2021-04-09 | End: 2021-04-09

## 2021-04-09 RX ORDER — POLYETHYLENE GLYCOL 3350 17 G/17G
1 POWDER, FOR SOLUTION ORAL
Status: DISCONTINUED | OUTPATIENT
Start: 2021-04-09 | End: 2021-04-12 | Stop reason: HOSPADM

## 2021-04-09 RX ORDER — DEXTROSE MONOHYDRATE 25 G/50ML
50 INJECTION, SOLUTION INTRAVENOUS
Status: DISCONTINUED | OUTPATIENT
Start: 2021-04-09 | End: 2021-04-10

## 2021-04-09 RX ORDER — SODIUM CHLORIDE 9 MG/ML
INJECTION, SOLUTION INTRAVENOUS CONTINUOUS
Status: DISCONTINUED | OUTPATIENT
Start: 2021-04-09 | End: 2021-04-09

## 2021-04-09 RX ORDER — LABETALOL HYDROCHLORIDE 5 MG/ML
10 INJECTION, SOLUTION INTRAVENOUS EVERY 4 HOURS PRN
Status: DISCONTINUED | OUTPATIENT
Start: 2021-04-09 | End: 2021-04-12 | Stop reason: HOSPADM

## 2021-04-09 RX ORDER — SODIUM CHLORIDE, SODIUM LACTATE, POTASSIUM CHLORIDE, CALCIUM CHLORIDE 600; 310; 30; 20 MG/100ML; MG/100ML; MG/100ML; MG/100ML
INJECTION, SOLUTION INTRAVENOUS CONTINUOUS
Status: DISCONTINUED | OUTPATIENT
Start: 2021-04-09 | End: 2021-04-09

## 2021-04-09 RX ORDER — LOSARTAN POTASSIUM 50 MG/1
100 TABLET ORAL DAILY
Status: DISCONTINUED | OUTPATIENT
Start: 2021-04-09 | End: 2021-04-12 | Stop reason: HOSPADM

## 2021-04-09 RX ADMIN — LEVOTHYROXINE SODIUM 112 MCG: 0.11 TABLET ORAL at 13:01

## 2021-04-09 RX ADMIN — INSULIN HUMAN 10 UNITS: 100 INJECTION, SOLUTION PARENTERAL at 10:56

## 2021-04-09 RX ADMIN — SODIUM CHLORIDE, SODIUM LACTATE, POTASSIUM CHLORIDE, CALCIUM CHLORIDE AND DEXTROSE MONOHYDRATE: 5; 600; 310; 30; 20 INJECTION, SOLUTION INTRAVENOUS at 19:16

## 2021-04-09 RX ADMIN — ROSUVASTATIN CALCIUM 10 MG: 10 TABLET, FILM COATED ORAL at 17:32

## 2021-04-09 RX ADMIN — DEXTROSE AND SODIUM CHLORIDE: 10; .45 INJECTION, SOLUTION INTRAVENOUS at 20:59

## 2021-04-09 RX ADMIN — SODIUM CHLORIDE 4 UNITS/HR: 9 INJECTION, SOLUTION INTRAVENOUS at 12:49

## 2021-04-09 RX ADMIN — SODIUM CHLORIDE 8 UNITS/HR: 9 INJECTION, SOLUTION INTRAVENOUS at 21:39

## 2021-04-09 RX ADMIN — POTASSIUM CHLORIDE 10 MEQ: 7.46 INJECTION, SOLUTION INTRAVENOUS at 23:42

## 2021-04-09 RX ADMIN — LOSARTAN POTASSIUM 100 MG: 50 TABLET, FILM COATED ORAL at 12:39

## 2021-04-09 RX ADMIN — Medication 81 MG: at 12:39

## 2021-04-09 RX ADMIN — POTASSIUM CHLORIDE 10 MEQ: 7.46 INJECTION, SOLUTION INTRAVENOUS at 18:51

## 2021-04-09 RX ADMIN — POTASSIUM CHLORIDE 10 MEQ: 7.46 INJECTION, SOLUTION INTRAVENOUS at 14:15

## 2021-04-09 RX ADMIN — ONDANSETRON 4 MG: 2 INJECTION INTRAMUSCULAR; INTRAVENOUS at 09:15

## 2021-04-09 RX ADMIN — INSULIN GLARGINE 10 UNITS: 100 INJECTION, SOLUTION SUBCUTANEOUS at 23:29

## 2021-04-09 RX ADMIN — POTASSIUM CHLORIDE 10 MEQ: 7.46 INJECTION, SOLUTION INTRAVENOUS at 20:22

## 2021-04-09 RX ADMIN — SODIUM CHLORIDE 1000 ML: 9 INJECTION, SOLUTION INTRAVENOUS at 11:15

## 2021-04-09 RX ADMIN — SODIUM CHLORIDE, POTASSIUM CHLORIDE, SODIUM LACTATE AND CALCIUM CHLORIDE: 600; 310; 30; 20 INJECTION, SOLUTION INTRAVENOUS at 12:55

## 2021-04-09 RX ADMIN — SODIUM CHLORIDE: 9 INJECTION, SOLUTION INTRAVENOUS at 09:15

## 2021-04-09 RX ADMIN — AMLODIPINE BESYLATE 10 MG: 10 TABLET ORAL at 12:39

## 2021-04-09 RX ADMIN — POTASSIUM CHLORIDE 10 MEQ: 7.46 INJECTION, SOLUTION INTRAVENOUS at 15:17

## 2021-04-09 RX ADMIN — POTASSIUM CHLORIDE 10 MEQ: 7.46 INJECTION, SOLUTION INTRAVENOUS at 12:39

## 2021-04-09 RX ADMIN — DOCUSATE SODIUM 50 MG AND SENNOSIDES 8.6 MG 2 TABLET: 8.6; 5 TABLET, FILM COATED ORAL at 17:32

## 2021-04-09 ASSESSMENT — LIFESTYLE VARIABLES
CONSUMPTION TOTAL: INCOMPLETE
DOES PATIENT WANT TO STOP DRINKING: NO
TOTAL SCORE: 0
TOTAL SCORE: 0
HAVE YOU EVER FELT YOU SHOULD CUT DOWN ON YOUR DRINKING: NO
ALCOHOL_USE: YES
CONSUMPTION TOTAL: NEGATIVE
HAVE PEOPLE ANNOYED YOU BY CRITICIZING YOUR DRINKING: NO
EVER HAD A DRINK FIRST THING IN THE MORNING TO STEADY YOUR NERVES TO GET RID OF A HANGOVER: NO
HAVE YOU EVER FELT YOU SHOULD CUT DOWN ON YOUR DRINKING: NO
HAVE PEOPLE ANNOYED YOU BY CRITICIZING YOUR DRINKING: NO
DO YOU DRINK ALCOHOL: NO
TOTAL SCORE: 0
TOTAL SCORE: 0
EVER FELT BAD OR GUILTY ABOUT YOUR DRINKING: NO
HOW MANY TIMES IN THE PAST YEAR HAVE YOU HAD 5 OR MORE DRINKS IN A DAY: 0
EVER FELT BAD OR GUILTY ABOUT YOUR DRINKING: NO
TOTAL SCORE: 0
DOES PATIENT WANT TO STOP DRINKING: NO
EVER HAD A DRINK FIRST THING IN THE MORNING TO STEADY YOUR NERVES TO GET RID OF A HANGOVER: NO
SUBSTANCE_ABUSE: 0
ON A TYPICAL DAY WHEN YOU DRINK ALCOHOL HOW MANY DRINKS DO YOU HAVE: 0
AVERAGE NUMBER OF DAYS PER WEEK YOU HAVE A DRINK CONTAINING ALCOHOL: 0
TOTAL SCORE: 0

## 2021-04-09 ASSESSMENT — ENCOUNTER SYMPTOMS
BACK PAIN: 0
FLANK PAIN: 0
WHEEZING: 0
FOCAL WEAKNESS: 0
NECK PAIN: 0
BLURRED VISION: 1
CLAUDICATION: 0
NAUSEA: 1
SPUTUM PRODUCTION: 0
BLOOD IN STOOL: 0
EYE DISCHARGE: 0
LOSS OF CONSCIOUSNESS: 0
NERVOUS/ANXIOUS: 0
SORE THROAT: 0
VOMITING: 1
ORTHOPNEA: 0
COUGH: 0
FEVER: 0
MYALGIAS: 0
SEIZURES: 0
HALLUCINATIONS: 0
ABDOMINAL PAIN: 0
DIARRHEA: 0
VOMITING: 0
DIAPHORESIS: 0
DOUBLE VISION: 0
HEMOPTYSIS: 0
EYE REDNESS: 0
HEADACHES: 0
PHOTOPHOBIA: 0
EYE PAIN: 0
SPEECH CHANGE: 0
CHILLS: 0
STRIDOR: 0
BRUISES/BLEEDS EASILY: 0
PALPITATIONS: 0
SHORTNESS OF BREATH: 0
PND: 0
NAUSEA: 0
CONSTIPATION: 0
SENSORY CHANGE: 0

## 2021-04-09 ASSESSMENT — COGNITIVE AND FUNCTIONAL STATUS - GENERAL
SUGGESTED CMS G CODE MODIFIER DAILY ACTIVITY: CK
EATING MEALS: A LITTLE
STANDING UP FROM CHAIR USING ARMS: A LOT
MOVING FROM LYING ON BACK TO SITTING ON SIDE OF FLAT BED: A LOT
MOBILITY SCORE: 14
DRESSING REGULAR LOWER BODY CLOTHING: A LITTLE
MOVING TO AND FROM BED TO CHAIR: A LITTLE
HELP NEEDED FOR BATHING: A LITTLE
DRESSING REGULAR UPPER BODY CLOTHING: A LITTLE
TURNING FROM BACK TO SIDE WHILE IN FLAT BAD: A LITTLE
CLIMB 3 TO 5 STEPS WITH RAILING: A LOT
DAILY ACTIVITIY SCORE: 18
SUGGESTED CMS G CODE MODIFIER MOBILITY: CL
TOILETING: A LITTLE
PERSONAL GROOMING: A LITTLE
WALKING IN HOSPITAL ROOM: A LOT

## 2021-04-09 ASSESSMENT — PAIN DESCRIPTION - PAIN TYPE
TYPE: ACUTE PAIN
TYPE: ACUTE PAIN

## 2021-04-09 ASSESSMENT — PATIENT HEALTH QUESTIONNAIRE - PHQ9
SUM OF ALL RESPONSES TO PHQ9 QUESTIONS 1 AND 2: 0
1. LITTLE INTEREST OR PLEASURE IN DOING THINGS: NOT AT ALL
2. FEELING DOWN, DEPRESSED, IRRITABLE, OR HOPELESS: NOT AT ALL

## 2021-04-09 ASSESSMENT — FIBROSIS 4 INDEX: FIB4 SCORE: 1.78

## 2021-04-09 NOTE — ASSESSMENT & PLAN NOTE
-s/p IV fluid resuscitation and IV insulin drip  -A1c 18 (last A1c 4 months ago was 7)  -Transition to basal bolus insulin  -diabetic diet  -Accu-Chek/hypoglycemia protocol  -Replete potassium, phosphorus and magnesium based upon electrolyte determinations  -Diabetic education prior to discharge  -Conside rechecking A1c

## 2021-04-09 NOTE — PROGRESS NOTES
Lab called with critical result of blood glucose of 610 at 1350. Critical lab result read back to lab. MD Glynn notified. Patient is on DKA protocol. This RN check BG at bedside. Result was 515. Insulin drip increased to 6 units/hr per DKA protocol.

## 2021-04-09 NOTE — CONSULTS
PULMONARY AND CRITICAL CARE MEDICINE CONSULTATION    Date of Consultation:  4/9/2021    Requesting Physician:  Olegario Day MD    Consulting Physician:  Walter Pepper MD    Reason for Consultation: Critical care management in lady with acute DKA    Chief Complaint: Nausea, vomiting, weakness    History of Present Illness:    I was kindly asked to see and evaluate Roya Muniz, a 80 y.o. female for evaluation and management of the above problem.    This lady has a history of DM type II on no medications, stage III CKD, prior permanent pacemaker placement, dyslipidemia and hypothyroidism.  She comes into the emergency department with weakness, nausea and vomiting.  Her vision has been blurry for about 2 weeks.  She has no fever, chills, sweats, myalgias, arthralgias, sore throat, angina, palpitations, syncope, cough, sputum production, wheezing, hemoptysis, shortness of breath, abdominal pain, diarrhea, dysuria, urgency, frequency, hematuria or flank pain.  Laboratory evaluation reveals DKA and she is admitted to the ICU.    Medications Prior to Admission:    No current facility-administered medications on file prior to encounter.     Current Outpatient Medications on File Prior to Encounter   Medication Sig Dispense Refill   • doxycycline (VIBRAMYCIN) 100 MG Tab Take 1 tablet by mouth every day for 30 days. (Patient not taking: Reported on 4/9/2021) 30 tablet 0   • rosuvastatin (CRESTOR) 10 MG Tab TAKE ONE TABLET BY MOUTH EVERY EVENING 100 tablet 2   • amLODIPine (NORVASC) 10 MG Tab TAKE ONE TABLET BY MOUTH DAILY for Blood pressure 90 tablet 1   • losartan (COZAAR) 100 MG Tab TAKE ONE TABLET BY MOUTH DAILY for Blood Pressure 100 tablet 1   • levothyroxine (SYNTHROID) 112 MCG Tab TAKE ONE TABLET BY MOUTH EVERY MORNING ON AN EMPTY STOMACH 90 Tab 3   • vitamin D (CHOLECALCIFEROL) 1000 UNIT Tab Take 1,000 Units by mouth every morning.     • aspirin 81 MG tablet Take 81 mg by mouth every morning.          Current Medications:      Current Facility-Administered Medications:   •  NS infusion, , Intravenous, Continuous, Olegario Day M.D., Stopped at 04/09/21 1050  •  D10%-0.45% NaCl infusion, , Intravenous, Continuous, Walter Pepper M.D.  •  MD ALERT-PHARMACY TO CONSULT FOR DKA MONITORING 1 Each, 1 Each, Other, PRN, Walter Pepper M.D.  •  magnesium sulfate IVPB premix 2 g, 2 g, Intravenous, Once PRN **OR** magnesium sulfate IVPB premix 4 g, 4 g, Intravenous, Once PRN, Walter Pepper M.D.  •  potassium phosphate 30 mmol in  mL ivpb, 30 mmol, Intravenous, Once PRN **OR** sodium phosphate 30 mmol in 1/2  mL ivpb, 30 mmol, Intravenous, Once PRN, Walter Pepper M.D.  •  Adult DKA potassium(K+) replacement scale, 1 Each, Intravenous, Q4HRS, Walter Pepper M.D.  •  lactated ringers infusion (BOLUS), 2,000 mL, Intravenous, Once **OR** NS (BOLUS) infusion 2,000 mL, 2,000 mL, Intravenous, Once, Walter Pepper M.D.  •  lactated ringers infusion, , Intravenous, Continuous, Walter Pepper M.D.  •  D5LR infusion, , Intravenous, Continuous, Walter Pepper M.D.  •  MD ALERT-INSULIN DRIP INITIAL RATE BY PHARMACY AT 0.05 UNITS/KG/HR 1 Each, 1 Each, Other, PRN, Walter Pepper M.D.  •  senna-docusate (PERICOLACE or SENOKOT S) 8.6-50 MG per tablet 2 tablet, 2 tablet, Oral, BID **AND** polyethylene glycol/lytes (MIRALAX) PACKET 1 Packet, 1 Packet, Oral, QDAY PRN **AND** magnesium hydroxide (MILK OF MAGNESIA) suspension 30 mL, 30 mL, Oral, QDAY PRN **AND** bisacodyl (DULCOLAX) suppository 10 mg, 10 mg, Rectal, QDAY PRN, TAMMY Baltazar.D.  •  enoxaparin (LOVENOX) inj 40 mg, 40 mg, Subcutaneous, DAILY, Walter Pepper M.D.  •  acetaminophen (Tylenol) tablet 650 mg, 650 mg, Oral, Q6HRS PRN, Walter Pepper M.D.  •  enalaprilat (VASOTEC) injection 1.25 mg, 1.25 mg, Intravenous, Q6HRS PRN, Walter Pepper M.D.  •   labetalol (NORMODYNE/TRANDATE) injection 10 mg, 10 mg, Intravenous, Q4HRS PRN, Walter Pepper M.D.  •  ondansetron (ZOFRAN) syringe/vial injection 4 mg, 4 mg, Intravenous, Q4HRS PRN, Walter Pepper M.D.  •  ondansetron (ZOFRAN ODT) dispertab 4 mg, 4 mg, Oral, Q4HRS PRN, Walter Pepper M.D.    Current Outpatient Medications:   •  doxycycline (VIBRAMYCIN) 100 MG Tab, Take 1 tablet by mouth every day for 30 days. (Patient not taking: Reported on 4/9/2021), Disp: 30 tablet, Rfl: 0  •  rosuvastatin (CRESTOR) 10 MG Tab, TAKE ONE TABLET BY MOUTH EVERY EVENING, Disp: 100 tablet, Rfl: 2  •  amLODIPine (NORVASC) 10 MG Tab, TAKE ONE TABLET BY MOUTH DAILY for Blood pressure, Disp: 90 tablet, Rfl: 1  •  losartan (COZAAR) 100 MG Tab, TAKE ONE TABLET BY MOUTH DAILY for Blood Pressure, Disp: 100 tablet, Rfl: 1  •  levothyroxine (SYNTHROID) 112 MCG Tab, TAKE ONE TABLET BY MOUTH EVERY MORNING ON AN EMPTY STOMACH, Disp: 90 Tab, Rfl: 3  •  vitamin D (CHOLECALCIFEROL) 1000 UNIT Tab, Take 1,000 Units by mouth every morning., Disp: , Rfl:   •  aspirin 81 MG tablet, Take 81 mg by mouth every morning., Disp: , Rfl:     Allergies:    Cephalexin [keflex]    Past Surgical History:    Past Surgical History:   Procedure Laterality Date   • GASTROSCOPY-ENDO  2/21/2014    Performed by Steve Islas Jr., M.D. at ENDOSCOPY [facility]   • GASTROSCOPY-ENDO  2/20/2014    Performed by Antonio Espinoza M.D. at ENDOSCOPY [facility]   • EXPLORATORY LAPAROTOMY  2/10/2014    Performed by Rigo Garcia M.D. at SURGERY [facility]   • CHOLECYSTECTOMY  2/10/2014    Performed by Rigo Garcia M.D. at SURGERY [facility]   • PANCREATECTOMY  2/10/2014    Performed by Rigo Garcia M.D. at SURGERY [facility]   • GASTROSTOMY FEEDING  2/10/2014    Performed by Rigo Garcia M.D. at SURGERY [facility]   • OTHER ORTHOPEDIC SURGERY      foot surgery       Past Medical  History:    Past Medical History:   Diagnosis Date   • Cardiac pacemaker in situ    • Diabetes (HCC)    • History of DVT of lower extremity 6/5/2014   • Hyperlipidemia    • Hypertension    • Pancreatic cyst    • Pancreatitis    • Sinus node dysfunction (HCC)    • Thyroid disease     hypothyroidism       Social History:    Social History     Socioeconomic History   • Marital status:      Spouse name: Not on file   • Number of children: Not on file   • Years of education: Not on file   • Highest education level: Not on file   Occupational History   • Not on file   Tobacco Use   • Smoking status: Never Smoker   • Smokeless tobacco: Never Used   • Tobacco comment: avoid all tobacco products   Substance and Sexual Activity   • Alcohol use: Not Currently     Alcohol/week: 0.0 oz     Comment: occasionally   • Drug use: No   • Sexual activity: Yes     Partners: Male   Other Topics Concern   • Not on file   Social History Narrative    Worked as a , 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 Is , has a son in [place], one in [place] and one in [place]. She enjoys riding quads with her  and gardening. She wears a helmet on her quad. No tob, drugs and only rare etoh.      Social Determinants of Health     Financial Resource Strain:    • Difficulty of Paying Living Expenses:    Food Insecurity: No Food Insecurity   • Worried About Running Out of Food in the Last Year: Never true   • Ran Out of Food in the Last Year: Never true   Transportation Needs: No Transportation Needs   • Lack of Transportation (Medical): No   • Lack of Transportation (Non-Medical): No   Physical Activity:    • Days of Exercise per Week:    • Minutes of Exercise per Session:    Stress:    • Feeling of Stress :    Social Connections:    • Frequency of Communication with Friends and Family:    • Frequency of Social Gatherings with Friends and Family:    • Attends Confucianism Services:    • Active Member of Clubs or  Organizations:    • Attends Club or Organization Meetings:    • Marital Status:    Intimate Partner Violence:    • Fear of Current or Ex-Partner:    • Emotionally Abused:    • Physically Abused:    • Sexually Abused:        Family History:    Family History   Problem Relation Age of Onset   • Heart Disease Mother         enlarged heart, heart failure   • Cancer Father         liver   • No Known Problems Sister    • Heart Disease Brother         heart failure   • Lung Disease Brother         heavy smoker   • Alcohol/Drug Brother         etoh   • Cancer Paternal Aunt         breast cancer    • Cancer Maternal Grandmother         liver    • Heart Disease Maternal Grandfather         CHF    • No Known Problems Paternal Grandmother    • No Known Problems Paternal Grandfather    • No Known Problems Sister    • No Known Problems Sister    • Stroke Sister    • No Known Problems Son    • No Known Problems Son    • No Known Problems Son        Review of System:    Review of Systems   Constitutional: Positive for malaise/fatigue. Negative for chills, diaphoresis and fever.   HENT: Negative for ear discharge, ear pain, nosebleeds, sore throat and tinnitus.    Eyes: Positive for blurred vision. Negative for double vision, photophobia, pain, discharge and redness.   Respiratory: Negative for cough, hemoptysis, sputum production, shortness of breath, wheezing and stridor.    Cardiovascular: Negative for chest pain, palpitations, orthopnea, claudication and PND.   Gastrointestinal: Positive for nausea and vomiting. Negative for abdominal pain, blood in stool, constipation, diarrhea and melena.   Genitourinary: Negative for dysuria, flank pain, frequency, hematuria and urgency.   Musculoskeletal: Negative for back pain, myalgias and neck pain.   Skin: Negative for itching and rash.   Neurological: Negative for sensory change, speech change, focal weakness, seizures, loss of consciousness and headaches.   Endo/Heme/Allergies: Does  "not bruise/bleed easily.   Psychiatric/Behavioral: Negative for hallucinations, substance abuse and suicidal ideas. The patient is not nervous/anxious.        Physical Examination:    BP (!) 187/81   Pulse 94   Temp 36.3 °C (97.4 °F) (Temporal)   Resp (!) 23   Ht 1.626 m (5' 4\")   Wt 71.2 kg (157 lb)   SpO2 96%   BMI 26.95 kg/m²   Physical Exam   Constitutional: She is oriented to person, place, and time.   HENT:   Head: Normocephalic and atraumatic.   Right Ear: External ear normal.   Left Ear: External ear normal.   Nose: Nose normal.   Mouth/Throat: No oropharyngeal exudate.   Dry mucous membranes   Eyes: Pupils are equal, round, and reactive to light. Conjunctivae are normal. Right eye exhibits no discharge. Left eye exhibits no discharge.   Neck: No JVD present. No tracheal deviation present.   Cardiovascular: Intact distal pulses. Exam reveals no gallop and no friction rub.   No murmur heard.  Sinus rhythm   Pulmonary/Chest: No stridor. No respiratory distress. She has no wheezes. She has no rales.   Abdominal: Soft. Bowel sounds are normal. She exhibits no distension. There is no abdominal tenderness. There is no rebound and no guarding.   Musculoskeletal:         General: No tenderness, deformity or edema. Normal range of motion.      Cervical back: Normal range of motion and neck supple.      Comments: No clubbing or cyanosis   Neurological: She is alert and oriented to person, place, and time. No cranial nerve deficit. Coordination normal. GCS score is 15.   No focal weakness   Skin: Skin is warm and dry. No rash noted. She is not diaphoretic. No erythema.       Laboratory Data:        Recent Labs     04/09/21 0905   WBC 11.8*   RBC 5.35   HEMOGLOBIN 17.3*   HEMATOCRIT 50.3*   MCV 94.0   MCH 32.3   MCHC 34.4   RDW 42.7   PLATELETCT 136*   MPV 12.9     Recent Labs     04/09/21 0905   SODIUM 135   POTASSIUM 5.2   CHLORIDE 90*   CO2 15*   GLUCOSE 853*   BUN 37*   CREATININE 1.59*   CALCIUM 10.7*      "              Imaging:    I personally viewed all the available CXR and CT scan images as well as reviewed the radiology interpretation reports.    DX-CHEST-PORTABLE (1 VIEW)   Final Result      Mild interstitial prominence, unchanged.          Assessment and Plan:    * Diabetic ketoacidosis without coma associated with type 2 diabetes mellitus (HCC)  Assessment & Plan  IV fluid resuscitation  I am titrating an insulin drip based upon hourly determinations of blood glucose as well as serial determinations of acid-base status  Replete potassium, phosphorus and magnesium based upon electrolyte determinations    Hypertension- (present on admission)  Assessment & Plan  Continue amlodipine, 10 mg daily  Continue losartan, 100 mg daily  Goal SBP less than 160    Chronic kidney disease (CKD), stage III (moderate)- (present on admission)  Assessment & Plan  Monitor renal function and urine output  Avoid nephrotoxins and renal dose medications    Hypothyroidism- (present on admission)  Assessment & Plan  Continue levothyroxine    Cardiac pacemaker in situ- (present on admission)  Assessment & Plan       Hyperlipidemia- (present on admission)  Assessment & Plan  Continue statin      I have assessed and reassessed hourly determinations of blood glucose as well as serial determinations of acid-base status with titration of an insulin drip, she is at increased risk for worsening endocrine/metabolic system dysfunction.    High risk of deterioration and worsening vital organ dysfunction and death without the above critical care interventions.    Thank you for allowing me to participate in the care of this lady.  I will continue to follow her with great interest.    The patient is critically ill.  Critical care time = 45 minutes in directly providing and coordinating critical care and extensive data review.  No time overlap and excludes procedures.    Discussed with ER physician, hospitalist, RN    Walter Pepper,  MD  Pulmonary and Critical Care Medicine

## 2021-04-09 NOTE — ASSESSMENT & PLAN NOTE
Diabetic education  Lantus 20units with monitor of accuchecks and cover with SSI  Oddly Glycohemoglobin is 18.3 this admit and 5 months ago it was 7.3.  Hx of pseudocyst after gallstones in 2014.  Question if autoimmune pancreatitis.  She has a history of Irritable bowel and question if this is chronic pancreatitis.  I will check ESR, CRP, C-peptide, Insulin, and antibody testing. I will check an U/S of the pancreas to look for any masses but outpatient may need additional work up with consideration of MRCP or CT dedicated of pancrease.    She has previously seen Dr Yap, GI, and had a colonoscopy and negative work up for Celiac Sprue disease.

## 2021-04-09 NOTE — PROGRESS NOTES
Patient transported to ICU with ACLS RN and tech on telemetry. Patient patient belongings with patient are glasses.   2 RN skin check completed:   Patient has blanchable redness on sacrum. Mepilex placed on sacrum. Patient turned with pillows.   Patient has dry and cracked skin on bilateral lower extremities and heels. Patient heels floated with pillows.   Patient educated on skin breakdown prevention, call light, fall precautions, medications, and safety.   Bed is locked and in lowest position, call light is in reach, and bed alarm on.

## 2021-04-09 NOTE — ASSESSMENT & PLAN NOTE
-monitor renal function and urine output  -Avoid nephrotoxins and renal dose medications   I\"m not sure  Lab orders addended to include 'healthcare maintenance' as dx code as well

## 2021-04-09 NOTE — ED TRIAGE NOTES
"Chief Complaint   Patient presents with   • Weakness     generalized x months but worse the last 2 days.    • Blood Sugar Problem     BS: 412 PTA. c/o increased uriation, thirst/mouth dryness. hx of diabetes.     BIB EMS from home for above. Received 150cc NS PTA  BP noted. Denies fevers.    .BP (!) 184/82   Pulse 100   Temp 36.3 °C (97.4 °F) (Temporal)   Resp 18   Ht 1.626 m (5' 4\")   Wt 71.2 kg (157 lb)   SpO2 96%   BMI 26.95 kg/m²     "

## 2021-04-09 NOTE — ED NOTES
Med rec complete per pt at bedside  Allergies reviewed    Pt was prescribed a 10 day course of doxycycline on 3/29/21 but she reports stopping after a few days   States that she did not do well on this medication and thinks it exacerbated the reason why she is here today

## 2021-04-09 NOTE — ASSESSMENT & PLAN NOTE
Cr is up to 1.59 due to dehydration  Wean fluids.  Improved with fluids  Avoid nephrotoxins.    resolved

## 2021-04-09 NOTE — H&P
"Hospital Medicine History & Physical Note    Date of Service  4/9/2021    Primary Care Physician  Bisi Browning M.D.    Consultants  Critical care, I discussed with Dr. Pepper  Diabetes education   Code Status  Full Code    Chief Complaint  Chief Complaint   Patient presents with   • Weakness     generalized x months but worse the last 2 days.    • Blood Sugar Problem     BS: 412 PTA. c/o increased uriation, thirst/mouth dryness. hx of diabetes.       History of Presenting Illness  80 y.o. female who presented 4/9/2021 with dizziness and increased thirst and urination  Ms. Muniz has a past medical history of pretension and diet-controlled diabetes mellitus states for the past 2 weeks she has noticed that she is looking more water and having a urinate more often.  This is getting the point where she is urinating \"almost constantly\".  Past 2 days she has been feeling \"sick and dizzy\" has not had any recent illnesses no changes in her eating habits has had a unintended 5 pound weight loss.  Her  called paramedics this morning because of her symptoms they found her fingerstick glucose to be 412.  She was transferred to the emergency room where she is found to be in diabetic ketoacidosis with a glucose of 853 and anion gap of 30 beta hydroxybutyric acid over 8.  She will be admitted to the intensive care unit on IV insulin drip with IV fluids.    She denies exposure to persons known to have SARS-CoV-2 has not had a cough or shortness of breath no fevers or chills no changes in her taste or smell.  A screening COVID will be checked.  In the emergency room she has budding yeast in her urine which is asymptomatic and will not be treated.    In the emergency room we had a long discussion that she will need to be on insulin upon discharge and discussed that the diabetes education team as well as her nurses will work on teaching her how to check her blood sugar levels and administer insulin.    Review of " Systems  Review of Systems   Constitutional: Negative for chills and fever.   Eyes: Positive for blurred vision.   Respiratory: Negative for cough and shortness of breath.    Cardiovascular: Negative for chest pain and leg swelling.   Gastrointestinal: Negative for abdominal pain, diarrhea, nausea and vomiting.   Genitourinary: Positive for frequency and urgency. Negative for dysuria, flank pain and hematuria.   All other systems reviewed and are negative.      Past Medical History   has a past medical history of Cardiac pacemaker in situ, Diabetes (HCC), History of DVT of lower extremity (6/5/2014), Hyperlipidemia, Hypertension, Pancreatic cyst, Pancreatitis, Sinus node dysfunction (HCC), and Thyroid disease.    Surgical History   has a past surgical history that includes exploratory laparotomy (2/10/2014); cholecystectomy (2/10/2014); pancreatectomy (2/10/2014); gastrostomy feeding (2/10/2014); gastroscopy-endo (2/20/2014); other orthopedic surgery; and gastroscopy-endo (2/21/2014).     Family History  No family history of diabetes     Social History   reports that she has never smoked. She has never used smokeless tobacco. She reports previous alcohol use. She reports that she does not use drugs.she lives with her     Allergies  Allergies   Allergen Reactions   • Cephalexin [Keflex] Hives, Rash, Itching and Unspecified     Photosensitivity         Medications  Prior to Admission Medications   Prescriptions Last Dose Informant Patient Reported? Taking?   amLODIPine (NORVASC) 10 MG Tab 4/8/2021 at am Patient No No   Sig: TAKE ONE TABLET BY MOUTH DAILY for Blood pressure   aspirin 81 MG tablet 4/8/2021 at am Patient Yes No   Sig: Take 81 mg by mouth every morning.   levothyroxine (SYNTHROID) 112 MCG Tab 4/8/2021 at am Patient No No   Sig: TAKE ONE TABLET BY MOUTH EVERY MORNING ON AN EMPTY STOMACH   losartan (COZAAR) 100 MG Tab 4/8/2021 at am Patient No No   Sig: TAKE ONE TABLET BY MOUTH DAILY for Blood  Pressure   rosuvastatin (CRESTOR) 10 MG Tab 4/8/2021 at pm Patient No No   Sig: TAKE ONE TABLET BY MOUTH EVERY EVENING   vitamin D (CHOLECALCIFEROL) 1000 UNIT Tab 4/8/2021 at am Patient Yes No   Sig: Take 1,000 Units by mouth every morning.      Facility-Administered Medications: None       Physical Exam  Temp:  [36.3 °C (97.4 °F)] 36.3 °C (97.4 °F)  Pulse:  [] 94  Resp:  [18-23] 23  BP: (149-187)/(70-82) 187/81  SpO2:  [90 %-96 %] 96 %    Physical Exam  Vitals and nursing note reviewed.   Constitutional:       General: She is not in acute distress.     Appearance: Normal appearance. She is not toxic-appearing.   HENT:      Head: Normocephalic and atraumatic.      Mouth/Throat:      Mouth: Mucous membranes are dry.      Pharynx: Oropharynx is clear.   Eyes:      General: No scleral icterus.     Conjunctiva/sclera: Conjunctivae normal.   Cardiovascular:      Rate and Rhythm: Normal rate and regular rhythm.      Heart sounds: No murmur.      Comments: Upper chest pacemaker  Pulmonary:      Effort: Pulmonary effort is normal. No respiratory distress.      Breath sounds: Normal breath sounds.   Abdominal:      General: There is no distension.      Tenderness: There is no abdominal tenderness.   Musculoskeletal:      Cervical back: Normal range of motion and neck supple.      Right lower leg: No edema.      Left lower leg: No edema.   Skin:     General: Skin is warm and dry.      Findings: No rash.   Neurological:      General: No focal deficit present.      Mental Status: She is alert and oriented to person, place, and time.   Psychiatric:         Mood and Affect: Mood normal.         Behavior: Behavior normal.         Thought Content: Thought content normal.         Judgment: Judgment normal.         Laboratory:  Recent Labs     04/09/21 0905   WBC 11.8*   RBC 5.35   HEMOGLOBIN 17.3*   HEMATOCRIT 50.3*   MCV 94.0   MCH 32.3   MCHC 34.4   RDW 42.7   PLATELETCT 136*   MPV 12.9     Recent Labs     04/09/21 0905    SODIUM 135   POTASSIUM 5.2   CHLORIDE 90*   CO2 15*   GLUCOSE 853*   BUN 37*   CREATININE 1.59*   CALCIUM 10.7*     Recent Labs     04/09/21  0905   ALTSGPT 30   ASTSGOT 17   ALKPHOSPHAT 117*   TBILIRUBIN 1.3   GLUCOSE 853*         No results for input(s): NTPROBNP in the last 72 hours.      No results for input(s): TROPONINT in the last 72 hours.    Imaging:  DX-CHEST-PORTABLE (1 VIEW)   Final Result      Mild interstitial prominence, unchanged.            Assessment/Plan:  I anticipate this patient will require at least two midnights for appropriate medical management, necessitating inpatient admission.    * Diabetic ketoacidosis without coma associated with type 2 diabetes mellitus (HCC)- (present on admission)  Assessment & Plan  She is very symptomatic with polyuria and polydipsia  Glucose in the ER is 853 with bicarb 15, anion gap of 30, and beta hydroxybuteric acid over 8.  Admit to the ICU on an IV insulin drip with aggressive IV fluids  Replace magnesium, potassium, and phosphorus   Hourly fingersticks and q4 hour BMPs  She has a history of diet-controlled diabetes with last HbA1c 7.3 on 11/20  She will now require insulin and RN's will work with her on how to check glucose levels and how to administer insulin. Diabetes education consulted  HbA1c ordered.    Hypertension- (present on admission)  Assessment & Plan  Continue amlodipine and Cozaar with holding parameters  Her BP is quite elevated in the ER    Chronic kidney disease (CKD), stage III (moderate)- (present on admission)  Assessment & Plan  Baseline Cr appears to be about 1.4 per records    TISH (acute kidney injury) (HCC)- (present on admission)  Assessment & Plan  Cr is up to 1.59 due to dehydration  IV fluids  Follow urine output  Follow serial BMPs    Hypernatremia- (present on admission)  Assessment & Plan  Sodium adjusted for glucose is 150 in the setting of dehydration     Hypothyroidism- (present on admission)  Assessment & Plan  Continue  synthroid    Cardiac pacemaker in situ- (present on admission)  Assessment & Plan  Hx of  Followed by Dr. Schultz    Hyperlipidemia- (present on admission)  Assessment & Plan  Continue Crestor

## 2021-04-09 NOTE — ED PROVIDER NOTES
ED Provider Note    CHIEF COMPLAINT  Chief Complaint   Patient presents with   • Weakness     generalized x months but worse the last 2 days.    • Blood Sugar Problem     BS: 412 PTA. c/o increased uriation, thirst/mouth dryness. hx of diabetes.       HPI  Roya Muniz is a 80 y.o. female who presents with generalized weakness and confusion.  The patient states has been ongoing over the last 2 weeks.  She says she is also been very thirsty and has been urinating frequently.  She states she did not know her blood sugars have been elevated.  She states she has had vomiting but denies abdominal pain.  She does not have any associated fevers.  She denies dysuria.    REVIEW OF SYSTEMS  See HPI for further details. All other systems are negative.     PAST MEDICAL HISTORY  Past Medical History:   Diagnosis Date   • History of DVT of lower extremity 6/5/2014   • Cardiac pacemaker in situ    • Diabetes (HCC)    • Hyperlipidemia    • Hypertension    • Pancreatic cyst    • Pancreatitis    • Sinus node dysfunction (HCC)    • Thyroid disease     hypothyroidism       FAMILY HISTORY  [unfilled]    SOCIAL HISTORY  Social History     Socioeconomic History   • Marital status:      Spouse name: Not on file   • Number of children: Not on file   • Years of education: Not on file   • Highest education level: Not on file   Occupational History   • Not on file   Tobacco Use   • Smoking status: Never Smoker   • Smokeless tobacco: Never Used   • Tobacco comment: avoid all tobacco products   Substance and Sexual Activity   • Alcohol use: Not Currently     Alcohol/week: 0.0 oz     Comment: occasionally   • Drug use: No   • Sexual activity: Yes     Partners: Male   Other Topics Concern   • Not on file   Social History Narrative    Worked as a , 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 Is , has a son in [place], one in [place] and one in [place]. She enjoys riding quads with her  and gardening. She  wears a helmet on her quad. No tob, drugs and only rare etoh.      Social Determinants of Health     Financial Resource Strain:    • Difficulty of Paying Living Expenses:    Food Insecurity: No Food Insecurity   • Worried About Running Out of Food in the Last Year: Never true   • Ran Out of Food in the Last Year: Never true   Transportation Needs: No Transportation Needs   • Lack of Transportation (Medical): No   • Lack of Transportation (Non-Medical): No   Physical Activity:    • Days of Exercise per Week:    • Minutes of Exercise per Session:    Stress:    • Feeling of Stress :    Social Connections:    • Frequency of Communication with Friends and Family:    • Frequency of Social Gatherings with Friends and Family:    • Attends Evangelical Services:    • Active Member of Clubs or Organizations:    • Attends Club or Organization Meetings:    • Marital Status:    Intimate Partner Violence:    • Fear of Current or Ex-Partner:    • Emotionally Abused:    • Physically Abused:    • Sexually Abused:        SURGICAL HISTORY  Past Surgical History:   Procedure Laterality Date   • GASTROSCOPY-ENDO  2/21/2014    Performed by Steve Islas Jr., M.D. at ENDOSCOPY [facility]   • GASTROSCOPY-ENDO  2/20/2014    Performed by Antonio Espinoza M.D. at ENDOSCOPY [facility]   • EXPLORATORY LAPAROTOMY  2/10/2014    Performed by Rigo Garcia M.D. at SURGERY [facility]   • CHOLECYSTECTOMY  2/10/2014    Performed by Rigo Garcia M.D. at SURGERY [facility]   • PANCREATECTOMY  2/10/2014    Performed by Rigo Garcia M.D. at SURGERY [facility]   • GASTROSTOMY FEEDING  2/10/2014    Performed by Rigo Garcia M.D. at SURGERY [facility]   • OTHER ORTHOPEDIC SURGERY      foot surgery       CURRENT MEDICATIONS  Home Medications     Reviewed by Dago Madrigal R.N. (Registered Nurse) on 04/09/21 at 0846  Med List Status: Partial   Medication Last Dose Status   amLODIPine  "(NORVASC) 10 MG Tab  Active   aspirin 81 MG tablet  Active   doxycycline (VIBRAMYCIN) 100 MG Tab  Active   levothyroxine (SYNTHROID) 112 MCG Tab  Active   losartan (COZAAR) 100 MG Tab  Active   rosuvastatin (CRESTOR) 10 MG Tab  Active   vitamin D (CHOLECALCIFEROL) 1000 UNIT Tab  Active                ALLERGIES  Allergies   Allergen Reactions   • Cephalexin [Keflex] Hives, Rash and Itching     Photosensitivity         PHYSICAL EXAM  VITAL SIGNS: BP (!) 184/82   Pulse 100   Temp 36.3 °C (97.4 °F) (Temporal)   Resp 19   Ht 1.626 m (5' 4\")   Wt 71.2 kg (157 lb)   SpO2 96%   BMI 26.95 kg/m²       Constitutional: In acute distress, Non-toxic appearance.   HENT: Normocephalic, Atraumatic, Bilateral external ears normal, Oropharynx dry, No oral exudates, Nose normal.   Eyes: PERRLA, EOMI, Conjunctiva normal, No discharge.   Neck: Normal range of motion, No tenderness, Supple, No stridor.   Lymphatic: No lymphadenopathy noted.   Cardiovascular: Tachycardic heart rate, Normal rhythm, No murmurs, No rubs, No gallops.   Thorax & Lungs: Normal breath sounds, No respiratory distress, No wheezing, No chest tenderness.   Abdomen: Bowel sounds normal, Soft, No tenderness, No masses, No pulsatile masses.   Skin: Warm, Dry, No erythema, No rash.   Back: No tenderness, No CVA tenderness.   Extremities: Intact distal pulses, No edema, No tenderness, No cyanosis, No clubbing.   Neurologic: Alert & oriented x 3, generalized weakness but no focal weakness, sensory exams intact, cranial nerves are intact  Psychiatric: Affect normal, Judgment normal, Mood normal.     RADIOLOGY/PROCEDURES  DX-CHEST-PORTABLE (1 VIEW)   Final Result      Mild interstitial prominence, unchanged.        Results for orders placed or performed during the hospital encounter of 04/09/21   CBC WITH DIFFERENTIAL   Result Value Ref Range    WBC 11.8 (H) 4.8 - 10.8 K/uL    RBC 5.35 4.20 - 5.40 M/uL    Hemoglobin 17.3 (H) 12.0 - 16.0 g/dL    Hematocrit 50.3 (H) " 37.0 - 47.0 %    MCV 94.0 81.4 - 97.8 fL    MCH 32.3 27.0 - 33.0 pg    MCHC 34.4 33.6 - 35.0 g/dL    RDW 42.7 35.9 - 50.0 fL    Platelet Count 136 (L) 164 - 446 K/uL    MPV 12.9 9.0 - 12.9 fL    Neutrophils-Polys 83.90 (H) 44.00 - 72.00 %    Lymphocytes 9.10 (L) 22.00 - 41.00 %    Monocytes 5.90 0.00 - 13.40 %    Eosinophils 0.00 0.00 - 6.90 %    Basophils 0.20 0.00 - 1.80 %    Immature Granulocytes 0.90 0.00 - 0.90 %    Nucleated RBC 0.00 /100 WBC    Neutrophils (Absolute) 9.87 (H) 2.00 - 7.15 K/uL    Lymphs (Absolute) 1.07 1.00 - 4.80 K/uL    Monos (Absolute) 0.70 0.00 - 0.85 K/uL    Eos (Absolute) 0.00 0.00 - 0.51 K/uL    Baso (Absolute) 0.02 0.00 - 0.12 K/uL    Immature Granulocytes (abs) 0.11 0.00 - 0.11 K/uL    NRBC (Absolute) 0.00 K/uL   COMP METABOLIC PANEL   Result Value Ref Range    Sodium 135 135 - 145 mmol/L    Potassium 5.2 3.6 - 5.5 mmol/L    Chloride 90 (L) 96 - 112 mmol/L    Co2 15 (L) 20 - 33 mmol/L    Anion Gap 30.0 (H) 7.0 - 16.0    Glucose 853 (HH) 65 - 99 mg/dL    Bun 37 (H) 8 - 22 mg/dL    Creatinine 1.59 (H) 0.50 - 1.40 mg/dL    Calcium 10.7 (H) 8.5 - 10.5 mg/dL    AST(SGOT) 17 12 - 45 U/L    ALT(SGPT) 30 2 - 50 U/L    Alkaline Phosphatase 117 (H) 30 - 99 U/L    Total Bilirubin 1.3 0.1 - 1.5 mg/dL    Albumin 4.8 3.2 - 4.9 g/dL    Total Protein 8.4 (H) 6.0 - 8.2 g/dL    Globulin 3.6 (H) 1.9 - 3.5 g/dL    A-G Ratio 1.3 g/dL   LACTIC ACID   Result Value Ref Range    Lactic Acid 3.2 (H) 0.5 - 2.0 mmol/L   LACTIC ACID   Result Value Ref Range    Lactic Acid 2.4 (H) 0.5 - 2.0 mmol/L   URINALYSIS    Specimen: Urine, Clean Catch   Result Value Ref Range    Micro Urine Req Microscopic    ESTIMATED GFR   Result Value Ref Range    GFR If  38 (A) >60 mL/min/1.73 m 2    GFR If Non  31 (A) >60 mL/min/1.73 m 2   VENOUS BLOOD GAS   Result Value Ref Range    Venous Bg Ph 7.31 7.31 - 7.45    Venous Bg Pco2 32.2 (L) 41.0 - 51.0 mmHg    Venous Bg Po2 41.8 (H) 25.0 - 40.0 mmHg     Venous Bg O2 Saturation 75.6 %    Venous Bg Hco3 16 (L) 24 - 28 mmol/L    Venous Bg Base Excess -9 mmol/L    Body Temp see below Centigrade   BETA-HYDROXYBUTYRIC ACID   Result Value Ref Range    beta-Hydroxybutyric Acid >8.00 (H) 0.02 - 0.27 mmol/L       COURSE & MEDICAL DECISION MAKING  Pertinent Labs & Imaging studies reviewed. (See chart for details)  This a 80-year-old female who presents to the emergency department with confusion.  Clinically she did appear dehydrated on arrival.  Laboratory analysis concerning for diabetic ketoacidosis.  The patient had a second large-bore IV placed in the right upper extremity initially ordered an insulin drip.  However after speak with the intensivist he recommended given her 2 L of fluid as well as a bolus of 10 units of regular insulin as we are waiting the beta hydroxybutyric acid.  This did come back greater than 8 and I suspect the patient will require the insulin drip as well as ICU admission.  Clinically do not appreciate any evidence of an infectious process.  Her urinalysis is pending but she does not have any urinary symptoms.  This will be followed up for the hospitalist.  The patient's condition is unchanged on repeat exam.  She is slightly hypertensive.  She will require ICU admission due to the DKA.  She is otherwise neurologically intact.    FINAL IMPRESSION  1.  Diabetic ketoacidosis  2.  Critical care time 30 minutes           Electronically signed by: Olegario Day M.D., 4/9/2021 8:52 AM

## 2021-04-10 ENCOUNTER — APPOINTMENT (OUTPATIENT)
Dept: RADIOLOGY | Facility: MEDICAL CENTER | Age: 81
DRG: 638 | End: 2021-04-10
Attending: HOSPITALIST
Payer: MEDICARE

## 2021-04-10 ENCOUNTER — APPOINTMENT (OUTPATIENT)
Dept: RADIOLOGY | Facility: MEDICAL CENTER | Age: 81
DRG: 638 | End: 2021-04-10
Attending: INTERNAL MEDICINE
Payer: MEDICARE

## 2021-04-10 PROBLEM — D72.829 LEUKOCYTOSIS: Status: ACTIVE | Noted: 2021-04-10

## 2021-04-10 LAB
ANION GAP SERPL CALC-SCNC: 12 MMOL/L (ref 7–16)
ANION GAP SERPL CALC-SCNC: 12 MMOL/L (ref 7–16)
ANION GAP SERPL CALC-SCNC: 13 MMOL/L (ref 7–16)
ANION GAP SERPL CALC-SCNC: 17 MMOL/L (ref 7–16)
BASOPHILS # BLD AUTO: 0.2 % (ref 0–1.8)
BASOPHILS # BLD AUTO: 0.2 % (ref 0–1.8)
BASOPHILS # BLD: 0.03 K/UL (ref 0–0.12)
BASOPHILS # BLD: 0.03 K/UL (ref 0–0.12)
BUN SERPL-MCNC: 17 MG/DL (ref 8–22)
BUN SERPL-MCNC: 23 MG/DL (ref 8–22)
BUN SERPL-MCNC: 24 MG/DL (ref 8–22)
BUN SERPL-MCNC: 24 MG/DL (ref 8–22)
CALCIUM SERPL-MCNC: 8.2 MG/DL (ref 8.5–10.5)
CALCIUM SERPL-MCNC: 8.5 MG/DL (ref 8.5–10.5)
CALCIUM SERPL-MCNC: 8.6 MG/DL (ref 8.5–10.5)
CALCIUM SERPL-MCNC: 8.7 MG/DL (ref 8.5–10.5)
CHLORIDE SERPL-SCNC: 103 MMOL/L (ref 96–112)
CHLORIDE SERPL-SCNC: 103 MMOL/L (ref 96–112)
CHLORIDE SERPL-SCNC: 105 MMOL/L (ref 96–112)
CHLORIDE SERPL-SCNC: 113 MMOL/L (ref 96–112)
CO2 SERPL-SCNC: 17 MMOL/L (ref 20–33)
CO2 SERPL-SCNC: 19 MMOL/L (ref 20–33)
CO2 SERPL-SCNC: 21 MMOL/L (ref 20–33)
CO2 SERPL-SCNC: 25 MMOL/L (ref 20–33)
CREAT SERPL-MCNC: 0.9 MG/DL (ref 0.5–1.4)
CREAT SERPL-MCNC: 0.97 MG/DL (ref 0.5–1.4)
CREAT SERPL-MCNC: 0.98 MG/DL (ref 0.5–1.4)
CREAT SERPL-MCNC: 0.99 MG/DL (ref 0.5–1.4)
CRP SERPL HS-MCNC: <0.3 MG/DL (ref 0–0.75)
EKG IMPRESSION: NORMAL
EOSINOPHIL # BLD AUTO: 0 K/UL (ref 0–0.51)
EOSINOPHIL # BLD AUTO: 0.02 K/UL (ref 0–0.51)
EOSINOPHIL NFR BLD: 0 % (ref 0–6.9)
EOSINOPHIL NFR BLD: 0.1 % (ref 0–6.9)
ERYTHROCYTE [DISTWIDTH] IN BLOOD BY AUTOMATED COUNT: 44.2 FL (ref 35.9–50)
ERYTHROCYTE [DISTWIDTH] IN BLOOD BY AUTOMATED COUNT: 44.5 FL (ref 35.9–50)
ERYTHROCYTE [SEDIMENTATION RATE] IN BLOOD BY WESTERGREN METHOD: 4 MM/HOUR (ref 0–30)
GLUCOSE BLD-MCNC: 135 MG/DL (ref 65–99)
GLUCOSE BLD-MCNC: 138 MG/DL (ref 65–99)
GLUCOSE BLD-MCNC: 145 MG/DL (ref 65–99)
GLUCOSE BLD-MCNC: 167 MG/DL (ref 65–99)
GLUCOSE BLD-MCNC: 168 MG/DL (ref 65–99)
GLUCOSE BLD-MCNC: 214 MG/DL (ref 65–99)
GLUCOSE BLD-MCNC: 260 MG/DL (ref 65–99)
GLUCOSE BLD-MCNC: 266 MG/DL (ref 65–99)
GLUCOSE BLD-MCNC: 329 MG/DL (ref 65–99)
GLUCOSE BLD-MCNC: 338 MG/DL (ref 65–99)
GLUCOSE BLD-MCNC: 96 MG/DL (ref 65–99)
GLUCOSE SERPL-MCNC: 135 MG/DL (ref 65–99)
GLUCOSE SERPL-MCNC: 256 MG/DL (ref 65–99)
GLUCOSE SERPL-MCNC: 318 MG/DL (ref 65–99)
GLUCOSE SERPL-MCNC: 364 MG/DL (ref 65–99)
HCT VFR BLD AUTO: 40.3 % (ref 37–47)
HCT VFR BLD AUTO: 44.7 % (ref 37–47)
HGB BLD-MCNC: 14 G/DL (ref 12–16)
HGB BLD-MCNC: 15.1 G/DL (ref 12–16)
IMM GRANULOCYTES # BLD AUTO: 0.09 K/UL (ref 0–0.11)
IMM GRANULOCYTES # BLD AUTO: 0.11 K/UL (ref 0–0.11)
IMM GRANULOCYTES NFR BLD AUTO: 0.5 % (ref 0–0.9)
IMM GRANULOCYTES NFR BLD AUTO: 0.6 % (ref 0–0.9)
LIPASE SERPL-CCNC: 64 U/L (ref 11–82)
LYMPHOCYTES # BLD AUTO: 1.34 K/UL (ref 1–4.8)
LYMPHOCYTES # BLD AUTO: 1.73 K/UL (ref 1–4.8)
LYMPHOCYTES NFR BLD: 7.1 % (ref 22–41)
LYMPHOCYTES NFR BLD: 9.9 % (ref 22–41)
MAGNESIUM SERPL-MCNC: 1.7 MG/DL (ref 1.5–2.5)
MCH RBC QN AUTO: 31.8 PG (ref 27–33)
MCH RBC QN AUTO: 32.4 PG (ref 27–33)
MCHC RBC AUTO-ENTMCNC: 33.8 G/DL (ref 33.6–35)
MCHC RBC AUTO-ENTMCNC: 34.4 G/DL (ref 33.6–35)
MCV RBC AUTO: 94.1 FL (ref 81.4–97.8)
MCV RBC AUTO: 94.1 FL (ref 81.4–97.8)
MONOCYTES # BLD AUTO: 1.07 K/UL (ref 0–0.85)
MONOCYTES # BLD AUTO: 1.75 K/UL (ref 0–0.85)
MONOCYTES NFR BLD AUTO: 6.1 % (ref 0–13.4)
MONOCYTES NFR BLD AUTO: 9.2 % (ref 0–13.4)
NEUTROPHILS # BLD AUTO: 14.64 K/UL (ref 2–7.15)
NEUTROPHILS # BLD AUTO: 15.69 K/UL (ref 2–7.15)
NEUTROPHILS NFR BLD: 82.8 % (ref 44–72)
NEUTROPHILS NFR BLD: 83.3 % (ref 44–72)
NRBC # BLD AUTO: 0 K/UL
NRBC # BLD AUTO: 0 K/UL
NRBC BLD-RTO: 0 /100 WBC
NRBC BLD-RTO: 0 /100 WBC
PHOSPHATE SERPL-MCNC: 3.1 MG/DL (ref 2.5–4.5)
PLATELET # BLD AUTO: 172 K/UL (ref 164–446)
PLATELET # BLD AUTO: 178 K/UL (ref 164–446)
PMV BLD AUTO: 11.4 FL (ref 9–12.9)
PMV BLD AUTO: 11.6 FL (ref 9–12.9)
POTASSIUM SERPL-SCNC: 3.4 MMOL/L (ref 3.6–5.5)
POTASSIUM SERPL-SCNC: 3.9 MMOL/L (ref 3.6–5.5)
POTASSIUM SERPL-SCNC: 3.9 MMOL/L (ref 3.6–5.5)
POTASSIUM SERPL-SCNC: 4.2 MMOL/L (ref 3.6–5.5)
RBC # BLD AUTO: 4.26 M/UL (ref 4.2–5.4)
RBC # BLD AUTO: 4.75 M/UL (ref 4.2–5.4)
SODIUM SERPL-SCNC: 137 MMOL/L (ref 135–145)
SODIUM SERPL-SCNC: 139 MMOL/L (ref 135–145)
SODIUM SERPL-SCNC: 140 MMOL/L (ref 135–145)
SODIUM SERPL-SCNC: 144 MMOL/L (ref 135–145)
TROPONIN T SERPL-MCNC: 14 NG/L (ref 6–19)
WBC # BLD AUTO: 17.6 K/UL (ref 4.8–10.8)
WBC # BLD AUTO: 18.9 K/UL (ref 4.8–10.8)

## 2021-04-10 PROCEDURE — 700102 HCHG RX REV CODE 250 W/ 637 OVERRIDE(OP): Performed by: INTERNAL MEDICINE

## 2021-04-10 PROCEDURE — 770020 HCHG ROOM/CARE - TELE (206)

## 2021-04-10 PROCEDURE — 76705 ECHO EXAM OF ABDOMEN: CPT

## 2021-04-10 PROCEDURE — 700111 HCHG RX REV CODE 636 W/ 250 OVERRIDE (IP): Performed by: STUDENT IN AN ORGANIZED HEALTH CARE EDUCATION/TRAINING PROGRAM

## 2021-04-10 PROCEDURE — 93005 ELECTROCARDIOGRAM TRACING: CPT | Performed by: INTERNAL MEDICINE

## 2021-04-10 PROCEDURE — 84681 ASSAY OF C-PEPTIDE: CPT

## 2021-04-10 PROCEDURE — 700105 HCHG RX REV CODE 258: Performed by: INTERNAL MEDICINE

## 2021-04-10 PROCEDURE — 86341 ISLET CELL ANTIBODY: CPT | Mod: 91

## 2021-04-10 PROCEDURE — 99233 SBSQ HOSP IP/OBS HIGH 50: CPT | Mod: GC | Performed by: PSYCHIATRY & NEUROLOGY

## 2021-04-10 PROCEDURE — 700111 HCHG RX REV CODE 636 W/ 250 OVERRIDE (IP): Performed by: INTERNAL MEDICINE

## 2021-04-10 PROCEDURE — 71045 X-RAY EXAM CHEST 1 VIEW: CPT

## 2021-04-10 PROCEDURE — 99233 SBSQ HOSP IP/OBS HIGH 50: CPT | Performed by: HOSPITALIST

## 2021-04-10 PROCEDURE — 84100 ASSAY OF PHOSPHORUS: CPT

## 2021-04-10 PROCEDURE — 85025 COMPLETE CBC W/AUTO DIFF WBC: CPT

## 2021-04-10 PROCEDURE — 93010 ELECTROCARDIOGRAM REPORT: CPT | Performed by: INTERNAL MEDICINE

## 2021-04-10 PROCEDURE — 86140 C-REACTIVE PROTEIN: CPT

## 2021-04-10 PROCEDURE — 700105 HCHG RX REV CODE 258: Performed by: STUDENT IN AN ORGANIZED HEALTH CARE EDUCATION/TRAINING PROGRAM

## 2021-04-10 PROCEDURE — 82962 GLUCOSE BLOOD TEST: CPT

## 2021-04-10 PROCEDURE — 700111 HCHG RX REV CODE 636 W/ 250 OVERRIDE (IP)

## 2021-04-10 PROCEDURE — 700111 HCHG RX REV CODE 636 W/ 250 OVERRIDE (IP): Performed by: HOSPITALIST

## 2021-04-10 PROCEDURE — A9270 NON-COVERED ITEM OR SERVICE: HCPCS | Performed by: HOSPITALIST

## 2021-04-10 PROCEDURE — 83735 ASSAY OF MAGNESIUM: CPT

## 2021-04-10 PROCEDURE — 86337 INSULIN ANTIBODIES: CPT

## 2021-04-10 PROCEDURE — 700101 HCHG RX REV CODE 250: Performed by: INTERNAL MEDICINE

## 2021-04-10 PROCEDURE — 74018 RADEX ABDOMEN 1 VIEW: CPT

## 2021-04-10 PROCEDURE — 83690 ASSAY OF LIPASE: CPT

## 2021-04-10 PROCEDURE — 85652 RBC SED RATE AUTOMATED: CPT

## 2021-04-10 PROCEDURE — 84484 ASSAY OF TROPONIN QUANT: CPT

## 2021-04-10 PROCEDURE — 36415 COLL VENOUS BLD VENIPUNCTURE: CPT

## 2021-04-10 PROCEDURE — 700102 HCHG RX REV CODE 250 W/ 637 OVERRIDE(OP): Performed by: STUDENT IN AN ORGANIZED HEALTH CARE EDUCATION/TRAINING PROGRAM

## 2021-04-10 PROCEDURE — 80048 BASIC METABOLIC PNL TOTAL CA: CPT

## 2021-04-10 PROCEDURE — A9270 NON-COVERED ITEM OR SERVICE: HCPCS | Performed by: INTERNAL MEDICINE

## 2021-04-10 PROCEDURE — 700102 HCHG RX REV CODE 250 W/ 637 OVERRIDE(OP): Performed by: HOSPITALIST

## 2021-04-10 PROCEDURE — 83036 HEMOGLOBIN GLYCOSYLATED A1C: CPT

## 2021-04-10 RX ORDER — INSULIN GLARGINE 100 [IU]/ML
5 INJECTION, SOLUTION SUBCUTANEOUS ONCE
Status: COMPLETED | OUTPATIENT
Start: 2021-04-10 | End: 2021-04-10

## 2021-04-10 RX ORDER — SODIUM CHLORIDE, SODIUM LACTATE, POTASSIUM CHLORIDE, CALCIUM CHLORIDE 600; 310; 30; 20 MG/100ML; MG/100ML; MG/100ML; MG/100ML
3000 INJECTION, SOLUTION INTRAVENOUS ONCE
Status: COMPLETED | OUTPATIENT
Start: 2021-04-10 | End: 2021-04-10

## 2021-04-10 RX ORDER — INSULIN GLARGINE 100 [IU]/ML
20 INJECTION, SOLUTION SUBCUTANEOUS EVERY EVENING
Status: DISCONTINUED | OUTPATIENT
Start: 2021-04-10 | End: 2021-04-12 | Stop reason: HOSPADM

## 2021-04-10 RX ORDER — SODIUM CHLORIDE, SODIUM LACTATE, POTASSIUM CHLORIDE, CALCIUM CHLORIDE 600; 310; 30; 20 MG/100ML; MG/100ML; MG/100ML; MG/100ML
2000 INJECTION, SOLUTION INTRAVENOUS ONCE
Status: DISCONTINUED | OUTPATIENT
Start: 2021-04-10 | End: 2021-04-10

## 2021-04-10 RX ORDER — MORPHINE SULFATE 4 MG/ML
2-4 INJECTION, SOLUTION INTRAMUSCULAR; INTRAVENOUS
Status: DISCONTINUED | OUTPATIENT
Start: 2021-04-10 | End: 2021-04-12 | Stop reason: HOSPADM

## 2021-04-10 RX ORDER — MORPHINE SULFATE 4 MG/ML
INJECTION, SOLUTION INTRAMUSCULAR; INTRAVENOUS
Status: COMPLETED
Start: 2021-04-10 | End: 2021-04-10

## 2021-04-10 RX ORDER — DEXTROSE MONOHYDRATE 25 G/50ML
50 INJECTION, SOLUTION INTRAVENOUS
Status: DISCONTINUED | OUTPATIENT
Start: 2021-04-10 | End: 2021-04-12 | Stop reason: HOSPADM

## 2021-04-10 RX ORDER — INSULIN GLARGINE 100 [IU]/ML
15 INJECTION, SOLUTION SUBCUTANEOUS EVERY EVENING
Status: DISCONTINUED | OUTPATIENT
Start: 2021-04-10 | End: 2021-04-10

## 2021-04-10 RX ADMIN — SODIUM PHOSPHATE, MONOBASIC, MONOHYDRATE AND SODIUM PHOSPHATE, DIBASIC, ANHYDROUS 30 MMOL: 276; 142 INJECTION, SOLUTION INTRAVENOUS at 00:02

## 2021-04-10 RX ADMIN — LIDOCAINE HYDROCHLORIDE 30 ML: 20 SOLUTION OROPHARYNGEAL at 03:33

## 2021-04-10 RX ADMIN — MAGNESIUM SULFATE HEPTAHYDRATE 3 G: 500 INJECTION, SOLUTION INTRAMUSCULAR; INTRAVENOUS at 12:38

## 2021-04-10 RX ADMIN — SODIUM CHLORIDE, POTASSIUM CHLORIDE, SODIUM LACTATE AND CALCIUM CHLORIDE 1000 ML: 600; 310; 30; 20 INJECTION, SOLUTION INTRAVENOUS at 01:16

## 2021-04-10 RX ADMIN — SODIUM CHLORIDE, POTASSIUM CHLORIDE, SODIUM LACTATE AND CALCIUM CHLORIDE: 600; 310; 30; 20 INJECTION, SOLUTION INTRAVENOUS at 02:48

## 2021-04-10 RX ADMIN — MORPHINE SULFATE 2 MG: 4 INJECTION INTRAVENOUS at 03:54

## 2021-04-10 RX ADMIN — INSULIN GLARGINE 5 UNITS: 100 INJECTION, SOLUTION SUBCUTANEOUS at 04:55

## 2021-04-10 RX ADMIN — INSULIN LISPRO 3 UNITS: 100 INJECTION, SOLUTION INTRAVENOUS; SUBCUTANEOUS at 21:41

## 2021-04-10 RX ADMIN — ENOXAPARIN SODIUM 40 MG: 40 INJECTION SUBCUTANEOUS at 05:28

## 2021-04-10 RX ADMIN — LEVOTHYROXINE SODIUM 112 MCG: 0.11 TABLET ORAL at 05:18

## 2021-04-10 RX ADMIN — LOSARTAN POTASSIUM 100 MG: 50 TABLET, FILM COATED ORAL at 05:18

## 2021-04-10 RX ADMIN — Medication 81 MG: at 05:18

## 2021-04-10 RX ADMIN — ROSUVASTATIN CALCIUM 10 MG: 10 TABLET, FILM COATED ORAL at 18:13

## 2021-04-10 RX ADMIN — DOCUSATE SODIUM 50 MG AND SENNOSIDES 8.6 MG 2 TABLET: 8.6; 5 TABLET, FILM COATED ORAL at 18:13

## 2021-04-10 RX ADMIN — SODIUM CHLORIDE, POTASSIUM CHLORIDE, SODIUM LACTATE AND CALCIUM CHLORIDE 3000 ML: 600; 310; 30; 20 INJECTION, SOLUTION INTRAVENOUS at 05:17

## 2021-04-10 RX ADMIN — AMLODIPINE BESYLATE 10 MG: 10 TABLET ORAL at 05:18

## 2021-04-10 RX ADMIN — MORPHINE SULFATE 2 MG: 4 INJECTION, SOLUTION INTRAMUSCULAR; INTRAVENOUS at 03:54

## 2021-04-10 RX ADMIN — INSULIN LISPRO 5 UNITS: 100 INJECTION, SOLUTION INTRAVENOUS; SUBCUTANEOUS at 18:13

## 2021-04-10 RX ADMIN — INSULIN LISPRO 7 UNITS: 100 INJECTION, SOLUTION INTRAVENOUS; SUBCUTANEOUS at 18:14

## 2021-04-10 RX ADMIN — INSULIN GLARGINE 20 UNITS: 100 INJECTION, SOLUTION SUBCUTANEOUS at 18:12

## 2021-04-10 RX ADMIN — ONDANSETRON 4 MG: 2 INJECTION INTRAMUSCULAR; INTRAVENOUS at 02:11

## 2021-04-10 RX ADMIN — INSULIN LISPRO 5 UNITS: 100 INJECTION, SOLUTION INTRAVENOUS; SUBCUTANEOUS at 11:16

## 2021-04-10 RX ADMIN — POTASSIUM CHLORIDE 10 MEQ: 7.46 INJECTION, SOLUTION INTRAVENOUS at 00:45

## 2021-04-10 RX ADMIN — INSULIN LISPRO 7 UNITS: 100 INJECTION, SOLUTION INTRAVENOUS; SUBCUTANEOUS at 11:16

## 2021-04-10 RX ADMIN — ACETAMINOPHEN 650 MG: 325 TABLET, FILM COATED ORAL at 02:47

## 2021-04-10 ASSESSMENT — COGNITIVE AND FUNCTIONAL STATUS - GENERAL
HELP NEEDED FOR BATHING: A LITTLE
DAILY ACTIVITIY SCORE: 22
MOVING FROM LYING ON BACK TO SITTING ON SIDE OF FLAT BED: A LITTLE
MOBILITY SCORE: 21
SUGGESTED CMS G CODE MODIFIER DAILY ACTIVITY: CJ
CLIMB 3 TO 5 STEPS WITH RAILING: A LITTLE
TOILETING: A LITTLE
WALKING IN HOSPITAL ROOM: A LITTLE
SUGGESTED CMS G CODE MODIFIER MOBILITY: CJ

## 2021-04-10 ASSESSMENT — ENCOUNTER SYMPTOMS
FEVER: 0
ORTHOPNEA: 0
BLURRED VISION: 0
DIZZINESS: 0
FOCAL WEAKNESS: 0
COUGH: 0
SENSORY CHANGE: 0
DEPRESSION: 0
STRIDOR: 0
SHORTNESS OF BREATH: 0
SPEECH CHANGE: 0
DOUBLE VISION: 0
CHILLS: 0
SORE THROAT: 0
DIARRHEA: 1
TINGLING: 0
BACK PAIN: 0
NECK PAIN: 0
ABDOMINAL PAIN: 0
HEADACHES: 0
PALPITATIONS: 0
WEIGHT LOSS: 1
ABDOMINAL PAIN: 1
NAUSEA: 0
VOMITING: 0
NERVOUS/ANXIOUS: 0

## 2021-04-10 ASSESSMENT — PAIN DESCRIPTION - PAIN TYPE
TYPE: ACUTE PAIN

## 2021-04-10 ASSESSMENT — FIBROSIS 4 INDEX
FIB4 SCORE: 1.36
FIB4 SCORE: 1.41

## 2021-04-10 NOTE — PROGRESS NOTES
Late Entry:    0245 Patient complaining of generalized LUQ pain. Tylenol administered with no relief.     03:20 Notified Dr. Mariscal. GI cocktail ordered.    03:45 Patient vomited GI cocktail d/t bad taste. Dr. Mariscal updated. Morphine 2-4 mg IV Q3H PRN severe pain, STAT EKG, STAT trop, and lipase. Orders read back.     04:15 Pain relieved with Morphine (3/10). Dr. Mariscal notified of BMP results, trop result, and lipase.    04:40 Dr. Mariscal at bedside.

## 2021-04-10 NOTE — PROGRESS NOTES
[facility] ICU Progress Note      Admit Date: 4/9/2021    Resident(s): Radha Putnam M.D.  Attending: [facility]/ Dr. Benson    Date & Time:   4/10/2021   11:44 AM       Patient ID:    Name:             Roya Muniz   YOB: 1940  Age:                 80 y.o.  female   MRN:               [id number]    HPI:  80-year-old female with past medical history of hypertension, diet-controlled type 2 diabetes mellitus (last A1c 7.3 on 11/20/2020), stage III CKD, prior permanent pacemaker placement, dyslipidemia and hypothyroidism.  She presented to the emergency department on 4/9/2021 with complaint of weakness, nausea and vomiting.  Her vision has been blurry for about 2 weeks.  She has no fever, chills, sweats, myalgias, arthralgias, sore throat, angina, palpitations, syncope, cough, sputum production, wheezing, hemoptysis, shortness of breath, abdominal pain, diarrhea, dysuria, urgency, frequency, hematuria or flank pain.  Laboratory evaluation reveals DKA and she is admitted to the ICU.      Consultants:  ICU:     Interval Events:  -Overnight, patient's gap closed and was transitioned to subQ insulin however she did not tolerate her meals and started having abdominal pain and repeat labs showed elevated anion gap.  Patient received fluids.  Normal troponin.  No EKG changes for ischemia.  -Patient feeling better this morning, abdominal pain improved  -Leukocytosis improving   -Patient is stable to be transferred out of ICU. She will need diabetic education .  Prior to discharge    Review of Systems   Constitutional: Negative for chills and fever.   HENT: Negative for congestion and sore throat.    Eyes: Negative for blurred vision and double vision.   Respiratory: Negative for cough and shortness of breath.    Cardiovascular: Negative for chest pain, palpitations and orthopnea.   Gastrointestinal: Positive for abdominal pain. Negative for nausea and vomiting.   Genitourinary: Negative for dysuria and  "urgency.   Musculoskeletal: Negative for back pain and neck pain.   Skin: Negative for rash.   Neurological: Negative for tingling, focal weakness and headaches.   Psychiatric/Behavioral: Negative for depression. The patient is not nervous/anxious.        PHYSICAL EXAM    Vitals:    04/10/21 0600 04/10/21 0700 04/10/21 0800 04/10/21 0900   BP: 159/76 156/72 140/83 139/66   Pulse: 81 83 85 84   Resp: 18 17 20 (!) 21   Temp: 36.4 °C (97.5 °F)  36.7 °C (98 °F)    TempSrc: Temporal  Temporal    SpO2: 94% 93% 96% 95%   Weight:       Height:         Body mass index is 26.45 kg/m².  /66   Pulse 84   Temp 36.7 °C (98 °F) (Temporal)   Resp (!) 21   Ht 1.626 m (5' 4\")   Wt 69.9 kg (154 lb 1.6 oz)   SpO2 95%   BMI 26.45 kg/m²   O2 therapy: Pulse Oximetry: 95 %, O2 (LPM): 2, O2 Delivery Device: Silicone Nasal Cannula    Physical Exam   Constitutional: She is oriented to person, place, and time and well-developed, well-nourished, and in no distress.   HENT:   Head: Normocephalic and atraumatic.   Eyes: Pupils are equal, round, and reactive to light. EOM are normal.   Neck: No thyromegaly present.   Cardiovascular: Normal rate and regular rhythm.   Murmur (systolic 43/6 right upper sternal border, no radiation) heard.  Pulmonary/Chest: Effort normal and breath sounds normal. No respiratory distress. She has no wheezes.   Abdominal: Soft. Bowel sounds are normal. She exhibits no distension. There is no abdominal tenderness.   Musculoskeletal:         General: No edema. Normal range of motion.      Cervical back: Normal range of motion and neck supple.   Neurological: She is alert and oriented to person, place, and time. She has normal strength.   Skin: Skin is warm and dry.   Psychiatric: Mood and affect normal.       Respiratory:     Respiration: (!) 21, Pulse Oximetry: 95 %    HemoDynamics:  Pulse: 84 Blood Pressure : 139/66      Fluids:  Date 04/10/21 0700 - 04/11/21 0659   Shift 2450-3471 4728-4082 5975-0507 24 " Hour Total   INTAKE   I.V. 1199   1199     Volume (mL) (lactated ringers infusion) 0   0     Volume (mL) (lactated ringers infusion) 200   200     Volume (mL) (lactated ringers infusion) 999   999   Shift Total 1199   1199   OUTPUT   Urine 1200   1200     Output (mL) (External Urinary Catheter (Female Wick)) 1200   1200   Shift Total 1200   1200   NET -1   -1        Intake/Output Summary (Last 24 hours) at 4/10/2021 1142  Last data filed at 4/10/2021 0800  Gross per 24 hour   Intake 6803.69 ml   Output 3850 ml   Net 2953.69 ml       Weight: 69.9 kg (154 lb 1.6 oz)  Body mass index is 26.45 kg/m².    Recent Labs     21  1027 21  1254 21  1725 21  2140 04/10/21  0215 04/10/21  0355 04/10/21  0825   SODIUM  --    < > 143   < > 144 139 137   POTASSIUM  --    < > 3.8   < > 3.9 4.2 3.9   CHLORIDE  --    < > 110   < > 113* 105 103   CO2  --    < > 23   < > 19* 17* 21   BUN  --    < > 30*   < > 24* 24* 23*   CREATININE  --    < > 1.37   < > 0.97 0.98 0.90   MAGNESIUM 2.8*  --  2.3  --   --   --  1.7   PHOSPHORUS 6.6*  --  1.7*  --   --   --  3.1   CALCIUM  --    < > 9.3   < > 8.2* 8.5 8.6    < > = values in this interval not displayed.       GI/Nutrition:  Recent Labs     21  0905 21  1254 04/10/21  0215 04/10/21  0355 04/10/21  0825   ALTSGPT 30  --   --   --   --    ASTSGOT 17  --   --   --   --    ALKPHOSPHAT 117*  --   --   --   --    TBILIRUBIN 1.3  --   --   --   --    LIPASE  --   --   --  64  --    GLUCOSE 853*   < > 135* 318* 364*    < > = values in this interval not displayed.       Heme:  Recent Labs     04/09/21  0905 04/10/21  0355 04/10/21  0825   RBC 5.35 4.26 4.75   HEMOGLOBIN 17.3* 14.0 15.1   HEMATOCRIT 50.3* 40.3 44.7   PLATELETCT 136* 172 178       Infectious Disease:  Temp  Av.5 °C (97.7 °F)  Min: 36.4 °C (97.5 °F)  Max: 36.7 °C (98 °F)  Recent Labs     04/09/21  0905 04/10/21  0355 04/10/21  0825   WBC 11.8* 18.9* 17.6*   NEUTSPOLYS 83.90* 82.80* 83.30*    LYMPHOCYTES 9.10* 7.10* 9.90*   MONOCYTES 5.90 9.20 6.10   EOSINOPHILS 0.00 0.10 0.00   BASOPHILS 0.20 0.20 0.20   ASTSGOT 17  --   --    ALTSGPT 30  --   --    ALKPHOSPHAT 117*  --   --    TBILIRUBIN 1.3  --   --        Meds:  • morphine injection  2-4 mg     • insulin glargine  20 Units      And   • insulin lispro  0.2 Units/kg/day      And   • insulin lispro  3-14 Units      And   • glucose  16 g      And   • dextrose 50%  50 mL     • magnesium sulfate  3 g     • acetaminophen  650 mg     • enalaprilat  1.25 mg     • labetalol  10 mg     • ondansetron  4 mg     • ondansetron  4 mg     • amLODIPine  10 mg     • aspirin EC  81 mg     • levothyroxine  112 mcg     • losartan  100 mg     • rosuvastatin  10 mg     • senna-docusate  2 tablet      And   • polyethylene glycol/lytes  1 Packet      And   • magnesium hydroxide  30 mL      And   • bisacodyl  10 mg     • enoxaparin  40 mg     • LR   100 mL/hr at 04/10/21 0248        Procedures:  None    Imaging:  [id number]   Final Result         No specific finding to suggest small bowel obstruction.      DX-CHEST-LIMITED (1 VIEW)   Final Result         Minimal left basilar opacities, atelectasis or infection.      DX-CHEST-PORTABLE (1 VIEW)   Final Result      Mild interstitial prominence, unchanged.          Problem and Plan:    * Diabetic ketoacidosis without coma associated with type 2 diabetes mellitus (HCC)- (present on admission)  Assessment & Plan  -s/p IV fluid resuscitation and IV insulin drip  -A1c 18 (last A1c 4 months ago was 7)  -Transition to basal bolus insulin  -diabetic diet  -Accu-Chek/hypoglycemia protocol  -Replete potassium, phosphorus and magnesium based upon electrolyte determinations  -Diabetic education prior to discharge  -Conside rechecking A1c    Leukocytosis  Assessment & Plan  - Most likely reactive  - No evidence of infection  - Continue to monitor    Hypertension- (present on admission)  Assessment & Plan  -Continue amlodipine, 10 mg  daily  -Continue losartan, 100 mg daily  -Goal SBP less than 160    Chronic kidney disease (CKD), stage III (moderate)- (present on admission)  Assessment & Plan  -monitor renal function and urine output  -Avoid nephrotoxins and renal dose medications    TISH (acute kidney injury) (HCC)- (present on admission)  Assessment & Plan  -Resolved  Continue to monitor-    Hypothyroidism- (present on admission)  Assessment & Plan  -Continue levothyroxine    Hyperlipidemia- (present on admission)  Assessment & Plan  -Continue statin      DISPO: Transfer to medical floor    CODE STATUS: Full    Quality Measures:  Hurtado Catheter:   None  DVT Prophylaxis:   Lovenox  Ulcer Prophylaxis:   None  Antibiotics:   None  Lines:   PIV

## 2021-04-10 NOTE — PROGRESS NOTES
Pt BG decreased from 439 by 121 to 318. RN decreased rate by 2 units per the DKA protocol. MD Kimble notified per protocol. No changes to orders.

## 2021-04-10 NOTE — CARE PLAN
Problem: Communication  Goal: The ability to communicate needs accurately and effectively will improve  Outcome: PROGRESSING AS EXPECTED     Problem: Safety  Goal: Will remain free from injury  Outcome: PROGRESSING AS EXPECTED     Problem: Infection  Goal: Will remain free from infection  Outcome: PROGRESSING AS EXPECTED     Problem: Knowledge Deficit  Goal: Knowledge of disease process/condition, treatment plan, diagnostic tests, and medications will improve  Outcome: PROGRESSING AS EXPECTED     Problem: Knowledge Deficit  Goal: Knowledge of the prescribed therapeutic regimen will improve  Outcome: PROGRESSING AS EXPECTED     Problem: Fluid Volume:  Goal: Will maintain balanced intake and output  Outcome: PROGRESSING AS EXPECTED

## 2021-04-10 NOTE — PROGRESS NOTES
Critical care progress note:  Patient transitioned overnight, tolerated.  However, she did have left upper abdominal pain. Lipase and troponin negative. EKG no ischemic changes. Chest xray and KUB ordered given ongoing fluid resuscitation as ag re-opened.  Initially given 10 u lantus, change to 15 u lantus and given additional 5 u lantus this morning.  Given 1 L LR bolus with transition.  Giving additional 3 L IVF bolus now and repeat bmp, mg, ph at 8:00 am (after fluids). If ag remains worsening then will need to be back on insulin drip vs continued fluid resuscitation.  If poor toleration of meals then will need insulin drip.  She does not have significant pain on palpation, likely from DKA and residual gastroparesis.  Not hypoxic and tolerating fluids

## 2021-04-10 NOTE — PROGRESS NOTES
Hospital Medicine Daily Progress Note    Date of Service  4/10/2021    Chief Complaint  Weakness and elevated blood sugar    Hospital Course  80 y.o. female with diet controlled diabetes with polyuria, polydypsia over prior 2 weeks.  She was admitted 4/9/2021 with dizziness and found to have diabetic ketoacidosis.  SHe was admitted to ICU and initiated on IV fluids and Insulin drip with improvement of anion gap acidosis.  She was transitioned to lantus.  She will need diabetic education.Of interest her A1c in 11/2020 was 7.4 and now 4/9 A1c:18    Interval Problem Update  4/10: Alert with clear speech.  Still much thirst but feels better.  No dizziness but states she has yet to get out of bed.  We discussed her need to have diabetes education, further evaluation by her primary on her pancreas/diabetes.      Consultants/Specialty  Intensivist signing off 4/10    Code Status  Full Code    Disposition  Transfer to medical    Review of Systems  Review of Systems   Constitutional: Positive for weight loss (5 pounds). Negative for fever.   HENT: Negative for sore throat.    Eyes: Negative for blurred vision.   Respiratory: Negative for cough, shortness of breath and stridor.    Cardiovascular: Negative for palpitations and leg swelling.   Gastrointestinal: Positive for diarrhea. Negative for abdominal pain and nausea.   Genitourinary: Negative for dysuria and hematuria.   Musculoskeletal: Negative for back pain and joint pain.   Skin: Negative for rash.   Neurological: Negative for dizziness, sensory change, speech change and headaches.   Psychiatric/Behavioral: The patient is not nervous/anxious.         Physical Exam  Temp:  [36.4 °C (97.5 °F)-36.8 °C (98.2 °F)] 36.7 °C (98 °F)  Pulse:  [68-85] 82  Resp:  [16-44] 26  BP: (111-159)/(53-83) 138/67  SpO2:  [90 %-99 %] 94 %    Physical Exam  Vitals reviewed.   Constitutional:       Appearance: Normal appearance. She is not diaphoretic.   HENT:      Head: Normocephalic and  atraumatic.      Nose: Nose normal.      Mouth/Throat:      Mouth: Mucous membranes are moist.      Pharynx: No oropharyngeal exudate.   Eyes:      General: No scleral icterus.        Right eye: No discharge.         Left eye: No discharge.      Extraocular Movements: Extraocular movements intact.      Conjunctiva/sclera: Conjunctivae normal.   Cardiovascular:      Rate and Rhythm: Normal rate and regular rhythm.      Pulses:           Radial pulses are 2+ on the right side and 2+ on the left side.        Dorsalis pedis pulses are 2+ on the right side and 2+ on the left side.      Heart sounds: No murmur.   Pulmonary:      Effort: Pulmonary effort is normal. No respiratory distress.      Breath sounds: Normal breath sounds. No wheezing or rales.   Abdominal:      General: Bowel sounds are normal. There is no distension.      Palpations: Abdomen is soft.      Tenderness: There is no abdominal tenderness.   Musculoskeletal:         General: No swelling or tenderness.      Cervical back: No tenderness. No muscular tenderness.      Right lower leg: No edema.      Left lower leg: No edema.   Lymphadenopathy:      Cervical: No cervical adenopathy.   Skin:     Coloration: Skin is not jaundiced or pale.   Neurological:      General: No focal deficit present.      Mental Status: She is alert and oriented to person, place, and time. Mental status is at baseline.      Cranial Nerves: No cranial nerve deficit.   Psychiatric:         Mood and Affect: Mood normal.         Behavior: Behavior normal.         Fluids    Intake/Output Summary (Last 24 hours) at 4/10/2021 1434  Last data filed at 4/10/2021 1400  Gross per 24 hour   Intake 6449.2 ml   Output 3950 ml   Net 2499.2 ml       Laboratory  Recent Labs     04/09/21  0905 04/10/21  0355 04/10/21  0825   WBC 11.8* 18.9* 17.6*   RBC 5.35 4.26 4.75   HEMOGLOBIN 17.3* 14.0 15.1   HEMATOCRIT 50.3* 40.3 44.7   MCV 94.0 94.1 94.1   MCH 32.3 32.4 31.8   MCHC 34.4 34.4 33.8   RDW 42.7  44.5 44.2   PLATELETCT 136* 172 178   MPV 12.9 11.4 11.6     Recent Labs     04/10/21  0215 04/10/21  0355 04/10/21  0825   SODIUM 144 139 137   POTASSIUM 3.9 4.2 3.9   CHLORIDE 113* 105 103   CO2 19* 17* 21   GLUCOSE 135* 318* 364*   BUN 24* 24* 23*   CREATININE 0.97 0.98 0.90   CALCIUM 8.2* 8.5 8.6                   Imaging  [id number] VIEW   Final Result         No specific finding to suggest small bowel obstruction.      DX-CHEST-LIMITED (1 VIEW)   Final Result         Minimal left basilar opacities, atelectasis or infection.      DX-CHEST-PORTABLE (1 VIEW)   Final Result      Mild interstitial prominence, unchanged.      US-PANCREAS    (Results Pending)        Assessment/Plan  * Diabetic ketoacidosis without coma associated with type 2 diabetes mellitus (HCC)- (present on admission)  Assessment & Plan  Diabetic education  Lantus 20units with monitor of accuchecks and cover with SSI  Oddly Glycohemoglobin is 18.3 this admit and 5 months ago it was 7.3.  Hx of pseudocyst after gallstones in 2014.  Question if autoimmune pancreatitis.  She has a history of Irritable bowel and question if this is chronic pancreatitis.  I will check ESR, CRP, C-peptide, Insulin, and antibody testing. I will check an U/S of the pancreas to look for any masses but outpatient may need additional work up with consideration of MRCP or CT dedicated of pancrease.    She has previously seen Dr Yap, GI, and had a colonoscopy and negative work up for Celiac Sprue disease.    Leukocytosis  Assessment & Plan  4/10 WBC:17.6 < 18.9 < 11.8  Likely reactive.  Monitor daily CBC and vitals.    Hypertension- (present on admission)  Assessment & Plan  Amlodipine 10mg qd, losartan 100mg qd  Monitor vitals  Prn labetalol and enalaprilat    Chronic kidney disease (CKD), stage III (moderate)- (present on admission)  Assessment & Plan  Baseline Cr appears to be about 1.4 per records  4/10 BUN:23, Cr:0.9    Hypernatremia  Assessment & Plan  Sodium  adjusted for glucose is 150 in the setting of dehydration     TISH (acute kidney injury) (HCC)- (present on admission)  Assessment & Plan  Cr is up to 1.59 due to dehydration  Wean fluids.  Improved with fluids  Avoid nephrotoxins.    Hypothyroidism- (present on admission)  Assessment & Plan  Synthroid 112mcg daily  4/9 TSH:1.003    Cardiac pacemaker in situ  Assessment & Plan  Hx of  Followed by Dr. Schultz    Hyperlipidemia- (present on admission)  Assessment & Plan  Continue Crestor       VTE prophylaxis: Enoxaparin

## 2021-04-11 PROBLEM — E11.9 DIABETES (HCC): Status: ACTIVE | Noted: 2021-03-12

## 2021-04-11 LAB
ANION GAP SERPL CALC-SCNC: 8 MMOL/L (ref 7–16)
BASOPHILS # BLD AUTO: 0.1 % (ref 0–1.8)
BASOPHILS # BLD: 0.01 K/UL (ref 0–0.12)
BUN SERPL-MCNC: 16 MG/DL (ref 8–22)
CALCIUM SERPL-MCNC: 8.5 MG/DL (ref 8.5–10.5)
CHLORIDE SERPL-SCNC: 106 MMOL/L (ref 96–112)
CO2 SERPL-SCNC: 28 MMOL/L (ref 20–33)
CREAT SERPL-MCNC: 0.87 MG/DL (ref 0.5–1.4)
EOSINOPHIL # BLD AUTO: 0.09 K/UL (ref 0–0.51)
EOSINOPHIL NFR BLD: 1 % (ref 0–6.9)
ERYTHROCYTE [DISTWIDTH] IN BLOOD BY AUTOMATED COUNT: 43 FL (ref 35.9–50)
GLUCOSE BLD-MCNC: 132 MG/DL (ref 65–99)
GLUCOSE BLD-MCNC: 135 MG/DL (ref 65–99)
GLUCOSE BLD-MCNC: 209 MG/DL (ref 65–99)
GLUCOSE BLD-MCNC: 216 MG/DL (ref 65–99)
GLUCOSE SERPL-MCNC: 96 MG/DL (ref 65–99)
HCT VFR BLD AUTO: 41.5 % (ref 37–47)
HGB BLD-MCNC: 14.4 G/DL (ref 12–16)
IMM GRANULOCYTES # BLD AUTO: 0.04 K/UL (ref 0–0.11)
IMM GRANULOCYTES NFR BLD AUTO: 0.4 % (ref 0–0.9)
LYMPHOCYTES # BLD AUTO: 2.22 K/UL (ref 1–4.8)
LYMPHOCYTES NFR BLD: 23.9 % (ref 22–41)
MAGNESIUM SERPL-MCNC: 2.2 MG/DL (ref 1.5–2.5)
MCH RBC QN AUTO: 32.5 PG (ref 27–33)
MCHC RBC AUTO-ENTMCNC: 34.7 G/DL (ref 33.6–35)
MCV RBC AUTO: 93.7 FL (ref 81.4–97.8)
MONOCYTES # BLD AUTO: 0.96 K/UL (ref 0–0.85)
MONOCYTES NFR BLD AUTO: 10.4 % (ref 0–13.4)
NEUTROPHILS # BLD AUTO: 5.95 K/UL (ref 2–7.15)
NEUTROPHILS NFR BLD: 64.2 % (ref 44–72)
NRBC # BLD AUTO: 0 K/UL
NRBC BLD-RTO: 0 /100 WBC
PHOSPHATE SERPL-MCNC: 2.9 MG/DL (ref 2.5–4.5)
PLATELET # BLD AUTO: 159 K/UL (ref 164–446)
PMV BLD AUTO: 11.6 FL (ref 9–12.9)
POTASSIUM SERPL-SCNC: 3 MMOL/L (ref 3.6–5.5)
RBC # BLD AUTO: 4.43 M/UL (ref 4.2–5.4)
SODIUM SERPL-SCNC: 142 MMOL/L (ref 135–145)
WBC # BLD AUTO: 9.3 K/UL (ref 4.8–10.8)

## 2021-04-11 PROCEDURE — 700111 HCHG RX REV CODE 636 W/ 250 OVERRIDE (IP): Performed by: HOSPITALIST

## 2021-04-11 PROCEDURE — 700102 HCHG RX REV CODE 250 W/ 637 OVERRIDE(OP): Performed by: HOSPITALIST

## 2021-04-11 PROCEDURE — A9270 NON-COVERED ITEM OR SERVICE: HCPCS | Performed by: HOSPITALIST

## 2021-04-11 PROCEDURE — A9270 NON-COVERED ITEM OR SERVICE: HCPCS | Performed by: STUDENT IN AN ORGANIZED HEALTH CARE EDUCATION/TRAINING PROGRAM

## 2021-04-11 PROCEDURE — 85025 COMPLETE CBC W/AUTO DIFF WBC: CPT

## 2021-04-11 PROCEDURE — 36415 COLL VENOUS BLD VENIPUNCTURE: CPT

## 2021-04-11 PROCEDURE — 80048 BASIC METABOLIC PNL TOTAL CA: CPT

## 2021-04-11 PROCEDURE — RXMED WILLOW AMBULATORY MEDICATION CHARGE: Performed by: STUDENT IN AN ORGANIZED HEALTH CARE EDUCATION/TRAINING PROGRAM

## 2021-04-11 PROCEDURE — 700102 HCHG RX REV CODE 250 W/ 637 OVERRIDE(OP): Performed by: STUDENT IN AN ORGANIZED HEALTH CARE EDUCATION/TRAINING PROGRAM

## 2021-04-11 PROCEDURE — 99232 SBSQ HOSP IP/OBS MODERATE 35: CPT | Performed by: STUDENT IN AN ORGANIZED HEALTH CARE EDUCATION/TRAINING PROGRAM

## 2021-04-11 PROCEDURE — 83735 ASSAY OF MAGNESIUM: CPT

## 2021-04-11 PROCEDURE — 770020 HCHG ROOM/CARE - TELE (206)

## 2021-04-11 PROCEDURE — 82962 GLUCOSE BLOOD TEST: CPT | Mod: 91

## 2021-04-11 PROCEDURE — 84100 ASSAY OF PHOSPHORUS: CPT

## 2021-04-11 RX ORDER — INSULIN GLARGINE 100 [IU]/ML
25 INJECTION, SOLUTION SUBCUTANEOUS EVERY EVENING
Qty: 9 ML | Refills: 0 | Status: SHIPPED | OUTPATIENT
Start: 2021-04-11 | End: 2021-04-11

## 2021-04-11 RX ORDER — BLOOD-GLUCOSE METER
KIT MISCELLANEOUS
Qty: 1 EACH | Refills: 0 | Status: SHIPPED | OUTPATIENT
Start: 2021-04-11

## 2021-04-11 RX ORDER — INSULIN ASPART 100 [IU]/ML
2-12 INJECTION, SOLUTION INTRAVENOUS; SUBCUTANEOUS
Qty: 12 ML | Refills: 0 | Status: SHIPPED | OUTPATIENT
Start: 2021-04-11 | End: 2021-04-23 | Stop reason: SDUPTHER

## 2021-04-11 RX ORDER — GLUCOSAMINE HCL/CHONDROITIN SU 500-400 MG
CAPSULE ORAL
Qty: 200 EACH | Refills: 0 | Status: SHIPPED | OUTPATIENT
Start: 2021-04-11 | End: 2021-04-11 | Stop reason: SDUPTHER

## 2021-04-11 RX ORDER — INSULIN ASPART 100 [IU]/ML
5 INJECTION, SOLUTION INTRAVENOUS; SUBCUTANEOUS
Qty: 5 ML | Refills: 0 | Status: SHIPPED | OUTPATIENT
Start: 2021-04-11 | End: 2021-04-11

## 2021-04-11 RX ORDER — POTASSIUM CHLORIDE 20 MEQ/1
40 TABLET, EXTENDED RELEASE ORAL 2 TIMES DAILY
Status: ACTIVE | OUTPATIENT
Start: 2021-04-11 | End: 2021-04-11

## 2021-04-11 RX ORDER — LANCETS 30 GAUGE
EACH MISCELLANEOUS
Qty: 200 EACH | Refills: 0 | Status: SHIPPED | OUTPATIENT
Start: 2021-04-11 | End: 2021-06-07 | Stop reason: SDUPTHER

## 2021-04-11 RX ORDER — LANCETS 30 GAUGE
EACH MISCELLANEOUS
Qty: 200 EACH | Refills: 0 | Status: SHIPPED | OUTPATIENT
Start: 2021-04-11 | End: 2021-04-11 | Stop reason: SDUPTHER

## 2021-04-11 RX ORDER — GUAIFENESIN 600 MG/1
600 TABLET, EXTENDED RELEASE ORAL EVERY 12 HOURS
Status: DISCONTINUED | OUTPATIENT
Start: 2021-04-11 | End: 2021-04-12 | Stop reason: HOSPADM

## 2021-04-11 RX ORDER — INSULIN ASPART 100 [IU]/ML
2-12 INJECTION, SOLUTION INTRAVENOUS; SUBCUTANEOUS
Qty: 11 ML | Refills: 0 | Status: SHIPPED | OUTPATIENT
Start: 2021-04-11 | End: 2021-04-11

## 2021-04-11 RX ORDER — GLUCOSAMINE HCL/CHONDROITIN SU 500-400 MG
CAPSULE ORAL
Qty: 200 EACH | Refills: 0 | Status: SHIPPED | OUTPATIENT
Start: 2021-04-11

## 2021-04-11 RX ORDER — INSULIN ASPART 100 [IU]/ML
5 INJECTION, SOLUTION INTRAVENOUS; SUBCUTANEOUS
Qty: 6 ML | Refills: 0 | Status: SHIPPED | OUTPATIENT
Start: 2021-04-11 | End: 2021-04-19

## 2021-04-11 RX ORDER — INSULIN GLARGINE 100 [IU]/ML
25 INJECTION, SOLUTION SUBCUTANEOUS EVERY EVENING
Qty: 9 ML | Refills: 0 | Status: SHIPPED | OUTPATIENT
Start: 2021-04-11 | End: 2021-04-28 | Stop reason: SDUPTHER

## 2021-04-11 RX ADMIN — INSULIN LISPRO 5 UNITS: 100 INJECTION, SOLUTION INTRAVENOUS; SUBCUTANEOUS at 13:00

## 2021-04-11 RX ADMIN — INSULIN GLARGINE 20 UNITS: 100 INJECTION, SOLUTION SUBCUTANEOUS at 17:33

## 2021-04-11 RX ADMIN — INSULIN LISPRO 4 UNITS: 100 INJECTION, SOLUTION INTRAVENOUS; SUBCUTANEOUS at 17:39

## 2021-04-11 RX ADMIN — LEVOTHYROXINE SODIUM 112 MCG: 0.11 TABLET ORAL at 04:50

## 2021-04-11 RX ADMIN — GUAIFENESIN 600 MG: 600 TABLET, EXTENDED RELEASE ORAL at 17:31

## 2021-04-11 RX ADMIN — ROSUVASTATIN CALCIUM 10 MG: 10 TABLET, FILM COATED ORAL at 17:31

## 2021-04-11 RX ADMIN — INSULIN LISPRO 5 UNITS: 100 INJECTION, SOLUTION INTRAVENOUS; SUBCUTANEOUS at 08:40

## 2021-04-11 RX ADMIN — INSULIN LISPRO 4 UNITS: 100 INJECTION, SOLUTION INTRAVENOUS; SUBCUTANEOUS at 21:38

## 2021-04-11 RX ADMIN — Medication 81 MG: at 04:50

## 2021-04-11 RX ADMIN — GUAIFENESIN 600 MG: 600 TABLET, EXTENDED RELEASE ORAL at 12:58

## 2021-04-11 RX ADMIN — LOSARTAN POTASSIUM 100 MG: 50 TABLET, FILM COATED ORAL at 04:50

## 2021-04-11 RX ADMIN — AMLODIPINE BESYLATE 10 MG: 10 TABLET ORAL at 04:50

## 2021-04-11 RX ADMIN — ENOXAPARIN SODIUM 40 MG: 40 INJECTION SUBCUTANEOUS at 04:50

## 2021-04-11 RX ADMIN — INSULIN LISPRO 5 UNITS: 100 INJECTION, SOLUTION INTRAVENOUS; SUBCUTANEOUS at 17:33

## 2021-04-11 ASSESSMENT — PAIN DESCRIPTION - PAIN TYPE
TYPE: ACUTE PAIN

## 2021-04-11 ASSESSMENT — ENCOUNTER SYMPTOMS
WEIGHT LOSS: 1
SHORTNESS OF BREATH: 0
PALPITATIONS: 0
SENSORY CHANGE: 0
COUGH: 0
BLURRED VISION: 0
STRIDOR: 0
BACK PAIN: 0
SPEECH CHANGE: 0
NAUSEA: 0
HEADACHES: 0
DIZZINESS: 0
ABDOMINAL PAIN: 0
NERVOUS/ANXIOUS: 0
FEVER: 0
SORE THROAT: 0

## 2021-04-11 ASSESSMENT — FIBROSIS 4 INDEX: FIB4 SCORE: 1.52

## 2021-04-11 NOTE — PROGRESS NOTES
Received evening report from RN. Pt is comfortable in bed, and feeling overwhelmed with the amount of information but understands that this is the case. Bed is locked in low position with call light and belongings within reach. POC discussed and all questions answered. All needs and requirements met at this time.

## 2021-04-11 NOTE — PROGRESS NOTES
Hospital Medicine Daily Progress Note    Date of Service  4/11/2021    Chief Complaint  Weakness and elevated blood sugar    Hospital Course  80 y.o. female with diet controlled diabetes with polyuria, polydypsia over prior 2 weeks.  She was admitted 4/9/2021 with dizziness and found to have diabetic ketoacidosis.  SHe was admitted to ICU and initiated on IV fluids and Insulin drip with improvement of anion gap acidosis.  She was transitioned to lantus.  She will need diabetic education.Of interest her A1c in 11/2020 was 7.4 and now 4/9 A1c:18    Interval Problem Update  4/10: Alert with clear speech.  Still much thirst but feels better.  No dizziness but states she has yet to get out of bed.  We discussed her need to have diabetes education, further evaluation by her primary on her pancreas/diabetes.    4/11 tolerates diet, walk to bathroom  bedside, discussed about DM diet; will also have DM education, which will be available on Monday, nutrition consult; pharmacy to teach how to use insulin and check her blood sugar.    PENNY-65, INSULIN AB, islet cell Ab pending.   PCP follow up for more workup     Consultants/Specialty  Intensivist signing off 4/10    Code Status  Full Code    Disposition  Transfer to medical    Review of Systems  Review of Systems   Constitutional: Positive for weight loss (5 pounds). Negative for fever.   HENT: Negative for sore throat.    Eyes: Negative for blurred vision.   Respiratory: Negative for cough, shortness of breath and stridor.    Cardiovascular: Negative for palpitations and leg swelling.   Gastrointestinal: Negative for abdominal pain and nausea.   Genitourinary: Negative for dysuria and hematuria.   Musculoskeletal: Negative for back pain and joint pain.   Skin: Negative for rash.   Neurological: Negative for dizziness, sensory change, speech change and headaches.   Psychiatric/Behavioral: The patient is not nervous/anxious.         Physical Exam  Temp:  [35.9 °C (96.6  °F)-37 °C (98.6 °F)] 36 °C (96.8 °F)  Pulse:  [60-85] 66  Resp:  [16-26] 16  BP: (120-147)/(56-81) 147/56  SpO2:  [94 %-96 %] 95 %    Physical Exam  Vitals reviewed.   Constitutional:       Appearance: Normal appearance. She is not diaphoretic.   HENT:      Head: Normocephalic and atraumatic.      Nose: Nose normal.      Mouth/Throat:      Mouth: Mucous membranes are moist.      Pharynx: No oropharyngeal exudate.   Eyes:      General: No scleral icterus.        Right eye: No discharge.         Left eye: No discharge.      Extraocular Movements: Extraocular movements intact.      Conjunctiva/sclera: Conjunctivae normal.   Cardiovascular:      Rate and Rhythm: Normal rate and regular rhythm.      Pulses:           Radial pulses are 2+ on the right side and 2+ on the left side.        Dorsalis pedis pulses are 2+ on the right side and 2+ on the left side.      Heart sounds: No murmur.   Pulmonary:      Effort: Pulmonary effort is normal. No respiratory distress.      Breath sounds: Normal breath sounds. No wheezing or rales.   Abdominal:      General: Bowel sounds are normal. There is no distension.      Palpations: Abdomen is soft.      Tenderness: There is no abdominal tenderness.   Musculoskeletal:         General: No swelling or tenderness.      Cervical back: No tenderness. No muscular tenderness.      Right lower leg: No edema.      Left lower leg: No edema.   Lymphadenopathy:      Cervical: No cervical adenopathy.   Skin:     Coloration: Skin is not jaundiced or pale.   Neurological:      General: No focal deficit present.      Mental Status: She is alert and oriented to person, place, and time. Mental status is at baseline.      Cranial Nerves: No cranial nerve deficit.   Psychiatric:         Mood and Affect: Mood normal.         Behavior: Behavior normal.         Fluids    Intake/Output Summary (Last 24 hours) at 4/11/2021 1121  Last data filed at 4/11/2021 0400  Gross per 24 hour   Intake 745.51 ml   Output  900 ml   Net -154.49 ml       Laboratory  Recent Labs     04/10/21  0355 04/10/21  0825 04/11/21  0300   WBC 18.9* 17.6* 9.3   RBC 4.26 4.75 4.43   HEMOGLOBIN 14.0 15.1 14.4   HEMATOCRIT 40.3 44.7 41.5   MCV 94.1 94.1 93.7   MCH 32.4 31.8 32.5   MCHC 34.4 33.8 34.7   RDW 44.5 44.2 43.0   PLATELETCT 172 178 159*   MPV 11.4 11.6 11.6     Recent Labs     04/10/21  0825 04/10/21  1702 04/11/21  0300   SODIUM 137 140 142   POTASSIUM 3.9 3.4* 3.0*   CHLORIDE 103 103 106   CO2 21 25 28   GLUCOSE 364* 256* 96   BUN 23* 17 16   CREATININE 0.90 0.99 0.87   CALCIUM 8.6 8.7 8.5                   Imaging  US-PANCREAS   Final Result      1.  No mass of the pancreas identified sonographically      2.  If there is significant clinical suspicion for a mass then assessment with CT with contrast is recommended given the low sensitivity of ultrasound      [id number] VIEW   Final Result         No specific finding to suggest small bowel obstruction.      DX-CHEST-LIMITED (1 VIEW)   Final Result         Minimal left basilar opacities, atelectasis or infection.      DX-CHEST-PORTABLE (1 VIEW)   Final Result      Mild interstitial prominence, unchanged.           Assessment/Plan  * Diabetic ketoacidosis without coma associated with type 2 diabetes mellitus (HCC)- (present on admission)  Assessment & Plan  Diabetic education  Lantus 20units with monitor of accuchecks and cover with SSI  Oddly Glycohemoglobin is 18.3 this admit and 5 months ago it was 7.3.  Hx of pseudocyst after gallstones in 2014.  Question if autoimmune pancreatitis.  She has a history of Irritable bowel and question if this is chronic pancreatitis.  I will check ESR, CRP, C-peptide, Insulin, and antibody testing. I will check an U/S of the pancreas to look for any masses but outpatient may need additional work up with consideration of MRCP or CT dedicated of pancrease.    She has previously seen Dr Yap, GI, and had a colonoscopy and negative work up for Celiac Sprue  disease.    Leukocytosis  Assessment & Plan  4/10 WBC:17.6 < 18.9 < 11.8  Likely reactive.  Monitor daily CBC and vitals.    resolved    Hypertension- (present on admission)  Assessment & Plan  Amlodipine 10mg qd, losartan 100mg qd  Monitor vitals  Prn labetalol and enalaprilat    Hypokalemia  Assessment & Plan  Refused medication due to burning sensation by IV and cause burning in her throat  Recheck in AM  Agreed on taking if still low    Chronic kidney disease (CKD), stage III (moderate)- (present on admission)  Assessment & Plan  Baseline Cr appears to be about 1.4 per records  4/10 BUN:23, Cr:0.9    Hypernatremia  Assessment & Plan  Sodium adjusted for glucose is 150 in the setting of dehydration     Diabetes (HCC)  Assessment & Plan  A1c WAS 7.3 in Nov 2020  This time 18    PENNY-65, INSULIN AB, islet cell Ab pending    TISH (acute kidney injury) ([facility])- (present on admission)  Assessment & Plan  Cr is up to 1.59 due to dehydration  Wean fluids.  Improved with fluids  Avoid nephrotoxins.    resolved    Hypothyroidism- (present on admission)  Assessment & Plan  Synthroid 112mcg daily  4/9 TSH:1.003    Cardiac pacemaker in situ  Assessment & Plan  Hx of  Followed by Dr. Schultz    Hyperlipidemia- (present on admission)  Assessment & Plan  Continue Crestor       VTE prophylaxis: Enoxaparin

## 2021-04-11 NOTE — CARE PLAN
Problem: Communication  Goal: The ability to communicate needs accurately and effectively will improve  Outcome: PROGRESSING AS EXPECTED     Problem: Knowledge Deficit  Goal: Knowledge of disease process/condition, treatment plan, diagnostic tests, and medications will improve  Outcome: PROGRESSING AS EXPECTED  Goal: Knowledge of the prescribed therapeutic regimen will improve  Outcome: PROGRESSING AS EXPECTED     Problem: Discharge Barriers/Planning  Goal: Patient's continuum of care needs will be met  Outcome: PROGRESSING AS EXPECTED    New information about pt's disease given, pt feeling overwhelmed about all the info but open to receiving print outs.

## 2021-04-11 NOTE — PROGRESS NOTES
Received report from Nan SWEENEY, at pt bedside.  POC discussed. Call light and belongings within reach. Bed locked and in low position. Alarm on and fall precautions in place.

## 2021-04-12 ENCOUNTER — PHARMACY VISIT (OUTPATIENT)
Dept: PHARMACY | Facility: MEDICAL CENTER | Age: 81
End: 2021-04-12
Payer: COMMERCIAL

## 2021-04-12 ENCOUNTER — PATIENT OUTREACH (OUTPATIENT)
Dept: HEALTH INFORMATION MANAGEMENT | Facility: OTHER | Age: 81
End: 2021-04-12

## 2021-04-12 VITALS
HEIGHT: 64 IN | SYSTOLIC BLOOD PRESSURE: 132 MMHG | TEMPERATURE: 97.5 F | BODY MASS INDEX: 26.72 KG/M2 | DIASTOLIC BLOOD PRESSURE: 72 MMHG | OXYGEN SATURATION: 97 % | RESPIRATION RATE: 16 BRPM | WEIGHT: 156.53 LBS | HEART RATE: 66 BPM

## 2021-04-12 LAB
ANION GAP SERPL CALC-SCNC: 9 MMOL/L (ref 7–16)
BUN SERPL-MCNC: 17 MG/DL (ref 8–22)
C PEPTIDE SERPL-MCNC: 0.7 NG/ML (ref 0.8–3.5)
CALCIUM SERPL-MCNC: 8.5 MG/DL (ref 8.5–10.5)
CHLORIDE SERPL-SCNC: 100 MMOL/L (ref 96–112)
CO2 SERPL-SCNC: 26 MMOL/L (ref 20–33)
CREAT SERPL-MCNC: 0.89 MG/DL (ref 0.5–1.4)
ERYTHROCYTE [DISTWIDTH] IN BLOOD BY AUTOMATED COUNT: 42.3 FL (ref 35.9–50)
GLUCOSE BLD-MCNC: 160 MG/DL (ref 65–99)
GLUCOSE SERPL-MCNC: 148 MG/DL (ref 65–99)
HCT VFR BLD AUTO: 43.2 % (ref 37–47)
HGB BLD-MCNC: 14.9 G/DL (ref 12–16)
MCH RBC QN AUTO: 32.1 PG (ref 27–33)
MCHC RBC AUTO-ENTMCNC: 34.5 G/DL (ref 33.6–35)
MCV RBC AUTO: 93.1 FL (ref 81.4–97.8)
PLATELET # BLD AUTO: 146 K/UL (ref 164–446)
PMV BLD AUTO: 11.5 FL (ref 9–12.9)
POTASSIUM SERPL-SCNC: 3.2 MMOL/L (ref 3.6–5.5)
RBC # BLD AUTO: 4.64 M/UL (ref 4.2–5.4)
SODIUM SERPL-SCNC: 135 MMOL/L (ref 135–145)
WBC # BLD AUTO: 6.1 K/UL (ref 4.8–10.8)

## 2021-04-12 PROCEDURE — 99239 HOSP IP/OBS DSCHRG MGMT >30: CPT | Performed by: STUDENT IN AN ORGANIZED HEALTH CARE EDUCATION/TRAINING PROGRAM

## 2021-04-12 PROCEDURE — 80048 BASIC METABOLIC PNL TOTAL CA: CPT

## 2021-04-12 PROCEDURE — 700102 HCHG RX REV CODE 250 W/ 637 OVERRIDE(OP): Performed by: STUDENT IN AN ORGANIZED HEALTH CARE EDUCATION/TRAINING PROGRAM

## 2021-04-12 PROCEDURE — 36415 COLL VENOUS BLD VENIPUNCTURE: CPT

## 2021-04-12 PROCEDURE — 700102 HCHG RX REV CODE 250 W/ 637 OVERRIDE(OP): Performed by: HOSPITALIST

## 2021-04-12 PROCEDURE — A9270 NON-COVERED ITEM OR SERVICE: HCPCS | Performed by: HOSPITALIST

## 2021-04-12 PROCEDURE — 82962 GLUCOSE BLOOD TEST: CPT

## 2021-04-12 PROCEDURE — 700111 HCHG RX REV CODE 636 W/ 250 OVERRIDE (IP): Performed by: HOSPITALIST

## 2021-04-12 PROCEDURE — A9270 NON-COVERED ITEM OR SERVICE: HCPCS | Performed by: STUDENT IN AN ORGANIZED HEALTH CARE EDUCATION/TRAINING PROGRAM

## 2021-04-12 PROCEDURE — RXMED WILLOW AMBULATORY MEDICATION CHARGE: Performed by: STUDENT IN AN ORGANIZED HEALTH CARE EDUCATION/TRAINING PROGRAM

## 2021-04-12 PROCEDURE — 85027 COMPLETE CBC AUTOMATED: CPT

## 2021-04-12 RX ORDER — GUAIFENESIN 600 MG/1
600 TABLET, EXTENDED RELEASE ORAL EVERY 12 HOURS
Qty: 28 TABLET | Refills: 0 | Status: SHIPPED | OUTPATIENT
Start: 2021-04-12 | End: 2021-04-19

## 2021-04-12 RX ADMIN — AMLODIPINE BESYLATE 10 MG: 10 TABLET ORAL at 05:09

## 2021-04-12 RX ADMIN — INSULIN LISPRO 5 UNITS: 100 INJECTION, SOLUTION INTRAVENOUS; SUBCUTANEOUS at 13:06

## 2021-04-12 RX ADMIN — Medication 81 MG: at 05:09

## 2021-04-12 RX ADMIN — INSULIN LISPRO 5 UNITS: 100 INJECTION, SOLUTION INTRAVENOUS; SUBCUTANEOUS at 08:33

## 2021-04-12 RX ADMIN — INSULIN LISPRO 3 UNITS: 100 INJECTION, SOLUTION INTRAVENOUS; SUBCUTANEOUS at 08:28

## 2021-04-12 RX ADMIN — GUAIFENESIN 600 MG: 600 TABLET, EXTENDED RELEASE ORAL at 05:10

## 2021-04-12 RX ADMIN — LEVOTHYROXINE SODIUM 112 MCG: 0.11 TABLET ORAL at 05:09

## 2021-04-12 RX ADMIN — INSULIN LISPRO 3 UNITS: 100 INJECTION, SOLUTION INTRAVENOUS; SUBCUTANEOUS at 13:02

## 2021-04-12 RX ADMIN — LOSARTAN POTASSIUM 100 MG: 50 TABLET, FILM COATED ORAL at 05:10

## 2021-04-12 RX ADMIN — ENOXAPARIN SODIUM 40 MG: 40 INJECTION SUBCUTANEOUS at 05:10

## 2021-04-12 SDOH — ECONOMIC STABILITY: FOOD INSECURITY: WITHIN THE PAST 12 MONTHS, YOU WORRIED THAT YOUR FOOD WOULD RUN OUT BEFORE YOU GOT MONEY TO BUY MORE.: NEVER TRUE

## 2021-04-12 SDOH — ECONOMIC STABILITY: FOOD INSECURITY: WITHIN THE PAST 12 MONTHS, THE FOOD YOU BOUGHT JUST DIDN'T LAST AND YOU DIDN'T HAVE MONEY TO GET MORE.: NEVER TRUE

## 2021-04-12 ASSESSMENT — SOCIAL DETERMINANTS OF HEALTH (SDOH): HOW HARD IS IT FOR YOU TO PAY FOR THE VERY BASICS LIKE FOOD, HOUSING, MEDICAL CARE, AND HEATING?: NOT HARD AT ALL

## 2021-04-12 NOTE — DISCHARGE PLANNING
Meds-to-Beds: Discharge prescription orders listed below delivered to patient's bedside. RN and BRIE Eugene notified. Patient counseled. Patient elected to have co-payment billed to patient account.       Roya Muniz   Home Medication Instructions AVA:[id number]    Printed on:04/12/21 0663   Medication Information                      Alcohol Swabs  Wipe site with prep pad prior to injection.             Blood Glucose Monitoring Suppl (FREESTYLE LITE) Device  Test blood sugar as directed.             glucose blood strip  Use one strip to test blood sugar 4times/day.             insulin aspart (NOVOLOG FLEXPEN) 100 UNIT/ML injection PEN  Inject 2-12 Units under the skin 3 times a day before meals for 30 days.             insulin glargine (LANTUS SOLOSTAR) 100 UNIT/ML Solution Pen-injector injection  Inject 25 Units under the skin every evening for 30 days.             Insulin Pen Needle 32 G x 4 mm  Use one pen needle in pen device to inject insulin             Lancets  Use one lancet to test blood sugar 4 times/day                 [place] Daly, PharmD

## 2021-04-12 NOTE — PROGRESS NOTES
Pt dc'd at 1500. IV and monitor removed; monitor room notified. Pt left unit via wheelchair with CATERINA Galloway and pt spouse. Personal belongings with pt when leaving unit. Pt given discharge instructions prior to leaving unit including where to  prescriptions and when to follow-up; verbalizes understanding. Copy of discharge instructions with pt and in the chart.

## 2021-04-12 NOTE — PROGRESS NOTES
Assumed care at 0700. Bedside report received from Rosalinda. Patient's chart and MAR reviewed. Pt denies pain at this time. Pt is A & O 4. Patient was updated on plan of care for the day. Questions answered and concerns addressed.  Pt denies any additional needs at this time. White board updated. Call light, phone and personal belongings within reach.

## 2021-04-12 NOTE — DISCHARGE INSTRUCTIONS
Discharge Instructions    Discharged to home by car with relative. Discharged via wheelchair, hospital escort: Yes.  Special equipment needed: Not Applicable    Be sure to schedule a follow-up appointment with your primary care doctor or any specialists as instructed.     Discharge Plan:        I understand that a diet low in cholesterol, fat, and sodium is recommended for good health. Unless I have been given specific instructions below for another diet, I accept this instruction as my diet prescription.   Other diet: Carb controlled diet, follow diabetes educator recommendations    Special Instructions: None    · Is patient discharged on Warfarin / Coumadin?   No     Depression / Suicide Risk    As you are discharged from this Critical access hospital facility, it is important to learn how to keep safe from harming yourself.    Recognize the warning signs:  · Abrupt changes in personality, positive or negative- including increase in energy   · Giving away possessions  · Change in eating patterns- significant weight changes-  positive or negative  · Change in sleeping patterns- unable to sleep or sleeping all the time   · Unwillingness or inability to communicate  · Depression  · Unusual sadness, discouragement and loneliness  · Talk of wanting to die  · Neglect of personal appearance   · Rebelliousness- reckless behavior  · Withdrawal from people/activities they love  · Confusion- inability to concentrate     If you or a loved one observes any of these behaviors or has concerns about self-harm, here's what you can do:  · Talk about it- your feelings and reasons for harming yourself  · Remove any means that you might use to hurt yourself (examples: pills, rope, extension cords, firearm)  · Get professional help from the community (Mental Health, Substance Abuse, psychological counseling)  · Do not be alone:Call your Safe Contact- someone whom you trust who will be there for you.  · Call your local CRISIS HOTLINE 113-8263 or  [phone]  · Call your local Children's Mobile Crisis Response Team [place] (727) 714-2643 or www.[employer]imet[employer]  · Call the toll free National Suicide Prevention Hotlines   · National Suicide Prevention Lifeline 645-945-GLDV (7065)  · National Hope Line Network 800-SUICIDE (329-9217)        Diabetes Basics    Diabetes (diabetes mellitus) is a long-term (chronic) disease. It occurs when the body does not properly use sugar (glucose) that is released from food after you eat.  Diabetes may be caused by one or both of these problems:  · Your pancreas does not make enough of a hormone called insulin.  · Your body does not react in a normal way to insulin that it makes.  Insulin lets sugars (glucose) go into cells in your body. This gives you energy. If you have diabetes, sugars cannot get into cells. This causes high blood sugar (hyperglycemia).  Follow these instructions at home:  How is diabetes treated?  You may need to take insulin or other diabetes medicines daily to keep your blood sugar in balance. Take your diabetes medicines every day as told by your doctor. List your diabetes medicines here:  Diabetes medicines  · Name of medicine: __Novolog (before meals)____________________________  ? Amount (dose): _______________ Time (a.m./p.m.): _______________ Notes: ___________________________________  · Name of medicine: ____Lantus (Every evening)__________________________  ? Amount (dose): _______________ Time (a.m./p.m.): _______________ Notes: ___________________________________  · Name of medicine: ______________________________  ? Amount (dose): _______________ Time (a.m./p.m.): _______________ Notes: ___________________________________  If you use insulin, you will learn how to give yourself insulin by injection. You may need to adjust the amount based on the food that you eat. List the types of insulin you use here:  Insulin  · Insulin type: ____Novolog (before  meals)__________________________  ? Amount (dose): _______________ Time (a.m./p.m.): _______________ Notes: ____See Medication Schedule    Blood Sugar(mg/dL)       Units Novolog  151-200                                     2  201-250                                     4  251-300                                     6  301-350                                     8  351-400                                     10   > 400                                         12    If blood sugar > 400 mg/dL please call your doctor right away, even if it is after hours.       · Insulin type: __Lantus (Every evening)____________________________  ? Amount (dose): _______________ Time (a.m./p.m.): _______________ Notes: ____See Medication Schedule  ? _______________________________  · Insulin type: ______________________________  ? Amount (dose): _______________ Time (a.m./p.m.): _______________ Notes: ___________________________________  · Insulin type: ______________________________  ? Amount (dose): _______________ Time (a.m./p.m.): _______________ Notes: ___________________________________  · Insulin type: ______________________________  ? Amount (dose): _______________ Time (a.m./p.m.): _______________ Notes: ___________________________________  How do I manage my blood sugar?    Check your blood sugar levels using a blood glucose monitor as directed by your doctor.  Your doctor will set treatment goals for you. Generally, you should have these blood sugar levels:  · Before meals (preprandial):  mg/dL (4.4-7.2 mmol/L).  · After meals (postprandial): below 180 mg/dL (10 mmol/L).  · A1c level: less than 7%.  Write down the times that you will check your blood sugar levels:  Blood sugar checks  · Time: _______________ Notes: ___________________________________  · Time: _______________ Notes: ___________________________________  · Time: _______________ Notes: ___________________________________  · Time: _______________ Notes:  ___________________________________  · Time: _______________ Notes: ___________________________________  · Time: _______________ Notes: ___________________________________    What do I need to know about low blood sugar?  Low blood sugar is called hypoglycemia. This is when blood sugar is at or below 70 mg/dL (3.9 mmol/L). Symptoms may include:  · Feeling:  ? Hungry.  ? Worried or nervous (anxious).  ? Sweaty and clammy.  ? Confused.  ? Dizzy.  ? Sleepy.  ? Sick to your stomach (nauseous).  · Having:  ? A fast heartbeat.  ? A headache.  ? A change in your vision.  ? Tingling or no feeling (numbness) around the mouth, lips, or tongue.  ? Jerky movements that you cannot control (seizure).  · Having trouble with:  ? Moving (coordination).  ? Sleeping.  ? Passing out (fainting).  ? Getting upset easily (irritability).  Treating low blood sugar  To treat low blood sugar, eat or drink something sugary right away. If you can think clearly and swallow safely, follow the 15:15 rule:  · Take 15 grams of a fast-acting carb (carbohydrate). Talk with your doctor about how much you should take.  · Some fast-acting carbs are:  ? Sugar tablets (glucose pills). Take 3-4 glucose pills.  ? 6-8 pieces of hard candy.  ? 4-6 oz (120-150 mL) of fruit juice.  ? 4-6 oz (120-150 mL) of regular (not diet) soda.  ? 1 Tbsp (15 mL) honey or sugar.  · Check your blood sugar 15 minutes after you take the carb.  · If your blood sugar is still at or below 70 mg/dL (3.9 mmol/L), take 15 grams of a carb again.  · If your blood sugar does not go above 70 mg/dL (3.9 mmol/L) after 3 tries, get help right away.  · After your blood sugar goes back to normal, eat a meal or a snack within 1 hour.  Treating very low blood sugar  If your blood sugar is at or below 54 mg/dL (3 mmol/L), you have very low blood sugar (severe hypoglycemia). This is an emergency. Do not wait to see if the symptoms will go away. Get medical help right away. Call your local emergency  services (911 in the [place] Do not drive yourself to the hospital.  Questions to ask your health care provider  · Do I need to meet with a diabetes educator?  · What equipment will I need to care for myself at home?  · What diabetes medicines do I need? When should I take them?  · How often do I need to check my blood sugar?  · What number can I call if I have questions?  · When is my next doctor's visit?  · Where can I find a support group for people with diabetes?  Where to find more information  · American Diabetes Association: www.diabetes.org  · American Association of Diabetes Educators: www.diabeteseducator.org/patient-resources  Contact a doctor if:  · Your blood sugar is at or above 240 mg/dL (13.3 mmol/L) for 2 days in a row.  · You have been sick or have had a fever for 2 days or more, and you are not getting better.  · You have any of these problems for more than 6 hours:  ? You cannot eat or drink.  ? You feel sick to your stomach (nauseous).  ? You throw up (vomit).  ? You have watery poop (diarrhea).  Get help right away if:  · Your blood sugar is lower than 54 mg/dL (3 mmol/L).  · You get confused.  · You have trouble:  ? Thinking clearly.  ? Breathing.  Summary  · Diabetes (diabetes mellitus) is a long-term (chronic) disease. It occurs when the body does not properly use sugar (glucose) that is released from food after digestion.  · Take insulin and diabetes medicines as told.  · Check your blood sugar every day, as often as told.  · Keep all follow-up visits as told by your doctor. This is important.  This information is not intended to replace advice given to you by your health care provider. Make sure you discuss any questions you have with your health care provider.  Document Released: 03/22/2019 Document Revised: 02/07/2020 Document Reviewed: 03/22/2019  Elsevier Patient Education © 2020 [employer]      Diabetic Ketoacidosis  Diabetic ketoacidosis is a serious complication of diabetes. This  condition develops when there is not enough insulin in the body. Insulin is an hormone that regulates blood sugar levels in the body. Normally, insulin allows glucose to enter the cells in the body. The cells break down glucose for energy. Without enough insulin, the body cannot break down glucose, so it breaks down fats instead. This leads to high blood glucose levels in the body and the production of acids that are called ketones. Ketones are poisonous at high levels.  If diabetic ketoacidosis is not treated, it can cause severe dehydration and can lead to a coma or death.  What are the causes?  This condition develops when a lack of insulin causes the body to break down fats instead of glucose. This may be triggered by:  · Stress on the body. This stress is brought on by an illness.  · Infection.  · Medicines that raise blood glucose levels.  · Not taking diabetes medicine.  · New onset of type 1 diabetes mellitus.  What are the signs or symptoms?  Symptoms of this condition include:  · Fatigue.  · Weight loss.  · Excessive thirst.  · Light-headedness.  · Fruity or sweet-smelling breath.  · Excessive urination.  · Vision changes.  · Confusion or irritability.  · Nausea.  · Vomiting.  · Rapid breathing.  · Abdominal pain.  · Feeling flushed.  How is this diagnosed?  This condition is diagnosed based on your medical history, a physical exam, and blood tests. You may also have a urine test to check for ketones.  How is this treated?  This condition may be treated with:  · Fluid replacement. This may be done to correct dehydration.  · Insulin injections. These may be given through the skin or through an IV tube.  · Electrolyte replacement. Electrolytes are minerals in your blood. Electrolytes such as potassium and sodium may be given in pill form or through an IV tube.  · Antibiotic medicines. These may be prescribed if your condition was caused by an infection.  Diabetic ketoacidosis is a serious medical condition.  You may need emergency treatment in the hospital to monitor your condition.  Follow these instructions at home:  Eating and drinking  · Drink enough fluids to keep your urine clear or pale yellow.  · If you are not able to eat, drink clear fluids in small amounts as you are able. Clear fluids include water, ice chips, fruit juice with water added (diluted), and low-calorie sports drinks. You may also have sugar-free jello or popsicles.  · If you are able to eat, follow your usual diet and drink sugar-free liquids, such as water.  Medicines  · Take over-the-counter and prescription medicines only as told by your health care provider.  · Continue to take insulin and other diabetes medicines as told by your health care provider.  · If you were prescribed an antibiotic, take it as told by your health care provider. Do not stop taking the antibiotic even if you start to feel better.  General instructions    · Check your urine for ketones when you are ill and as told by your health care provider.  ? If your blood glucose is 240 mg/dL (13.3 mmol/L) or higher, check your urine ketones every 4-6 hours.  · Check your blood glucose every day, as often as told by your health care provider.  ? If your blood glucose is high, drink plenty of fluids. This helps to flush out ketones.  ? If your blood glucose is above your target for 2 tests in a row, contact your health care provider.  · Carry a medical alert card or wear medical alert jewelry that says that you have diabetes.  · Rest and exercise only as told by your health care provider. Do not exercise when your blood glucose is high and you have ketones in your urine.  · If you get sick, call your health care provider and begin treatment quickly. Your body often needs extra insulin to fight an illness. Check your blood glucose every 4-6 hours when you are sick.  · Keep all follow-up visits as told by your health care provider. This is important.  Contact a health care provider  if:  · Your blood glucose level is higher than 240 mg/dL (13.3 mmol/L) for 2 days in a row.  · You have moderate or large ketones in your urine.  · You have a fever.  · You cannot eat or drink without vomiting.  · You have been vomiting for more than 2 hours.  · You continue to have symptoms of diabetic ketoacidosis.  · You develop new symptoms.  Get help right away if:  · Your blood glucose monitor reads “high” even when you are taking insulin.  · You faint.  · You have chest pain.  · You have trouble breathing.  · You have sudden trouble speaking or swallowing.  · You have vomiting or diarrhea that gets worse after 3 hours.  · You are unable to stay awake.  · You have trouble thinking.  · You are severely dehydrated. Symptoms of severe dehydration include:  ? Extreme thirst.  ? Dry mouth.  ? Rapid breathing.  These symptoms may represent a serious problem that is an emergency. Do not wait to see if the symptoms will go away. Get medical help right away. Call your local emergency services (911 in the [place] Do not drive yourself to the hospital.  Summary  · Diabetic ketoacidosis is a serious complication of diabetes. This condition develops when there is not enough insulin in the body.  · This condition is diagnosed based on your medical history, a physical exam, and blood tests. You may also have a urine test to check for ketones.  · Diabetic ketoacidosis is a serious medical condition. You may need emergency treatment in the hospital to monitor your condition.  · Contact your health care provider if your blood glucose is higher than 240 mg/dl for 2 days in a row or if you have moderate or large ketones in your urine.  This information is not intended to replace advice given to you by your health care provider. Make sure you discuss any questions you have with your health care provider.  Document Released: 12/15/2001 Document Revised: 02/02/2018 Document Reviewed: 01/22/2018  Elsevier Patient Education © 2020  [employer]      Potassium Content of Foods    Potassium is a mineral found in many foods and drinks. It affects how the heart works, and helps keep fluids and minerals balanced in the body.  The amount of potassium you need each day depends on your age and any medical conditions you may have. Talk to your health care provider or dietitian about how much potassium you need.  The following lists of foods provide the general serving size for foods and the approximate amount of potassium in each serving, listed in milligrams (mg). Actual values may vary depending on the product and how it is processed.  High in potassium  The following foods and beverages have 200 mg or more of potassium per serving:  · Apricots (raw) - 2 have 200 mg of potassium.  · Apricots (dry) - 5 have 200 mg of potassium.  · Artichoke - 1 medium has 345 mg of potassium.  · Avocado - ¼ fruit has 245 mg of potassium.  · Banana - 1 medium fruit has 425 mg of potassium.  · Lima or baked beans (canned) - ½ cup has 280 mg of potassium.  · White beans (canned) - ½ cup has 595 mg potassium.  · Beef roast - 3 oz has 320 mg of potassium.  · Ground beef - 3 oz has 270 mg of potassium.  · Beets (raw or cooked) - ½ cup has 260 mg of potassium.  · Bran muffin - 2 oz has 300 mg of potassium.  · Broccoli (cooked) - ½ cup has 230 mg of potassium.  · [place] sprouts - ½ cup has 250 mg of potassium.  · Cantaloupe - ½ cup has 215 mg of potassium.  · Cereal, 100% bran - ½ cup has 200-400 mg of potassium.  · Cheeseburger -1 single fast food burger has 225-400 mg of potassium.  · Chicken - 3 oz has 220 mg of potassium.  · Clams (canned) - 3 oz has 535 mg of potassium.  · Crab - 3 oz has 225 mg of potassium.  · Dates - 5 have 270 mg of potassium.  · Dried beans and peas - ½ cup has 300-475 mg of potassium.  · Figs (dried) - 2 have 260 mg of potassium.  · Fish (halibut, tuna, cod, snapper) - 3 oz has 480 mg of potassium.  · Fish (salmon, avelina, swordfish, perch) - 3  oz has 300 mg of potassium.  · Fish (tuna, canned) - 3 oz has 200 mg of potassium.  · French fries (fast food) - 3 oz has 470 mg of potassium.  · Granola with fruit and nuts - ½ cup has 200 mg of potassium.  · Grapefruit juice - ½ cup has 200 mg of potassium.  · Honeydew melon - ½ cup has 200 mg of potassium.  · Kale (raw) - 1 cup has 300 mg of potassium.  · Kiwi - 1 medium fruit has 240 mg of potassium.  · Kohlrabi, rutabaga, parsnips - ½ cup has 280 mg of potassium.  · Lentils - ½ cup has 365 mg of potassium.  · Jovan - 1 each has 325 mg of potassium.  · Milk (nonfat, low-fat, whole, buttermilk) - 1 cup has 350-380 mg of potassium.  · Milk (chocolate) - 1 cup has 420 mg of potassium  · Molasses - 1 Tbsp has 295 mg of potassium.  · Mushrooms - ½ cup has 280 mg of potassium.  · Nectarine - 1 each has 275 mg of potassium.  · Nuts (almonds, peanuts, hazelnuts, [place], cashew, mixed) - 1 oz has 200 mg of potassium.  · Nuts (pistachios) - 1 oz has 295 mg of potassium.  · Orange - 1 fruit has 240 mg of potassium.  · Orange juice - ½ cup has 235 mg of potassium.  · Papaya - ½ medium fruit has 390 mg of potassium.  · Peanut butter (chunky) - 2 Tbsp has 240 mg of potassium.  · Peanut butter (smooth) - 2 Tbsp has 210 mg of potassium.  · Pear - 1 medium (200 mg) of potassium.  · Pomegranate - 1 whole fruit has 400 mg of potassium.  · Pomegranate juice - ½ cup has 215 mg of potassium.  · Pork - 3 oz has 350 mg of potassium.  · Potato chips (salted) - 1 oz has 465 mg of potassium.  · Potato (baked with skin) - 1 medium has 925 mg of potassium.  · Potato (boiled) - ½ cup has 255 mg of potassium.  · Potato (Mashed) - ½ cup has 330 mg of potassium.  · Prune juice - ½ cup has 370 mg of potassium.  · Prunes - 5 have 305 mg of potassium.  · Pudding (chocolate) - ½ cup has 230 mg of potassium.  · Pumpkin (canned) - ½ cup has 250 mg of potassium.  · Raisins (seedless) - ¼ cup has 270 mg of potassium.  · Seeds (sunflower or pumpkin) - 1  oz has 240 mg of potassium.  · Soy milk - 1 cup has 300 mg of potassium.  · Spinach (cooked) - 1/2 cup has 420 mg of potassium.  · Spinach (canned) - ½ cup has 370 mg of potassium.  · Sweet potato (baked with skin) - 1 medium has 450 mg of potassium.  · Swiss chard - ½ cup has 480 mg of potassium.  · Tomato or vegetable juice - ½ cup has 275 mg of potassium.  · Tomato (sauce or puree) - ½ cup has 400-550 mg of potassium.  · Tomato (raw) - 1 medium has 290 mg of potassium.  · Tomato (canned) - ½ cup has 200-300 mg of potassium.  · Turkey - 3 oz has 250 mg of potassium.  · Wheat germ - 1 oz has 250 mg of potassium.  · Winter squash - ½ cup has 250 mg of potassium.  · Yogurt (plain or fruited) - 6 oz has 260-435 mg of potassium.  · Zucchini - ½ cup has 220 mg of potassium.  Moderate in potassium  The following foods and beverages have  mg of potassium per serving:  · Apple - 1 fruit has 150 mg of potassium  · Apple juice - ½ cup has 150 mg of potassium  · Applesauce - ½ cup has 90 mg of potassium  · Apricot nectar - ½ cup has 140 mg of potassium  · Asparagus (small maher) - ½ cup has 155 mg of potassium  · Asparagus (large maher) - 6 have 155 mg of potassium  · Bagel (cinnamon raisin) - 1 four-inch bagel has 130 mg of potassium  · Bagel (egg or plain) - 1 four- inch bagel has 70 mg of potassium  · Beans (green) - ½ cup has 90 mg of potassium  · Beans (yellow) - ½ cup has 190 mg of potassium  · Beer, regular - 12 oz has 100 mg of potassium  · Beets (canned) - ½ cup has 125 mg of potassium  · Blackberries - ½ cup has 115 mg of potassium  · Blueberries - ½ cup has 60 mg of potassium  · Bread (whole wheat) - 1 slice has 70 mg of potassium  · Broccoli (raw) - ½ cup has 145 mg of potassium  · Cabbage - ½ cup has 150 mg of potassium  · Carrots (cooked or raw) - ½ cup has 180 mg of potassium  · Cauliflower (raw) - ½ cup has 150 mg of potassium  · Celery (raw) - ½ cup has 155 mg of potassium  · Cereal, bran flakes - ½  cup has 120-150 mg of potassium  · Cheese (cottage) - ½ cup has 110 mg of potassium  · Cherries - 10 have 150 mg of potassium  · Chocolate - 1½ oz bar has 165 mg of potassium  · Coffee (brewed) - 6 oz has 90 mg of potassium  · Corn - ½ cup or 1 ear has 195 mg of potassium  · Cucumbers - ½ cup has 80 mg of potassium  · Egg - 1 large egg has 60 mg of potassium  · Eggplant - ½ cup has 60 mg of potassium  · Endive (raw) - ½ cup has 80 mg of potassium  · English muffin - 1 has 65 mg of potassium  · Fish (ocean perch) - 3 oz has 192 mg of potassium  · Frankfurter, beef or pork - 1 has 75 mg of potassium  · Fruit cocktail - ½ cup has 115 mg of potassium  · Grape juice - ½ cup has 170 mg of potassium  · Grapefruit - ½ fruit has 175 mg of potassium  · Grapes - ½ cup has 155 mg of potassium  · Greens: kale, turnip, judith - ½ cup has 110-150 mg of potassium  · Ice cream or frozen yogurt (chocolate) - ½ cup has 175 mg of potassium  · Ice cream or frozen yogurt (vanilla) - ½ cup has 120-150 mg of potassium  · Cindy, limes - 1 each has 80 mg of potassium  · Lettuce - 1 cup has 100 mg of potassium  · Mixed vegetables - ½ cup has 150 mg of potassium  · Mushrooms, raw - ½ cup has 110 mg of potassium  · Nuts (walnuts, pecans, or macadamia) - 1 oz has 125 mg of potassium  · Oatmeal - ½ cup has 80 mg of potassium  · Okra - ½ cup has 110 mg of potassium  · Onions - ½ cup has 120 mg of potassium  · Peach - 1 has 185 mg of potassium  · Peaches (canned) - ½ cup has 120 mg of potassium  · Pears (canned) - ½ cup has 120 mg of potassium  · Peas, green (frozen) - ½ cup has 90 mg of potassium  · Peppers (Green) - ½ cup has 130 mg of potassium  · Peppers (Red) - ½ cup has 160 mg of potassium  · Pineapple juice - ½ cup has 165 mg of potassium  · Pineapple (fresh or canned) - ½ cup has 100 mg of potassium  · Plums - 1 has 105 mg of potassium  · Pudding, vanilla - ½ cup has 150 mg of potassium  · Raspberries - ½ cup has 90 mg of  potassium  · Rhubarb - ½ cup has 115 mg of potassium  · Rice, wild - ½ cup has 80 mg of potassium  · Shrimp - 3 oz has 155 mg of potassium  · Spinach (raw) - 1 cup has 170 mg of potassium  · Strawberries - ½ cup has 125 mg of potassium  · Summer squash - ½ cup has 175-200 mg of potassium  · Swiss chard (raw) - 1 cup has 135 mg of potassium  · Tangerines - 1 fruit has 140 mg of potassium  · Tea, brewed - 6 oz has 65 mg of potassium  · Turnips - ½ cup has 140 mg of potassium  · Watermelon - ½ cup has 85 mg of potassium  · Wine (Red, table) - 5 oz has 180 mg of potassium  · Wine (White, table) - 5 oz 100 mg of potassium  Low in potassium  The following foods and beverages have less than 50 mg of potassium per serving.  · Bread (white) - 1 slice has 30 mg of potassium  · Carbonated beverages - 12 oz has less than 5 mg of potassium  · Cheese - 1 oz has 20-30 mg of potassium  · Cranberries - ½ cup has 45 mg of potassium  · Cranberry juice cocktail - ½ cup has 20 mg of potassium  · Fats and oils - 1 Tbsp has less than 5 mg of potassium  · Hummus - 1 Tbsp has 32 mg of potassium  · Nectar (papaya, madison, or pear) - ½ cup has 35 mg of potassium  · Rice (white or brown) - ½ cup has 50 mg of potassium  · Spaghetti or macaroni (cooked) - ½ cup has 30 mg of potassium  · Tortilla, flour or corn - 1 has 50 mg of potassium  · Waffle - 1 four-inch waffle has 50 mg of potassium  · Water chestnuts - ½ cup has 40 mg of potassium  Summary  · Potassium is a mineral found in many foods and drinks. It affects how the heart works, and helps keep fluids and minerals balanced in the body.  · The amount of potassium you need each day depends on your age and any existing medical conditions you may have. Your health care provider or dietitian may recommend an amount of potassium that you should have each day.  This information is not intended to replace advice given to you by your health care provider. Make sure you discuss any questions you have  with your health care provider.  Document Released: 08/01/2006 Document Revised: 11/30/2018 Document Reviewed: 03/14/2018  Elsevier Patient Education © 2020 Elsevier Inc.

## 2021-04-12 NOTE — CARE PLAN
Problem: Pain Management  Goal: Pain level will decrease to patient's comfort goal  Outcome: PROGRESSING AS EXPECTED     Problem: Knowledge Deficit  Goal: Knowledge of the prescribed therapeutic regimen will improve  Description: Patient able to understand importance of glucose monitoring and insulin administration, newly diagnosed DM2  Outcome: PROGRESSING AS EXPECTED     Problem: Infection  Goal: Will remain free from infection  Description: Patient has remained free of any S/S of infection.  Outcome: PROGRESSING AS EXPECTED

## 2021-04-12 NOTE — PROGRESS NOTES
Assumed care of patient, bedside report received from PATEL Kerr. Patient in bed watching television. Patient denies any pain at this time and all questions were addressed at this time. Bed in lowest position and call light is within reach.

## 2021-04-12 NOTE — PROGRESS NOTES
Community Health Worker Intake  • Social determinates of health intake Complete.   • Identified barriers to None.  • Contact information provided to Roya Muniz   • Has PCP appointment scheduled   • [id number].   • Inpatient assessment completed.    ANA MARÍA Cortés met with pt bedside to introduce CCM and GSC programs. Pt accepted GSC. Pt requesting information on a Diabetes education class, CHW will provide resources via mail to pt. Pt identifies no other barriers to resources and expressed no other needs at this time.     Plan: CHW will refer pt to GSC

## 2021-04-13 ENCOUNTER — PATIENT OUTREACH (OUTPATIENT)
Dept: MEDICAL GROUP | Facility: PHYSICIAN GROUP | Age: 81
End: 2021-04-13

## 2021-04-13 ENCOUNTER — TELEPHONE (OUTPATIENT)
Dept: MEDICAL GROUP | Facility: PHYSICIAN GROUP | Age: 81
End: 2021-04-13

## 2021-04-13 LAB
PANC ISLET CELL AB TITR SER: NORMAL {TITER}
UNCODED TEST RESULT 95040: ABNORMAL

## 2021-04-13 NOTE — TELEPHONE ENCOUNTER
ESTABLISHED PATIENT PRE-VISIT PLANNING     Patient was NOT contacted to complete PVP.     Note: Patient will not be contacted if there is no indication to call.     1.  Reviewed notes from the last few office visits within the medical group: Yes    2.  If any orders were placed at last visit or intended to be done for this visit (i.e. 6 mos follow-up), do we have Results/Consult Notes?         •  Labs - Labs were not ordered at last office visit.  Note: If patient appointment is for lab review and patient did not complete labs, check with provider if OK to reschedule patient until labs completed.       •  Imaging - Imaging was not ordered at last office visit.       •  Referrals - No referrals were ordered at last office visit.    3. Is this appointment scheduled as a Hospital Follow-Up? No    4.  Immunizations were updated in Epic using Reconcile Outside Information activity? Yes    5.  Patient is due for the following Health Maintenance Topics:   Health Maintenance Due   Topic Date Due   • IMM ZOSTER VACCINES (2 of 3) 12/19/2012   • RETINAL SCREENING  02/04/2020   • Annual Wellness Visit  01/31/2021   • FASTING LIPID PROFILE  03/06/2021   • URINE ACR / MICROALBUMIN  03/06/2021       6.  AHA (Pulse8) form printed for Provider? Email sent to [facility] requesting form

## 2021-04-13 NOTE — CONSULTS
Diabetes education: Met with pt and  prior to discharge. Pt is newly dx with diabetes and admitted with blood sugar of 853 and Hga1c of 18.3%.  Pt was on Lantus 20 units pm with Humalog 5 units tid meals and Humalog sliding scale coverage ac and hs. Blood sugars were 209 ( 4 units), 160 ( 3 + 5 units) and 159 ( 3 +5 units).  Discussed what diabetes was,  type two, hypoglycemia, hyperglycemia, sick day, and goals for blood sugars.  Discussed need for carbohydrates and proteins, with every meal and snack as well as why. Discussed the importance of the HS snack with a carbohydrate and protein. Discussed need, benefit and precautions with exercise.  Discussed  what effects blood sugars. Discussed need to follow up with PCP.  Insulin was discussed as well, insulin storage, shelf life and site rotation. Pt and  practiced with saline pens.  Once meds to beds delivered medications and meter, CDE instructed pt on finger sticks and use of meter. Return demo done with finger stick of 159. Pt had been sticking her finger and giving her insulin with nursing.  Handout given and questions answered.

## 2021-04-14 ENCOUNTER — PATIENT OUTREACH (OUTPATIENT)
Dept: MEDICAL GROUP | Facility: PHYSICIAN GROUP | Age: 81
End: 2021-04-14

## 2021-04-14 ENCOUNTER — HOME HEALTH ADMISSION (OUTPATIENT)
Dept: HOME HEALTH SERVICES | Facility: HOME HEALTHCARE | Age: 81
End: 2021-04-14
Payer: MEDICARE

## 2021-04-14 ENCOUNTER — OFFICE VISIT (OUTPATIENT)
Dept: MEDICAL GROUP | Facility: PHYSICIAN GROUP | Age: 81
End: 2021-04-14
Payer: MEDICARE

## 2021-04-14 VITALS
OXYGEN SATURATION: 97 % | WEIGHT: 152.4 LBS | HEART RATE: 77 BPM | DIASTOLIC BLOOD PRESSURE: 70 MMHG | BODY MASS INDEX: 26.02 KG/M2 | RESPIRATION RATE: 16 BRPM | TEMPERATURE: 97.7 F | SYSTOLIC BLOOD PRESSURE: 130 MMHG | HEIGHT: 64 IN

## 2021-04-14 DIAGNOSIS — E11.9 TYPE 2 DIABETES MELLITUS WITHOUT COMPLICATION, WITH LONG-TERM CURRENT USE OF INSULIN (HCC): ICD-10-CM

## 2021-04-14 DIAGNOSIS — Z79.4 TYPE 2 DIABETES MELLITUS WITHOUT COMPLICATION, WITH LONG-TERM CURRENT USE OF INSULIN (HCC): ICD-10-CM

## 2021-04-14 PROCEDURE — 99214 OFFICE O/P EST MOD 30 MIN: CPT | Performed by: FAMILY MEDICINE

## 2021-04-14 ASSESSMENT — FIBROSIS 4 INDEX: FIB4 SCORE: 1.66

## 2021-04-14 NOTE — PATIENT INSTRUCTIONS
Try egg whites with baby spinach or baby kale or other veggies.  Change flour tortilla to corn street taco or other lower carb options (thin sliced bread e.g. Regan) or just cut out the carb and add more veggies    Try lemon instead of salad dressing  Try quinoa, goes well on a salad too    Try making salad dressing with plain low fat greek yogurt and lemon or lime and garlic and parsely etc.

## 2021-04-14 NOTE — PROGRESS NOTES
Subjective:     CC:    Chief Complaint   Patient presents with   • Follow-Up       HISTORY OF THE PRESENT ILLNESS: Patient is a 80 y.o. female. This pleasant patient is here today to review :    Problem   Type 2 Diabetes Mellitus Without Complication, With Long-Term Current Use of Insulin ([facility])    Admitted 4/9 for DKA, had been diet controlled prior. Was d/c home 4/12/21 on lantus.   She met me on 3/12/21 to establish we had reviewed her chronic medical problems. I had ordered her routine labs but she did not obtain those as she was admitted 4/9. a1c was 18.3.   tsh 1.03  4/9 GFR 31, 4/11 > 60, plt 159    04/14/21 fbs 283, pre-lunch 230s, similar reading pre-dinner. At bed time took 25u lantus, this am at 7am 4/14/21 fbs 104 so did not use novolog 5u.   Has a sliding scale. 151-200 (2), 201 (4), 251-300 (6), 301-350 (8), 351-400 (10, >400 (12).    is cooking, eg had 1/4c rice w/ lots of veggies and small chix breast + a green salad dressing with low fat italian. Breakfast - small bowl of strawberries or peaches in low sugar syrup with scrambled eggs in a tortilla. (small flour).            Allergies: Cephalexin [keflex]    Current Outpatient Medications Ordered in Epic   Medication Sig Dispense Refill   • guaiFENesin ER (MUCINEX) 600 MG TABLET SR 12 HR Take 1 tablet by mouth every 12 hours for 14 days. 28 tablet 0   • insulin glargine (LANTUS SOLOSTAR) 100 UNIT/ML Solution Pen-injector injection Inject 25 Units under the skin every evening for 30 days. 9 mL 0   • insulin aspart (NOVOLOG FLEXPEN) 100 UNIT/ML injection PEN Inject 5 Units under the skin 3 times a day before meals for 30 days. 6 mL 0   • insulin aspart (NOVOLOG FLEXPEN) 100 UNIT/ML injection PEN Inject 2-12 Units under the skin 3 times a day before meals for 30 days. 12 mL 0   • Alcohol Swabs Wipe site with prep pad prior to injection. 200 Each 0   • Blood Glucose Monitoring Suppl (FREESTYLE LITE) Device Test blood sugar as directed. 1 Each 0   •  glucose blood strip Use one strip to test blood sugar 4times/day. 200 Strip 0   • Insulin Pen Needle 32 G x 4 mm Use one pen needle in pen device to inject insulin 200 Each 0   • Lancets Use one lancet to test blood sugar 4 times/day 200 Each 0   • rosuvastatin (CRESTOR) 10 MG Tab TAKE ONE TABLET BY MOUTH EVERY EVENING 100 tablet 2   • amLODIPine (NORVASC) 10 MG Tab TAKE ONE TABLET BY MOUTH DAILY for Blood pressure 90 tablet 1   • losartan (COZAAR) 100 MG Tab TAKE ONE TABLET BY MOUTH DAILY for Blood Pressure 100 tablet 1   • levothyroxine (SYNTHROID) 112 MCG Tab TAKE ONE TABLET BY MOUTH EVERY MORNING ON AN EMPTY STOMACH 90 Tab 3   • vitamin D (CHOLECALCIFEROL) 1000 UNIT Tab Take 1,000 Units by mouth every morning.     • aspirin 81 MG tablet Take 81 mg by mouth every morning.       No current [facility]-ordered facility-administered medications on file.       Past Medical History:   Diagnosis Date   • Cardiac pacemaker in situ    • Diabetes (HCC)    • History of DVT of lower extremity 6/5/2014   • Hyperlipidemia    • Hypertension    • Pancreatic cyst    • Pancreatitis    • Sinus node dysfunction (HCC)    • Thyroid disease     hypothyroidism       Past Surgical History:   Procedure Laterality Date   • GASTROSCOPY-ENDO  2/21/2014    Performed by Steve Islas Jr., M.D. at ENDOSCOPY [facility]   • GASTROSCOPY-ENDO  2/20/2014    Performed by Antonio Espinoza M.D. at ENDOSCOPY [facility]   • EXPLORATORY LAPAROTOMY  2/10/2014    Performed by iRgo Garcia M.D. at SURGERY [facility]   • CHOLECYSTECTOMY  2/10/2014    Performed by Rigo Garcia M.D. at SURGERY [facility]   • PANCREATECTOMY  2/10/2014    Performed by Rigo Garcia M.D. at SURGERY [facility]   • GASTROSTOMY FEEDING  2/10/2014    Performed by Rigo Garcia M.D. at SURGERY 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 Is , has a son in [place], one in [place] and one in [place]. She enjoys riding quads with her  and gardening. She wears a helmet on her quad. No tob, drugs and only rare etoh.        Family History   Problem Relation Age of Onset   • Heart Disease Mother         enlarged heart, heart failure   • Cancer Father         liver   • No Known Problems Sister    • Heart Disease Brother         heart failure   • Lung Disease Brother         heavy smoker   • Alcohol/Drug Brother         etoh   • Cancer Paternal Aunt         breast cancer    • Cancer Maternal Grandmother         liver    • Heart Disease Maternal Grandfather         CHF    • No Known Problems Paternal Grandmother    • No Known Problems Paternal Grandfather    • No Known Problems Sister    • No Known Problems Sister    • Stroke Sister    • No Known Problems Son    • No Known Problems Son    • No Known Problems Son        Health Maintenance: has appt w/ [facility] pending    ROS:   See HPI  Gen: no fevers/chills, no nightsweats, no changes in weight  Eyes:+changes in vision when she wears her glasses, is fine w/o glasses, no dry eyes  ENT: no sore throat, no dysphagia, no hearing loss, no bloody nose  Pulm: no sob, no cough, no wheezing  CV: no chest pain, no palpitations, no syncope  GI: no nausea/vomiting, no diarrhea/constipation, no abdominal pain, no blood in stool  : no dysuria, no incontinence  MSk: no myalgias, no weakness, no falls  Skin: no rash  Neuro: no headaches, no numbness/tingling  Heme/Lymph: no easy bruising  Psych: no depression, no anxiety, no hallucinations, no insomnia, no memory concerns       "  Objective:     Exam:   /70 (BP Location: Left arm, Patient Position: Sitting, BP Cuff Size: Adult)   Pulse 77   Temp 36.5 °C (97.7 °F) (Temporal)   Resp 16   Ht 1.626 m (5' 4\")   Wt 69.1 kg (152 lb 6.4 oz)   SpO2 97%   BMI 26.16 kg/m²   Body mass index is 26.16 kg/m².  Wt Readings from Last 4 Encounters:   04/14/21 69.1 kg (152 lb 6.4 oz)   04/11/21 71 kg (156 lb 8.4 oz)   03/12/21 72.4 kg (159 lb 9.6 oz)   11/24/20 74.7 kg (164 lb 9.6 oz)     General: Normal appearing. No distress. Appropriately groomed.  HEENT: Normocephalic. Eyes conjunctiva clear lids without ptosis, pupils equal and reactive to light accommodation, ears normal shape and contour, canals are clear bilaterally, tympanic membranes are benign, nasal mucosa benign, oropharynx is without erythema, edema or exudates.   Neck: Supple without JVD or bruit. Thyroid is not enlarged.   Pulmonary: Clear to ausculation.  Normal effort. No rales, ronchi, or wheezing.  Cardiovascular: Regular rate and rhythm without murmur. Carotid and radial pulses are intact and equal bilaterally.  Abdomen: Soft, nontender, nondistended. Normal bowel sounds. Liver and spleen are not palpable  Neurologic: Grossly nonfocal  Lymph: No cervical or supraclavicular lymph nodes are palpable  Skin: Warm and dry.  No obvious lesions.  Musculoskeletal: Normal gait. No extremity cyanosis, clubbing, or edema.  Psych: Normal mood and affect. Alert and oriented x3. Judgment and insight is normal.      Assessment & Plan:   80 y.o. female with the following -    1. Type 2 diabetes mellitus without complication, with long-term current use of insulin ([facility])  - REFERRAL TO HOME HEALTH     Reviewed diet.   Try egg whites with baby spinach or baby kale or other veggies.  Change flour tortilla to corn street taco or other lower carb options (thin sliced bread e.g. Regan) or just cut out the carb and add more veggies    Try lemon instead of salad dressing  Try quinoa, goes well on a " salad too    Try making salad dressing with plain low fat greek yogurt and lemon or lime and garlic and parsely etc.    Has appt already to see me in 2wk    Please note that this dictation was created using voice recognition software. I have made every reasonable attempt to correct obvious errors, but I expect that there are errors of grammar and possibly content that I did not discover before finalizing the note.  I spent a total of 30 minutes with record review, exam, communication with the patient, communication with other providers, and documentation of this encounter.

## 2021-04-14 NOTE — PROGRESS NOTES
Nurse CM outreach call to patient for hospital follow-up. Patient reports she was discharged 2 days ago. Reports on the first night at home she had a difficult time figuring out her diabetes medications. Report she contacted telemedicine team through [facility] [employer] for assistance. Reports Laynesa came to her house to help her figure out the medications. Pt reports she is on her way to go see Dr. Browning this morning for an appointment.     Pt states the diabetes management and insulin is all new and she is needing some assistance.    Pt reports blood sugar this morning was 104. Reports she didn't take her insulin this morning. Reports she did eat breakfast this morning. Reports she was given a sliding scale coverage for Novolog at the hospital. Pt states she is going to bring the sliding scale to talk to Dr. Browning about dosage. Per medication list in Epic patient was given two prescriptions for Novolog 5 units at meals and also additional one for sliding scale. Nurse reviewed hypoglycemia and appropriate treatment. Pt is able to teach back s/s and appropriate treatment.     Plan: Pt reports she is in a hurry this am to get to  Appointment. Pt would benefit from Home Care Nursing for assistance with diabetes management and skills. Would also benefit from referral to Health Management for diabetes education. Nurse will route message to PCP. Nurse also contacted nurse Karly at [facility] with information.  Nurse will contact patient later and may benefit from CCM program.

## 2021-04-15 ENCOUNTER — PATIENT OUTREACH (OUTPATIENT)
Dept: MEDICAL GROUP | Facility: PHYSICIAN GROUP | Age: 81
End: 2021-04-15

## 2021-04-15 NOTE — PROGRESS NOTES
Nurse CM outreach call for follow-up. Spouse answered telephone. Reports patient currently not available. States patient had visit with PCP yesterday and has better understanding of insulin dose and management. Spouse states patient had a blood sugar reading of 158 this morning. Spouse states they feel comfortable managing care and he will have patient contact nurse CM if any questions or if patient would like to enroll in CCM program after discharge from home care. Nurse provided spouse with CM contact information.

## 2021-04-16 LAB — GAD65 AB SER IA-ACNC: <5 IU/ML (ref 0–5)

## 2021-04-17 ENCOUNTER — HOME CARE VISIT (OUTPATIENT)
Dept: HOME HEALTH SERVICES | Facility: HOME HEALTHCARE | Age: 81
End: 2021-04-17
Payer: MEDICARE

## 2021-04-17 VITALS
HEIGHT: 64 IN | WEIGHT: 149 LBS | TEMPERATURE: 98.4 F | DIASTOLIC BLOOD PRESSURE: 64 MMHG | RESPIRATION RATE: 16 BRPM | BODY MASS INDEX: 25.44 KG/M2 | SYSTOLIC BLOOD PRESSURE: 124 MMHG | OXYGEN SATURATION: 97 % | HEART RATE: 78 BPM

## 2021-04-17 LAB — INSULIN HUMAN AB SER-ACNC: <0.4 U/ML (ref 0–0.4)

## 2021-04-17 PROCEDURE — G0493 RN CARE EA 15 MIN HH/HOSPICE: HCPCS

## 2021-04-17 PROCEDURE — 665001 SOC-HOME HEALTH

## 2021-04-17 ASSESSMENT — FIBROSIS 4 INDEX: FIB4 SCORE: 1.66

## 2021-04-17 NOTE — PROGRESS NOTES
Primary dx/Skilled need: IDDM- new onset. Experienced ketoacidosis April 9th and was hospitalized and discharged home on April 12th with newly Dx. IDDM. TISH. Sick sinus syndrome. Pt. history benign tumor, partial removal of the pancreas in 2014. Osteopenia, colon polyps.  Skilled need for DM, Medication response and C/V assessment/intervention.  SN frequency: 1wk1, 2wk4, 1wk4  Zip code: [address]  Disciplines ordered:   Insurance and authorization: [facility]  Certification period:  04/17/21 to 6/15/21  Special considerations.Blood sugar log was missing afternoon and evening result readings(pt.was omitting to record)

## 2021-04-18 ASSESSMENT — ENCOUNTER SYMPTOMS
NAUSEA: DENIES
VOMITING: DENIES

## 2021-04-18 ASSESSMENT — PATIENT HEALTH QUESTIONNAIRE - PHQ9
CLINICAL INTERPRETATION OF PHQ2 SCORE: 0
2. FEELING DOWN, DEPRESSED, IRRITABLE, OR HOPELESS: 00
1. LITTLE INTEREST OR PLEASURE IN DOING THINGS: 00

## 2021-04-19 ENCOUNTER — TELEPHONE (OUTPATIENT)
Dept: MEDICAL GROUP | Facility: PHYSICIAN GROUP | Age: 81
End: 2021-04-19

## 2021-04-19 NOTE — TELEPHONE ENCOUNTER
Medication chart review for [facility] services    PCP:  Bisi Browning M.D.  [address]  [place] [address]  Fax: [phone]    Current medication list     Current Outpatient Medications:   •  Lantus SoloStar, 25 Units, Subcutaneous, Q EVENING  •  Alcohol Swabs, Wipe site with prep pad prior to injection.  •  FreeStyle Lite, Test blood sugar as directed.  •  glucose blood, Use one strip to test blood sugar 4times/day.  •  Insulin Pen Needle 32 G x 4 mm, Use one pen needle in pen device to inject insulin  •  Lancets, Use one lancet to test blood sugar 4 times/day  •  aspirin, 81 mg, Oral, DAILY AT 1800  •  Acetaminophen (TYLENOL 8 HOUR PO), 500 mg, Oral, QDAY PRN  •  Vitamin D (Cholecalciferol), 1,000 Units, Oral, DAILY AT 1800  •  NovoLOG FlexPen, 2-12 Units, Subcutaneous, TID AC (Patient taking differently: 2-12 Units, Subcutaneous, 3 TIMES DAILY BEFORE MEALS, Per sliding scale - reference pt. chart call if BS greater than 400 even if after hours)  •  rosuvastatin, TAKE ONE TABLET BY MOUTH EVERY EVENING  •  amLODIPine, TAKE ONE TABLET BY MOUTH DAILY for Blood pressure  •  losartan, TAKE ONE TABLET BY MOUTH DAILY for Blood Pressure  •  levothyroxine, TAKE ONE TABLET BY MOUTH EVERY MORNING ON AN EMPTY STOMACH    Allergies   Allergen Reactions   • Cephalexin [Keflex] Hives, Rash, Itching and Unspecified     Photosensitivity         Medications on discharge summary that are not on their home medication list (or the dosing interval differs from the discharge summary)     guaiFENesin  MG Tb12  Commonly known as: MUCINEX    Take 1 tablet by mouth every 12 hours for 14 days.  Dose: 600 mg          Medications on home medication list not listed on their discharge summary     APAP     Labs / Images Reviewed:     Lab Results   Component Value Date/Time    SODIUM 135 04/12/2021 07:29 AM    POTASSIUM 3.2 (L) 04/12/2021 07:29 AM    CHLORIDE 100 04/12/2021 07:29 AM    CO2 26 04/12/2021 07:29 AM     GLUCOSE 148 (H) 04/12/2021 07:29 AM    BUN 17 04/12/2021 07:29 AM    CREATININE 0.89 04/12/2021 07:29 AM    CREATININE 1.59 (H) 02/10/2011 08:40 AM    BUNCREATRAT 21 02/10/2011 08:40 AM    GLOMRATE 32 (L) 02/10/2011 08:40 AM      Lab Results   Component Value Date/Time    ALKPHOSPHAT 117 (H) 04/09/2021 09:05 AM    ASTSGOT 17 04/09/2021 09:05 AM    ALTSGPT 30 04/09/2021 09:05 AM    TBILIRUBIN 1.3 04/09/2021 09:05 AM    ALBUMIN 4.8 04/09/2021 09:05 AM    INR 1.12 11/05/2015 11:05 AM            Assessment and Plan:   • Received referral from [facility]. Medications reviewed. No clinically significant interactions noted.             John Mac, PharmD, MS, BCACP, LCC  [facility]  Phone [phone] fax [phone]    This note was created using voice recognition software (Dragon). The accuracy of the dictation is limited by the abilities of the software. I have reviewed the note prior to signing, however some errors in grammar and context are still possible. If you have any questions related to this note please do not hesitate to contact our office.

## 2021-04-20 ENCOUNTER — HOME CARE VISIT (OUTPATIENT)
Dept: HOME HEALTH SERVICES | Facility: HOME HEALTHCARE | Age: 81
End: 2021-04-20
Payer: MEDICARE

## 2021-04-20 PROCEDURE — G0299 HHS/HOSPICE OF RN EA 15 MIN: HCPCS

## 2021-04-21 VITALS
SYSTOLIC BLOOD PRESSURE: 114 MMHG | DIASTOLIC BLOOD PRESSURE: 76 MMHG | TEMPERATURE: 97.8 F | RESPIRATION RATE: 18 BRPM | HEART RATE: 76 BPM | OXYGEN SATURATION: 98 %

## 2021-04-21 ASSESSMENT — ENCOUNTER SYMPTOMS
MUSCLE WEAKNESS: 1
NAUSEA: DENIES
VOMITING: DENIES

## 2021-04-22 ENCOUNTER — TELEPHONE (OUTPATIENT)
Dept: MEDICAL GROUP | Facility: PHYSICIAN GROUP | Age: 81
End: 2021-04-22

## 2021-04-22 NOTE — TELEPHONE ENCOUNTER
ESTABLISHED PATIENT PRE-VISIT PLANNING     Patient was NOT contacted to complete PVP.     Note: Patient will not be contacted if there is no indication to call.     1.  Reviewed notes from the last few office visits within the medical group: Yes    2.  If any orders were placed at last visit or intended to be done for this visit (i.e. 6 mos follow-up), do we have Results/Consult Notes?         •  Labs - Labs were not ordered at last office visit.  Note: If patient appointment is for lab review and patient did not complete labs, check with provider if OK to reschedule patient until labs completed.       •  Imaging - Imaging was not ordered at last office visit.       •  Referrals - Seeing [facility]    3. Is this appointment scheduled as a Hospital Follow-Up? No    4.  Immunizations were updated in Epic using Reconcile Outside Information activity? Yes    5.  Patient is due for the following Health Maintenance Topics:   Health Maintenance Due   Topic Date Due   • IMM ZOSTER VACCINES (2 of 3) 12/19/2012   • RETINAL SCREENING  02/04/2020   • Annual Wellness Visit  01/31/2021   • FASTING LIPID PROFILE  03/06/2021   • URINE ACR / MICROALBUMIN  03/06/2021       6.  AHA (Pulse8) form printed for Provider? Email sent to [facility] requesting form

## 2021-04-23 ENCOUNTER — HOME CARE VISIT (OUTPATIENT)
Dept: HOME HEALTH SERVICES | Facility: HOME HEALTHCARE | Age: 81
End: 2021-04-23
Payer: MEDICARE

## 2021-04-23 VITALS
TEMPERATURE: 96.8 F | SYSTOLIC BLOOD PRESSURE: 122 MMHG | HEART RATE: 82 BPM | OXYGEN SATURATION: 98 % | RESPIRATION RATE: 17 BRPM | DIASTOLIC BLOOD PRESSURE: 80 MMHG

## 2021-04-23 VITALS
DIASTOLIC BLOOD PRESSURE: 80 MMHG | TEMPERATURE: 96.8 F | WEIGHT: 150 LBS | OXYGEN SATURATION: 99 % | SYSTOLIC BLOOD PRESSURE: 122 MMHG | HEIGHT: 64 IN | RESPIRATION RATE: 17 BRPM | BODY MASS INDEX: 25.61 KG/M2 | HEART RATE: 73 BPM

## 2021-04-23 PROCEDURE — G0270 MNT SUBS TX FOR CHANGE DX: HCPCS

## 2021-04-23 PROCEDURE — G0495 RN CARE TRAIN/EDU IN HH: HCPCS

## 2021-04-23 RX ORDER — INSULIN ASPART 100 [IU]/ML
2-12 INJECTION, SOLUTION INTRAVENOUS; SUBCUTANEOUS
Qty: 12 ML | Refills: 0 | Status: SHIPPED | OUTPATIENT
Start: 2021-04-23 | End: 2021-04-28 | Stop reason: SDUPTHER

## 2021-04-23 SDOH — ECONOMIC STABILITY: HOUSING INSECURITY
HOME SAFETY: FALL RISK OF 2 NOTED PER SOC. PT IS ON RA. NO CHANGES TO MED LIST NOTED AT THIS TIME. PT REPORTS NO FALLS OR SAFETY CONCERNS AT THIS TIME. RN REVIEWED MEDS WITH PT PRIOR TO RD VISIT, PT DENIES CHANGES TO MEDICATIONS.

## 2021-04-23 ASSESSMENT — ENCOUNTER SYMPTOMS
NAUSEA: DENIES
VOMITING: DENIES

## 2021-04-23 ASSESSMENT — FIBROSIS 4 INDEX: FIB4 SCORE: 1.66

## 2021-04-26 ENCOUNTER — HOME CARE VISIT (OUTPATIENT)
Dept: HOME HEALTH SERVICES | Facility: HOME HEALTHCARE | Age: 81
End: 2021-04-26
Payer: MEDICARE

## 2021-04-26 PROCEDURE — G0299 HHS/HOSPICE OF RN EA 15 MIN: HCPCS

## 2021-04-26 ASSESSMENT — ENCOUNTER SYMPTOMS
VOMITING: DENIES
NAUSEA: DENIES

## 2021-04-26 ASSESSMENT — ACTIVITIES OF DAILY LIVING (ADL): OASIS_M1830: 03

## 2021-04-26 NOTE — CASE COMMUNICATION
Quality Review for 4.17.21 SOC OASIS performed on by DEBRA Min RN on 4.26, 2021:    Edits completed by DEBRA Min RN:  1, Changed  to 4.17.21 per LSOC order  2. Changed  to 2 per pain narrative  3. Changed  to 1 per SNV on 4.20.21. Changed  to 4.20.21 date of SNV, data used as part of the collaboration convention  4. Changed , , ,  to 2,  to 3,  to 1,  to 2,  to 3, [id number] C,E,F,G,H to 4, [id number] D,E,F,G,I,J,K,L,M,N,O,P to 4 for safety related to HB status report that pt is having blood sugar instability, pt would need supervision and DME to safely perform ADLs.   5. Changed [facility] 10 question on poly pharm to yes per the MAR  6. Changed  and  to 3 per ambulation score   7. Changed  to 0  8. Added ambulate only with assistance, fall risk and high risk medication precautions to safety measures. Added heart healthy diet to nutritional requirements per dxs  9. Updated F2F data  10. Added HTN to care plan  11. Added fall reduction education to patient safety goals  12. Added endurance and ambulation to functional limitations  13. Added severe taxing effort to leave the home to HB status

## 2021-04-27 ENCOUNTER — APPOINTMENT (OUTPATIENT)
Dept: MEDICAL GROUP | Facility: PHYSICIAN GROUP | Age: 81
End: 2021-04-27
Payer: MEDICARE

## 2021-04-28 ENCOUNTER — OFFICE VISIT (OUTPATIENT)
Dept: MEDICAL GROUP | Facility: PHYSICIAN GROUP | Age: 81
End: 2021-04-28
Payer: MEDICARE

## 2021-04-28 VITALS
HEART RATE: 83 BPM | RESPIRATION RATE: 15 BRPM | WEIGHT: 153 LBS | BODY MASS INDEX: 26.12 KG/M2 | OXYGEN SATURATION: 97 % | SYSTOLIC BLOOD PRESSURE: 140 MMHG | DIASTOLIC BLOOD PRESSURE: 80 MMHG | HEIGHT: 64 IN | TEMPERATURE: 98.3 F

## 2021-04-28 DIAGNOSIS — Z79.4 TYPE 2 DIABETES MELLITUS WITHOUT COMPLICATION, WITH LONG-TERM CURRENT USE OF INSULIN (HCC): ICD-10-CM

## 2021-04-28 DIAGNOSIS — E78.2 MIXED HYPERLIPIDEMIA: ICD-10-CM

## 2021-04-28 DIAGNOSIS — E11.9 TYPE 2 DIABETES MELLITUS WITHOUT COMPLICATION, WITH LONG-TERM CURRENT USE OF INSULIN (HCC): ICD-10-CM

## 2021-04-28 DIAGNOSIS — Z23 NEED FOR SHINGLES VACCINE: ICD-10-CM

## 2021-04-28 DIAGNOSIS — F41.9 ANXIETY AND DEPRESSION: ICD-10-CM

## 2021-04-28 DIAGNOSIS — F32.A ANXIETY AND DEPRESSION: ICD-10-CM

## 2021-04-28 PROCEDURE — 90750 HZV VACC RECOMBINANT IM: CPT | Performed by: FAMILY MEDICINE

## 2021-04-28 PROCEDURE — 99214 OFFICE O/P EST MOD 30 MIN: CPT | Mod: 25 | Performed by: FAMILY MEDICINE

## 2021-04-28 PROCEDURE — 90471 IMMUNIZATION ADMIN: CPT | Performed by: FAMILY MEDICINE

## 2021-04-28 RX ORDER — INSULIN GLARGINE 100 [IU]/ML
INJECTION, SOLUTION SUBCUTANEOUS
Qty: 5 EACH | Refills: 11 | Status: SHIPPED | OUTPATIENT
Start: 2021-04-28 | End: 2022-02-03 | Stop reason: SDUPTHER

## 2021-04-28 RX ORDER — INSULIN ASPART 100 [IU]/ML
2-12 INJECTION, SOLUTION INTRAVENOUS; SUBCUTANEOUS
Qty: 12 ML | Refills: 11 | Status: SHIPPED | OUTPATIENT
Start: 2021-04-28 | End: 2021-05-28

## 2021-04-28 ASSESSMENT — FIBROSIS 4 INDEX: FIB4 SCORE: 1.66

## 2021-04-28 NOTE — PROGRESS NOTES
Subjective:     CC:   Chief Complaint   Patient presents with   • Follow-Up         HPI:   Roya presents today with f/u dm2, recent admit for DKA (after only having a h/o predm).   Sees optho 5/3.     Problem   Hyperlipidemia    On crestor 10  Results for ROYA NATHAN (MRN [id number]) as of 4/28/2021 13:58   Ref. Range 3/6/2020 08:13   Cholesterol,Tot Latest Ref Range: 100 - 199 mg/dL 141   Triglycerides Latest Ref Range: 0 - 149 mg/dL 147   HDL Latest Ref Range: >=40 mg/dL 56   LDL Latest Ref Range: <100 mg/dL 56        Anxiety and Depression    Doesn't want a rx for this. Has had some difficulty adjusting to all the changes since her diagnosis of IDDM plus her change in energy level over the past few months but defers BH as well.      Type 2 Diabetes Mellitus Without Complication, With Long-Term Current Use of Insulin ([facility])    Admitted 4/9 for DKA, had been diet controlled dm2 prior.   Ref. Range 6/2/2017 07:44 2/15/2018 08:29 8/17/2018 10:31 1/30/2019 07:59 8/28/2019 08:04 3/6/2020 08:13 11/20/2020 08:39 4/9/2021 09:05   Glycohemoglobin Latest Ref Range: 4.0 - 5.6 % 6.1 (H) 6.4 (H) 6.8 (H) 6.7 (H) 6.6 (H) 7.7 (H) 7.3 (H) 18.3 (H)   Was d/c home 4/12/21 on lantus.   She met me on 3/12/21 to establish we had reviewed her chronic medical problems. I had ordered her routine labs but she did not obtain those as she was admitted 4/9. a1c was 18.3.   tsh 1.03  4/9 GFR 31, 4/11 > 60, plt 159    04/14/21 fbs 283, pre-lunch 230s, similar reading pre-dinner. At bed time took 25u lantus, this am at 7am 4/14/21 fbs 104 so did not use novolog 5u.   Has a sliding scale. 151-200 (2), 201 (4), 251-300 (6), 301-350 (8), 351-400 (10, >400 (12).    is cooking, eg had 1/4c rice w/ lots of veggies and small chix breast + a green salad dressing with low fat italian. Breakfast - small bowl of strawberries or peaches in low sugar syrup with scrambled eggs in a tortilla. (small flour).    04/28/21 states has been eating a  similar diet. Had a dietician come to her house which was helpful.  Her fasting blood sugars have been 186, 189, 147, 138, 128, 176, 158, 183, 149, 143, 159, 159.  She has taken between 0 to 7 units with breakfast.  Her pre- lunch sugars have been 136, 93, 143, 142, 133, 96, 104, 89, 187, 132, 68, 86.  Only once did she need to take 7 units with lunch.  Her 15-minute predinner sugars have been 100s, 185, 90, 124, 108, 166, 183, 134, 91, 158, 134.  On 3 occasions she took 7 units.  Her blood pressures have been 114/76 and 132/76.    04/28/21 I suggested that she slowly increase her Lantus by 1 unit every other day until her fasting morning sugars are in the 120s to 140s.  I also suggested she meet with an endocrinologist as I think it would be beneficial to have a continuous blood glucose monitor to limit the 3 times a day glucose checks.  I also think it is concerning that she went from diet controlled diabetes to DKA in a very short over the prior month.          Current Outpatient Medications Ordered in Epic   Medication Sig Dispense Refill   • insulin glargine (LANTUS SOLOSTAR) 100 UNIT/ML Solution Pen-injector injection 26u qhs, increase by 1u qo day until at goal fasting blood sugar 120-140 5 Each 11   • NOVOLOG FLEXPEN 100 UNIT/ML solution for injection Inject 2-12 Units under the skin 3 times a day before meals for 30 days. Use sliding scale as previously instructed. 12 mL 11   • aspirin 81 MG EC tablet Take 81 mg by mouth every day at 6 PM.     • Acetaminophen (TYLENOL 8 HOUR PO) Take 500 mg by mouth 1 time a day as needed (HA or body aches).     • Vitamin D, Cholecalciferol, (CHOLECALCIFEROL) 25 MCG (1000 UT) Tab Take 1,000 Units by mouth every day at 6 PM.     • Alcohol Swabs Wipe site with prep pad prior to injection. 200 Each 0   • Blood Glucose Monitoring Suppl (FREESTYLE LITE) Device Test blood sugar as directed. 1 Each 0   • glucose blood strip Use one strip to test blood sugar 4times/day. 200 Strip 0  "  • Insulin Pen Needle 32 G x 4 mm Use one pen needle in pen device to inject insulin 200 Each 0   • Lancets Use one lancet to test blood sugar 4 times/day 200 Each 0   • rosuvastatin (CRESTOR) 10 MG Tab TAKE ONE TABLET BY MOUTH EVERY EVENING 100 tablet 2   • amLODIPine (NORVASC) 10 MG Tab TAKE ONE TABLET BY MOUTH DAILY for Blood pressure 90 tablet 1   • losartan (COZAAR) 100 MG Tab TAKE ONE TABLET BY MOUTH DAILY for Blood Pressure 100 tablet 1   • levothyroxine (SYNTHROID) 112 MCG Tab TAKE ONE TABLET BY MOUTH EVERY MORNING ON AN EMPTY STOMACH 90 Tab 3     No current Epic-ordered facility-administered medications on file.       Past Medical History:   Diagnosis Date   • Cardiac pacemaker in situ    • Diabetes (HCC)    • History of DVT of lower extremity 6/5/2014   • Hyperlipidemia    • Hypertension    • Pancreatic cyst    • Pancreatitis    • Sinus node dysfunction (HCC)    • Thyroid disease     hypothyroidism       Social History     Tobacco Use   • Smoking status: Never Smoker   • Smokeless tobacco: Never Used   • Tobacco comment: avoid all tobacco products   Substance Use Topics   • Alcohol use: Not Currently     Alcohol/week: 0.0 oz     Comment: occasionally   • Drug use: No       Allergies:  Cephalexin [keflex]    Health Maintenance: Completed    ROS:  Per HPI      Objective:       Exam:  /80 (BP Location: Left arm, Patient Position: Sitting, BP Cuff Size: Adult)   Pulse 83   Temp 36.8 °C (98.3 °F) (Temporal)   Resp 15   Ht 1.626 m (5' 4\")   Wt 69.4 kg (153 lb)   SpO2 97%   BMI 26.26 kg/m²   Body mass index is 26.26 kg/m².  Wt Readings from Last 4 Encounters:   04/28/21 69.4 kg (153 lb)   04/23/21 68 kg (150 lb)   04/17/21 67.6 kg (149 lb)   04/14/21 69.1 kg (152 lb 6.4 oz)       Gen: Alert and oriented, No apparent distress. Appropriately groomed.  Neck: Neck is supple without lymphadenopathy.No thyromegaly.   Lungs: Normal effort, CTA bilaterally, no wheezes, rhonchi, or rales  CV: Regular rate " and rhythm. No murmurs, rubs, or gallops.  Ext: No clubbing, cyanosis, edema.  Skin: No rash noted.      Labs:     Assessment & Plan:     80 y.o. female with the following -     1. Type 2 diabetes mellitus without complication, with long-term current use of insulin ([facility])  - REFERRAL TO ENDOCRINOLOGY    2. Mixed hyperlipidemia    3. Need for shingles vaccine  - Shingles Vaccine (Shingrix)    4. Anxiety and depression    Other orders  - insulin glargine (LANTUS SOLOSTAR) 100 UNIT/ML Solution Pen-injector injection; 26u qhs, increase by 1u qo day until at goal fasting blood sugar 120-140  Dispense: 5 Each; Refill: 11  - NOVOLOG FLEXPEN 100 UNIT/ML solution for injection; Inject 2-12 Units under the skin 3 times a day before meals for 30 days. Use sliding scale as previously instructed.  Dispense: 12 mL; Refill: 11     I suggested that she slowly increase her Lantus by 1 unit every other day until her fasting morning sugars are in the 120s to 140s.  I also suggested she meet with an endocrinologist as I think it would be beneficial to have a continuous blood glucose monitor to limit the 3 times a day glucose checks.  I also think it is concerning that she went from diet controlled diabetes to DKA in a very short over the prior month.      Labs in 2m.        Return in about 2 months (around 6/28/2021) for labs.    Please note that this dictation was created using voice recognition software. I have made every reasonable attempt to correct obvious errors, but I expect that there are errors of grammar and possibly content that I did not discover before finalizing the note.

## 2021-04-28 NOTE — PATIENT INSTRUCTIONS
Please be sure to continue with your morning walks as this will be great to help control your diabetes.

## 2021-04-29 ENCOUNTER — HOME CARE VISIT (OUTPATIENT)
Dept: HOME HEALTH SERVICES | Facility: HOME HEALTHCARE | Age: 81
End: 2021-04-29
Payer: MEDICARE

## 2021-04-29 VITALS
TEMPERATURE: 97.8 F | RESPIRATION RATE: 18 BRPM | OXYGEN SATURATION: 95 % | SYSTOLIC BLOOD PRESSURE: 110 MMHG | DIASTOLIC BLOOD PRESSURE: 60 MMHG | HEART RATE: 76 BPM

## 2021-04-29 VITALS — WEIGHT: 150.8 LBS | BODY MASS INDEX: 25.88 KG/M2

## 2021-04-29 PROCEDURE — G0270 MNT SUBS TX FOR CHANGE DX: HCPCS

## 2021-04-29 PROCEDURE — G0495 RN CARE TRAIN/EDU IN HH: HCPCS

## 2021-04-29 PROCEDURE — G0180 MD CERTIFICATION HHA PATIENT: HCPCS | Performed by: FAMILY MEDICINE

## 2021-04-29 SDOH — ECONOMIC STABILITY: HOUSING INSECURITY: HOME SAFETY: PATIENT DENIES FALLS OR SAFETY CONCERNS.  CURRENT MEDICATION LIST IS UP TO DATE PER REVIEW WITH PATIENT.

## 2021-04-29 ASSESSMENT — FIBROSIS 4 INDEX: FIB4 SCORE: 1.66

## 2021-04-29 ASSESSMENT — ACTIVITIES OF DAILY LIVING (ADL): TRANSPORTATION COMMENTS: YES

## 2021-04-29 ASSESSMENT — ENCOUNTER SYMPTOMS
VOMITING: DENIED
NAUSEA: DENIED

## 2021-04-29 NOTE — CASE COMMUNICATION
I agree with these changes    ----- Message -----  From: Julisa Min R.N.  Sent: 4/26/2021  10:58 AM PDT  To: Nena Marquez R.N.      Quality Review for 4.17.21 SOC OASIS performed on by DEBRA Min RN on 4.26, 2021:    Edits completed by DEBRA Min RN:  1, Changed  to 4.17.21 per LSOC order  2. Changed  to 2 per pain narrative  3. Changed  to 1 per SNV on 4.20.21. Changed  to 4.20.21 date of SNV, data used as part of the collaboration convention  4. Changed , , ,  to 2,  to 3,  to 1,  to 2,  to 3, [id number] C,E,F,G,H to 4, [id number] D,E,F,G,I,J,K,L,M,N,O,P to 4 for safety related to HB status report that pt is having blood sugar instability, pt would need supervision and DME to safely perform ADLs.   5. Changed [facility] 10 question on poly pharm to yes per the MAR  6. Changed  and  to 3 per ambulation score   7. Changed  to 0  8. Added ambulate only with assistance, fall risk and high risk medication precautions to safety measures. Added heart healthy diet to nutritional requirements per dxs  9. Updated F2F data  10. Added HTN to care plan  11. Added fall reduction education to patient safety goals  12. Added endurance and ambulation to functional limitations  13. Added severe taxing effort to leave the home to HB status

## 2021-04-30 NOTE — CASE COMMUNICATION
" PT. A&OX4. PT. DENIED ANY PAIN. LUNGS CLEAR T/O. PT. DENIED ANY DEPRESSIVE FEELINGS OR SI. PT. CHECKING FSBS 4X DAILY. TEACHING DONE ON NEED TO DECREASE CARBS IN DIET, AVOID CONCENTRATED SWEETS, EAT A HIGH PROTEIN SNACK BEFORE BEDTIME, NOTIFY HHRN/MD IF FS IS CONSISTENTLY <60 OR .300. PT. REFUSED TO ALLOW THIS SN TO DO FOOT CHECKS. PT. STATED\"I JUST SAW A DR 2-3 DAYS AGO.\" THIS SN EXPLAINED WE ARE MANDATED TO DO FOOT CHECKS EVERY SNV BUT PT. STILL REFUSED.PT. WITH HTN. TEACHING DONE ON NEED TO DECREASE SODIUM IN DIET-AVOID HAM, SHEEHAN, SAUSAGE, PIZZA, CHINESE FOOD, READ LABELS TO KEEP SODIUM <30% PER SERVING. TEACHING DONE S/SX CVA-FACIAL DROOP, SLURRED SPEECH, BLURRED VISION, NUMBNESS/PARALYSIS OF EITHER EXTREMITY, SEVERE PAIN BEHIND EITHER EYE WITH FLASHING RED LIGHTS. INSTRUCTED PT. TO CALL 911 IF ANY S/SX OCCUR. MED RECONCILIATION DONE WITH PT. DENIED ANY NEW/CHANGED MEDS."

## 2021-05-04 ENCOUNTER — HOME CARE VISIT (OUTPATIENT)
Dept: HOME HEALTH SERVICES | Facility: HOME HEALTHCARE | Age: 81
End: 2021-05-04
Payer: MEDICARE

## 2021-05-04 VITALS
RESPIRATION RATE: 16 BRPM | DIASTOLIC BLOOD PRESSURE: 60 MMHG | SYSTOLIC BLOOD PRESSURE: 112 MMHG | TEMPERATURE: 98 F | OXYGEN SATURATION: 98 % | HEART RATE: 68 BPM

## 2021-05-04 PROCEDURE — G0299 HHS/HOSPICE OF RN EA 15 MIN: HCPCS

## 2021-05-04 ASSESSMENT — ENCOUNTER SYMPTOMS
VOMITING: DENIES
NAUSEA: DENIES

## 2021-05-07 ENCOUNTER — HOME CARE VISIT (OUTPATIENT)
Dept: HOME HEALTH SERVICES | Facility: HOME HEALTHCARE | Age: 81
End: 2021-05-07
Payer: MEDICARE

## 2021-05-07 ENCOUNTER — PATIENT MESSAGE (OUTPATIENT)
Dept: MEDICAL GROUP | Facility: PHYSICIAN GROUP | Age: 81
End: 2021-05-07

## 2021-05-07 VITALS
RESPIRATION RATE: 18 BRPM | SYSTOLIC BLOOD PRESSURE: 122 MMHG | TEMPERATURE: 97.8 F | DIASTOLIC BLOOD PRESSURE: 78 MMHG | HEART RATE: 63 BPM | OXYGEN SATURATION: 97 %

## 2021-05-07 DIAGNOSIS — E03.9 HYPOTHYROIDISM, UNSPECIFIED TYPE: ICD-10-CM

## 2021-05-07 PROCEDURE — G0493 RN CARE EA 15 MIN HH/HOSPICE: HCPCS

## 2021-05-07 RX ORDER — LEVOTHYROXINE SODIUM 112 UG/1
112 TABLET ORAL
Qty: 100 TABLET | Refills: 2 | Status: SHIPPED | OUTPATIENT
Start: 2021-05-07 | End: 2021-07-21

## 2021-05-07 ASSESSMENT — ACTIVITIES OF DAILY LIVING (ADL)
HOME_HEALTH_OASIS: 00
OASIS_M1830: 00

## 2021-05-07 ASSESSMENT — PATIENT HEALTH QUESTIONNAIRE - PHQ9: CLINICAL INTERPRETATION OF PHQ2 SCORE: 0

## 2021-05-07 NOTE — PATIENT COMMUNICATION
Received request via: Patient    Was the patient seen in the last year in this department? Yes    Does the patient have an active prescription (recently filled or refills available) for medication(s) requested? No    Last office visit- 4/28/21

## 2021-05-10 ENCOUNTER — HOME CARE VISIT (OUTPATIENT)
Dept: HOME HEALTH SERVICES | Facility: HOME HEALTHCARE | Age: 81
End: 2021-05-10
Payer: MEDICARE

## 2021-05-10 NOTE — CASE COMMUNICATION
Quality Review for CT 5/7/21 OASIS by LISANDRA Gibson, PATEL on  May 10, 2021      Edits completed by LISANDRA Gibson RN:  1.  C is yes per care plan

## 2021-05-11 ENCOUNTER — HOSPITAL ENCOUNTER (OUTPATIENT)
Facility: MEDICAL CENTER | Age: 81
End: 2021-05-11
Attending: PHYSICIAN ASSISTANT
Payer: MEDICARE

## 2021-05-11 ENCOUNTER — OFFICE VISIT (OUTPATIENT)
Dept: URGENT CARE | Facility: PHYSICIAN GROUP | Age: 81
End: 2021-05-11
Payer: MEDICARE

## 2021-05-11 VITALS
BODY MASS INDEX: 25.61 KG/M2 | OXYGEN SATURATION: 97 % | WEIGHT: 150 LBS | HEART RATE: 80 BPM | SYSTOLIC BLOOD PRESSURE: 122 MMHG | HEIGHT: 64 IN | RESPIRATION RATE: 14 BRPM | DIASTOLIC BLOOD PRESSURE: 68 MMHG | TEMPERATURE: 97.5 F

## 2021-05-11 DIAGNOSIS — N30.01 ACUTE CYSTITIS WITH HEMATURIA: ICD-10-CM

## 2021-05-11 LAB
APPEARANCE UR: NORMAL
BILIRUB UR STRIP-MCNC: NEGATIVE MG/DL
COLOR UR AUTO: YELLOW
GLUCOSE UR STRIP.AUTO-MCNC: NEGATIVE MG/DL
KETONES UR STRIP.AUTO-MCNC: NEGATIVE MG/DL
LEUKOCYTE ESTERASE UR QL STRIP.AUTO: NORMAL
NITRITE UR QL STRIP.AUTO: NEGATIVE
PH UR STRIP.AUTO: 5.5 [PH] (ref 5–8)
PROT UR QL STRIP: NORMAL MG/DL
RBC UR QL AUTO: NORMAL
SP GR UR STRIP.AUTO: 1.02
UROBILINOGEN UR STRIP-MCNC: 0.2 MG/DL

## 2021-05-11 PROCEDURE — 87077 CULTURE AEROBIC IDENTIFY: CPT | Mod: 91

## 2021-05-11 PROCEDURE — 87186 SC STD MICRODIL/AGAR DIL: CPT

## 2021-05-11 PROCEDURE — 87086 URINE CULTURE/COLONY COUNT: CPT

## 2021-05-11 PROCEDURE — 99214 OFFICE O/P EST MOD 30 MIN: CPT | Performed by: PHYSICIAN ASSISTANT

## 2021-05-11 PROCEDURE — 81002 URINALYSIS NONAUTO W/O SCOPE: CPT | Performed by: PHYSICIAN ASSISTANT

## 2021-05-11 RX ORDER — SULFAMETHOXAZOLE AND TRIMETHOPRIM 800; 160 MG/1; MG/1
1 TABLET ORAL 2 TIMES DAILY
Qty: 10 TABLET | Refills: 0 | Status: SHIPPED | OUTPATIENT
Start: 2021-05-11 | End: 2021-05-16

## 2021-05-11 ASSESSMENT — ENCOUNTER SYMPTOMS
FLANK PAIN: 0
DIARRHEA: 0
VOMITING: 0
CHILLS: 0
MUSCULOSKELETAL NEGATIVE: 1
DIZZINESS: 0
SHORTNESS OF BREATH: 0
FEVER: 0
NAUSEA: 0
ABDOMINAL PAIN: 0

## 2021-05-11 ASSESSMENT — FIBROSIS 4 INDEX: FIB4 SCORE: 1.66

## 2021-05-11 NOTE — CASE COMMUNICATION
I agree with these changes.  Thank you, Ashleigh Kuhn RN,   ----- Message -----  From: Jeaneth Gibson R.N.  Sent: 5/10/2021   9:59 AM PDT  To: Ashleigh Kuhn R.N.      Quality Review for CA 5/7/21 OASIS by LISANDRA Gibson RN on  May 10, 2021      Edits completed by LISANDRA Gibson RN:  1.  C is yes per care plan

## 2021-05-11 NOTE — PROGRESS NOTES
"Subjective:      Roya Muniz is a 80 y.o. female who presents with Painful Urination (burning, discomfort, x1 day )            Dysuria   This is a new problem. The current episode started yesterday. The problem occurs every urination. The problem has been unchanged. The quality of the pain is described as burning. The pain is mild. There is no history of pyelonephritis. Associated symptoms include frequency and urgency. Pertinent negatives include no chills, flank pain, hematuria, nausea or vomiting. There is no history of catheterization, kidney stones, recurrent UTIs or a urological procedure.     Patient denies fevers, abdominal pain, flank pain, or nausea. No history of kidney stones.        Review of Systems   Constitutional: Negative for chills and fever.   HENT: Negative for congestion.    Respiratory: Negative for shortness of breath.    Cardiovascular: Negative for chest pain.   Gastrointestinal: Negative for abdominal pain, diarrhea, nausea and vomiting.   Genitourinary: Positive for dysuria, frequency and urgency. Negative for flank pain and hematuria.   Musculoskeletal: Negative.    Skin: Negative for rash.   Neurological: Negative for dizziness.        Objective:     /68 (BP Location: Right arm, Patient Position: Sitting, BP Cuff Size: Adult)   Pulse 80   Temp 36.4 °C (97.5 °F) (Temporal)   Resp 14   Ht 1.626 m (5' 4\")   Wt 68 kg (150 lb)   SpO2 97%   BMI 25.75 kg/m²        Physical Exam  Vitals and nursing note reviewed.   Constitutional:       General: She is not in acute distress.     Appearance: Normal appearance. She is well-developed. She is not diaphoretic.   HENT:      Head: Normocephalic and atraumatic.      Right Ear: External ear normal.      Left Ear: External ear normal.   Eyes:      Conjunctiva/sclera: Conjunctivae normal.   Cardiovascular:      Rate and Rhythm: Normal rate.   Pulmonary:      Effort: Pulmonary effort is normal.   Musculoskeletal:         General: " Normal range of motion.      Cervical back: Normal range of motion.   Skin:     General: Skin is warm and dry.   Neurological:      Mental Status: She is alert and oriented to person, place, and time.   Psychiatric:         Behavior: Behavior normal.          PMH:  has a past medical history of Cardiac pacemaker in situ, Diabetes (HCC), History of DVT of lower extremity (6/5/2014), Hyperlipidemia, Hypertension, Pancreatic cyst, Pancreatitis, Sinus node dysfunction (HCC), and Thyroid disease.  MEDS:   Current Outpatient Medications:   •  sulfamethoxazole-trimethoprim (BACTRIM DS) 800-160 MG tablet, Take 1 tablet by mouth 2 times a day for 5 days., Disp: 10 tablet, Rfl: 0  •  levothyroxine (SYNTHROID) 112 MCG Tab, Take 1 tablet by mouth every morning on an empty stomach. ON EMPTY STOMACH, Disp: 100 tablet, Rfl: 2  •  insulin glargine (LANTUS SOLOSTAR) 100 UNIT/ML Solution Pen-injector injection, 26u qhs, increase by 1u qo day until at goal fasting blood sugar 120-140, Disp: 5 Each, Rfl: 11  •  NOVOLOG FLEXPEN 100 UNIT/ML solution for injection, Inject 2-12 Units under the skin 3 times a day before meals for 30 days. Use sliding scale as previously instructed., Disp: 12 mL, Rfl: 11  •  aspirin 81 MG EC tablet, Take 81 mg by mouth every day at 6 PM., Disp: , Rfl:   •  Acetaminophen (TYLENOL 8 HOUR PO), Take 500 mg by mouth 1 time a day as needed (HA or body aches)., Disp: , Rfl:   •  Vitamin D, Cholecalciferol, (CHOLECALCIFEROL) 25 MCG (1000 UT) Tab, Take 1,000 Units by mouth every day at 6 PM., Disp: , Rfl:   •  Alcohol Swabs, Wipe site with prep pad prior to injection., Disp: 200 Each, Rfl: 0  •  Blood Glucose Monitoring Suppl (FREESTYLE LITE) Device, Test blood sugar as directed., Disp: 1 Each, Rfl: 0  •  glucose blood strip, Use one strip to test blood sugar 4times/day., Disp: 200 Strip, Rfl: 0  •  Insulin Pen Needle 32 G x 4 mm, Use one pen needle in pen device to inject insulin, Disp: 200 Each, Rfl: 0  •  Lancets,  Use one lancet to test blood sugar 4 times/day, Disp: 200 Each, Rfl: 0  •  rosuvastatin (CRESTOR) 10 MG Tab, TAKE ONE TABLET BY MOUTH EVERY EVENING, Disp: 100 tablet, Rfl: 2  •  amLODIPine (NORVASC) 10 MG Tab, TAKE ONE TABLET BY MOUTH DAILY for Blood pressure, Disp: 90 tablet, Rfl: 1  •  losartan (COZAAR) 100 MG Tab, TAKE ONE TABLET BY MOUTH DAILY for Blood Pressure, Disp: 100 tablet, Rfl: 1  ALLERGIES:   Allergies   Allergen Reactions   • Cephalexin [Keflex] Hives, Rash, Itching and Unspecified     Photosensitivity       SURGHX:   Past Surgical History:   Procedure Laterality Date   • GASTROSCOPY-ENDO  2/21/2014    Performed by Steve Islas Jr., M.D. at ENDOSCOPY [facility]   • GASTROSCOPY-ENDO  2/20/2014    Performed by Antonio Espinoza M.D. at ENDOSCOPY [facility]   • EXPLORATORY LAPAROTOMY  2/10/2014    Performed by Rigo Garcia M.D. at SURGERY [facility]   • CHOLECYSTECTOMY  2/10/2014    Performed by Rigo Garcia M.D. at SURGERY [facility]   • PANCREATECTOMY  2/10/2014    Performed by Rigo Garcia M.D. at SURGERY [facility]   • GASTROSTOMY FEEDING  2/10/2014    Performed by Rigo Garcia M.D. at SURGERY [facility]   • OTHER ORTHOPEDIC SURGERY      foot surgery     SOCHX:  reports that she has never smoked. She has never used smokeless tobacco. She reports previous alcohol use. She reports that she does not use drugs.  FH: family history includes Alcohol/Drug in her brother; Cancer in her father, maternal grandmother, and paternal aunt; Heart Disease in her brother, maternal grandfather, and mother; Lung Disease in her brother; No Known Problems in her paternal grandfather, paternal grandmother, sister, sister, sister, son, son, and son; Stroke in her sister.    POCT Urinalysis:  Ref Range & Units  9:17 AM    POC Color Negative yellow    POC Appearance Negative cloudy    POC Leukocyte Esterase Negative moderate    POC Nitrites  Negative negative    POC Urobiligen Negative (0.2) mg/dL 0.2    POC Protein Negative mg/dL trace    POC Urine PH 5.0 - 8.0 5.5    POC Blood Negative small    POC Specific Gravity <1.005 - >1.030 1.025    POC Ketones Negative mg/dL negative    POC Bilirubin Negative mg/dL negative    POC Glucose Negative mg/dL negative         Assessment/Plan:        1. Acute cystitis with hematuria    - POCT Urinalysis --> + leuks, + blood   - URINE CULTURE(NEW); Future  - sulfamethoxazole-trimethoprim (BACTRIM DS) 800-160 MG tablet; Take 1 tablet by mouth 2 times a day for 5 days.  Dispense: 10 tablet; Refill: 0   - Complete full course of antibiotics as prescribed     - Pt educated on preventative measures for avoiding future UTIs  - Advised to increase fluid intake  - OTC Pyridium (Azo) for symptomatic relief, advised that it will turn urine orange in color  - Pending urine culture  - ER precautions given regarding pyelonephritis including fevers greater than 101 and, vomiting and dehydration, increased back pain.

## 2021-05-24 ENCOUNTER — NON-PROVIDER VISIT (OUTPATIENT)
Dept: CARDIOLOGY | Facility: MEDICAL CENTER | Age: 81
End: 2021-05-24
Payer: MEDICARE

## 2021-05-24 DIAGNOSIS — Z95.0 CARDIAC PACEMAKER IN SITU: ICD-10-CM

## 2021-05-24 DIAGNOSIS — I49.5 SICK SINUS SYNDROME (HCC): ICD-10-CM

## 2021-05-24 PROCEDURE — 93288 INTERROG EVL PM/LDLS PM IP: CPT | Performed by: NURSE PRACTITIONER

## 2021-06-05 ENCOUNTER — PATIENT MESSAGE (OUTPATIENT)
Dept: MEDICAL GROUP | Facility: PHYSICIAN GROUP | Age: 81
End: 2021-06-05

## 2021-06-07 NOTE — TELEPHONE ENCOUNTER
From: Roya Muniz  To: Physician Bisi Browning  Sent: 6/5/2021 11:02 AM PDT  Subject: Prescription Question    I need to have my Diabetic supplies put into a prescription form to [facility] pharmacy [facility]. I was under the assumption that these were over the counter. They are, but no insurance coverage. This includes TechLite lancelets, ......FreeStyle lite test strips...... and lastly the Ultra Fine pen needles. If you have any questions please call  or leave a text. Thank you. This is a money saver.

## 2021-06-08 RX ORDER — LANCETS 30 GAUGE
EACH MISCELLANEOUS
Qty: 200 EACH | Refills: 0 | Status: SHIPPED | OUTPATIENT
Start: 2021-06-08 | End: 2022-04-19 | Stop reason: SDUPTHER

## 2021-06-09 ENCOUNTER — PATIENT MESSAGE (OUTPATIENT)
Dept: MEDICAL GROUP | Facility: PHYSICIAN GROUP | Age: 81
End: 2021-06-09

## 2021-06-09 NOTE — TELEPHONE ENCOUNTER
"From: Roya Muniz  To: Physician Bisi Browning  Sent: 6/9/2021 7:35 AM PDT  Subject: Prescription Question    would like to have a prescription given to [facility] on [address] for my \"FreeStyle lite\" test strips. These are very expensive without my health coverage. Thanks.  "

## 2021-07-01 ENCOUNTER — OFFICE VISIT (OUTPATIENT)
Dept: MEDICAL GROUP | Facility: PHYSICIAN GROUP | Age: 81
End: 2021-07-01
Payer: MEDICARE

## 2021-07-01 VITALS
RESPIRATION RATE: 16 BRPM | OXYGEN SATURATION: 97 % | HEIGHT: 64 IN | TEMPERATURE: 99 F | HEART RATE: 75 BPM | BODY MASS INDEX: 25.66 KG/M2 | DIASTOLIC BLOOD PRESSURE: 64 MMHG | WEIGHT: 150.3 LBS | SYSTOLIC BLOOD PRESSURE: 112 MMHG

## 2021-07-01 DIAGNOSIS — I10 ESSENTIAL HYPERTENSION: ICD-10-CM

## 2021-07-01 DIAGNOSIS — Z23 NEED FOR VACCINATION: ICD-10-CM

## 2021-07-01 DIAGNOSIS — E03.9 HYPOTHYROIDISM, UNSPECIFIED TYPE: ICD-10-CM

## 2021-07-01 DIAGNOSIS — E11.43 DIABETIC AUTONOMIC NEUROPATHY ASSOCIATED WITH TYPE 2 DIABETES MELLITUS (HCC): ICD-10-CM

## 2021-07-01 DIAGNOSIS — E87.6 HYPOKALEMIA: ICD-10-CM

## 2021-07-01 PROBLEM — N17.9 AKI (ACUTE KIDNEY INJURY) (HCC): Status: RESOLVED | Noted: 2021-04-09 | Resolved: 2021-07-01

## 2021-07-01 PROBLEM — E11.10 DIABETIC KETOACIDOSIS WITHOUT COMA ASSOCIATED WITH TYPE 2 DIABETES MELLITUS (HCC): Status: RESOLVED | Noted: 2021-04-09 | Resolved: 2021-07-01

## 2021-07-01 PROBLEM — E87.0 HYPERNATREMIA: Status: RESOLVED | Noted: 2021-04-09 | Resolved: 2021-07-01

## 2021-07-01 PROBLEM — D72.829 LEUKOCYTOSIS: Status: RESOLVED | Noted: 2021-04-10 | Resolved: 2021-07-01

## 2021-07-01 PROCEDURE — 90471 IMMUNIZATION ADMIN: CPT | Performed by: FAMILY MEDICINE

## 2021-07-01 PROCEDURE — 90750 HZV VACC RECOMBINANT IM: CPT | Performed by: FAMILY MEDICINE

## 2021-07-01 PROCEDURE — 99213 OFFICE O/P EST LOW 20 MIN: CPT | Mod: 25 | Performed by: FAMILY MEDICINE

## 2021-07-01 ASSESSMENT — FIBROSIS 4 INDEX: FIB4 SCORE: 1.66

## 2021-07-01 NOTE — PROGRESS NOTES
Subjective:     CC:   Chief Complaint   Patient presents with   • Follow-Up     Labs          HPI:   Roya presents today with f/u dm.  Seen in UC 5/11 for uti and improved w/ bactrim  Saw me last 4/28/21 after her recent admit for DKA. Is up to 26u lantus. Her fbs 111, 125, 114, 108, 107, 86, 100, 115. Lowest 76 once. Once while on vacation had fs 150 so took short acting insulin.   4/11 last K 3.2  4/9 a1c 18, was 7.3 11/20  Hasn't yet done her dexa  Problem   Leukocytosis (Resolved)   Diabetic Ketoacidosis Without Coma Associated With Type 2 Diabetes Mellitus (Hcc) (Resolved)   Hypernatremia (Resolved)   Brandin (Acute Kidney Injury) (Hcc) (Resolved)       Current Outpatient Medications Ordered in Epic   Medication Sig Dispense Refill   • Insulin Pen Needle 32 G x 4 mm Use one pen needle in pen device to inject insulin 200 Each 0   • glucose blood strip 1 Strip by Other route as needed (freestyle lite test strips). 100 Strip 3   • Lancets Use one lancet to test blood sugar 4 times/day 200 Each 0   • levothyroxine (SYNTHROID) 112 MCG Tab Take 1 tablet by mouth every morning on an empty stomach. ON EMPTY STOMACH 100 tablet 2   • insulin glargine (LANTUS SOLOSTAR) 100 UNIT/ML Solution Pen-injector injection 26u qhs, increase by 1u qo day until at goal fasting blood sugar 120-140 5 Each 11   • aspirin 81 MG EC tablet Take 81 mg by mouth every day at 6 PM.     • Acetaminophen (TYLENOL 8 HOUR PO) Take 500 mg by mouth 1 time a day as needed (HA or body aches).     • Vitamin D, Cholecalciferol, (CHOLECALCIFEROL) 25 MCG (1000 UT) Tab Take 1,000 Units by mouth every day at 6 PM.     • Alcohol Swabs Wipe site with prep pad prior to injection. 200 Each 0   • Blood Glucose Monitoring Suppl (FREESTYLE LITE) Device Test blood sugar as directed. 1 Each 0   • rosuvastatin (CRESTOR) 10 MG Tab TAKE ONE TABLET BY MOUTH EVERY EVENING 100 tablet 2   • amLODIPine (NORVASC) 10 MG Tab TAKE ONE TABLET BY MOUTH DAILY for Blood pressure 90  "tablet 1   • losartan (COZAAR) 100 MG Tab TAKE ONE TABLET BY MOUTH DAILY for Blood Pressure 100 tablet 1     No current Epic-ordered facility-administered medications on file.       Past Medical History:   Diagnosis Date   • Cardiac pacemaker in situ    • Diabetes (HCC)    • History of DVT of lower extremity 6/5/2014   • Hyperlipidemia    • Hypertension    • Pancreatic cyst    • Pancreatitis    • Sinus node dysfunction (HCC)    • Thyroid disease     hypothyroidism       Social History     Tobacco Use   • Smoking status: Never Smoker   • Smokeless tobacco: Never Used   • Tobacco comment: avoid all tobacco products   Vaping Use   • Vaping Use: Never used   Substance Use Topics   • Alcohol use: Not Currently     Alcohol/week: 0.0 oz     Comment: occasionally   • Drug use: No       Allergies:  Cephalexin [keflex]    Health Maintenance: Completed    ROS:  Per HPI  Gen: no fevers/chills, no nightsweats, no changes in weight  Eyes: no changes in vision, no dry eyes  ENT: no sore throat, no dysphagia, no hearing loss, no bloody nose  Pulm: no sob, no cough, no wheezing  CV: no chest pain, no palpitations, no syncope  GI: no nausea/vomiting, no diarrhea/constipation, no abdominal pain, no blood in stool  : no dysuria, no incontinence  MSk: no myalgias, no weakness, no falls  Skin: no rash  Neuro: no headaches, no numbness/tingling  Heme/Lymph: no easy bruising  Psych: no depression, no anxiety, no hallucinations, no insomnia, no memory concerns    Objective:       Exam:  /64 (BP Location: Right arm, Patient Position: Sitting, BP Cuff Size: Adult)   Pulse 75   Temp 37.2 °C (99 °F) (Temporal)   Resp 16   Ht 1.626 m (5' 4\")   Wt 68.2 kg (150 lb 4.8 oz)   SpO2 97%   BMI 25.80 kg/m²   Body mass index is 25.8 kg/m².  Wt Readings from Last 4 Encounters:   07/01/21 68.2 kg (150 lb 4.8 oz)   05/11/21 68 kg (150 lb)   04/29/21 68.4 kg (150 lb 12.8 oz)   04/28/21 69.4 kg (153 lb)       Gen: Alert and oriented, No " apparent distress. Appropriately groomed.  Neck: Neck is supple without lymphadenopathy.No thyromegaly.   Lungs: Normal effort, CTA bilaterally, no wheezes, rhonchi, or rales.  CV: Regular rate and rhythm. No murmurs, rubs, or gallops.  ABD:  Soft, nontender, nondistended, NABSx4, no HSM or RBT or guarding or masses.  Ext: No clubbing, cyanosis, edema.  Skin: No rash noted.        Assessment & Plan:     80 y.o. female with the following -   Can decr lantus to 25u   Labs in 10d  F/u 4m  Obtain dexa  Go to the lab for a non fasting test after July 9.    See me in the office at the end of Oct or early Nov and we can check a repeat Hga1c at that time (make sure your appt is 3 months after your last lab test. I'll repeat the HgA1c in the office.   1. Need for vaccination  - Shingrix Vaccine    2. Hypokalemia  - Basic Metabolic Panel; Future    3. Essential hypertension    4. Hypothyroidism, unspecified type    5. Diabetic autonomic neuropathy associated with type 2 diabetes mellitus (HCC)  - HEMOGLOBIN A1C; Future      Return in about 4 months (around 11/1/2021).    Please note that this dictation was created using voice recognition software. I have made every reasonable attempt to correct obvious errors, but I expect that there are errors of grammar and possibly content that I did not discover before finalizing the note.

## 2021-07-01 NOTE — PATIENT INSTRUCTIONS
Go to the lab for a non fasting test after July 9.    See me in the office at the end of Oct or early Nov and we can check a repeat Hga1c at that time (make sure your appt is 3 months after your last lab test. I'll repeat the HgA1c in the office.     Schedule your bone scan please

## 2021-07-12 ENCOUNTER — HOSPITAL ENCOUNTER (OUTPATIENT)
Dept: LAB | Facility: MEDICAL CENTER | Age: 81
End: 2021-07-12
Attending: FAMILY MEDICINE
Payer: MEDICARE

## 2021-07-12 DIAGNOSIS — E03.9 HYPOTHYROIDISM, UNSPECIFIED TYPE: ICD-10-CM

## 2021-07-12 DIAGNOSIS — Z11.59 NEED FOR HEPATITIS C SCREENING TEST: ICD-10-CM

## 2021-07-12 DIAGNOSIS — E11.9 TYPE 2 DIABETES MELLITUS WITHOUT COMPLICATION, WITHOUT LONG-TERM CURRENT USE OF INSULIN (HCC): ICD-10-CM

## 2021-07-12 DIAGNOSIS — E11.43 DIABETIC AUTONOMIC NEUROPATHY ASSOCIATED WITH TYPE 2 DIABETES MELLITUS (HCC): ICD-10-CM

## 2021-07-12 DIAGNOSIS — M85.859 OSTEOPENIA OF NECK OF FEMUR, UNSPECIFIED LATERALITY: ICD-10-CM

## 2021-07-12 DIAGNOSIS — E87.6 HYPOKALEMIA: ICD-10-CM

## 2021-07-12 LAB
25(OH)D3 SERPL-MCNC: 27 NG/ML (ref 30–100)
ALBUMIN SERPL BCP-MCNC: 4.4 G/DL (ref 3.2–4.9)
ALBUMIN/GLOB SERPL: 1.5 G/DL
ALP SERPL-CCNC: 65 U/L (ref 30–99)
ALT SERPL-CCNC: 21 U/L (ref 2–50)
ANION GAP SERPL CALC-SCNC: 11 MMOL/L (ref 7–16)
AST SERPL-CCNC: 17 U/L (ref 12–45)
BASOPHILS # BLD AUTO: 0.3 % (ref 0–1.8)
BASOPHILS # BLD: 0.02 K/UL (ref 0–0.12)
BILIRUB SERPL-MCNC: 1.1 MG/DL (ref 0.1–1.5)
BUN SERPL-MCNC: 27 MG/DL (ref 8–22)
CALCIUM SERPL-MCNC: 9.4 MG/DL (ref 8.5–10.5)
CHLORIDE SERPL-SCNC: 110 MMOL/L (ref 96–112)
CHOLEST SERPL-MCNC: 119 MG/DL (ref 100–199)
CO2 SERPL-SCNC: 20 MMOL/L (ref 20–33)
CREAT SERPL-MCNC: 0.98 MG/DL (ref 0.5–1.4)
CREAT UR-MCNC: 84.81 MG/DL
EOSINOPHIL # BLD AUTO: 0.22 K/UL (ref 0–0.51)
EOSINOPHIL NFR BLD: 3.3 % (ref 0–6.9)
ERYTHROCYTE [DISTWIDTH] IN BLOOD BY AUTOMATED COUNT: 44.6 FL (ref 35.9–50)
EST. AVERAGE GLUCOSE BLD GHB EST-MCNC: 134 MG/DL
FASTING STATUS PATIENT QL REPORTED: NORMAL
GLOBULIN SER CALC-MCNC: 2.9 G/DL (ref 1.9–3.5)
GLUCOSE SERPL-MCNC: 96 MG/DL (ref 65–99)
HBA1C MFR BLD: 6.3 % (ref 4–5.6)
HCT VFR BLD AUTO: 47.4 % (ref 37–47)
HCV AB SER QL: NORMAL
HDLC SERPL-MCNC: 54 MG/DL
HGB BLD-MCNC: 15.5 G/DL (ref 12–16)
IMM GRANULOCYTES # BLD AUTO: 0.01 K/UL (ref 0–0.11)
IMM GRANULOCYTES NFR BLD AUTO: 0.1 % (ref 0–0.9)
LDLC SERPL CALC-MCNC: 44 MG/DL
LYMPHOCYTES # BLD AUTO: 1.85 K/UL (ref 1–4.8)
LYMPHOCYTES NFR BLD: 27.7 % (ref 22–41)
MCH RBC QN AUTO: 31.2 PG (ref 27–33)
MCHC RBC AUTO-ENTMCNC: 32.7 G/DL (ref 33.6–35)
MCV RBC AUTO: 95.4 FL (ref 81.4–97.8)
MICROALBUMIN UR-MCNC: 4.2 MG/DL
MICROALBUMIN/CREAT UR: 50 MG/G (ref 0–30)
MONOCYTES # BLD AUTO: 0.75 K/UL (ref 0–0.85)
MONOCYTES NFR BLD AUTO: 11.2 % (ref 0–13.4)
NEUTROPHILS # BLD AUTO: 3.83 K/UL (ref 2–7.15)
NEUTROPHILS NFR BLD: 57.4 % (ref 44–72)
NRBC # BLD AUTO: 0 K/UL
NRBC BLD-RTO: 0 /100 WBC
PLATELET # BLD AUTO: 267 K/UL (ref 164–446)
PMV BLD AUTO: 10.7 FL (ref 9–12.9)
POTASSIUM SERPL-SCNC: 4.1 MMOL/L (ref 3.6–5.5)
PROT SERPL-MCNC: 7.3 G/DL (ref 6–8.2)
RBC # BLD AUTO: 4.97 M/UL (ref 4.2–5.4)
SODIUM SERPL-SCNC: 141 MMOL/L (ref 135–145)
TRIGL SERPL-MCNC: 103 MG/DL (ref 0–149)
TSH SERPL DL<=0.005 MIU/L-ACNC: 0.32 UIU/ML (ref 0.38–5.33)
WBC # BLD AUTO: 6.7 K/UL (ref 4.8–10.8)

## 2021-07-12 PROCEDURE — 80061 LIPID PANEL: CPT

## 2021-07-12 PROCEDURE — 83036 HEMOGLOBIN GLYCOSYLATED A1C: CPT

## 2021-07-12 PROCEDURE — 84443 ASSAY THYROID STIM HORMONE: CPT

## 2021-07-12 PROCEDURE — 80053 COMPREHEN METABOLIC PANEL: CPT

## 2021-07-12 PROCEDURE — 82043 UR ALBUMIN QUANTITATIVE: CPT

## 2021-07-12 PROCEDURE — 85025 COMPLETE CBC W/AUTO DIFF WBC: CPT

## 2021-07-12 PROCEDURE — 82306 VITAMIN D 25 HYDROXY: CPT

## 2021-07-12 PROCEDURE — 82570 ASSAY OF URINE CREATININE: CPT

## 2021-07-12 PROCEDURE — 86803 HEPATITIS C AB TEST: CPT

## 2021-07-12 PROCEDURE — 36415 COLL VENOUS BLD VENIPUNCTURE: CPT

## 2021-07-21 ENCOUNTER — TELEPHONE (OUTPATIENT)
Dept: MEDICAL GROUP | Facility: PHYSICIAN GROUP | Age: 81
End: 2021-07-21

## 2021-07-21 DIAGNOSIS — E03.9 HYPOTHYROIDISM, UNSPECIFIED TYPE: ICD-10-CM

## 2021-07-21 DIAGNOSIS — E55.9 HYPOVITAMINOSIS D: ICD-10-CM

## 2021-07-21 RX ORDER — LEVOTHYROXINE SODIUM 0.1 MG/1
100 TABLET ORAL
Qty: 100 TABLET | Refills: 2 | Status: SHIPPED | OUTPATIENT
Start: 2021-07-21 | End: 2022-03-02

## 2021-07-21 NOTE — TELEPHONE ENCOUNTER
I d/w patient  a1c 6.3, had been 18.3,  3m prior, but in the past had always ranged 6-7    Has been on 112mcg synthroid x 3+ yr  tsh low  Was 0.7 4/21    Takes 2000iu vit d/d, low d, rec to incr to 4000iu /d  Labs in 2m

## 2021-08-02 ENCOUNTER — PATIENT MESSAGE (OUTPATIENT)
Dept: HEALTH INFORMATION MANAGEMENT | Facility: OTHER | Age: 81
End: 2021-08-02

## 2021-08-31 DIAGNOSIS — I10 ESSENTIAL HYPERTENSION: ICD-10-CM

## 2021-09-01 RX ORDER — LOSARTAN POTASSIUM 100 MG/1
TABLET ORAL
Qty: 100 TABLET | Refills: 1 | Status: SHIPPED | OUTPATIENT
Start: 2021-09-01 | End: 2022-07-05 | Stop reason: SDUPTHER

## 2021-09-24 DIAGNOSIS — Z79.4 TYPE 2 DIABETES MELLITUS WITHOUT COMPLICATION, WITH LONG-TERM CURRENT USE OF INSULIN (HCC): ICD-10-CM

## 2021-09-24 DIAGNOSIS — E11.9 TYPE 2 DIABETES MELLITUS WITHOUT COMPLICATION, WITH LONG-TERM CURRENT USE OF INSULIN (HCC): ICD-10-CM

## 2021-09-27 RX ORDER — PEN NEEDLE, DIABETIC 32GX 5/32"
NEEDLE, DISPOSABLE MISCELLANEOUS
Qty: 100 EACH | Refills: 3 | Status: SHIPPED | OUTPATIENT
Start: 2021-09-27 | End: 2022-10-27

## 2021-10-25 RX ORDER — BLOOD-GLUCOSE METER
KIT MISCELLANEOUS
Qty: 100 STRIP | Refills: 3 | Status: SHIPPED | OUTPATIENT
Start: 2021-10-25 | End: 2022-04-19 | Stop reason: SDUPTHER

## 2021-11-03 ENCOUNTER — HOSPITAL ENCOUNTER (OUTPATIENT)
Facility: MEDICAL CENTER | Age: 81
End: 2021-11-03
Attending: FAMILY MEDICINE
Payer: MEDICARE

## 2021-11-03 ENCOUNTER — OFFICE VISIT (OUTPATIENT)
Dept: MEDICAL GROUP | Facility: PHYSICIAN GROUP | Age: 81
End: 2021-11-03
Payer: MEDICARE

## 2021-11-03 VITALS
HEART RATE: 78 BPM | OXYGEN SATURATION: 97 % | HEIGHT: 64 IN | SYSTOLIC BLOOD PRESSURE: 128 MMHG | BODY MASS INDEX: 24.41 KG/M2 | DIASTOLIC BLOOD PRESSURE: 60 MMHG | WEIGHT: 143 LBS | TEMPERATURE: 97.6 F | RESPIRATION RATE: 16 BRPM

## 2021-11-03 DIAGNOSIS — E03.9 HYPOTHYROIDISM, UNSPECIFIED TYPE: ICD-10-CM

## 2021-11-03 DIAGNOSIS — Z79.4 TYPE 2 DIABETES MELLITUS WITHOUT COMPLICATION, WITH LONG-TERM CURRENT USE OF INSULIN (HCC): ICD-10-CM

## 2021-11-03 DIAGNOSIS — E55.9 HYPOVITAMINOSIS D: ICD-10-CM

## 2021-11-03 DIAGNOSIS — I10 ESSENTIAL HYPERTENSION: ICD-10-CM

## 2021-11-03 DIAGNOSIS — E11.9 TYPE 2 DIABETES MELLITUS WITHOUT COMPLICATION, WITH LONG-TERM CURRENT USE OF INSULIN (HCC): ICD-10-CM

## 2021-11-03 LAB
HBA1C MFR BLD: 5.6 % (ref 0–5.6)
INT CON NEG: NEGATIVE
INT CON POS: POSITIVE

## 2021-11-03 PROCEDURE — 82306 VITAMIN D 25 HYDROXY: CPT

## 2021-11-03 PROCEDURE — 99213 OFFICE O/P EST LOW 20 MIN: CPT | Performed by: FAMILY MEDICINE

## 2021-11-03 PROCEDURE — 83036 HEMOGLOBIN GLYCOSYLATED A1C: CPT | Performed by: FAMILY MEDICINE

## 2021-11-03 PROCEDURE — 36415 COLL VENOUS BLD VENIPUNCTURE: CPT | Performed by: FAMILY MEDICINE

## 2021-11-03 PROCEDURE — 84443 ASSAY THYROID STIM HORMONE: CPT

## 2021-11-03 PROCEDURE — 99000 SPECIMEN HANDLING OFFICE-LAB: CPT | Performed by: FAMILY MEDICINE

## 2021-11-03 RX ORDER — AMLODIPINE BESYLATE 10 MG/1
TABLET ORAL
Qty: 90 TABLET | Refills: 3 | Status: SHIPPED | OUTPATIENT
Start: 2021-11-03 | End: 2022-12-12 | Stop reason: SDUPTHER

## 2021-11-03 ASSESSMENT — FIBROSIS 4 INDEX: FIB4 SCORE: 1.1

## 2021-11-03 NOTE — PROGRESS NOTES
Pt was seated, confirmed pt name and , procedure explained to pt, venipuncture performed in LAC using aseptic technique, 2 gold tubes collected, gauze placed with pressure on venipuncture site until hemostasis observed, site clean and dry, no redness or swelling observed, bandage placed, pt tolerated well and voiced no concerns.

## 2021-11-03 NOTE — PROGRESS NOTES
Subjective:     CC:   Chief Complaint   Patient presents with   • Follow-Up     A1c         HPI:   Roya presents today with :  a1c 11/03/21 < 6 , she is using 25u lantus nightly. She tracks her sugars a few times daily. Her lowest fbs in the am is 82. She had a few afternoon sugars in the 50s - 60s, she says those tend to be after working in the yard.   Last TSH 0.32 7/21. Lowered dose of synthroid, due for repeat labs.   Problem   Hypokalemia (Resolved)       Current Outpatient Medications Ordered in Epic   Medication Sig Dispense Refill   • FREESTYLE LITE strip 1 STRIP BY OTHER ROUTE AS NEEDED (FREESTYLE LITE TEST STRIPS). 100 Strip 3   • Insulin Pen Needle 32 G x 4 mm (BD PEN NEEDLE ANTONI U/F) USE ONE PEN NEEDLE IN PEN DEVICE TO INJECT INSULIN 100 Each 3   • losartan (COZAAR) 100 MG Tab TAKE 1 TABLET BY MOUTH EVERY DAY FOR BLOOD PRESSURE 100 Tablet 1   • levothyroxine (SYNTHROID) 100 MCG Tab Take 1 tablet by mouth every morning on an empty stomach. 100 tablet 2   • Lancets Use one lancet to test blood sugar 4 times/day 200 Each 0   • insulin glargine (LANTUS SOLOSTAR) 100 UNIT/ML Solution Pen-injector injection 26u qhs, increase by 1u qo day until at goal fasting blood sugar 120-140 5 Each 11   • aspirin 81 MG EC tablet Take 81 mg by mouth every day at 6 PM.     • Acetaminophen (TYLENOL 8 HOUR PO) Take 500 mg by mouth 1 time a day as needed (HA or body aches).     • Vitamin D, Cholecalciferol, (CHOLECALCIFEROL) 25 MCG (1000 UT) Tab Take 4,000 Units by mouth every day at 6 PM.     • Alcohol Swabs Wipe site with prep pad prior to injection. 200 Each 0   • Blood Glucose Monitoring Suppl (FREESTYLE LITE) Device Test blood sugar as directed. 1 Each 0   • rosuvastatin (CRESTOR) 10 MG Tab TAKE ONE TABLET BY MOUTH EVERY EVENING 100 tablet 2   • amLODIPine (NORVASC) 10 MG Tab TAKE ONE TABLET BY MOUTH DAILY for Blood pressure 90 tablet 1     No current Epic-ordered facility-administered medications on file.       Past  "Medical History:   Diagnosis Date   • Cardiac pacemaker in situ    • Diabetes (HCC)    • History of DVT of lower extremity 6/5/2014   • Hyperlipidemia    • Hypertension    • Pancreatic cyst    • Pancreatitis    • Sinus node dysfunction (HCC)    • Thyroid disease     hypothyroidism       Social History     Tobacco Use   • Smoking status: Never Smoker   • Smokeless tobacco: Never Used   • Tobacco comment: avoid all tobacco products   Vaping Use   • Vaping Use: Never used   Substance Use Topics   • Alcohol use: Not Currently     Alcohol/week: 0.0 oz     Comment: occasionally   • Drug use: No       Allergies:  Cephalexin [keflex]    Health Maintenance: Completed    ROS:  Per HPI      Objective:       Exam:  /60 (BP Location: Right arm, Patient Position: Sitting, BP Cuff Size: Adult)   Pulse 78   Temp 36.4 °C (97.6 °F) (Temporal)   Resp 16   Ht 1.626 m (5' 4\")   Wt 64.9 kg (143 lb)   SpO2 97%   BMI 24.55 kg/m²   Body mass index is 24.55 kg/m².  Wt Readings from Last 4 Encounters:   11/03/21 64.9 kg (143 lb)   07/01/21 68.2 kg (150 lb 4.8 oz)   05/11/21 68 kg (150 lb)   04/29/21 68.4 kg (150 lb 12.8 oz)       Gen: Alert and oriented, No apparent distress. Appropriately groomed.  Neck: Neck is supple without lymphadenopathy.No thyromegaly.   Lungs: Normal effort, CTA bilaterally, no wheezes, rhonchi, or rales.  CV: Regular rate and rhythm. No murmurs, rubs, or gallops.  ABD:  Soft, nontender, nondistended, NABSx4, no HSM or RBT or guarding or masses.  Ext: No clubbing, cyanosis, edema.  Skin: No rash noted.      Assessment & Plan:     81 y.o. female with the following -     1. Type 2 diabetes mellitus without complication, with long-term current use of insulin ([facility])  - POCT Hemoglobin A1C    2. Hypothyroidism, unspecified type    I rec she meet w/ an endo to review possibly changing from lantus to a GLP1 receptor agonist, but for now she wants to continue the same dose of lantus and will be more careful to " have snacks prior to working in the yard.   of this encounter.      Return in about 3 months (around 2/3/2022) for DM, a1c.    Please note that this dictation was created using voice recognition software. I have made every reasonable attempt to correct obvious errors, but I expect that there are errors of grammar and possibly content that I did not discover before finalizing the note.

## 2021-11-04 LAB
25(OH)D3 SERPL-MCNC: 36 NG/ML (ref 30–100)
TSH SERPL DL<=0.005 MIU/L-ACNC: 2.04 UIU/ML (ref 0.38–5.33)

## 2021-11-10 NOTE — PROGRESS NOTES
Cardiology Follow-up Consultation Note    Date of note:    11/11/2021    Primary Care Provider: Bisi Browning M.D.    Patient Name: Roya Muniz   YOB: 1940  MRN:              [id number]    Chief Complaint: Follow-up hypertension and dyslipidemia    History of Present Illness: Ms. Roya Muniz is a 81 y.o. 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 De Santiago, and history of DVT who is here for follow-up.    The patient was a patient of Dr. Schultz, last seen 11/24/2020.  The patient was doing well at the time, no changes are made to her medications.    The patient returns today for follow-up.  She reports that she exercises a few times a week on stationary bicycle and does gardening almost daily.  She denies any chest pain or shortness of breath on exertion.  Denies any orthopnea, PND, or leg swelling.  No palpitations.  No syncope or presyncopal episodes.      Cardiovascular Risk Factors:  1. Smoking status: Never smoker  2. Type II Diabetes Mellitus: None  Lab Results   Component Value Date/Time    HBA1C 5.6 11/03/2021 11:38 AM    HBA1C 6.3 (H) 07/12/2021 06:31 AM     3. Hypertension: On medication  4. Dyslipidemia: On statin  Cholesterol,Tot   Date Value Ref Range Status   07/12/2021 119 100 - 199 mg/dL Final     LDL   Date Value Ref Range Status   07/12/2021 44 <100 mg/dL Final     HDL   Date Value Ref Range Status   07/12/2021 54 >=40 mg/dL Final     Triglycerides   Date Value Ref Range Status   07/12/2021 103 0 - 149 mg/dL Final     5. Family history of early Coronary Artery Disease in a first degree relative (Male less than 55 years of age; Female less than 65 years of age): None  6.  Obesity and/or Metabolic Syndrome: BMI 24.89  7. Sedentary lifestyle: Active    Review of Systems   Constitutional: Negative for fever, malaise/fatigue and night sweats.    Cardiovascular: Negative for chest pain, dyspnea on exertion, irregular heartbeat, leg swelling, near-syncope, orthopnea, palpitations, paroxysmal nocturnal dyspnea and syncope.   Respiratory: Negative for cough, shortness of breath and wheezing.    Gastrointestinal: Negative for abdominal pain, diarrhea, nausea and vomiting.   Neurological: Negative for dizziness, focal weakness and weakness.         All other systems reviewed and are negative.       Current Outpatient Medications   Medication Sig Dispense Refill   • amLODIPine (NORVASC) 10 MG Tab TAKE ONE TABLET BY MOUTH DAILY for Blood pressure 90 Tablet 3   • FREESTYLE LITE strip 1 STRIP BY OTHER ROUTE AS NEEDED (FREESTYLE LITE TEST STRIPS). 100 Strip 3   • Insulin Pen Needle 32 G x 4 mm (BD PEN NEEDLE ANTONI U/F) USE ONE PEN NEEDLE IN PEN DEVICE TO INJECT INSULIN 100 Each 3   • losartan (COZAAR) 100 MG Tab TAKE 1 TABLET BY MOUTH EVERY DAY FOR BLOOD PRESSURE 100 Tablet 1   • levothyroxine (SYNTHROID) 100 MCG Tab Take 1 tablet by mouth every morning on an empty stomach. 100 tablet 2   • Lancets Use one lancet to test blood sugar 4 times/day 200 Each 0   • insulin glargine (LANTUS SOLOSTAR) 100 UNIT/ML Solution Pen-injector injection 26u qhs, increase by 1u qo day until at goal fasting blood sugar 120-140 5 Each 11   • aspirin 81 MG EC tablet Take 81 mg by mouth every day at 6 PM.     • Acetaminophen (TYLENOL 8 HOUR PO) Take 500 mg by mouth 1 time a day as needed (HA or body aches).     • Vitamin D, Cholecalciferol, (CHOLECALCIFEROL) 25 MCG (1000 UT) Tab Take 4,000 Units by mouth every day at 6 PM.     • Alcohol Swabs Wipe site with prep pad prior to injection. 200 Each 0   • Blood Glucose Monitoring Suppl (FREESTYLE LITE) Device Test blood sugar as directed. 1 Each 0   • rosuvastatin (CRESTOR) 10 MG Tab TAKE ONE TABLET BY MOUTH EVERY EVENING 100 tablet 2     No current facility-administered medications for this visit.         Allergies   Allergen Reactions   •  "Cephalexin [Keflex] Hives, Rash, Itching and Unspecified     Photosensitivity         Physical Exam:  Ambulatory Vitals  /82 (BP Location: Left arm, Patient Position: Sitting, BP Cuff Size: Adult)   Pulse 77   Resp 16   Ht 1.626 m (5' 4\")   Wt 65.8 kg (145 lb)   SpO2 97%    Oxygen Therapy:  Pulse Oximetry: 97 %  BP Readings from Last 4 Encounters:   11/11/21 130/82   11/03/21 128/60   07/01/21 112/64   05/11/21 122/68       Weight/BMI: Body mass index is 24.89 kg/m².  Wt Readings from Last 4 Encounters:   11/11/21 65.8 kg (145 lb)   11/03/21 64.9 kg (143 lb)   07/01/21 68.2 kg (150 lb 4.8 oz)   05/11/21 68 kg (150 lb)       General: Well appearing and in no apparent distress  Eyes: nl conjunctiva, no icteric sclera  ENT: wearing a mask, normal external appearance of ears  Neck: no visible JVP,  no carotid bruits  Lungs: normal respiratory effort, CTAB  Heart: RRR, 2/6 systolic murmur,  no edema bilateral lower extremities. No LV/RV heave on cardiac palpatation. + bilateral radial pulses.  + bilateral dp pulses.   Abdomen: soft, non tender, non distended, no masses, normal bowel sounds.  No HSM.  Extremities/MSK: no clubbing, no cyanosis  Neurological: No focal sensory deficits  Psychiatric: Appropriate affect, A/O x 3, intact judgement and insight  Skin: Warm extremities      Lab Data Review:  Lab Results   Component Value Date/Time    CHOLSTRLTOT 119 07/12/2021 06:31 AM    LDL 44 07/12/2021 06:31 AM    HDL 54 07/12/2021 06:31 AM    TRIGLYCERIDE 103 07/12/2021 06:31 AM       Lab Results   Component Value Date/Time    SODIUM 141 07/12/2021 06:31 AM    POTASSIUM 4.1 07/12/2021 06:31 AM    CHLORIDE 110 07/12/2021 06:31 AM    CO2 20 07/12/2021 06:31 AM    GLUCOSE 96 07/12/2021 06:31 AM    BUN 27 (H) 07/12/2021 06:31 AM    CREATININE 0.98 07/12/2021 06:31 AM    CREATININE 1.59 (H) 02/10/2011 08:40 AM    BUNCREATRAT 21 02/10/2011 08:40 AM    GLOMRATE 32 (L) 02/10/2011 08:40 AM     Lab Results   Component Value " Date/Time    ALKPHOSPHAT 65 07/12/2021 06:31 AM    ASTSGOT 17 07/12/2021 06:31 AM    ALTSGPT 21 07/12/2021 06:31 AM    TBILIRUBIN 1.1 07/12/2021 06:31 AM      Lab Results   Component Value Date/Time    WBC 6.7 07/12/2021 06:31 AM     Lab Results   Component Value Date/Time    HBA1C 5.6 11/03/2021 11:38 AM    HBA1C 6.3 (H) 07/12/2021 06:31 AM         Cardiac Imaging and Procedures Review:    EKG dated 4/10/2021: My personal interpretation is sinus rhythm, poor R wave progression, LVH    Echo dated 1/10/2014:   CONCLUSIONS     Left ventricle is small in size.   Normal left ventricular systolic function.   Grade I diastolic dysfunction is present. Mild concentric left   ventricular hypertrophy.   Left ventricular ejection fraction is 65% to 70%.   Significantly Enlarged right ventricular size.    Mildly  Reduced right ventricular systolic function.   Right ventricular systolic pressure is estimated to be 36-46mmHg.   Moderate pulmonary hypertension.   Trace mitral regurgitation.   Aortic annular calcification. Aortic sclerosis. No stenosis or   regurgitation seen.   Moderate tricuspid regurgitation.   Normal pericardium without effusion.     Ascending aorta not well visualized.   Echo findings suggestive of possible pulmonary embolus or hypertension.   Clinical correlation is recommended.       Assessment & Plan     1. Sick sinus syndrome (HCC)     2. Cardiac pacemaker in situ     3. Essential hypertension     4. Mixed hyperlipidemia     5. Cardiac murmur           Shared Medical Decision Making:    Recurrent sinus arrest status post pacemaker  Status post [employer]tr[employer] permanent pacemaker placement in 2014 by Dr. De Santiago  -Continue follow-up with device clinic    Hypertension  BP well controlled this visit. Echocardiogram in 2014 with mild LVH  -Continue losartan 100 mg daily and amlodipine 10 mg daily  -Discussed obtaining echocardiogram prior to next visit.  Patient would like to hold off at this time,.  Will order next  visit.    Dyslipidemia  -Continue rosuvastatin 10 mg daily    Cardiac murmur  Echocardiogram in 2014 showed aortic sclerosis without stenosis.  Patient asymptomatic.  -We will order echocardiogram next visit as above    All of the patient's excellent questions were answered to the best of my knowledge and to her satisfaction.  It was a pleasure seeing Ms. Roya Muniz in my clinic today. Return in about 1 year (around 11/11/2022), or if symptoms worsen or fail to improve. Patient is aware to call the cardiology clinic with any questions or concerns.      Gabe Roberson MD  [facility]  [facility], [address]  [address] [address]  Phone: [phone]  Fax: [phone]

## 2021-11-11 ENCOUNTER — OFFICE VISIT (OUTPATIENT)
Dept: CARDIOLOGY | Facility: MEDICAL CENTER | Age: 81
End: 2021-11-11
Payer: MEDICARE

## 2021-11-11 ENCOUNTER — NON-PROVIDER VISIT (OUTPATIENT)
Dept: CARDIOLOGY | Facility: MEDICAL CENTER | Age: 81
End: 2021-11-11
Payer: MEDICARE

## 2021-11-11 VITALS
RESPIRATION RATE: 16 BRPM | BODY MASS INDEX: 24.75 KG/M2 | HEIGHT: 64 IN | SYSTOLIC BLOOD PRESSURE: 130 MMHG | OXYGEN SATURATION: 97 % | HEART RATE: 77 BPM | WEIGHT: 145 LBS | DIASTOLIC BLOOD PRESSURE: 82 MMHG

## 2021-11-11 DIAGNOSIS — I49.5 SICK SINUS SYNDROME (HCC): ICD-10-CM

## 2021-11-11 DIAGNOSIS — R01.1 CARDIAC MURMUR: ICD-10-CM

## 2021-11-11 DIAGNOSIS — E78.2 MIXED HYPERLIPIDEMIA: ICD-10-CM

## 2021-11-11 DIAGNOSIS — Z95.0 CARDIAC PACEMAKER IN SITU: ICD-10-CM

## 2021-11-11 DIAGNOSIS — I10 ESSENTIAL HYPERTENSION: ICD-10-CM

## 2021-11-11 PROCEDURE — 93288 INTERROG EVL PM/LDLS PM IP: CPT | Performed by: NURSE PRACTITIONER

## 2021-11-11 PROCEDURE — 99214 OFFICE O/P EST MOD 30 MIN: CPT | Performed by: STUDENT IN AN ORGANIZED HEALTH CARE EDUCATION/TRAINING PROGRAM

## 2021-11-11 ASSESSMENT — ENCOUNTER SYMPTOMS
NEAR-SYNCOPE: 0
FEVER: 0
IRREGULAR HEARTBEAT: 0
DIZZINESS: 0
SYNCOPE: 0
FOCAL WEAKNESS: 0
NAUSEA: 0
DIARRHEA: 0
DYSPNEA ON EXERTION: 0
PALPITATIONS: 0
COUGH: 0
SHORTNESS OF BREATH: 0
ORTHOPNEA: 0
VOMITING: 0
WHEEZING: 0
NIGHT SWEATS: 0
PND: 0
WEAKNESS: 0
ABDOMINAL PAIN: 0

## 2021-11-11 ASSESSMENT — FIBROSIS 4 INDEX: FIB4 SCORE: 1.1

## 2021-12-17 DIAGNOSIS — E78.2 MIXED HYPERLIPIDEMIA: ICD-10-CM

## 2021-12-20 RX ORDER — ROSUVASTATIN CALCIUM 10 MG/1
TABLET, COATED ORAL
Qty: 100 TABLET | Refills: 3 | Status: SHIPPED | OUTPATIENT
Start: 2021-12-20 | End: 2023-01-18 | Stop reason: SDUPTHER

## 2022-02-03 ENCOUNTER — OFFICE VISIT (OUTPATIENT)
Dept: MEDICAL GROUP | Facility: PHYSICIAN GROUP | Age: 82
End: 2022-02-03
Payer: MEDICARE

## 2022-02-03 VITALS
SYSTOLIC BLOOD PRESSURE: 140 MMHG | BODY MASS INDEX: 24.74 KG/M2 | HEIGHT: 64 IN | WEIGHT: 144.9 LBS | RESPIRATION RATE: 16 BRPM | DIASTOLIC BLOOD PRESSURE: 80 MMHG | HEART RATE: 81 BPM | TEMPERATURE: 96.2 F | OXYGEN SATURATION: 97 %

## 2022-02-03 DIAGNOSIS — E11.9 TYPE 2 DIABETES MELLITUS WITHOUT COMPLICATION, WITH LONG-TERM CURRENT USE OF INSULIN (HCC): ICD-10-CM

## 2022-02-03 DIAGNOSIS — E78.2 MIXED HYPERLIPIDEMIA: ICD-10-CM

## 2022-02-03 DIAGNOSIS — E55.9 VITAMIN D DEFICIENCY: ICD-10-CM

## 2022-02-03 DIAGNOSIS — I10 PRIMARY HYPERTENSION: ICD-10-CM

## 2022-02-03 DIAGNOSIS — N18.32 STAGE 3B CHRONIC KIDNEY DISEASE: ICD-10-CM

## 2022-02-03 DIAGNOSIS — E03.9 HYPOTHYROIDISM, UNSPECIFIED TYPE: ICD-10-CM

## 2022-02-03 DIAGNOSIS — Z79.4 TYPE 2 DIABETES MELLITUS WITHOUT COMPLICATION, WITH LONG-TERM CURRENT USE OF INSULIN (HCC): ICD-10-CM

## 2022-02-03 DIAGNOSIS — I70.0 ATHEROSCLEROSIS OF AORTA (HCC): ICD-10-CM

## 2022-02-03 DIAGNOSIS — K58.0 IRRITABLE BOWEL SYNDROME WITH DIARRHEA: ICD-10-CM

## 2022-02-03 DIAGNOSIS — Z12.31 ENCOUNTER FOR SCREENING MAMMOGRAM FOR BREAST CANCER: ICD-10-CM

## 2022-02-03 DIAGNOSIS — M85.859 OSTEOPENIA OF NECK OF FEMUR, UNSPECIFIED LATERALITY: ICD-10-CM

## 2022-02-03 DIAGNOSIS — I49.5 SICK SINUS SYNDROME (HCC): ICD-10-CM

## 2022-02-03 DIAGNOSIS — I27.20 PULMONARY HYPERTENSION (HCC): ICD-10-CM

## 2022-02-03 LAB
HBA1C MFR BLD: 5.9 % (ref 0–5.6)
INT CON NEG: NEGATIVE
INT CON POS: POSITIVE

## 2022-02-03 PROCEDURE — 83036 HEMOGLOBIN GLYCOSYLATED A1C: CPT | Performed by: FAMILY MEDICINE

## 2022-02-03 PROCEDURE — 99214 OFFICE O/P EST MOD 30 MIN: CPT | Performed by: FAMILY MEDICINE

## 2022-02-03 RX ORDER — INSULIN GLARGINE 100 [IU]/ML
INJECTION, SOLUTION SUBCUTANEOUS
Qty: 5 EACH | Refills: 11 | Status: SHIPPED | OUTPATIENT
Start: 2022-02-03 | End: 2022-07-06 | Stop reason: SDUPTHER

## 2022-02-03 RX ORDER — NICOTINE POLACRILEX 2 MG
GUM BUCCAL
COMMUNITY
End: 2022-10-20

## 2022-02-03 ASSESSMENT — FIBROSIS 4 INDEX: FIB4 SCORE: 1.1

## 2022-02-03 ASSESSMENT — PATIENT HEALTH QUESTIONNAIRE - PHQ9: CLINICAL INTERPRETATION OF PHQ2 SCORE: 0

## 2022-02-03 NOTE — PROGRESS NOTES
Subjective:     CC:   Chief Complaint   Patient presents with   • Follow-Up     No concerns   • Lab Results     11/3/21 BW         HPI:   Roya presents today with :    She saw me last 11/3/21, a1c then was  5.6- continues to use 25u lantus nightly. Still has a low sugar - in the month of Jan had 4 values in the 40s and 4 in the 50s. She checks her sugar tid. At her last appt 11/3 I had suggested she d/w her endo re: changing from lantus to a GLP1 receptor agonist but she hasn't yet established. I referred her to endo on 4/21 but she hasn't been contacted.    She does not have any sxs a/w her hypoglycemia.   She also should have obtained her dexa scan but the lab closed that day and she was never called to reschedule.   Will f/u with cards 5/18/22.   Problem   Sick Sinus Syndrome (Hcc)    Stable, continue current plan of care with current medications.        Pulmonary Hypertension (Hcc)    Stable, continue current plan of care with current medications.        Atherosclerosis of aorta (HCC)    Stable, continue current plan of care with current medications.        Chronic kidney disease (CKD), stage III (moderate) (HCC)    Stable, continue current plan of care with current medications.        Diabetes (Hcc) (Resolved)       Current Outpatient Medications Ordered in Epic   Medication Sig Dispense Refill   • Naphazoline-Polyethyl Glycol (EYE DROPS) 0.012-0.2 % Solution Administer  into affected eye(s).     • insulin glargine (LANTUS SOLOSTAR) 100 UNIT/ML Solution Pen-injector injection Lower to 22 units nightly 5 Each 11   • rosuvastatin (CRESTOR) 10 MG Tab TAKE 1 TABLET BY MOUTH EVERY DAY IN THE EVENING 100 Tablet 3   • amLODIPine (NORVASC) 10 MG Tab TAKE ONE TABLET BY MOUTH DAILY for Blood pressure 90 Tablet 3   • FREESTYLE LITE strip 1 STRIP BY OTHER ROUTE AS NEEDED (FREESTYLE LITE TEST STRIPS). 100 Strip 3   • Insulin Pen Needle 32 G x 4 mm (BD PEN NEEDLE ANTONI U/F) USE ONE PEN NEEDLE IN PEN DEVICE TO INJECT INSULIN  "100 Each 3   • losartan (COZAAR) 100 MG Tab TAKE 1 TABLET BY MOUTH EVERY DAY FOR BLOOD PRESSURE 100 Tablet 1   • levothyroxine (SYNTHROID) 100 MCG Tab Take 1 tablet by mouth every morning on an empty stomach. 100 tablet 2   • Lancets Use one lancet to test blood sugar 4 times/day 200 Each 0   • aspirin 81 MG EC tablet Take 81 mg by mouth every day at 6 PM.     • Acetaminophen (TYLENOL 8 HOUR PO) Take 500 mg by mouth 1 time a day as needed (HA or body aches).     • Vitamin D, Cholecalciferol, (CHOLECALCIFEROL) 25 MCG (1000 UT) Tab Take 4,000 Units by mouth every day at 6 PM.     • Alcohol Swabs Wipe site with prep pad prior to injection. 200 Each 0   • Blood Glucose Monitoring Suppl (FREESTYLE LITE) Device Test blood sugar as directed. 1 Each 0     No current [facility]-ordered facility-administered medications on file.       Past Medical History:   Diagnosis Date   • Cardiac pacemaker in situ    • Diabetes (HCC)    • History of DVT of lower extremity 6/5/2014   • Hyperlipidemia    • Hypertension    • Pancreatic cyst    • Pancreatitis    • Sinus node dysfunction (HCC)    • Thyroid disease     hypothyroidism       Social History     Tobacco Use   • Smoking status: Never Smoker   • Smokeless tobacco: Never Used   • Tobacco comment: avoid all tobacco products   Vaping Use   • Vaping Use: Never used   Substance Use Topics   • Alcohol use: Not Currently     Alcohol/week: 0.0 oz     Comment: occasionally   • Drug use: No       Allergies:  Cephalexin [keflex]    Health Maintenance: Completed    ROS:  Per HPI      Objective:       Exam:  /80 (BP Location: Right arm, Patient Position: Sitting, BP Cuff Size: Adult)   Pulse 81   Temp (!) 35.7 °C (96.2 °F) (Temporal)   Resp 16   Ht 1.626 m (5' 4\")   Wt 65.7 kg (144 lb 14.4 oz)   SpO2 97%   BMI 24.87 kg/m²   Body mass index is 24.87 kg/m².  Wt Readings from Last 4 Encounters:   02/03/22 65.7 kg (144 lb 14.4 oz)   11/11/21 65.8 kg (145 lb)   11/03/21 64.9 kg (143 lb) "   07/01/21 68.2 kg (150 lb 4.8 oz)       Gen: Alert and oriented, No apparent distress. Appropriately groomed.  Neck: Neck is supple without lymphadenopathy.No thyromegaly.   Lungs: Normal effort, CTA bilaterally, no wheezes, rhonchi, or rales.  CV: Regular rate and rhythm. No murmurs, rubs, or gallops.  ABD:  Soft, nontender, nondistended, NABSx4, no HSM or RBT or guarding or masses.  Ext: No clubbing, cyanosis, edema.  Skin: No rash noted.    5.9 a1c 02/03/22     Assessment & Plan:     81 y.o. female with the following -   Monitor regarding the risk of hypoglycemia and death associated, she will lower her Lantus to 22 units and establish with an endocrinologist.  She also needs to schedule her bone density.  1. Encounter for screening mammogram for breast cancer  - MA-SCREENING MAMMO BILAT W/CAD; Future    2. Type 2 diabetes mellitus without complication, with long-term current use of insulin (HCC)  - Referral to Endocrinology  - CBC WITH DIFFERENTIAL; Future  - Comp Metabolic Panel; Future  - Lipid Profile; Future  - MICROALBUMIN CREAT RATIO URINE; Future  - HEMOGLOBIN A1C; Future  - POCT  A1C    3. Hypothyroidism, unspecified type  - TSH; Future    4. Primary hypertension    5. Mixed hyperlipidemia  - VITAMIN D,25 HYDROXY; Future    6. Vitamin D deficiency    7. Osteopenia of neck of femur, unspecified laterality    8. Irritable bowel syndrome with diarrhea    9. Atherosclerosis of aorta (HCC)    10. Stage 3b chronic kidney disease (HCC)    11. Pulmonary hypertension (HCC)    12. Sick sinus syndrome (HCC)    Other orders  - Naphazoline-Polyethyl Glycol (EYE DROPS) 0.012-0.2 % Solution; Administer  into affected eye(s).  - insulin glargine (LANTUS SOLOSTAR) 100 UNIT/ML Solution Pen-injector injection; Lower to 22 units nightly  Dispense: 5 Each; Refill: 11          Return in about 4 months (around 6/3/2022) for DM.    Please note that this dictation was created using voice recognition software. I have made every  reasonable attempt to correct obvious errors, but I expect that there are errors of grammar and possibly content that I did not discover before finalizing the note.

## 2022-02-25 NOTE — TELEPHONE ENCOUNTER
Received request via: Pharmacy    Was the patient seen in the last year in this department? Yes 2/3/22    Does the patient have an active prescription (recently filled or refills available) for medication(s) requested? No

## 2022-03-02 RX ORDER — LEVOTHYROXINE SODIUM 0.1 MG/1
TABLET ORAL
Qty: 90 TABLET | Refills: 3 | Status: SHIPPED | OUTPATIENT
Start: 2022-03-02 | End: 2023-01-18 | Stop reason: SDUPTHER

## 2022-04-04 ENCOUNTER — PATIENT MESSAGE (OUTPATIENT)
Dept: HEALTH INFORMATION MANAGEMENT | Facility: OTHER | Age: 82
End: 2022-04-04

## 2022-04-19 ENCOUNTER — PATIENT MESSAGE (OUTPATIENT)
Dept: MEDICAL GROUP | Facility: PHYSICIAN GROUP | Age: 82
End: 2022-04-19
Payer: MEDICARE

## 2022-04-19 DIAGNOSIS — E11.9 TYPE 2 DIABETES MELLITUS WITHOUT COMPLICATION, WITH LONG-TERM CURRENT USE OF INSULIN (HCC): ICD-10-CM

## 2022-04-19 DIAGNOSIS — Z79.4 TYPE 2 DIABETES MELLITUS WITHOUT COMPLICATION, WITH LONG-TERM CURRENT USE OF INSULIN (HCC): ICD-10-CM

## 2022-04-19 RX ORDER — LANCETS 30 GAUGE
EACH MISCELLANEOUS
Qty: 300 EACH | Refills: 0 | Status: SHIPPED | OUTPATIENT
Start: 2022-04-19

## 2022-04-19 RX ORDER — BLOOD-GLUCOSE METER
1 KIT MISCELLANEOUS 3 TIMES DAILY
Qty: 300 STRIP | Refills: 0 | Status: SHIPPED | OUTPATIENT
Start: 2022-04-19 | End: 2022-07-15

## 2022-04-19 NOTE — TELEPHONE ENCOUNTER
From: Roya Muniz  To: Physician Bisi Browning  Sent: 4/19/2022 11:47 AM PDT  Subject: Free style life test strips    Update prescription using three per day. Pharmacy states that I should be using only one per day. Can you straighten this out for me please. Thank you.

## 2022-05-12 NOTE — ASSESSMENT & PLAN NOTE
A1c WAS 7.3 in Nov 2020  This time 18    PENNY-65, INSULIN AB, islet cell Ab pending   Glycopyrrolate Pregnancy And Lactation Text: This medication is Pregnancy Category B and is considered safe during pregnancy. It is unknown if it is excreted breast milk.

## 2022-06-22 ENCOUNTER — NON-PROVIDER VISIT (OUTPATIENT)
Dept: CARDIOLOGY | Facility: MEDICAL CENTER | Age: 82
End: 2022-06-22
Payer: MEDICARE

## 2022-06-22 DIAGNOSIS — Z95.0 CARDIAC PACEMAKER IN SITU: ICD-10-CM

## 2022-06-22 DIAGNOSIS — I49.5 SICK SINUS SYNDROME (HCC): ICD-10-CM

## 2022-06-22 PROCEDURE — 93280 PM DEVICE PROGR EVAL DUAL: CPT | Performed by: INTERNAL MEDICINE

## 2022-07-04 DIAGNOSIS — I10 ESSENTIAL HYPERTENSION: ICD-10-CM

## 2022-07-05 RX ORDER — LOSARTAN POTASSIUM 100 MG/1
TABLET ORAL
Qty: 100 TABLET | Refills: 3 | Status: SHIPPED | OUTPATIENT
Start: 2022-07-05 | End: 2023-08-16 | Stop reason: SDUPTHER

## 2022-07-06 ENCOUNTER — OFFICE VISIT (OUTPATIENT)
Dept: MEDICAL GROUP | Facility: PHYSICIAN GROUP | Age: 82
End: 2022-07-06
Payer: MEDICARE

## 2022-07-06 VITALS
WEIGHT: 143 LBS | RESPIRATION RATE: 16 BRPM | HEIGHT: 64 IN | DIASTOLIC BLOOD PRESSURE: 72 MMHG | BODY MASS INDEX: 24.41 KG/M2 | HEART RATE: 84 BPM | TEMPERATURE: 98.1 F | SYSTOLIC BLOOD PRESSURE: 122 MMHG | OXYGEN SATURATION: 96 %

## 2022-07-06 DIAGNOSIS — I10 PRIMARY HYPERTENSION: ICD-10-CM

## 2022-07-06 DIAGNOSIS — E11.9 TYPE 2 DIABETES MELLITUS WITHOUT COMPLICATION, WITH LONG-TERM CURRENT USE OF INSULIN (HCC): ICD-10-CM

## 2022-07-06 DIAGNOSIS — E78.2 MIXED HYPERLIPIDEMIA: ICD-10-CM

## 2022-07-06 DIAGNOSIS — Z79.4 TYPE 2 DIABETES MELLITUS WITHOUT COMPLICATION, WITH LONG-TERM CURRENT USE OF INSULIN (HCC): ICD-10-CM

## 2022-07-06 DIAGNOSIS — Z23 NEED FOR VACCINATION: ICD-10-CM

## 2022-07-06 DIAGNOSIS — M85.859 OSTEOPENIA OF NECK OF FEMUR, UNSPECIFIED LATERALITY: ICD-10-CM

## 2022-07-06 DIAGNOSIS — Z78.0 ASYMPTOMATIC MENOPAUSAL STATE: ICD-10-CM

## 2022-07-06 LAB
HBA1C MFR BLD: 5.8 % (ref 0–5.6)
INT CON NEG: NEGATIVE
INT CON POS: NEGATIVE

## 2022-07-06 PROCEDURE — 83036 HEMOGLOBIN GLYCOSYLATED A1C: CPT | Performed by: FAMILY MEDICINE

## 2022-07-06 PROCEDURE — 90471 IMMUNIZATION ADMIN: CPT | Performed by: FAMILY MEDICINE

## 2022-07-06 PROCEDURE — 90715 TDAP VACCINE 7 YRS/> IM: CPT | Performed by: FAMILY MEDICINE

## 2022-07-06 PROCEDURE — 99213 OFFICE O/P EST LOW 20 MIN: CPT | Mod: 25 | Performed by: FAMILY MEDICINE

## 2022-07-06 RX ORDER — INSULIN GLARGINE 100 [IU]/ML
INJECTION, SOLUTION SUBCUTANEOUS
Qty: 5 EACH | Refills: 11 | Status: SHIPPED | OUTPATIENT
Start: 2022-07-06 | End: 2023-03-23

## 2022-07-06 ASSESSMENT — FIBROSIS 4 INDEX: FIB4 SCORE: 1.1

## 2022-07-06 NOTE — PATIENT INSTRUCTIONS
Call to schedule your mammogram and bone density scan    Lower lantus to 20 units a day    Do your labs fasting after 9/6/22 and see new PCP to f/u labs

## 2022-07-06 NOTE — PROGRESS NOTES
Subjective:     CC:   Chief Complaint   Patient presents with   • Diabetes         HPI:   Roya presents today with f/u dm. Has not yet obtained her labs ordered. Saw me last 2/22.   5.8 hga1c 07/06/22, fbs range 99 - 112, no hypoglycemia.   Over due for dexa and mammo.   Sees cards to change batteries for her pacemaker in 11/22  Due for optho appt, will call  No problems updated.    Current Outpatient Medications Ordered in Epic   Medication Sig Dispense Refill   • insulin glargine (LANTUS SOLOSTAR) 100 UNIT/ML Solution Pen-injector injection 20 units nightly 5 Each 11   • losartan (COZAAR) 100 MG Tab TAKE 1 TABLET BY MOUTH EVERY DAY FOR BLOOD PRESSURE 100 Tablet 3   • glucose blood (FREESTYLE LITE) strip 1 Strip by Other route 3 times a day. 300 Strip 0   • Lancets Use one lancet to test blood sugar 3 times/day 300 Each 0   • levothyroxine (SYNTHROID) 100 MCG Tab TAKE 1 TABLET BY MOUTH EVERY DAY IN THE MORNING ON AN EMPTY STOMACH 90 Tablet 3   • Naphazoline-Polyethyl Glycol (EYE DROPS) 0.012-0.2 % Solution Administer  into affected eye(s).     • rosuvastatin (CRESTOR) 10 MG Tab TAKE 1 TABLET BY MOUTH EVERY DAY IN THE EVENING 100 Tablet 3   • amLODIPine (NORVASC) 10 MG Tab TAKE ONE TABLET BY MOUTH DAILY for Blood pressure 90 Tablet 3   • Insulin Pen Needle 32 G x 4 mm (BD PEN NEEDLE ANTONI U/F) USE ONE PEN NEEDLE IN PEN DEVICE TO INJECT INSULIN 100 Each 3   • aspirin 81 MG EC tablet Take 81 mg by mouth every day at 6 PM.     • Acetaminophen (TYLENOL 8 HOUR PO) Take 500 mg by mouth 1 time a day as needed (HA or body aches).     • Vitamin D, Cholecalciferol, (CHOLECALCIFEROL) 25 MCG (1000 UT) Tab Take 4,000 Units by mouth every day at 6 PM.     • Alcohol Swabs Wipe site with prep pad prior to injection. 200 Each 0   • Blood Glucose Monitoring Suppl (FREESTYLE LITE) Device Test blood sugar as directed. 1 Each 0     No current [facility]-ordered facility-administered medications on file.       Past Medical History:   Diagnosis  "Date   • Cardiac pacemaker in situ    • Diabetes (HCC)    • History of DVT of lower extremity 6/5/2014   • Hyperlipidemia    • Hypertension    • Pancreatic cyst    • Pancreatitis    • Sinus node dysfunction (HCC)    • Thyroid disease     hypothyroidism       Social History     Tobacco Use   • Smoking status: Never Smoker   • Smokeless tobacco: Never Used   • Tobacco comment: avoid all tobacco products   Vaping Use   • Vaping Use: Never used   Substance Use Topics   • Alcohol use: Not Currently     Alcohol/week: 0.0 oz     Comment: occasionally   • Drug use: No       Allergies:  Cephalexin [keflex]    Health Maintenance: Completed    ROS:  Per HPI      Objective:       Exam:  /72 (BP Location: Right arm, Patient Position: Sitting, BP Cuff Size: Adult)   Pulse 84   Temp 36.7 °C (98.1 °F) (Temporal)   Resp 16   Ht 1.626 m (5' 4\")   Wt 64.9 kg (143 lb)   SpO2 96%   BMI 24.55 kg/m²   Body mass index is 24.55 kg/m².  Wt Readings from Last 4 Encounters:   07/06/22 64.9 kg (143 lb)   02/03/22 65.7 kg (144 lb 14.4 oz)   11/11/21 65.8 kg (145 lb)   11/03/21 64.9 kg (143 lb)       Gen: Alert and oriented, No apparent distress. Appropriately groomed.  Neck: Neck is supple without lymphadenopathy.No thyromegaly.   Lungs: Normal effort, CTA bilaterally, no wheezes, rhonchi, or rales.  CV: Regular rate and rhythm. No murmurs, rubs, or gallops.  ABD:  Soft, nontender, nondistended, NABSx4, no HSM or RBT or guarding or masses.  Ext: No clubbing, cyanosis, edema.  Skin: No rash noted.    Assessment & Plan:     81 y.o. female with the following -   F/u with new pcp in 3m, lower lantus to 20u  1. Type 2 diabetes mellitus without complication, with long-term current use of insulin ([facility])  - POCT  A1C    2. Asymptomatic menopausal state  - DS-BONE DENSITY STUDY (DEXA); Future    3. Need for vaccination  - Tdap =>8yo IM    4. Primary hypertension    5. Osteopenia of neck of femur, unspecified laterality    6. Mixed " hyperlipidemia    Other orders  - insulin glargine (LANTUS SOLOSTAR) 100 UNIT/ML Solution Pen-injector injection; 20 units nightly  Dispense: 5 Each; Refill: 11          Return in about 3 months (around 10/6/2022) for loyd Meehan.    Please note that this dictation was created using voice recognition software. I have made every reasonable attempt to correct obvious errors, but I expect that there are errors of grammar and possibly content that I did not discover before finalizing the note.

## 2022-07-15 DIAGNOSIS — E11.9 TYPE 2 DIABETES MELLITUS WITHOUT COMPLICATION, WITH LONG-TERM CURRENT USE OF INSULIN (HCC): ICD-10-CM

## 2022-07-15 DIAGNOSIS — Z79.4 TYPE 2 DIABETES MELLITUS WITHOUT COMPLICATION, WITH LONG-TERM CURRENT USE OF INSULIN (HCC): ICD-10-CM

## 2022-07-15 RX ORDER — BLOOD-GLUCOSE METER
KIT MISCELLANEOUS
Qty: 300 STRIP | Refills: 0 | Status: SHIPPED | OUTPATIENT
Start: 2022-07-15 | End: 2023-01-18 | Stop reason: SDUPTHER

## 2022-08-03 ENCOUNTER — TELEPHONE (OUTPATIENT)
Dept: HEALTH INFORMATION MANAGEMENT | Facility: OTHER | Age: 82
End: 2022-08-03

## 2022-10-20 ENCOUNTER — OFFICE VISIT (OUTPATIENT)
Dept: MEDICAL GROUP | Facility: PHYSICIAN GROUP | Age: 82
End: 2022-10-20
Payer: MEDICARE

## 2022-10-20 VITALS
HEART RATE: 90 BPM | DIASTOLIC BLOOD PRESSURE: 68 MMHG | TEMPERATURE: 98.1 F | RESPIRATION RATE: 14 BRPM | BODY MASS INDEX: 24.75 KG/M2 | SYSTOLIC BLOOD PRESSURE: 128 MMHG | WEIGHT: 145 LBS | HEIGHT: 64 IN | OXYGEN SATURATION: 98 %

## 2022-10-20 DIAGNOSIS — I10 PRIMARY HYPERTENSION: ICD-10-CM

## 2022-10-20 DIAGNOSIS — Z95.0 CARDIAC PACEMAKER IN SITU: ICD-10-CM

## 2022-10-20 DIAGNOSIS — E11.9 TYPE 2 DIABETES MELLITUS WITHOUT COMPLICATION, WITH LONG-TERM CURRENT USE OF INSULIN (HCC): ICD-10-CM

## 2022-10-20 DIAGNOSIS — E03.9 HYPOTHYROIDISM, UNSPECIFIED TYPE: ICD-10-CM

## 2022-10-20 DIAGNOSIS — Z79.4 TYPE 2 DIABETES MELLITUS WITHOUT COMPLICATION, WITH LONG-TERM CURRENT USE OF INSULIN (HCC): ICD-10-CM

## 2022-10-20 DIAGNOSIS — L84 CALLUS OF FOOT: ICD-10-CM

## 2022-10-20 PROCEDURE — 99214 OFFICE O/P EST MOD 30 MIN: CPT | Performed by: FAMILY MEDICINE

## 2022-10-20 ASSESSMENT — FIBROSIS 4 INDEX: FIB4 SCORE: 1.11
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
## 2022-10-20 NOTE — ASSESSMENT & PLAN NOTE
Chronic, taking Levothyroxine 100 mcg daily.  Denies any signs or symptoms of hypo or hyperthyroidism.

## 2022-10-20 NOTE — ASSESSMENT & PLAN NOTE
Chronic, stable on current regimen over the last 10 years. On Losartan 100 mg daily and Amlodipine 10 mg daily.  Denies any chest pains, shortness of breath, dizziness.

## 2022-10-20 NOTE — ASSESSMENT & PLAN NOTE
Chronic, stable on Lantus 20 units nightly. She controls her blood sugars with diet and exercise. She rides a stationary bike and gardens frequently for activity.   Fasting range 87- highest 144, averaging low 100's.  Her A1c a year ago was over 18, most recent 1 done and July 2022 was down to 5.8.  She states that she controls her blood sugars mostly with diet and exercise and watches her carbohydrate and sugar intake.  She sees a podiatrist every 72 days for toenail clipping and bunion removals, she does have an ophthalmologist and will schedule her retinal exam with them, declines having it done today in clinic.  Monofilament testing with a 10 gram force: sensation intact: intact bilaterally  Visual Inspection: Feet without maceration, ulcers, fissures.  Pedal pulses: intact bilaterally

## 2022-10-20 NOTE — ASSESSMENT & PLAN NOTE
Chronic, seeing podiatry every 72 days for removal and foot exam. Denies any tingling or numbness in feet and does foot checks regularly.

## 2022-10-20 NOTE — ASSESSMENT & PLAN NOTE
Chronic, says she has a pacer check next month and she likely will need a battery change done within the next few months.  Has an appointment with Dr. Roberson November 10.

## 2022-10-25 DIAGNOSIS — Z79.4 TYPE 2 DIABETES MELLITUS WITHOUT COMPLICATION, WITH LONG-TERM CURRENT USE OF INSULIN (HCC): ICD-10-CM

## 2022-10-25 DIAGNOSIS — E11.9 TYPE 2 DIABETES MELLITUS WITHOUT COMPLICATION, WITH LONG-TERM CURRENT USE OF INSULIN (HCC): ICD-10-CM

## 2022-10-27 RX ORDER — PEN NEEDLE, DIABETIC 32GX 5/32"
NEEDLE, DISPOSABLE MISCELLANEOUS
Qty: 100 EACH | Refills: 3 | Status: SHIPPED | OUTPATIENT
Start: 2022-10-27 | End: 2023-12-14 | Stop reason: SDUPTHER

## 2022-11-10 ENCOUNTER — NON-PROVIDER VISIT (OUTPATIENT)
Dept: CARDIOLOGY | Facility: MEDICAL CENTER | Age: 82
End: 2022-11-10
Payer: MEDICARE

## 2022-11-10 DIAGNOSIS — I49.5 SICK SINUS SYNDROME (HCC): ICD-10-CM

## 2022-11-10 DIAGNOSIS — Z95.0 CARDIAC PACEMAKER IN SITU: ICD-10-CM

## 2022-11-10 PROCEDURE — 93280 PM DEVICE PROGR EVAL DUAL: CPT | Performed by: INTERNAL MEDICINE

## 2022-12-12 DIAGNOSIS — I10 ESSENTIAL HYPERTENSION: ICD-10-CM

## 2022-12-12 RX ORDER — AMLODIPINE BESYLATE 10 MG/1
TABLET ORAL
Qty: 100 TABLET | Refills: 0 | Status: SHIPPED | OUTPATIENT
Start: 2022-12-12 | End: 2023-01-18 | Stop reason: SDUPTHER

## 2022-12-13 NOTE — TELEPHONE ENCOUNTER
Received request via: Patient    Was the patient seen in the last year in this department? Yes    Does the patient have an active prescription (recently filled or refills available) for medication(s) requested? No    Does the patient have [facility] and need 100 day supply (blood pressure, diabetes and cholesterol meds only)? Yes, quantity updated to 100 days  Katie Teague sent to  [facility]  Pcp was [facility], will est care at [place]     Patient is asking for a one time refill of amLODIPine (NORVASC) 10 MG Tab . She is set to est care on 1/18 with new PCP. Send to [facility] on [employer] [address]. Thank you.

## 2023-01-18 ENCOUNTER — OFFICE VISIT (OUTPATIENT)
Dept: MEDICAL GROUP | Facility: PHYSICIAN GROUP | Age: 83
End: 2023-01-18
Payer: MEDICARE

## 2023-01-18 VITALS
DIASTOLIC BLOOD PRESSURE: 84 MMHG | HEIGHT: 64 IN | OXYGEN SATURATION: 97 % | RESPIRATION RATE: 20 BRPM | WEIGHT: 146 LBS | SYSTOLIC BLOOD PRESSURE: 138 MMHG | TEMPERATURE: 98.3 F | BODY MASS INDEX: 24.92 KG/M2 | HEART RATE: 86 BPM

## 2023-01-18 DIAGNOSIS — I15.2 HYPERTENSION ASSOCIATED WITH TYPE 2 DIABETES MELLITUS (HCC): ICD-10-CM

## 2023-01-18 DIAGNOSIS — N18.31 STAGE 3A CHRONIC KIDNEY DISEASE: ICD-10-CM

## 2023-01-18 DIAGNOSIS — E78.5 HYPERLIPIDEMIA ASSOCIATED WITH TYPE 2 DIABETES MELLITUS (HCC): ICD-10-CM

## 2023-01-18 DIAGNOSIS — E11.59 HYPERTENSION ASSOCIATED WITH TYPE 2 DIABETES MELLITUS (HCC): ICD-10-CM

## 2023-01-18 DIAGNOSIS — E03.9 HYPOTHYROIDISM, UNSPECIFIED TYPE: ICD-10-CM

## 2023-01-18 DIAGNOSIS — E11.69 TYPE 2 DIABETES MELLITUS WITH OTHER SPECIFIED COMPLICATION, WITH LONG-TERM CURRENT USE OF INSULIN (HCC): ICD-10-CM

## 2023-01-18 DIAGNOSIS — R80.9 MICROALBUMINURIA: ICD-10-CM

## 2023-01-18 DIAGNOSIS — Z79.4 TYPE 2 DIABETES MELLITUS WITH OTHER SPECIFIED COMPLICATION, WITH LONG-TERM CURRENT USE OF INSULIN (HCC): ICD-10-CM

## 2023-01-18 DIAGNOSIS — I70.0 ATHEROSCLEROSIS OF AORTA (HCC): ICD-10-CM

## 2023-01-18 DIAGNOSIS — E11.69 HYPERLIPIDEMIA ASSOCIATED WITH TYPE 2 DIABETES MELLITUS (HCC): ICD-10-CM

## 2023-01-18 DIAGNOSIS — M85.852 OSTEOPENIA OF NECK OF LEFT FEMUR: ICD-10-CM

## 2023-01-18 PROBLEM — Q66.72 PES CAVUS OF BOTH FEET: Status: ACTIVE | Noted: 2023-01-18

## 2023-01-18 PROBLEM — Q66.71 PES CAVUS OF BOTH FEET: Status: ACTIVE | Noted: 2023-01-18

## 2023-01-18 LAB
HBA1C MFR BLD: 5.8 % (ref 0–5.6)
INT CON NEG: NEGATIVE
INT CON POS: POSITIVE

## 2023-01-18 PROCEDURE — 83036 HEMOGLOBIN GLYCOSYLATED A1C: CPT | Performed by: STUDENT IN AN ORGANIZED HEALTH CARE EDUCATION/TRAINING PROGRAM

## 2023-01-18 PROCEDURE — 99214 OFFICE O/P EST MOD 30 MIN: CPT | Performed by: STUDENT IN AN ORGANIZED HEALTH CARE EDUCATION/TRAINING PROGRAM

## 2023-01-18 RX ORDER — LEVOTHYROXINE SODIUM 0.1 MG/1
100 TABLET ORAL
Qty: 90 TABLET | Refills: 0 | Status: SHIPPED | OUTPATIENT
Start: 2023-01-18 | End: 2023-03-23

## 2023-01-18 RX ORDER — BLOOD-GLUCOSE METER
KIT MISCELLANEOUS
Qty: 300 STRIP | Refills: 0 | Status: SHIPPED | OUTPATIENT
Start: 2023-01-18

## 2023-01-18 RX ORDER — AMLODIPINE BESYLATE 10 MG/1
TABLET ORAL
Qty: 100 TABLET | Refills: 3 | Status: SHIPPED | OUTPATIENT
Start: 2023-01-18 | End: 2024-01-24 | Stop reason: SDUPTHER

## 2023-01-18 RX ORDER — ROSUVASTATIN CALCIUM 10 MG/1
TABLET, COATED ORAL
Qty: 100 TABLET | Refills: 0 | Status: SHIPPED | OUTPATIENT
Start: 2023-01-18 | End: 2023-05-03

## 2023-01-18 ASSESSMENT — FIBROSIS 4 INDEX: FIB4 SCORE: 1.11

## 2023-01-18 NOTE — PROGRESS NOTES
Subjective:     Chief Complaint   Patient presents with    Establish Care    Medication Refill     Norvac  Levo  Crestor     HISTORY OF THE PRESENT ILLNESS: Patient is a 82 y.o. female. This pleasant patient is here today to establish care and discuss refills. His/her prior PCP was HOLLI Lowe.    Patient needs a refill of her levothyroxine, Norvasc and Crestor.  Aware that she is overdue for labs.  Followed by podiatry every 70 days and going at the end of the month.  Wears inserts for high arches.  Denies any numbness or tingling.  Has never been on any medication aside from insulin for her diabetes and takes 20 units of Lantus nightly.  Brings in a blood glucose log with readings, checks 3 times a day.  No hypoglycemia.  Ranges fasting .  Aware she is due for retinopathy screening.    Current Outpatient Medications Ordered in Epic   Medication Sig Dispense Refill    amLODIPine (NORVASC) 10 MG Tab TAKE ONE TABLET BY MOUTH DAILY for Blood pressure 100 Tablet 3    rosuvastatin (CRESTOR) 10 MG Tab TAKE 1 TABLET BY MOUTH EVERY DAY IN THE EVENING 100 Tablet 0    levothyroxine (SYNTHROID) 100 MCG Tab Take 1 Tablet by mouth every morning on an empty stomach. 90 Tablet 0    glucose blood (FREESTYLE LITE) strip TEST 3 TIMES A DAY or as needed for high/low blood sugar 300 Strip 0    Insulin Pen Needle 32 G x 4 mm (BD PEN NEEDLE ANTONI 2ND GEN) USE ONE PEN NEEDLE IN PEN DEVICE TO INJECT INSULIN 100 Each 3    insulin glargine (LANTUS SOLOSTAR) 100 UNIT/ML Solution Pen-injector injection 20 units nightly 5 Each 11    losartan (COZAAR) 100 MG Tab TAKE 1 TABLET BY MOUTH EVERY DAY FOR BLOOD PRESSURE 100 Tablet 3    Lancets Use one lancet to test blood sugar 3 times/day 300 Each 0    aspirin 81 MG EC tablet Take 81 mg by mouth every day at 6 PM.      Acetaminophen (TYLENOL 8 HOUR PO) Take 500 mg by mouth 1 time a day as needed (HA or body aches).      Vitamin D, Cholecalciferol, (CHOLECALCIFEROL) 25 MCG (1000 UT) Tab  "Take 4,000 Units by mouth every day at 6 PM.      Alcohol Swabs Wipe site with prep pad prior to injection. 200 Each 0    Blood Glucose Monitoring Suppl (FREESTYLE LITE) Device Test blood sugar as directed. 1 Each 0     No current Epic-ordered facility-administered medications on file.     ROS:   Gen: no fevers/chills, no changes in weight  Eyes: no changes in vision  ENT: no sore throat, no hearing loss,  Pulm: no sob, no cough  CV: no chest pain, no palpitations  GI: no nausea/vomiting, no diarrhea  MSk: no myalgias  Skin: no rash, callus on feet-followed by podiatry   Neuro: no headaches, no numbness/tingling  Heme/Lymph: no easy bruising      Objective:     Exam: /84 (BP Location: Left arm, Patient Position: Sitting, BP Cuff Size: Adult)   Pulse 86   Temp 36.8 °C (98.3 °F) (Temporal)   Resp 20   Ht 1.626 m (5' 4\")   Wt 66.2 kg (146 lb)   SpO2 97%  Body mass index is 25.06 kg/m².    General: Well developed, well nourished in no acute distress.  Head: Normocephalic and atraumatic.  Eyes: Conjunctivae and extraocular motions are normal. Pupils are equal, round. No scleral icterus.   Mouth/Throat: Wearing mask.  Neck: Supple. Thyroid is not grossly enlarged.  Pulmonary: Clear to ausculation.  Normal effort. No rales, ronchi, or wheezing.  Cardiovascular: Regular rate and rhythm without murmurs, rubs or gallops.  Neurologic: No gross/focal deficits. Normal gait.   Skin: Warm and dry.   Monofilament testing with a 10 gram force: sensation intact: intact bilaterally  Visual Inspection: Feet without maceration, ulcers, fissures.  Calluses noted bilateral, left more prominent than right.  Pedal pulses: intact bilaterally   Musculoskeletal: No extremity cyanosis, clubbing, or edema.  Pes cavus bilaterally.  Psych: Normal mood and affect. Alert and oriented x3. Judgment and insight is normal.    Assessment & Plan:   82 y.o. female with the following -    1. Type 2 diabetes mellitus with other specified " complication, with long-term current use of insulin (HCC)  Chronic, well controlled.  Patient states that she is never been on any other medication aside from insulin, prefers to continue with this for now as she is well controlled without hypoglycemia.  Continue Lantus 20 units at night, provided with a chart and after visit summary to titrate if needed.  Given CKD with mild microalbuminuria could consider something like farxiga if needed.  - A1C in 6 months. Discussed ideal glucose ranges.  - Annual labs ordered.  - Retinopathy screening advised.  -Continue care with podiatry  - POCT  A1C  - Comp Metabolic Panel; Future  - Lipid Profile; Future  - CBC WITH DIFFERENTIAL; Future  - MICROALBUMIN CREAT RATIO URINE; Future  - glucose blood (FREESTYLE LITE) strip; TEST 3 TIMES A DAY or as needed for high/low blood sugar  Dispense: 300 Strip; Refill: 0    2. Hypertension associated with type 2 diabetes mellitus (HCC)  Chronic, fair control today but prior at goal. For now continue losartan 100 mg daily and amlodipine 10 mg daily. Discussed ideal ranges/return precautions and ways to improve with lifestyle choices which it sounds like she is already following.  - amLODIPine (NORVASC) 10 MG Tab; TAKE ONE TABLET BY MOUTH DAILY for Blood pressure  Dispense: 100 Tablet; Refill: 3    3. Hyperlipidemia associated with type 2 diabetes mellitus (HCC)  4. Atherosclerosis of aorta (HCC)  Chronic, well controlled but well overdue for lab work.  For now continue rosuvastatin as below and 81 mg aspirin daily.  - rosuvastatin (CRESTOR) 10 MG Tab; TAKE 1 TABLET BY MOUTH EVERY DAY IN THE EVENING  Dispense: 100 Tablet; Refill: 0    5. Stage 3a chronic kidney disease (HCC)  6. Microalbuminuria  Chronic condition.  Diabetes well controlled, blood pressure discussed as above.  Trend labs, plan pending results.  Avoid nephrotoxic medication and continue ARB.  - PTH INTACT (PTH ONLY); Future  - PHOSPHORUS; Future    7. Hypothyroidism,  unspecified type  Chronic, well controlled prior but well overdue for labs.  Advised lab work and continue levothyroxine as below for now.  - TSH WITH REFLEX TO FT4; Future  - levothyroxine (SYNTHROID) 100 MCG Tab; Take 1 Tablet by mouth every morning on an empty stomach.  Dispense: 90 Tablet; Refill: 0    8. Osteopenia of neck of left femur  Plan pending results.  - VITAMIN D,25 HYDROXY (DEFICIENCY); Future  - DS-BONE DENSITY STUDY (DEXA); Future    Return in about 4 weeks (around 2/15/2023), or if symptoms worsen or fail to improve, for Annual Medicare Visit.    Please note that this dictation was created using voice recognition software. I have made every reasonable attempt to correct obvious errors, but I expect that there are errors of grammar and possibly content that I did not discover before finalizing the note.

## 2023-01-18 NOTE — PATIENT INSTRUCTIONS
Ideal glucose ranges: fasting , random (1-2 hours after eating) <180. If glucose <70, we need to adjust your medication to prevent continuing low blood sugar.    Ideal blood pressure <130/80 and at least <140/90.    Retinal exam annually due.    Long-acting insulin:   Adjust dose according to FASTING BLOOD GLUCOSE target  mg/dL  (1) Increase the insulin dose every 3-4 days as needed.   (2) Increase by 2 units if FBG average concentration is 131-170 mg/dL.   (3) Increase by 4 units if FBG average concentration is 171-210 mg/dL.   (4) Increase by 6 units if FBG average concentration is 221-260 mg/dL.   (5) Increase by 8 units if FBG average concentration is greater than 261mg/dL and call us.      Consider cutting back by 1-2 units to previous dose if glucose concentration is below 80 mg/dL or symptoms of hypoglycemia.

## 2023-02-16 ENCOUNTER — HOSPITAL ENCOUNTER (OUTPATIENT)
Dept: LAB | Facility: MEDICAL CENTER | Age: 83
End: 2023-02-16
Attending: STUDENT IN AN ORGANIZED HEALTH CARE EDUCATION/TRAINING PROGRAM
Payer: MEDICARE

## 2023-02-16 DIAGNOSIS — E03.9 HYPOTHYROIDISM, UNSPECIFIED TYPE: ICD-10-CM

## 2023-02-16 DIAGNOSIS — N18.31 STAGE 3A CHRONIC KIDNEY DISEASE: ICD-10-CM

## 2023-02-16 DIAGNOSIS — M85.852 OSTEOPENIA OF NECK OF LEFT FEMUR: ICD-10-CM

## 2023-02-16 DIAGNOSIS — Z79.4 TYPE 2 DIABETES MELLITUS WITH OTHER SPECIFIED COMPLICATION, WITH LONG-TERM CURRENT USE OF INSULIN (HCC): ICD-10-CM

## 2023-02-16 DIAGNOSIS — E11.69 TYPE 2 DIABETES MELLITUS WITH OTHER SPECIFIED COMPLICATION, WITH LONG-TERM CURRENT USE OF INSULIN (HCC): ICD-10-CM

## 2023-02-16 LAB
25(OH)D3 SERPL-MCNC: 38 NG/ML (ref 30–100)
ALBUMIN SERPL BCP-MCNC: 4.4 G/DL (ref 3.2–4.9)
ALBUMIN/GLOB SERPL: 1.5 G/DL
ALP SERPL-CCNC: 76 U/L (ref 30–99)
ALT SERPL-CCNC: 23 U/L (ref 2–50)
ANION GAP SERPL CALC-SCNC: 12 MMOL/L (ref 7–16)
AST SERPL-CCNC: 25 U/L (ref 12–45)
BASOPHILS # BLD AUTO: 0.4 % (ref 0–1.8)
BASOPHILS # BLD: 0.03 K/UL (ref 0–0.12)
BILIRUB SERPL-MCNC: 1.2 MG/DL (ref 0.1–1.5)
BUN SERPL-MCNC: 18 MG/DL (ref 8–22)
CALCIUM ALBUM COR SERPL-MCNC: 9.1 MG/DL (ref 8.5–10.5)
CALCIUM SERPL-MCNC: 9.4 MG/DL (ref 8.5–10.5)
CHLORIDE SERPL-SCNC: 106 MMOL/L (ref 96–112)
CHOLEST SERPL-MCNC: 144 MG/DL (ref 100–199)
CO2 SERPL-SCNC: 24 MMOL/L (ref 20–33)
CREAT SERPL-MCNC: 0.76 MG/DL (ref 0.5–1.4)
CREAT UR-MCNC: 27.79 MG/DL
EOSINOPHIL # BLD AUTO: 0.16 K/UL (ref 0–0.51)
EOSINOPHIL NFR BLD: 2.1 % (ref 0–6.9)
ERYTHROCYTE [DISTWIDTH] IN BLOOD BY AUTOMATED COUNT: 45.7 FL (ref 35.9–50)
FASTING STATUS PATIENT QL REPORTED: NORMAL
GFR SERPLBLD CREATININE-BSD FMLA CKD-EPI: 79 ML/MIN/1.73 M 2
GLOBULIN SER CALC-MCNC: 3 G/DL (ref 1.9–3.5)
GLUCOSE SERPL-MCNC: 79 MG/DL (ref 65–99)
HCT VFR BLD AUTO: 46 % (ref 37–47)
HDLC SERPL-MCNC: 61 MG/DL
HGB BLD-MCNC: 15.3 G/DL (ref 12–16)
IMM GRANULOCYTES # BLD AUTO: 0.03 K/UL (ref 0–0.11)
IMM GRANULOCYTES NFR BLD AUTO: 0.4 % (ref 0–0.9)
LDLC SERPL CALC-MCNC: 57 MG/DL
LYMPHOCYTES # BLD AUTO: 1.45 K/UL (ref 1–4.8)
LYMPHOCYTES NFR BLD: 18.8 % (ref 22–41)
MCH RBC QN AUTO: 32 PG (ref 27–33)
MCHC RBC AUTO-ENTMCNC: 33.3 G/DL (ref 33.6–35)
MCV RBC AUTO: 96.2 FL (ref 81.4–97.8)
MICROALBUMIN UR-MCNC: 2.7 MG/DL
MICROALBUMIN/CREAT UR: 97 MG/G (ref 0–30)
MONOCYTES # BLD AUTO: 0.69 K/UL (ref 0–0.85)
MONOCYTES NFR BLD AUTO: 9 % (ref 0–13.4)
NEUTROPHILS # BLD AUTO: 5.34 K/UL (ref 2–7.15)
NEUTROPHILS NFR BLD: 69.3 % (ref 44–72)
NRBC # BLD AUTO: 0 K/UL
NRBC BLD-RTO: 0 /100 WBC
PHOSPHATE SERPL-MCNC: 3.2 MG/DL (ref 2.5–4.5)
PLATELET # BLD AUTO: 211 K/UL (ref 164–446)
PMV BLD AUTO: 11 FL (ref 9–12.9)
POTASSIUM SERPL-SCNC: 4.1 MMOL/L (ref 3.6–5.5)
PROT SERPL-MCNC: 7.4 G/DL (ref 6–8.2)
PTH-INTACT SERPL-MCNC: 35.5 PG/ML (ref 14–72)
RBC # BLD AUTO: 4.78 M/UL (ref 4.2–5.4)
SODIUM SERPL-SCNC: 142 MMOL/L (ref 135–145)
T4 FREE SERPL-MCNC: 1.31 NG/DL (ref 0.93–1.7)
TRIGL SERPL-MCNC: 130 MG/DL (ref 0–149)
TSH SERPL DL<=0.005 MIU/L-ACNC: 5.59 UIU/ML (ref 0.38–5.33)
WBC # BLD AUTO: 7.7 K/UL (ref 4.8–10.8)

## 2023-02-16 PROCEDURE — 84439 ASSAY OF FREE THYROXINE: CPT

## 2023-02-16 PROCEDURE — 82570 ASSAY OF URINE CREATININE: CPT

## 2023-02-16 PROCEDURE — 84100 ASSAY OF PHOSPHORUS: CPT

## 2023-02-16 PROCEDURE — 80061 LIPID PANEL: CPT

## 2023-02-16 PROCEDURE — 85025 COMPLETE CBC W/AUTO DIFF WBC: CPT

## 2023-02-16 PROCEDURE — 82043 UR ALBUMIN QUANTITATIVE: CPT

## 2023-02-16 PROCEDURE — 83970 ASSAY OF PARATHORMONE: CPT

## 2023-02-16 PROCEDURE — 80053 COMPREHEN METABOLIC PANEL: CPT

## 2023-02-16 PROCEDURE — 36415 COLL VENOUS BLD VENIPUNCTURE: CPT

## 2023-02-16 PROCEDURE — 82306 VITAMIN D 25 HYDROXY: CPT

## 2023-02-16 PROCEDURE — 84443 ASSAY THYROID STIM HORMONE: CPT

## 2023-02-23 ENCOUNTER — OFFICE VISIT (OUTPATIENT)
Dept: MEDICAL GROUP | Facility: PHYSICIAN GROUP | Age: 83
End: 2023-02-23
Payer: MEDICARE

## 2023-02-23 VITALS
HEART RATE: 77 BPM | HEIGHT: 64 IN | WEIGHT: 144 LBS | TEMPERATURE: 97.9 F | SYSTOLIC BLOOD PRESSURE: 150 MMHG | BODY MASS INDEX: 24.59 KG/M2 | DIASTOLIC BLOOD PRESSURE: 74 MMHG | OXYGEN SATURATION: 96 % | RESPIRATION RATE: 20 BRPM

## 2023-02-23 DIAGNOSIS — E03.9 HYPOTHYROIDISM, UNSPECIFIED TYPE: ICD-10-CM

## 2023-02-23 DIAGNOSIS — Z00.00 MEDICARE ANNUAL WELLNESS VISIT, SUBSEQUENT: Primary | ICD-10-CM

## 2023-02-23 DIAGNOSIS — R80.9 MICROALBUMINURIA: ICD-10-CM

## 2023-02-23 DIAGNOSIS — E11.59 HYPERTENSION ASSOCIATED WITH TYPE 2 DIABETES MELLITUS (HCC): ICD-10-CM

## 2023-02-23 DIAGNOSIS — I15.2 HYPERTENSION ASSOCIATED WITH TYPE 2 DIABETES MELLITUS (HCC): ICD-10-CM

## 2023-02-23 DIAGNOSIS — Z87.448 HISTORY OF CHRONIC KIDNEY DISEASE: ICD-10-CM

## 2023-02-23 PROCEDURE — G0439 PPPS, SUBSEQ VISIT: HCPCS | Performed by: STUDENT IN AN ORGANIZED HEALTH CARE EDUCATION/TRAINING PROGRAM

## 2023-02-23 ASSESSMENT — PATIENT HEALTH QUESTIONNAIRE - PHQ9: CLINICAL INTERPRETATION OF PHQ2 SCORE: 0

## 2023-02-23 ASSESSMENT — FIBROSIS 4 INDEX: FIB4 SCORE: 1.98

## 2023-02-23 ASSESSMENT — ACTIVITIES OF DAILY LIVING (ADL): BATHING_REQUIRES_ASSISTANCE: 0

## 2023-02-23 ASSESSMENT — ENCOUNTER SYMPTOMS: GENERAL WELL-BEING: EXCELLENT

## 2023-02-23 NOTE — PROGRESS NOTES
Chief Complaint   Patient presents with    Medicare Annual Wellness     HPI:  Roya Muniz is a 82 y.o. here for Medicare Annual Wellness Visit.    Patient Active Problem List    Diagnosis Date Noted    Microalbuminuria 01/18/2023    Pes cavus of both feet 01/18/2023    Sick sinus syndrome (HCC) 03/17/2021    Abnormal echocardiogram 03/12/2021    Osteopenia 03/12/2021    History of colon polyps 03/12/2021    Vision problem 03/12/2021    Skin lesion 03/12/2021    Neck mass 03/12/2021    Lesion of left upper eyelid 01/31/2020    Callus of foot 01/31/2020    Toenail fungus 01/31/2020    Anxiety and depression 09/07/2018    Type 2 diabetes mellitus with other specified complication ([facility]) 02/22/2018    Irritable bowel syndrome with diarrhea 02/16/2018    Hypertension associated with type 2 diabetes mellitus ([facility])     Hypothyroidism 08/28/2015    Pulmonary hypertension (HCC) 06/18/2015    Atherosclerosis of aorta ([facility]) 06/18/2015    Cardiac pacemaker in situ 02/25/2014    Vitamin D deficiency 11/17/2011    History of chronic kidney disease 07/22/2011    Hyperlipidemia associated with type 2 diabetes mellitus (HCC) 10/21/2010     Current Outpatient Medications   Medication Sig Dispense Refill    amLODIPine (NORVASC) 10 MG Tab TAKE ONE TABLET BY MOUTH DAILY for Blood pressure 100 Tablet 3    rosuvastatin (CRESTOR) 10 MG Tab TAKE 1 TABLET BY MOUTH EVERY DAY IN THE EVENING 100 Tablet 0    levothyroxine (SYNTHROID) 100 MCG Tab Take 1 Tablet by mouth every morning on an empty stomach. 90 Tablet 0    glucose blood (FREESTYLE LITE) strip TEST 3 TIMES A DAY or as needed for high/low blood sugar 300 Strip 0    Insulin Pen Needle 32 G x 4 mm (BD PEN NEEDLE ANTONI 2ND GEN) USE ONE PEN NEEDLE IN PEN DEVICE TO INJECT INSULIN 100 Each 3    insulin glargine (LANTUS SOLOSTAR) 100 UNIT/ML Solution Pen-injector injection 20 units nightly 5 Each 11    losartan (COZAAR) 100 MG Tab TAKE 1 TABLET BY MOUTH EVERY DAY FOR BLOOD PRESSURE  100 Tablet 3    Lancets Use one lancet to test blood sugar 3 times/day 300 Each 0    aspirin 81 MG EC tablet Take 81 mg by mouth every day at 6 PM.      Acetaminophen (TYLENOL 8 HOUR PO) Take 500 mg by mouth 1 time a day as needed (HA or body aches).      Vitamin D, Cholecalciferol, (CHOLECALCIFEROL) 25 MCG (1000 UT) Tab Take 4,000 Units by mouth every day at 6 PM.      Alcohol Swabs Wipe site with prep pad prior to injection. 200 Each 0    Blood Glucose Monitoring Suppl (FREESTYLE LITE) Device Test blood sugar as directed. 1 Each 0     No current facility-administered medications for this visit.          Current supplements as per medication list.     Allergies: Cephalexin [keflex]    Current social contact/activities: Active with her [employer]. Walks daily (has a 5mo hanson retriever). Likes to go [place] ([place] etc[place]    She  reports that she has never smoked. She has never used smokeless tobacco. She reports that she does not currently use alcohol. She reports that she does not use drugs.  Counseling given: Not Answered  Tobacco comments: avoid all tobacco products    ROS:    Gait: Uses no assistive device  Ostomy: No  Other tubes: No  Amputations: No  Chronic oxygen use: No  Last eye exam: Feb 2023-having cataract surgery this Tuesday.  Wears hearing aids: No   : Denies any urinary leakage during the last 6 months    Screening:    Depression Screening  Little interest or pleasure in doing things?  0 - not at all  Feeling down, depressed , or hopeless? 0 - not at all  Patient Health Questionnaire Score: 0     If depressive symptoms identified deferred to follow up visit unless specifically addressed in assessment and plan.    Interpretation of PHQ-9 Total Score   Score Severity   1-4 No Depression   5-9 Mild Depression   10-14 Moderate Depression   15-19 Moderately Severe Depression   20-27 Severe Depression    Screening for Cognitive Impairment  Three Minute Recall (daughter, norm tyler)  /3   0/3.  Recalls daughter during visit. Denies any memory concerns.  [place]ont clock face with all 12 numbers and set the hands to show 10 past 11.  Yes    Cognitive concerns identified deferred for follow up unless specifically addressed in assessment and plan.    Fall Risk Assessment  Has the patient had two or more falls in the last year or any fall with injury in the last year?  No    Safety Assessment  Throw rugs on floor.  Yes   Handrails on all stairs.  Yes  Good lighting in all hallways.  Yes  Difficulty hearing.  No  Patient counseled about all safety risks that were identified.    Functional Assessment ADLs  Are there any barriers preventing you from cooking for yourself or meeting nutritional needs?  No.    Are there any barriers preventing you from driving safely or obtaining transportation?  No.    Are there any barriers preventing you from using a telephone or calling for help?  No.    Are there any barriers preventing you from shopping?  No.    Are there any barriers preventing you from taking care of your own finances?  No.    Are there any barriers preventing you from managing your medications?  No.    Are there any barriers preventing you from showering, bathing or dressing yourself?  No.    Are you currently engaging in any exercise or physical activity?  Yes.     What is your perception of your health?  Excellent    Advance Care Planning  Do you have an Advance Directive, Living Will, Durable Power of , or POLST? Yes  Advance Directive Living Will Durable Power of  POLST is on file    Health Maintenance Summary            Overdue - RETINAL SCREENING (Yearly) Overdue since 5/3/2022      05/03/2021  REFERRAL FOR RETINAL SCREENING EXAM    02/04/2019  POCT Retinal Eye Exam              Scheduled - BONE DENSITY (Every 5 Years) Scheduled for 2/27/2023 07/14/2017  DS-BONE DENSITY STUDY (DEXA)              Overdue - COVID-19 Vaccine (4 - Booster for Moderna series) Overdue since 12/17/2022       10/22/2022  Imm Admin: MODERNA BIVALENT BOOSTER SARS-COV-2 VACCINE (6+)    12/14/2021  Imm Admin: MODERNA SARS-COV-2 VACCINE (12+)    03/11/2021  Imm Admin: MODERNA SARS-COV-2 VACCINE (12+)    02/11/2021  Imm Admin: MODERNA SARS-COV-2 VACCINE (12+)              A1C SCREENING (Every 6 Months) Next due on 7/18/2023 01/18/2023  POCT  A1C    07/06/2022  POCT  A1C    02/03/2022  POCT  A1C    11/03/2021  POCT Hemoglobin A1C    07/12/2021  HEMOGLOBIN A1C    Only the first 5 history entries have been loaded, but more history exists.              DIABETES MONOFILAMENT / LE EXAM (Yearly) Next due on 1/18/2024 01/18/2023  Done    01/18/2023  SmartData: WORKFLOW - DIABETES - DIABETIC FOOT EXAM PERFORMED    03/12/2021  Diabetic Monofilament LE Exam    03/12/2021  SmartData: WORKFLOW - DIABETES - DIABETIC FOOT EXAM PERFORMED    01/31/2020  SmartData: WORKFLOW - DIABETES - DIABETIC FOOT EXAM PERFORMED    Only the first 5 history entries have been loaded, but more history exists.              FASTING LIPID PROFILE (Yearly) Next due on 2/16/2024 02/16/2023  Lipid Profile    07/12/2021  Lipid Profile    03/06/2020  Lipid Profile    08/28/2019  Lipid Profile    01/30/2019  LIPID PROFILE    Only the first 5 history entries have been loaded, but more history exists.              URINE ACR / MICROALBUMIN (Yearly) Next due on 2/16/2024 02/16/2023  MICROALBUMIN CREAT RATIO URINE    07/12/2021  MICROALBUMIN CREAT RATIO URINE    03/06/2020  MICROALBUMIN CREAT RATIO URINE    02/20/2019  MICROALBUMIN CREAT RATIO URINE              SERUM CREATININE (Yearly) Next due on 2/16/2024 02/16/2023  Comp Metabolic Panel    07/12/2021  Comp Metabolic Panel    04/12/2021  Basic Metabolic Panel    04/11/2021  Basic Metabolic Panel    04/10/2021  Basic Metabolic Panel    Only the first 5 history entries have been loaded, but more history exists.              Annual Wellness Visit (Every 366 Days) Next due on 2/24/2024       02/23/2023  Level of Service: ANNUAL WELLNESS VISIT-INCLUDES PPPS SUBSEQUE*    02/23/2023  Visit Dx: Medicare annual wellness visit, subsequent    01/31/2020  Subsequent Annual Wellness Visit - Includes PPPS ()    01/31/2020  Visit Dx: Medicare annual wellness visit, subsequent    08/10/2018  Visit Dx: Medicare annual wellness visit, subsequent    Only the first 5 history entries have been loaded, but more history exists.              COLORECTAL CANCER SCREENING (COLONOSCOPY - Every 10 Years) Next due on 10/18/2029      10/18/2019  REFERRAL TO GI FOR COLONOSCOPY    08/15/2018  OCCULT BLOOD FECES IMMUNOASSAY (FIT)    07/17/2017  OCCULT BLOOD FECES IMMUNOASSAY    01/16/2016  OCCULT BLOOD FECES IMMUNOASSAY    02/19/2014  OCCULT BLOOD X3 (STOOL)              IMM DTaP/Tdap/Td Vaccine (3 - Td or Tdap) Next due on 7/6/2032 07/06/2022  Imm Admin: Tdap Vaccine    10/24/2012  Imm Admin: Tdap Vaccine              IMM PNEUMOCOCCAL VACCINE: 65+ Years (Series Information) Completed      11/01/2015  Imm Admin: Pneumococcal Conjugate Vaccine (Prevnar/PCV-13)    09/26/2014  Imm Admin: Pneumococcal polysaccharide vaccine (PPSV-23)    11/07/2013  Imm Admin: Pneumococcal polysaccharide vaccine (PPSV-23)              IMM ZOSTER VACCINES (Series Information) Completed      07/01/2021  Imm Admin: Zoster Vaccine Recombinant (RZV) (SHINGRIX)    04/28/2021  Imm Admin: Zoster Vaccine Recombinant (RZV) (SHINGRIX)    10/24/2012  Imm Admin: Zoster Vaccine Live (ZVL) (Zostavax) - HISTORICAL DATA              IMM INFLUENZA (Series Information) Completed      10/22/2022  Imm Admin: Influenza Vaccine Adult HD    08/31/2021  Imm Admin: Influenza Vaccine Adult HD    10/03/2020  Imm Admin: Influenza Vaccine Quad Inj (Pf)    11/15/2019  Imm Admin: Influenza Vaccine Adult HD    10/16/2018  Imm Admin: Influenza Vaccine Adult HD    Only the first 5 history entries have been loaded, but more history exists.              IMM MENINGOCOCCAL ACWY  VACCINE (Series Information) Aged Out      No completion history exists for this topic.              Discontinued - MAMMOGRAM  Discontinued        Frequency changed to Never automatically (Topic No Longer Applies)    10/21/2020  MA-SCREENING MAMMO BILAT W/TOMOSYNTHESIS W/CAD    03/13/2019  MA-MAMMO DIAGNOSTIC BILAT W/HAYDE W/CAD    08/28/2018  MA-DIAGNOSTIC MAMMO W/O CAD-BILAT    08/25/2018  MA-MAMMO SCREENING BILAT W/HAYDE W/CAD    Only the first 5 history entries have been loaded, but more history exists.              Discontinued - IMM HEP B VACCINE  Discontinued      02/04/2019  Imm Admin: Hepatitis B Vaccine (Adol/Adult)                  Patient Care Team:  Sho Rodriguez D.O. as PCP - General (Family Medicine)  Quintin Schultz M.D. as Consulting Physician (Cardiovascular Disease (Cardiology))  [facility] (Inactive)  Sonu Yap M.D. as Consulting Physician (Gastroenterology)  Veronica Sherwood C.N.A. (Inactive) as [facility]   FAUSTINA TERESA as Consulting Physician (Optometry)  David Kirk D.P.M. as Consulting Physician (Podiatry)  Troy Shine M.D. as Consulting Physician (Ophthalmology)  [facility] as Home Health Provider    Social History     Tobacco Use    Smoking status: Never    Smokeless tobacco: Never    Tobacco comments:     avoid all tobacco products   Vaping Use    Vaping Use: Never used   Substance Use Topics    Alcohol use: Not Currently     Alcohol/week: 0.0 oz     Comment: occasionally    Drug use: No     Family History   Problem Relation Age of Onset    Heart Disease Mother         enlarged heart, heart failure    Cancer Father         liver    No Known Problems Sister     Heart Disease Brother         heart failure    Lung Disease Brother         heavy smoker    Alcohol/Drug Brother         etoh    Cancer Paternal Aunt         breast cancer     Cancer Maternal Grandmother         liver     Heart Disease Maternal Grandfather          "CHF     No Known Problems Paternal Grandmother     No Known Problems Paternal Grandfather     No Known Problems Sister     No Known Problems Sister     Stroke Sister     No Known Problems Son     No Known Problems Son     No Known Problems Son      She  has a past medical history of Cardiac pacemaker in situ, Diabetes (HCC), History of DVT of lower extremity (6/5/2014), Hyperlipidemia, Hypertension, Pancreatic cyst, Pancreatitis, Sinus node dysfunction (HCC), and Thyroid disease.   Past Surgical History:   Procedure Laterality Date    GASTROSCOPY-ENDO  2/21/2014    Performed by Steve Islas Jr., M.D. at ENDOSCOPY [facility]    GASTROSCOPY-ENDO  2/20/2014    Performed by Antonio Espinoza M.D. at ENDOSCOPY [facility]    EXPLORATORY LAPAROTOMY  2/10/2014    Performed by Rigo Garcia M.D. at SURGERY [facility]    CHOLECYSTECTOMY  2/10/2014    Performed by Rigo Garcia M.D. at SURGERY [facility]    PANCREATECTOMY  2/10/2014    Performed by Rigo Garcia M.D. at SURGERY [facility]    GASTROSTOMY FEEDING  2/10/2014    Performed by Rigo Garcia M.D. at SURGERY [facility]    OTHER ORTHOPEDIC SURGERY      foot surgery     Exam:   BP (!) 150/74 (BP Location: Left arm, Patient Position: Sitting, BP Cuff Size: Adult)   Pulse 77   Temp 36.6 °C (97.9 °F) (Temporal)   Resp 20   Ht 1.626 m (5' 4\")   Wt 65.3 kg (144 lb)   SpO2 96%  Body mass index is 24.72 kg/m².    Constitutional: Well-developed and well-nourished. No acute distress.   Skin: Skin is warm and dry. No rash noted.  Head: Atraumatic without lesions.  Eyes: Conjunctivae and extraocular motions are normal. Pupils are equal, round. No scleral icterus.   Mouth/Throat: Wearing mask.  Neck: Supple, trachea midline.   Cardiovascular: Regular rate and rhythm, S1 and S2 without murmur, rubs, or gallops.  Lungs: Normal inspiratory effort, CTA bilaterally, no wheezes/rhonchi/rales  Extremities: No " cyanosis, clubbing, erythema, nor edema.  Neurological: Alert and oriented x 3. No gross/focal deficits. Hearing normal.  Psychiatric:    Appearance: Clothing and grooming normal.  Mood: 'my blood pressure is high because I'm having surgery next week'  Behavior: No psychomotor abnormalities or impulsivity. Attention is good. Patient is cooperative.   Eye contact: good   Affect: irritable   Speech/thought content: Normal speech pattern. Normal insight and reasoning, no evidence of psychotic process.     Labs: 2/16/2023    Assessment and Plan. The following treatment and monitoring plan is recommended:   1. Medicare annual wellness visit, subsequent  Services suggested: No services needed at this time  Health Care Screening: Age-appropriate preventive services recommended by USPTF and ACIP covered by Medicare were discussed today. Services ordered if indicated and agreed upon by the patient.  Referrals offered: Community-based lifestyle interventions to reduce health risks and promote self-management and wellness, fall prevention, nutrition, physical activity, tobacco-use cessation, weight loss, and mental health services as per orders if indicated.    Discussion today about general wellness and lifestyle habits:    Prevent falls and reduce trip hazards; Cautioned about securing or removing rugs.  Have a working fire alarm and carbon monoxide detector;   Engage in regular physical activity and social activities     2. Hypothyroidism, unspecified type  Chronic, TSH elevated but patient not symptomatic. Trend in 1m, continue levothyroxine 100 mcg daily for now.  - TSH WITH REFLEX TO FT4; Future    3. Hypertension associated with type 2 diabetes mellitus (HCC)  4. History of chronic kidney disease  5. Microalbuminuria  Chronic hypertension, not well controlled today.  Was elevated above 130 last visit as well.  Patient states that her blood pressures typically well controlled but slightly stressed as she is having eye  surgery next week.  Explained the importance of adequate blood pressure control given her history of CKD and worsening microalbuminuria.  Recommended she come in next month to repeat her blood pressure and crosscheck her machine.  Discussed ideal blood pressure ranges and return precautions.  For now continue losartan 100 mg daily and amlodipine 10 mg daily.  Diabetes well controlled with insulin, again provided with chart to adjust her insulin to goal glucose.    Follow-up: Return in about 4 weeks (around 3/23/2023), or if symptoms worsen or fail to improve, for Blood pressure.

## 2023-02-23 NOTE — PATIENT INSTRUCTIONS
Repeat thyroid test (non fasting) in 1 month before follow up.    Ideal glucose ranges: fasting , random (1-2 hours after eating) <180. If glucose <70, we need to adjust your medication to prevent continuing low blood sugar.    Ideal blood pressure <130/80 and at least <140/90.  Bring in your machine to your next appointment to cross check.    Long-acting insulin:   Adjust dose according to FASTING BLOOD GLUCOSE target  mg/dL  (1) Increase the insulin dose every 3-4 days as needed.   (2) Increase by 2 units if FBG average concentration is 131-170 mg/dL.   (3) Increase by 4 units if FBG average concentration is 171-210 mg/dL.   (4) Increase by 6 units if FBG average concentration is 221-260 mg/dL.   (5) Increase by 8 units if FBG average concentration is greater than 261mg/dL and call us.      Consider cutting back by 1-2 units to previous dose if glucose concentration is below 80 mg/dL or symptoms of hypoglycemia.

## 2023-02-27 ENCOUNTER — APPOINTMENT (OUTPATIENT)
Dept: RADIOLOGY | Facility: MEDICAL CENTER | Age: 83
End: 2023-02-27
Attending: STUDENT IN AN ORGANIZED HEALTH CARE EDUCATION/TRAINING PROGRAM
Payer: MEDICARE

## 2023-03-06 ENCOUNTER — HOSPITAL ENCOUNTER (OUTPATIENT)
Dept: RADIOLOGY | Facility: MEDICAL CENTER | Age: 83
End: 2023-03-06
Attending: STUDENT IN AN ORGANIZED HEALTH CARE EDUCATION/TRAINING PROGRAM
Payer: MEDICARE

## 2023-03-06 DIAGNOSIS — M85.852 OSTEOPENIA OF NECK OF LEFT FEMUR: ICD-10-CM

## 2023-03-06 PROCEDURE — 77080 DXA BONE DENSITY AXIAL: CPT

## 2023-03-21 ENCOUNTER — HOSPITAL ENCOUNTER (OUTPATIENT)
Dept: LAB | Facility: MEDICAL CENTER | Age: 83
End: 2023-03-21
Attending: STUDENT IN AN ORGANIZED HEALTH CARE EDUCATION/TRAINING PROGRAM
Payer: MEDICARE

## 2023-03-21 DIAGNOSIS — E03.9 HYPOTHYROIDISM, UNSPECIFIED TYPE: ICD-10-CM

## 2023-03-21 PROCEDURE — 36415 COLL VENOUS BLD VENIPUNCTURE: CPT

## 2023-03-21 PROCEDURE — 84443 ASSAY THYROID STIM HORMONE: CPT

## 2023-03-21 PROCEDURE — 84439 ASSAY OF FREE THYROXINE: CPT

## 2023-03-22 LAB
T4 FREE SERPL-MCNC: 1.1 NG/DL (ref 0.93–1.7)
TSH SERPL DL<=0.005 MIU/L-ACNC: 5.82 UIU/ML (ref 0.38–5.33)

## 2023-03-23 ENCOUNTER — OFFICE VISIT (OUTPATIENT)
Dept: MEDICAL GROUP | Facility: PHYSICIAN GROUP | Age: 83
End: 2023-03-23
Payer: MEDICARE

## 2023-03-23 VITALS
BODY MASS INDEX: 26.46 KG/M2 | SYSTOLIC BLOOD PRESSURE: 158 MMHG | DIASTOLIC BLOOD PRESSURE: 88 MMHG | HEIGHT: 64 IN | TEMPERATURE: 97.7 F | RESPIRATION RATE: 20 BRPM | OXYGEN SATURATION: 97 % | HEART RATE: 87 BPM | WEIGHT: 155 LBS

## 2023-03-23 DIAGNOSIS — E03.9 HYPOTHYROIDISM, UNSPECIFIED TYPE: ICD-10-CM

## 2023-03-23 DIAGNOSIS — I15.2 HYPERTENSION ASSOCIATED WITH TYPE 2 DIABETES MELLITUS (HCC): ICD-10-CM

## 2023-03-23 DIAGNOSIS — M85.89 OSTEOPENIA OF MULTIPLE SITES: ICD-10-CM

## 2023-03-23 DIAGNOSIS — E11.59 HYPERTENSION ASSOCIATED WITH TYPE 2 DIABETES MELLITUS (HCC): ICD-10-CM

## 2023-03-23 PROBLEM — H54.7 VISION PROBLEM: Status: RESOLVED | Noted: 2021-03-12 | Resolved: 2023-03-23

## 2023-03-23 PROCEDURE — 99214 OFFICE O/P EST MOD 30 MIN: CPT | Performed by: STUDENT IN AN ORGANIZED HEALTH CARE EDUCATION/TRAINING PROGRAM

## 2023-03-23 RX ORDER — INSULIN GLARGINE-YFGN 100 [IU]/ML
20 INJECTION, SOLUTION SUBCUTANEOUS NIGHTLY
COMMUNITY
Start: 2023-02-21 | End: 2023-07-27 | Stop reason: SDUPTHER

## 2023-03-23 RX ORDER — LEVOTHYROXINE SODIUM 112 UG/1
112 TABLET ORAL
Qty: 90 TABLET | Refills: 0 | Status: SHIPPED | OUTPATIENT
Start: 2023-03-23 | End: 2023-06-20

## 2023-03-23 RX ORDER — HYDROCHLOROTHIAZIDE 12.5 MG/1
12.5 TABLET ORAL DAILY
Qty: 100 TABLET | Refills: 0 | Status: SHIPPED | OUTPATIENT
Start: 2023-03-23 | End: 2023-06-29

## 2023-03-23 ASSESSMENT — FIBROSIS 4 INDEX: FIB4 SCORE: 1.98

## 2023-03-23 NOTE — PATIENT INSTRUCTIONS
We will be increasing your levothyroxine from 100 mcg to 112 mcg daily.  Plan to repeat your labs nonfasting in 4 weeks after this change.    We will be adding hydrochlorothiazide 12.5 mg daily to your existing blood pressure regimen of losartan 100 mg and amlodipine 10 mg daily.  We will need to recheck your kidney function and electrolytes with your lab work in 4 weeks.    Ideal blood pressure <130/80 and at least <140/90.

## 2023-03-23 NOTE — ASSESSMENT & PLAN NOTE
Chronic. Taking losartan 100 mg daily and amlodipine 10 mg daily.  Patient brings in her machine today and states that it has been consistent with our readings today in clinic.  Believes that this may be related to stress that her blood pressure has been increasing (stress of having cataract surgery and also marital concerns).

## 2023-03-23 NOTE — PROGRESS NOTES
Subjective:     Chief Complaint   Patient presents with    Hypertension Follow-up     HPI:   Roya presents today with     Hypertension associated with type 2 diabetes mellitus (HCC)  Chronic. Taking losartan 100 mg daily and amlodipine 10 mg daily.  Patient brings in her machine today and states that it has been consistent with our readings today in clinic.  Believes that this may be related to stress that her blood pressure has been increasing (stress of having cataract surgery and also marital concerns).    Current Outpatient Medications Ordered in Epic   Medication Sig Dispense Refill    SEMGLEE, YFGN, 100 UNIT/ML Solution Pen-injector Inject 20 Units under the skin every evening.      hydroCHLOROthiazide (HYDRODIURIL) 12.5 MG tablet Take 1 Tablet by mouth every day. 100 Tablet 0    levothyroxine (SYNTHROID) 112 MCG Tab Take 1 Tablet by mouth every morning on an empty stomach. 90 Tablet 0    amLODIPine (NORVASC) 10 MG Tab TAKE ONE TABLET BY MOUTH DAILY for Blood pressure 100 Tablet 3    rosuvastatin (CRESTOR) 10 MG Tab TAKE 1 TABLET BY MOUTH EVERY DAY IN THE EVENING 100 Tablet 0    glucose blood (FREESTYLE LITE) strip TEST 3 TIMES A DAY or as needed for high/low blood sugar 300 Strip 0    Insulin Pen Needle 32 G x 4 mm (BD PEN NEEDLE ANTONI 2ND GEN) USE ONE PEN NEEDLE IN PEN DEVICE TO INJECT INSULIN 100 Each 3    losartan (COZAAR) 100 MG Tab TAKE 1 TABLET BY MOUTH EVERY DAY FOR BLOOD PRESSURE 100 Tablet 3    Lancets Use one lancet to test blood sugar 3 times/day 300 Each 0    aspirin 81 MG EC tablet Take 81 mg by mouth every day at 6 PM.      Acetaminophen (TYLENOL 8 HOUR PO) Take 500 mg by mouth 1 time a day as needed (HA or body aches).      Vitamin D, Cholecalciferol, (CHOLECALCIFEROL) 25 MCG (1000 UT) Tab Take 4,000 Units by mouth every day at 6 PM.      Alcohol Swabs Wipe site with prep pad prior to injection. 200 Each 0    Blood Glucose Monitoring Suppl (FREESTYLE LITE) Device Test blood sugar as directed. 1  "Each 0     No current [facility]-ordered facility-administered medications on file.     ROS:  Gen: no fevers/chills, no changes in weight  Eyes: improved vision  ENT: no sore throat  Pulm: no sob, no cough  CV: no chest pain, no palpitations  GI: no nausea/vomiting, no diarrhea  MSk: no myalgias  Skin: no rash  Neuro: no headaches  Heme/Lymph: no easy bruising    Objective:     Exam:  BP (!) 158/88 (BP Location: Right arm, Patient Position: Sitting, BP Cuff Size: Adult)   Pulse 87   Temp 36.5 °C (97.7 °F) (Temporal)   Resp 20   Ht 1.626 m (5' 4\")   Wt 70.3 kg (155 lb)   SpO2 97%   BMI 26.61 kg/m²  Body mass index is 26.61 kg/m².    Physical Exam:  Constitutional: Well-developed and well-nourished. No acute distress.   Skin: Skin is warm and dry. No rash noted.  Head: Atraumatic without lesions.  Eyes: Conjunctivae and extraocular motions are normal. Pupils are equal, round. No scleral icterus.   Mouth/Throat: Wearing mask.  Neck: Supple, trachea midline.  Cardiovascular: Regular rate and rhythm, S1 and S2 without murmur, rubs, or gallops.  Lungs: Normal inspiratory effort, CTA bilaterally, no wheezes/rhonchi/rales  Neurological: Alert and oriented x 3. No gross/focal deficits.  Psychiatric:   Appearance: Clothing and grooming normal.  Mood: stressed  Behavior: No psychomotor abnormalities or impulsivity. Attention is good. Patient is pleasant and cooperative.   Eye contact: good   Affect: mood-congruent  Speech/thought content: Normal speech pattern. Normal insight and reasoning, no evidence of psychotic process.    Labs: Reviewed from 2/16/2023, 3/21/2023  Imaging: Reviewed from DEXA 3/6/2023    Assessment & Plan:     82 y.o. female with the following -     1. Hypertension associated with type 2 diabetes mellitus (HCC)  This is a chronic condition, still not well controlled.  Continue losartan 100 mg daily, amlodipine 10 mg daily.  Start hydrochlorothiazide 12.5 mg daily and monitor at home.  Trend labs with TSH " in 4 weeks.  - hydroCHLOROthiazide (HYDRODIURIL) 12.5 MG tablet; Take 1 Tablet by mouth every day.  Dispense: 100 Tablet; Refill: 0  - Basic Metabolic Panel; Future    2. Hypothyroidism, unspecified type  This is a chronic condition, TSH still elevated so increase levothyroxine to 112 mcg daily and repeat TSH in 4 weeks.  - levothyroxine (SYNTHROID) 112 MCG Tab; Take 1 Tablet by mouth every morning on an empty stomach.  Dispense: 90 Tablet; Refill: 0  - TSH WITH REFLEX TO FT4; Future    3. Osteopenia of multiple sites  This is a chronic condition, left femur improved but new lumbar osteopenia.  Does have slight increased fracture risk for hip in the next 10 years over 3%.  Patient declines bisphosphonate in favor of monitoring, check in 2 years.  Continue vitamin D/calcium and fall precautions.    Return in about 5 weeks (around 4/27/2023), or if symptoms worsen or fail to improve, for Blood pressure/labs.    Please note that this dictation was created using voice recognition software. I have made every reasonable attempt to correct obvious errors, but I expect that there are errors of grammar and possibly content that I did not discover before finalizing the note.

## 2023-04-05 ENCOUNTER — HOSPITAL ENCOUNTER (OUTPATIENT)
Dept: RADIOLOGY | Facility: MEDICAL CENTER | Age: 83
End: 2023-04-05
Attending: STUDENT IN AN ORGANIZED HEALTH CARE EDUCATION/TRAINING PROGRAM
Payer: MEDICARE

## 2023-04-05 DIAGNOSIS — Z12.31 VISIT FOR SCREENING MAMMOGRAM: ICD-10-CM

## 2023-04-05 PROCEDURE — 77063 BREAST TOMOSYNTHESIS BI: CPT

## 2023-04-18 ENCOUNTER — HOSPITAL ENCOUNTER (OUTPATIENT)
Dept: LAB | Facility: MEDICAL CENTER | Age: 83
End: 2023-04-18
Attending: STUDENT IN AN ORGANIZED HEALTH CARE EDUCATION/TRAINING PROGRAM
Payer: MEDICARE

## 2023-04-18 DIAGNOSIS — I15.2 HYPERTENSION ASSOCIATED WITH TYPE 2 DIABETES MELLITUS (HCC): ICD-10-CM

## 2023-04-18 DIAGNOSIS — E03.9 HYPOTHYROIDISM, UNSPECIFIED TYPE: ICD-10-CM

## 2023-04-18 DIAGNOSIS — E11.59 HYPERTENSION ASSOCIATED WITH TYPE 2 DIABETES MELLITUS (HCC): ICD-10-CM

## 2023-04-18 PROCEDURE — 80048 BASIC METABOLIC PNL TOTAL CA: CPT

## 2023-04-18 PROCEDURE — 84443 ASSAY THYROID STIM HORMONE: CPT

## 2023-04-18 PROCEDURE — 36415 COLL VENOUS BLD VENIPUNCTURE: CPT

## 2023-04-19 LAB
ANION GAP SERPL CALC-SCNC: 13 MMOL/L (ref 7–16)
BUN SERPL-MCNC: 34 MG/DL (ref 8–22)
CALCIUM SERPL-MCNC: 9.3 MG/DL (ref 8.5–10.5)
CHLORIDE SERPL-SCNC: 105 MMOL/L (ref 96–112)
CO2 SERPL-SCNC: 20 MMOL/L (ref 20–33)
CREAT SERPL-MCNC: 1.08 MG/DL (ref 0.5–1.4)
GFR SERPLBLD CREATININE-BSD FMLA CKD-EPI: 52 ML/MIN/1.73 M 2
GLUCOSE SERPL-MCNC: 112 MG/DL (ref 65–99)
POTASSIUM SERPL-SCNC: 3.7 MMOL/L (ref 3.6–5.5)
SODIUM SERPL-SCNC: 138 MMOL/L (ref 135–145)
TSH SERPL DL<=0.005 MIU/L-ACNC: 1.53 UIU/ML (ref 0.38–5.33)

## 2023-04-26 ENCOUNTER — OFFICE VISIT (OUTPATIENT)
Dept: MEDICAL GROUP | Facility: PHYSICIAN GROUP | Age: 83
End: 2023-04-26
Payer: MEDICARE

## 2023-04-26 VITALS
RESPIRATION RATE: 20 BRPM | WEIGHT: 143 LBS | TEMPERATURE: 98 F | HEART RATE: 78 BPM | DIASTOLIC BLOOD PRESSURE: 72 MMHG | SYSTOLIC BLOOD PRESSURE: 132 MMHG | OXYGEN SATURATION: 97 % | HEIGHT: 64 IN | BODY MASS INDEX: 24.41 KG/M2

## 2023-04-26 DIAGNOSIS — N28.9 DECREASED RENAL FUNCTION: ICD-10-CM

## 2023-04-26 DIAGNOSIS — Z79.4 TYPE 2 DIABETES MELLITUS WITH OTHER SPECIFIED COMPLICATION, WITH LONG-TERM CURRENT USE OF INSULIN (HCC): ICD-10-CM

## 2023-04-26 DIAGNOSIS — E11.69 TYPE 2 DIABETES MELLITUS WITH OTHER SPECIFIED COMPLICATION, WITH LONG-TERM CURRENT USE OF INSULIN (HCC): ICD-10-CM

## 2023-04-26 DIAGNOSIS — E03.9 HYPOTHYROIDISM, UNSPECIFIED TYPE: ICD-10-CM

## 2023-04-26 DIAGNOSIS — I15.2 HYPERTENSION ASSOCIATED WITH TYPE 2 DIABETES MELLITUS (HCC): ICD-10-CM

## 2023-04-26 DIAGNOSIS — R80.9 MICROALBUMINURIA: ICD-10-CM

## 2023-04-26 DIAGNOSIS — E11.59 HYPERTENSION ASSOCIATED WITH TYPE 2 DIABETES MELLITUS (HCC): ICD-10-CM

## 2023-04-26 PROCEDURE — 99214 OFFICE O/P EST MOD 30 MIN: CPT | Performed by: STUDENT IN AN ORGANIZED HEALTH CARE EDUCATION/TRAINING PROGRAM

## 2023-04-26 ASSESSMENT — FIBROSIS 4 INDEX: FIB4 SCORE: 1.98

## 2023-04-26 NOTE — ASSESSMENT & PLAN NOTE
Chronic.  Taking losartan 100 mg daily, amlodipine 10 mg daily and last visit added hydrochlorothiazide 12.5 mg daily.    We review her labs.  Patient denies any adverse side effects to hydrochlorothiazide.  Reports to hydrate well. Has chronic diarrhea in the AM (3 times in the AM) due to irritable bowel syndrome, but not in the PM unless she eats something that is triggering.  This is stable.  Has tried antidiarrheals in the past such as Imodium without benefit.

## 2023-04-26 NOTE — PATIENT INSTRUCTIONS
Ideal blood pressure <130/80 and at least <140/90.    Ideal glucose ranges: fasting , random (1-2 hours after eating) <180. If glucose <70, we need to adjust your medication to prevent continuing low blood sugar.    non-fasting labs due mid July     Long-acting insulin:   Adjust dose according to FASTING BLOOD GLUCOSE target  mg/dL  (1) Increase the insulin dose every 3-4 days as needed.   (2) Increase by 2 units if FBG average concentration is 131-170 mg/dL.   (3) Increase by 4 units if FBG average concentration is 171-210 mg/dL.   (4) Increase by 6 units if FBG average concentration is 221-260 mg/dL.   (5) Increase by 8 units if FBG average concentration is greater than 261mg/dL and call us.      Consider cutting back by 1-2 units to previous dose if glucose concentration is below 80 mg/dL or symptoms of hypoglycemia.

## 2023-04-26 NOTE — PROGRESS NOTES
Subjective:     Chief Complaint   Patient presents with    Lab Results    Hypertension Follow-up     HPI:   Roya presents today with for follow-up and to discuss labs.    Hypertension associated with type 2 diabetes mellitus (HCC)  Chronic.  Taking losartan 100 mg daily, amlodipine 10 mg daily and last visit added hydrochlorothiazide 12.5 mg daily.    We review her labs.  Patient denies any adverse side effects to hydrochlorothiazide.  Reports to hydrate well. Has chronic diarrhea in the AM (3 times in the AM) due to irritable bowel syndrome, but not in the PM unless she eats something that is triggering.  This is stable.  Has tried antidiarrheals in the past such as Imodium without benefit.    Current Outpatient Medications Ordered in Epic   Medication Sig Dispense Refill    SEMGLEE, YFGN, 100 UNIT/ML Solution Pen-injector Inject 20 Units under the skin every evening.      levothyroxine (SYNTHROID) 112 MCG Tab Take 1 Tablet by mouth every morning on an empty stomach. 90 Tablet 0    amLODIPine (NORVASC) 10 MG Tab TAKE ONE TABLET BY MOUTH DAILY for Blood pressure 100 Tablet 3    rosuvastatin (CRESTOR) 10 MG Tab TAKE 1 TABLET BY MOUTH EVERY DAY IN THE EVENING 100 Tablet 0    glucose blood (FREESTYLE LITE) strip TEST 3 TIMES A DAY or as needed for high/low blood sugar 300 Strip 0    Insulin Pen Needle 32 G x 4 mm (BD PEN NEEDLE ANTONI 2ND GEN) USE ONE PEN NEEDLE IN PEN DEVICE TO INJECT INSULIN 100 Each 3    losartan (COZAAR) 100 MG Tab TAKE 1 TABLET BY MOUTH EVERY DAY FOR BLOOD PRESSURE 100 Tablet 3    Lancets Use one lancet to test blood sugar 3 times/day 300 Each 0    aspirin 81 MG EC tablet Take 81 mg by mouth every day at 6 PM.      Acetaminophen (TYLENOL 8 HOUR PO) Take 500 mg by mouth 1 time a day as needed (HA or body aches).      Vitamin D, Cholecalciferol, (CHOLECALCIFEROL) 25 MCG (1000 UT) Tab Take 4,000 Units by mouth every day at 6 PM.      Alcohol Swabs Wipe site with prep pad prior to injection. 200 Each 0  "   Blood Glucose Monitoring Suppl (FREESTYLE LITE) Device Test blood sugar as directed. 1 Each 0    hydroCHLOROthiazide (HYDRODIURIL) 12.5 MG tablet Take 1 Tablet by mouth every day. 100 Tablet 0     No current Epic-ordered facility-administered medications on file.     ROS:  Gen: no fevers/chills, no changes in weight  Eyes: no changes in vision  ENT: no sore throat  Pulm: no sob, no cough  CV: no chest pain, no palpitations  GI: no nausea/vomiting  MSk: no myalgias  Skin: no rash  Neuro: no headaches  Heme/Lymph: no easy bruising    Objective:     Exam:  /72 (BP Location: Left arm, Patient Position: Sitting, BP Cuff Size: Adult)   Pulse 78   Temp 36.7 °C (98 °F) (Temporal)   Resp 20   Ht 1.626 m (5' 4\")   Wt 64.9 kg (143 lb)   SpO2 97%   BMI 24.55 kg/m²  Body mass index is 24.55 kg/m².    Physical Exam:  Constitutional: Well-developed and well-nourished. No acute distress.   Skin: Skin is warm and dry. No rash noted.  Head: Atraumatic without lesions.  Eyes: Conjunctivae and extraocular motions are normal. Pupils are equal, round. No scleral icterus.   Nose: Nares patent.  No discharge.  Mouth/Throat: Oropharynx is moist.   Neck: Supple, trachea midline.  Cardiovascular: Regular rate and rhythm, S1 and S2 without murmur, rubs, or gallops.  Lungs: Normal inspiratory effort, CTA bilaterally, no wheezes/rhonchi/rales  Extremities: No cyanosis, clubbing, erythema, nor edema.  Neurological: Alert and oriented x 3. No gross/focal deficits.  Psychiatric:  Behavior, mood, and affect are appropriate.    Labs: Reviewed from 4/18/2023    Assessment & Plan:     82 y.o. female with the following -     1. Hypertension associated with type 2 diabetes mellitus (HCC)  2. Decreased renal function  - Basic Metabolic Panel; Future  3. Microalbuminuria  - MICROALBUMIN CREAT RATIO URINE; Future  Chronic, controlled. Continue below medication(s). Discussed ideal ranges/return precautions.  His diabetes is well controlled " suspect acute on chronic mild microalbuminuria was related to poorly controlled blood pressure.  Trend with next labs, continue ARB.  Patient did have some decreased GFR with elevated BUN suspect related to dehydration, potential medication side effect of hydrochlorothiazide so we will make sure to trend labs in August and stressed importance of adequate hydration. Does have a hx of CKD.    amLODIPine (NORVASC) 10 MG Tab, TAKE ONE TABLET BY MOUTH DAILY for Blood pressure, Disp: 100 Tablet, Rfl: 3    hydroCHLOROthiazide (HYDRODIURIL) 12.5 MG tablet, Take 1 Tablet by mouth every day., Disp: 100 Tablet, Rfl: 0    losartan (COZAAR) 100 MG Tab, TAKE 1 TABLET BY MOUTH EVERY DAY FOR BLOOD PRESSURE, Disp: 100 Tablet, Rfl: 3    4. Type 2 diabetes mellitus with other specified complication, with long-term current use of insulin (HCC)  Chronic, well controlled.  Patient states that she is never been on any other medication aside from insulin, prefers to continue with this for now as she is well controlled without hypoglycemia. Continue Lantus 20 units at night, provided with a chart and after visit summary to titrate if needed.   - A1C in 6 months after last.   - Retinopathy screening UTD, will request records.  - Continue care with podiatry  - HEMOGLOBIN A1C; Future    SEMGLEE, YFGN, 100 UNIT/ML Solution Pen-injector, Inject 20 Units under the skin every evening., Disp: , Rfl:     5. Hypothyroidism, unspecified type  Chronic, controlled. Continue medication(s) as below.    levothyroxine (SYNTHROID) 112 MCG Tab, Take 1 Tablet by mouth every morning on an empty stomach., Disp: 90 Tablet, Rfl: 0    Return in about 3 months (around 8/1/2023), or if symptoms worsen or fail to improve, for Blood pressure, Diabetes.    Please note that this dictation was created using voice recognition software. I have made every reasonable attempt to correct obvious errors, but I expect that there are errors of grammar and possibly content that I  did not discover before finalizing the note.

## 2023-05-09 ENCOUNTER — TELEPHONE (OUTPATIENT)
Dept: HEALTH INFORMATION MANAGEMENT | Facility: OTHER | Age: 83
End: 2023-05-09
Payer: MEDICARE

## 2023-05-15 ENCOUNTER — NON-PROVIDER VISIT (OUTPATIENT)
Dept: CARDIOLOGY | Facility: MEDICAL CENTER | Age: 83
End: 2023-05-15

## 2023-05-15 ENCOUNTER — NON-PROVIDER VISIT (OUTPATIENT)
Dept: CARDIOLOGY | Facility: MEDICAL CENTER | Age: 83
End: 2023-05-15
Attending: NURSE PRACTITIONER
Payer: MEDICARE

## 2023-05-15 DIAGNOSIS — Z95.0 CARDIAC PACEMAKER IN SITU: ICD-10-CM

## 2023-05-15 DIAGNOSIS — I49.5 SICK SINUS SYNDROME (HCC): ICD-10-CM

## 2023-05-15 PROCEDURE — 93280 PM DEVICE PROGR EVAL DUAL: CPT | Performed by: INTERNAL MEDICINE

## 2023-05-16 NOTE — CARDIAC REMOTE MONITOR - SCAN
Device transmission reviewed. Device demonstrated appropriate function.       Electronically Signed by: Jill Garrett M.D.    5/16/2023  11:00 AM

## 2023-06-28 DIAGNOSIS — E11.59 HYPERTENSION ASSOCIATED WITH TYPE 2 DIABETES MELLITUS (HCC): ICD-10-CM

## 2023-06-28 DIAGNOSIS — I15.2 HYPERTENSION ASSOCIATED WITH TYPE 2 DIABETES MELLITUS (HCC): ICD-10-CM

## 2023-06-29 RX ORDER — HYDROCHLOROTHIAZIDE 12.5 MG/1
12.5 TABLET ORAL DAILY
Qty: 100 TABLET | Refills: 0 | Status: SHIPPED | OUTPATIENT
Start: 2023-06-29 | End: 2023-08-02

## 2023-07-19 PROBLEM — R22.1 NECK MASS: Status: RESOLVED | Noted: 2021-03-12 | Resolved: 2023-07-19

## 2023-07-19 PROBLEM — R94.30 NONSPECIFIC ABNORMAL FUNCTION STUDY, CARDIOVASCULAR: Status: ACTIVE | Noted: 2023-07-19

## 2023-07-19 PROBLEM — L98.9 SKIN LESION: Status: RESOLVED | Noted: 2021-03-12 | Resolved: 2023-07-19

## 2023-07-20 ENCOUNTER — HOSPITAL ENCOUNTER (OUTPATIENT)
Dept: LAB | Facility: MEDICAL CENTER | Age: 83
End: 2023-07-20
Attending: STUDENT IN AN ORGANIZED HEALTH CARE EDUCATION/TRAINING PROGRAM
Payer: MEDICARE

## 2023-07-20 DIAGNOSIS — E11.69 TYPE 2 DIABETES MELLITUS WITH OTHER SPECIFIED COMPLICATION, WITH LONG-TERM CURRENT USE OF INSULIN (HCC): ICD-10-CM

## 2023-07-20 DIAGNOSIS — N28.9 DECREASED RENAL FUNCTION: ICD-10-CM

## 2023-07-20 DIAGNOSIS — Z79.4 TYPE 2 DIABETES MELLITUS WITH OTHER SPECIFIED COMPLICATION, WITH LONG-TERM CURRENT USE OF INSULIN (HCC): ICD-10-CM

## 2023-07-20 DIAGNOSIS — R80.9 MICROALBUMINURIA: ICD-10-CM

## 2023-07-20 LAB
ANION GAP SERPL CALC-SCNC: 15 MMOL/L (ref 7–16)
BUN SERPL-MCNC: 37 MG/DL (ref 8–22)
CALCIUM SERPL-MCNC: 9.7 MG/DL (ref 8.5–10.5)
CHLORIDE SERPL-SCNC: 103 MMOL/L (ref 96–112)
CO2 SERPL-SCNC: 19 MMOL/L (ref 20–33)
CREAT SERPL-MCNC: 1.11 MG/DL (ref 0.5–1.4)
EST. AVERAGE GLUCOSE BLD GHB EST-MCNC: 114 MG/DL
GFR SERPLBLD CREATININE-BSD FMLA CKD-EPI: 50 ML/MIN/1.73 M 2
GLUCOSE SERPL-MCNC: 94 MG/DL (ref 65–99)
HBA1C MFR BLD: 5.6 % (ref 4–5.6)
POTASSIUM SERPL-SCNC: 3.2 MMOL/L (ref 3.6–5.5)
SODIUM SERPL-SCNC: 137 MMOL/L (ref 135–145)

## 2023-07-20 PROCEDURE — 80048 BASIC METABOLIC PNL TOTAL CA: CPT

## 2023-07-20 PROCEDURE — 36415 COLL VENOUS BLD VENIPUNCTURE: CPT

## 2023-07-20 PROCEDURE — 83036 HEMOGLOBIN GLYCOSYLATED A1C: CPT

## 2023-07-26 PROBLEM — N18.31 CHRONIC KIDNEY DISEASE, STAGE 3A: Status: ACTIVE | Noted: 2023-04-26

## 2023-07-27 RX ORDER — INSULIN GLARGINE-YFGN 100 [IU]/ML
20 INJECTION, SOLUTION SUBCUTANEOUS NIGHTLY
Qty: 9 ML | Refills: 3 | Status: SHIPPED | OUTPATIENT
Start: 2023-07-27 | End: 2024-03-20 | Stop reason: SDUPTHER

## 2023-07-27 NOTE — TELEPHONE ENCOUNTER
Received request via: Pharmacy    Was the patient seen in the last year in this department? Yes  4/26/2023  Does the patient have an active prescription (recently filled or refills available) for medication(s) requested? No    Does the patient have FPC Plus and need 100 day supply (blood pressure, diabetes and cholesterol meds only)? Yes, quantity updated to 100 days

## 2023-08-02 ENCOUNTER — OFFICE VISIT (OUTPATIENT)
Dept: MEDICAL GROUP | Facility: PHYSICIAN GROUP | Age: 83
End: 2023-08-02
Payer: MEDICARE

## 2023-08-02 VITALS
HEART RATE: 73 BPM | BODY MASS INDEX: 24.24 KG/M2 | OXYGEN SATURATION: 97 % | WEIGHT: 142 LBS | HEIGHT: 64 IN | DIASTOLIC BLOOD PRESSURE: 78 MMHG | RESPIRATION RATE: 16 BRPM | TEMPERATURE: 99 F | SYSTOLIC BLOOD PRESSURE: 138 MMHG

## 2023-08-02 DIAGNOSIS — E11.69 TYPE 2 DIABETES MELLITUS WITH OTHER SPECIFIED COMPLICATION, WITH LONG-TERM CURRENT USE OF INSULIN (HCC): ICD-10-CM

## 2023-08-02 DIAGNOSIS — N18.31 CHRONIC KIDNEY DISEASE, STAGE 3A: ICD-10-CM

## 2023-08-02 DIAGNOSIS — E87.6 HYPOKALEMIA: ICD-10-CM

## 2023-08-02 DIAGNOSIS — E11.59 HYPERTENSION ASSOCIATED WITH TYPE 2 DIABETES MELLITUS (HCC): ICD-10-CM

## 2023-08-02 DIAGNOSIS — I15.2 HYPERTENSION ASSOCIATED WITH TYPE 2 DIABETES MELLITUS (HCC): ICD-10-CM

## 2023-08-02 DIAGNOSIS — Z79.4 TYPE 2 DIABETES MELLITUS WITH OTHER SPECIFIED COMPLICATION, WITH LONG-TERM CURRENT USE OF INSULIN (HCC): ICD-10-CM

## 2023-08-02 PROCEDURE — 3078F DIAST BP <80 MM HG: CPT | Performed by: STUDENT IN AN ORGANIZED HEALTH CARE EDUCATION/TRAINING PROGRAM

## 2023-08-02 PROCEDURE — 99214 OFFICE O/P EST MOD 30 MIN: CPT | Performed by: STUDENT IN AN ORGANIZED HEALTH CARE EDUCATION/TRAINING PROGRAM

## 2023-08-02 PROCEDURE — 1126F AMNT PAIN NOTED NONE PRSNT: CPT | Performed by: STUDENT IN AN ORGANIZED HEALTH CARE EDUCATION/TRAINING PROGRAM

## 2023-08-02 PROCEDURE — 3075F SYST BP GE 130 - 139MM HG: CPT | Performed by: STUDENT IN AN ORGANIZED HEALTH CARE EDUCATION/TRAINING PROGRAM

## 2023-08-02 RX ORDER — SPIRONOLACTONE 25 MG/1
25 TABLET ORAL DAILY
Qty: 100 TABLET | Refills: 0 | Status: SHIPPED | OUTPATIENT
Start: 2023-08-02 | End: 2023-09-13

## 2023-08-02 ASSESSMENT — FIBROSIS 4 INDEX: FIB4 SCORE: 1.98

## 2023-08-02 ASSESSMENT — ENCOUNTER SYMPTOMS
DIZZINESS: 0
WEIGHT LOSS: 0
SHORTNESS OF BREATH: 0
PALPITATIONS: 0
FEVER: 0
MYALGIAS: 0
HEADACHES: 0
CHILLS: 0
COUGH: 0

## 2023-08-02 ASSESSMENT — PAIN SCALES - GENERAL: PAINLEVEL: NO PAIN

## 2023-08-02 NOTE — PATIENT INSTRUCTIONS
Stop the hydrochlorothiazide and start spironolactone 25mg daily.    Repeat non fasting labs in 2 weeks after this change.    Ideal blood pressure <130/80 and at least <140/90.  If <100 for the top number we are over treating.    Ideal glucose ranges: fasting , random (1-2 hours after eating) <180. If glucose <70, we need to adjust your medication to prevent continuing low blood sugar.    Long-acting insulin:   Adjust dose according to FASTING BLOOD GLUCOSE target  mg/dL  (1) Increase the insulin dose every 3-4 days as needed.   (2) Increase by 2 units if FBG average concentration is 131-170 mg/dL.   (3) Increase by 4 units if FBG average concentration is 171-210 mg/dL.   (4) Increase by 6 units if FBG average concentration is 221-260 mg/dL.   (5) Increase by 8 units if FBG average concentration is greater than 261mg/dL and call us.      Consider cutting back by 1-2 units to previous dose if glucose concentration is below 80 mg/dL or symptoms of hypoglycemia.

## 2023-08-02 NOTE — PROGRESS NOTES
"Subjective:     Chief Complaint   Patient presents with    Hypertension Follow-up    Diabetes Follow-up     HPI:   Roya presents today with    Hypertension associated with type 2 diabetes mellitus (HCC)  Chronic. BP at home 138-140. Not checking often. Taking HCTZ 12.5mg daily, losartan 100mg daily and norvasc 10mg daily.    Review of Systems   Constitutional:  Negative for chills, fever, malaise/fatigue and weight loss.   Respiratory:  Negative for cough and shortness of breath.    Cardiovascular:  Negative for chest pain, palpitations and leg swelling.   Musculoskeletal:  Negative for myalgias.   Neurological:  Negative for dizziness and headaches.     Objective:     Exam:  /78 (BP Location: Left arm, Patient Position: Sitting, BP Cuff Size: Adult)   Pulse 73   Temp 37.2 °C (99 °F) (Temporal)   Resp 16   Ht 1.626 m (5' 4\")   Wt 64.4 kg (142 lb)   SpO2 97%   BMI 24.37 kg/m²  Body mass index is 24.37 kg/m².    Physical Exam  Vitals reviewed.   Constitutional:       General: She is not in acute distress.     Appearance: Normal appearance. She is not ill-appearing.   HENT:      Head: Normocephalic and atraumatic.      Nose: Nose normal.      Mouth/Throat:      Mouth: Mucous membranes are moist.   Eyes:      Conjunctiva/sclera: Conjunctivae normal.   Cardiovascular:      Rate and Rhythm: Normal rate and regular rhythm.      Heart sounds: Normal heart sounds. No murmur heard.  Pulmonary:      Effort: Pulmonary effort is normal. No respiratory distress.      Breath sounds: Normal breath sounds. No stridor. No wheezing, rhonchi or rales.   Musculoskeletal:      Cervical back: Normal range of motion and neck supple. No rigidity or tenderness.      Right lower leg: No edema.      Left lower leg: No edema.   Neurological:      General: No focal deficit present.      Mental Status: She is alert and oriented to person, place, and time.   Psychiatric:         Mood and Affect: Mood normal.         Behavior: " Behavior normal.         Thought Content: Thought content normal.       Labs: Reviewed from 7/20/2023    Assessment & Plan:     82 y.o. female with the following -     1. Hypertension associated with type 2 diabetes mellitus (HCC)  Chronic, still uncontrolled. Continue below medication(s) and discontinue hydrochlorothiazide in favor of spironolactone as below given #2.  Repeat labs in 2 weeks.  Discussed ideal ranges/return precautions.  - spironolactone (ALDACTONE) 25 MG Tab; Take 1 Tablet by mouth every day.  Dispense: 100 Tablet; Refill: 0    amLODIPine (NORVASC) 10 MG Tab, TAKE ONE TABLET BY MOUTH DAILY for Blood pressure, Disp: 100 Tablet, Rfl: 3    losartan (COZAAR) 100 MG Tab, TAKE 1 TABLET BY MOUTH EVERY DAY FOR BLOOD PRESSURE, Disp: 100 Tablet, Rfl: 3    2. Hypokalemia  Acute on chronic in this patient with chronic/stable diarrhea due to IBS.  Asymptomatic.  Discussed that insulin may also be contributing but also hydrochlorothiazide may be causal.  Will discontinue hydrochlorothiazide and starting spironolactone as above, patient declines potassium repletion due to prior side effect of oral potassium.  Trend labs in 2 weeks.  Discussed symptoms of hypokalemia and return precautions.  - Basic Metabolic Panel; Future  - MAGNESIUM; Future    3. Chronic kidney disease, stage 3a (HCC)  Acute on chronic in the setting of still poorly controlled blood pressure and addition of diuretic.  Blood pressure as above, continue ABR. Avoid nephrotoxic medications. Trend in 2 weeks with next labs. May benefit from alternative diabetes medication which may be more beneficial for CKD/microalbuminuria but patient prefers to continue with insulin for now.    4. Type 2 diabetes mellitus with other specified complication, with long-term current use of insulin (HCC)  This is a chronic condition, controlled without hypoglycemia.  Discussed that there are other diabetes medications which would be beneficial for her comorbid  conditions but she prefers to continue with insulin for now.  Provided with chart and after visit summary to adjust insulin if needed.  A1c in 6 months unless we decide on medication changes.    SEMGLEE, YFGN, 100 UNIT/ML Solution Pen-injector, Inject 20 Units under the skin every evening., Disp: 9 mL, Rfl: 3    Return in about 4 weeks (around 8/30/2023), or if symptoms worsen or fail to improve, for Blood pressure.    Please note that this dictation was created using voice recognition software. I have made every reasonable attempt to correct obvious errors, but I expect that there are errors of grammar and possibly content that I did not discover before finalizing the note.

## 2023-08-02 NOTE — ASSESSMENT & PLAN NOTE
Chronic. BP at home 138-140. Not checking often. Taking HCTZ 12.5mg daily, losartan 100mg daily and norvasc 10mg daily.

## 2023-08-16 DIAGNOSIS — I10 ESSENTIAL HYPERTENSION: ICD-10-CM

## 2023-08-16 RX ORDER — LOSARTAN POTASSIUM 100 MG/1
100 TABLET ORAL
Qty: 100 TABLET | Refills: 3 | Status: SHIPPED | OUTPATIENT
Start: 2023-08-16 | End: 2024-03-26

## 2023-09-07 ENCOUNTER — HOSPITAL ENCOUNTER (OUTPATIENT)
Dept: LAB | Facility: MEDICAL CENTER | Age: 83
End: 2023-09-07
Attending: STUDENT IN AN ORGANIZED HEALTH CARE EDUCATION/TRAINING PROGRAM
Payer: MEDICARE

## 2023-09-07 DIAGNOSIS — E87.6 HYPOKALEMIA: ICD-10-CM

## 2023-09-07 LAB
ANION GAP SERPL CALC-SCNC: 14 MMOL/L (ref 7–16)
BUN SERPL-MCNC: 28 MG/DL (ref 8–22)
CALCIUM SERPL-MCNC: 9.5 MG/DL (ref 8.5–10.5)
CHLORIDE SERPL-SCNC: 109 MMOL/L (ref 96–112)
CO2 SERPL-SCNC: 15 MMOL/L (ref 20–33)
CREAT SERPL-MCNC: 1.1 MG/DL (ref 0.5–1.4)
GFR SERPLBLD CREATININE-BSD FMLA CKD-EPI: 51 ML/MIN/1.73 M 2
GLUCOSE SERPL-MCNC: 84 MG/DL (ref 65–99)
MAGNESIUM SERPL-MCNC: 2 MG/DL (ref 1.5–2.5)
POTASSIUM SERPL-SCNC: 3.9 MMOL/L (ref 3.6–5.5)
SODIUM SERPL-SCNC: 138 MMOL/L (ref 135–145)

## 2023-09-07 PROCEDURE — 36415 COLL VENOUS BLD VENIPUNCTURE: CPT

## 2023-09-07 PROCEDURE — 83735 ASSAY OF MAGNESIUM: CPT

## 2023-09-07 PROCEDURE — 80048 BASIC METABOLIC PNL TOTAL CA: CPT

## 2023-09-13 ENCOUNTER — OFFICE VISIT (OUTPATIENT)
Dept: MEDICAL GROUP | Facility: PHYSICIAN GROUP | Age: 83
End: 2023-09-13
Payer: MEDICARE

## 2023-09-13 VITALS
TEMPERATURE: 98 F | BODY MASS INDEX: 23.9 KG/M2 | SYSTOLIC BLOOD PRESSURE: 138 MMHG | OXYGEN SATURATION: 97 % | HEART RATE: 78 BPM | WEIGHT: 140 LBS | RESPIRATION RATE: 20 BRPM | DIASTOLIC BLOOD PRESSURE: 62 MMHG | HEIGHT: 64 IN

## 2023-09-13 DIAGNOSIS — I15.2 HYPERTENSION ASSOCIATED WITH TYPE 2 DIABETES MELLITUS (HCC): ICD-10-CM

## 2023-09-13 DIAGNOSIS — E11.59 HYPERTENSION ASSOCIATED WITH TYPE 2 DIABETES MELLITUS (HCC): ICD-10-CM

## 2023-09-13 DIAGNOSIS — E87.20 METABOLIC ACIDOSIS: ICD-10-CM

## 2023-09-13 PROCEDURE — 3075F SYST BP GE 130 - 139MM HG: CPT | Performed by: STUDENT IN AN ORGANIZED HEALTH CARE EDUCATION/TRAINING PROGRAM

## 2023-09-13 PROCEDURE — 3078F DIAST BP <80 MM HG: CPT | Performed by: STUDENT IN AN ORGANIZED HEALTH CARE EDUCATION/TRAINING PROGRAM

## 2023-09-13 PROCEDURE — 99214 OFFICE O/P EST MOD 30 MIN: CPT | Performed by: STUDENT IN AN ORGANIZED HEALTH CARE EDUCATION/TRAINING PROGRAM

## 2023-09-13 RX ORDER — METOPROLOL SUCCINATE 25 MG/1
25 TABLET, EXTENDED RELEASE ORAL DAILY
Qty: 100 TABLET | Refills: 0 | Status: SHIPPED | OUTPATIENT
Start: 2023-09-13 | End: 2023-09-29

## 2023-09-13 ASSESSMENT — ENCOUNTER SYMPTOMS
SHORTNESS OF BREATH: 0
FEVER: 0
HEADACHES: 0
NAUSEA: 0
DIARRHEA: 1
PALPITATIONS: 0
WEIGHT LOSS: 0
COUGH: 0
ABDOMINAL PAIN: 0
DIZZINESS: 0
CHILLS: 0
VOMITING: 0

## 2023-09-13 ASSESSMENT — FIBROSIS 4 INDEX: FIB4 SCORE: 1.98

## 2023-09-13 NOTE — PATIENT INSTRUCTIONS
Ideal blood pressure <130/80 and at least <140/90.  If <100 for the top number we are over treating.    Stop the spironolactone, start metoprolol.    non-fasting labs due in 2-3 weeks     I will be transferring to the clinic below 10/10/2023.  [facility]  [address] [place] [address], [place] [address]     I will be happy to continue to be your primary care provider if you'd like to follow me. If not please call [phone] to establish care with new provider

## 2023-09-13 NOTE — ASSESSMENT & PLAN NOTE
Chronic.  Patient continues with losartan 100 mg daily, amlodipine 10 mg daily and started spironolactone 25 mg daily.  Denies any adverse side effects.  Completed her lab work.  States that she has stable diarrhea from irritable bowel syndrome without changes.    Blood pressure at home still in the high 130s, diastolic always good.  Blood pressure in the 150s prior to medication    Was out of losartan for 10 days due to insurance issues. Restarted and taking 100mg daily.

## 2023-09-13 NOTE — PROGRESS NOTES
"Subjective:     Chief Complaint   Patient presents with    Follow-Up     HPI:   Roya presents today with     Hypertension associated with type 2 diabetes mellitus (HCC)  Chronic.  Patient continues with losartan 100 mg daily, amlodipine 10 mg daily and started spironolactone 25 mg daily.  Denies any adverse side effects.  Completed her lab work.  States that she has stable diarrhea from irritable bowel syndrome without changes.    Blood pressure at home still in the high 130s, diastolic always good.  Blood pressure in the 150s prior to medication    Was out of losartan for 10 days due to insurance issues. Restarted and taking 100mg daily.    Review of Systems   Constitutional:  Negative for chills, fever, malaise/fatigue and weight loss.   Respiratory:  Negative for cough and shortness of breath.    Cardiovascular:  Negative for chest pain, palpitations and leg swelling.   Gastrointestinal:  Positive for diarrhea (stable). Negative for abdominal pain, nausea and vomiting.   Skin:  Negative for rash.   Neurological:  Negative for dizziness and headaches.     Objective:     Exam:  /62 (BP Location: Left arm, Patient Position: Sitting, BP Cuff Size: Adult)   Pulse 78   Temp 36.7 °C (98 °F) (Temporal)   Resp 20   Ht 1.626 m (5' 4\")   Wt 63.5 kg (140 lb) Comment: Pt states this moring  SpO2 97%   BMI 24.03 kg/m²  Body mass index is 24.03 kg/m².    Physical Exam  Vitals reviewed.   Constitutional:       General: She is not in acute distress.     Appearance: Normal appearance. She is not ill-appearing.   HENT:      Head: Normocephalic and atraumatic.      Nose: Nose normal.      Mouth/Throat:      Mouth: Mucous membranes are moist.   Eyes:      Conjunctiva/sclera: Conjunctivae normal.   Cardiovascular:      Rate and Rhythm: Normal rate and regular rhythm.      Heart sounds: Normal heart sounds. No murmur heard.  Pulmonary:      Effort: Pulmonary effort is normal. No respiratory distress.      Breath sounds: " Normal breath sounds. No stridor. No wheezing, rhonchi or rales.   Musculoskeletal:      Cervical back: Normal range of motion and neck supple. No rigidity or tenderness.      Right lower leg: No edema.      Left lower leg: No edema.   Neurological:      General: No focal deficit present.      Mental Status: She is alert and oriented to person, place, and time.   Psychiatric:         Mood and Affect: Mood normal.         Behavior: Behavior normal.         Thought Content: Thought content normal.         Labs: Reviewed from 9/7/2023    Assessment & Plan:     82 y.o. female with the following -     1. Hypertension associated with type 2 diabetes mellitus (HCC)  - metoprolol SR (TOPROL XL) 25 MG TABLET SR 24 HR; Take 1 Tablet by mouth every day.  Dispense: 100 Tablet; Refill: 0  2. Metabolic acidosis  - Basic Metabolic Panel; Future  Chronic, uncontrolled despite adding spironolactone with worsening metabolic acidosis in this patient with chronic/stable IBS-D.  Discussed options for treatment.  Had hypokalemia which is now resolved after discontinuing hydrochlorothiazide.  Patient elects to stop spironolactone and trial metoprolol as above.  Has a pacemaker that is functional, goes in for pacemaker check every 6 months. Continue below medication(s) in addition to metoprolol as below.  Trend labs in 2 weeks.  Discussed ideal ranges/return precautions and ways to improve with lifestyle choices.    losartan (COZAAR) 100 MG Tab, Take 1 Tablet by mouth every day. FOR BLOOD PRESSURE, Disp: 100 Tablet, Rfl: 3    amLODIPine (NORVASC) 10 MG Tab, TAKE ONE TABLET BY MOUTH DAILY for Blood pressure, Disp: 100 Tablet, Rfl: 3    Return in about 16 days (around 9/29/2023), or if symptoms worsen or fail to improve, for Blood pressure.    Please note that this dictation was created using voice recognition software. I have made every reasonable attempt to correct obvious errors, but I expect that there are errors of grammar and possibly  content that I did not discover before finalizing the note.

## 2023-09-27 ENCOUNTER — HOSPITAL ENCOUNTER (OUTPATIENT)
Dept: LAB | Facility: MEDICAL CENTER | Age: 83
End: 2023-09-27
Attending: STUDENT IN AN ORGANIZED HEALTH CARE EDUCATION/TRAINING PROGRAM
Payer: MEDICARE

## 2023-09-27 DIAGNOSIS — E87.20 METABOLIC ACIDOSIS: ICD-10-CM

## 2023-09-27 LAB
ANION GAP SERPL CALC-SCNC: 12 MMOL/L (ref 7–16)
BUN SERPL-MCNC: 26 MG/DL (ref 8–22)
CALCIUM SERPL-MCNC: 9.4 MG/DL (ref 8.5–10.5)
CHLORIDE SERPL-SCNC: 108 MMOL/L (ref 96–112)
CO2 SERPL-SCNC: 20 MMOL/L (ref 20–33)
CREAT SERPL-MCNC: 1.08 MG/DL (ref 0.5–1.4)
GFR SERPLBLD CREATININE-BSD FMLA CKD-EPI: 52 ML/MIN/1.73 M 2
GLUCOSE SERPL-MCNC: 73 MG/DL (ref 65–99)
POTASSIUM SERPL-SCNC: 3.9 MMOL/L (ref 3.6–5.5)
SODIUM SERPL-SCNC: 140 MMOL/L (ref 135–145)

## 2023-09-27 PROCEDURE — 36415 COLL VENOUS BLD VENIPUNCTURE: CPT

## 2023-09-27 PROCEDURE — 80048 BASIC METABOLIC PNL TOTAL CA: CPT

## 2023-09-29 ENCOUNTER — OFFICE VISIT (OUTPATIENT)
Dept: MEDICAL GROUP | Facility: PHYSICIAN GROUP | Age: 83
End: 2023-09-29
Payer: MEDICARE

## 2023-09-29 VITALS
SYSTOLIC BLOOD PRESSURE: 124 MMHG | DIASTOLIC BLOOD PRESSURE: 64 MMHG | HEART RATE: 78 BPM | OXYGEN SATURATION: 96 % | BODY MASS INDEX: 24.07 KG/M2 | TEMPERATURE: 97 F | HEIGHT: 64 IN | RESPIRATION RATE: 20 BRPM | WEIGHT: 141 LBS

## 2023-09-29 DIAGNOSIS — I15.2 HYPERTENSION ASSOCIATED WITH TYPE 2 DIABETES MELLITUS (HCC): ICD-10-CM

## 2023-09-29 DIAGNOSIS — Z79.4 TYPE 2 DIABETES MELLITUS WITH OTHER SPECIFIED COMPLICATION, WITH LONG-TERM CURRENT USE OF INSULIN (HCC): ICD-10-CM

## 2023-09-29 DIAGNOSIS — E11.69 TYPE 2 DIABETES MELLITUS WITH OTHER SPECIFIED COMPLICATION, WITH LONG-TERM CURRENT USE OF INSULIN (HCC): ICD-10-CM

## 2023-09-29 DIAGNOSIS — N18.31 CHRONIC KIDNEY DISEASE, STAGE 3A: ICD-10-CM

## 2023-09-29 DIAGNOSIS — E11.59 HYPERTENSION ASSOCIATED WITH TYPE 2 DIABETES MELLITUS (HCC): ICD-10-CM

## 2023-09-29 PROCEDURE — 99214 OFFICE O/P EST MOD 30 MIN: CPT | Performed by: STUDENT IN AN ORGANIZED HEALTH CARE EDUCATION/TRAINING PROGRAM

## 2023-09-29 PROCEDURE — 3074F SYST BP LT 130 MM HG: CPT | Performed by: STUDENT IN AN ORGANIZED HEALTH CARE EDUCATION/TRAINING PROGRAM

## 2023-09-29 PROCEDURE — 3078F DIAST BP <80 MM HG: CPT | Performed by: STUDENT IN AN ORGANIZED HEALTH CARE EDUCATION/TRAINING PROGRAM

## 2023-09-29 ASSESSMENT — FIBROSIS 4 INDEX: FIB4 SCORE: 1.98

## 2023-09-29 NOTE — PATIENT INSTRUCTIONS
Ideal blood pressure <130/80 and at least <140/90.  If <100 for the top number we are over treating.    Labs (fasting due late January)    I will be transferring to the clinic below October 2023.  [facility] [place] [facility]  [address] [place] [address]

## 2023-09-29 NOTE — PROGRESS NOTES
"Subjective:     Chief Complaint   Patient presents with    Follow-Up     HPI:   Roya presents today with    Hypertension associated with type 2 diabetes mellitus (HCC)  This is a chronic condition.  Patient states that she ended up not taking metoprolol due to concern for it side effects.  Has continued with amlodipine 10 mg daily and losartan 100 mg daily.  Discontinued spironolactone as directed and repeated her labs.  Feeling well without complaints today.    Happy to report that on her current regimen her blood pressures have been quite good, brings in a log today.  Feels that her blood pressure is related to stress at times which has improved.    Review of Systems   Constitutional:  Negative for chills, fever, malaise/fatigue and weight loss.   Respiratory:  Negative for cough and shortness of breath.    Cardiovascular:  Negative for chest pain, palpitations and leg swelling.   Neurological:  Negative for headaches.     Objective:     Exam:  /64 (BP Location: Right arm, Patient Position: Sitting, BP Cuff Size: Adult)   Pulse 78   Temp 36.1 °C (97 °F) (Temporal)   Resp 20   Ht 1.626 m (5' 4\")   Wt 64 kg (141 lb)   SpO2 96%   BMI 24.20 kg/m²  Body mass index is 24.2 kg/m².    Physical Exam  Vitals reviewed.   Constitutional:       General: She is not in acute distress.     Appearance: Normal appearance. She is not ill-appearing.   HENT:      Head: Normocephalic and atraumatic.      Nose: Nose normal.      Mouth/Throat:      Mouth: Mucous membranes are moist.   Eyes:      Conjunctiva/sclera: Conjunctivae normal.   Cardiovascular:      Rate and Rhythm: Normal rate and regular rhythm.      Heart sounds: Normal heart sounds. No murmur heard.  Pulmonary:      Effort: Pulmonary effort is normal. No respiratory distress.      Breath sounds: Normal breath sounds. No stridor. No wheezing, rhonchi or rales.   Musculoskeletal:      Cervical back: Normal range of motion and neck supple. No rigidity or " tenderness.      Right lower leg: No edema.      Left lower leg: No edema.   Neurological:      General: No focal deficit present.      Mental Status: She is alert and oriented to person, place, and time.   Psychiatric:         Mood and Affect: Mood normal.         Behavior: Behavior normal.         Thought Content: Thought content normal.       Labs: Reviewed from 9/27/2023    Assessment & Plan:     82 y.o. female with the following -     1. Hypertension associated with type 2 diabetes mellitus (HCC)  Chronic, controlled.  Metabolic acidosis resolved off diuretic.  Continue below medication(s). Discussed ideal ranges/return precautions.    losartan (COZAAR) 100 MG Tab, Take 1 Tablet by mouth every day. FOR BLOOD PRESSURE, Disp: 100 Tablet, Rfl: 3    amLODIPine (NORVASC) 10 MG Tab, TAKE ONE TABLET BY MOUTH DAILY for Blood pressure, Disp: 100 Tablet, Rfl: 3    2. Chronic kidney disease, stage 3a ([facility])  Chronic condition, stable. BP well controlled, continue ACEI. Avoid nephrotoxic medications. Trend below labs 1/2024 with annual labs.  - Comp Metabolic Panel; Future  - CBC WITH DIFFERENTIAL; Future  - MICROALBUMIN CREAT RATIO URINE; Future  - VITAMIN D,25 HYDROXY (DEFICIENCY); Future  - PTH INTACT (PTH ONLY); Future  - PHOSPHORUS; Future    3. Type 2 diabetes mellitus with other specified complication, with long-term current use of insulin ([facility])  This is a chronic condition, controlled without hypoglycemia. See AP from 8/2/2023. Trend labs in 1/2024. Continues with insulin as below.    SEMGLEE, YFGN, 100 UNIT/ML Solution Pen-injector, Inject 20 Units under the skin every evening., Disp: 9 mL, Rfl: 3  - Comp Metabolic Panel; Future  - Lipid Profile; Future  - CBC WITH DIFFERENTIAL; Future  - MICROALBUMIN CREAT RATIO URINE; Future  - HEMOGLOBIN A1C; Future    Return in about 5 months (around 2/14/2024), or if symptoms worsen or fail to improve, for labs.    Please note that this dictation was created using voice  recognition software. I have made every reasonable attempt to correct obvious errors, but I expect that there are errors of grammar and possibly content that I did not discover before finalizing the note.

## 2023-09-30 PROBLEM — E87.20 METABOLIC ACIDOSIS: Status: RESOLVED | Noted: 2023-09-13 | Resolved: 2023-09-30

## 2023-09-30 ASSESSMENT — ENCOUNTER SYMPTOMS
PALPITATIONS: 0
COUGH: 0
HEADACHES: 0
WEIGHT LOSS: 0
FEVER: 0
CHILLS: 0
SHORTNESS OF BREATH: 0

## 2023-10-01 NOTE — ASSESSMENT & PLAN NOTE
This is a chronic condition.  Patient states that she ended up not taking metoprolol due to concern for it side effects.  Has continued with amlodipine 10 mg daily and losartan 100 mg daily.  Discontinued spironolactone as directed and repeated her labs.  Feeling well without complaints today.    Happy to report that on her current regimen her blood pressures have been quite good, brings in a log today.  Feels that her blood pressure is related to stress at times which has improved.

## 2023-11-02 NOTE — TELEPHONE ENCOUNTER
Received request via: Pharmacy    Was the patient seen in the last year in this department? Yes    Does the patient have an active prescription (recently filled or refills available) for medication(s) requested? No   Adbry Counseling: I discussed with the patient the risks of tralokinumab including but not limited to eye infection and irritation, cold sores, injection site reactions, worsening of asthma, allergic reactions and increased risk of parasitic infection.  Live vaccines should be avoided while taking tralokinumab. The patient understands that monitoring is required and they must alert us or the primary physician if symptoms of infection or other concerning signs are noted.

## 2023-11-20 ENCOUNTER — NON-PROVIDER VISIT (OUTPATIENT)
Dept: CARDIOLOGY | Facility: MEDICAL CENTER | Age: 83
End: 2023-11-20

## 2023-11-20 ENCOUNTER — NON-PROVIDER VISIT (OUTPATIENT)
Dept: CARDIOLOGY | Facility: MEDICAL CENTER | Age: 83
End: 2023-11-20
Attending: STUDENT IN AN ORGANIZED HEALTH CARE EDUCATION/TRAINING PROGRAM
Payer: MEDICARE

## 2023-11-20 DIAGNOSIS — Z95.0 CARDIAC PACEMAKER IN SITU: ICD-10-CM

## 2023-11-20 DIAGNOSIS — I49.5 SICK SINUS SYNDROME (HCC): ICD-10-CM

## 2023-11-20 PROCEDURE — 93280 PM DEVICE PROGR EVAL DUAL: CPT | Performed by: INTERNAL MEDICINE

## 2023-11-22 PROCEDURE — 93280 PM DEVICE PROGR EVAL DUAL: CPT | Mod: 26 | Performed by: INTERNAL MEDICINE

## 2023-11-22 NOTE — CARDIAC REMOTE MONITOR - SCAN
Device transmission reviewed. Device demonstrated appropriate function.       Electronically Signed by: Jill Garrett M.D.    12/13/2023  1:19 PM

## 2023-12-14 DIAGNOSIS — E11.9 TYPE 2 DIABETES MELLITUS WITHOUT COMPLICATION, WITH LONG-TERM CURRENT USE OF INSULIN (HCC): ICD-10-CM

## 2023-12-14 DIAGNOSIS — Z79.4 TYPE 2 DIABETES MELLITUS WITHOUT COMPLICATION, WITH LONG-TERM CURRENT USE OF INSULIN (HCC): ICD-10-CM

## 2023-12-14 RX ORDER — PEN NEEDLE, DIABETIC 32GX 5/32"
NEEDLE, DISPOSABLE MISCELLANEOUS
Qty: 100 EACH | Refills: 3 | Status: SHIPPED | OUTPATIENT
Start: 2023-12-14 | End: 2024-03-26

## 2023-12-14 NOTE — TELEPHONE ENCOUNTER
Received request via: Patient    Was the patient seen in the last year in this department? Yes    Does the patient have an active prescription (recently filled or refills available) for medication(s) requested? No    Does the patient have [facility] and need 100 day supply (blood pressure, diabetes and cholesterol meds only)? Yes, quantity updated to 100 days

## 2024-01-24 DIAGNOSIS — E11.59 HYPERTENSION ASSOCIATED WITH TYPE 2 DIABETES MELLITUS (HCC): ICD-10-CM

## 2024-01-24 DIAGNOSIS — I15.2 HYPERTENSION ASSOCIATED WITH TYPE 2 DIABETES MELLITUS (HCC): ICD-10-CM

## 2024-01-24 NOTE — TELEPHONE ENCOUNTER
Received request via: Patient    Was the patient seen in the last year in this department? Yes    Does the patient have an active prescription (recently filled or refills available) for medication(s) requested? No    Pharmacy Name: [facility]     Does the patient have care home Plus and need 100 day supply (blood pressure, diabetes and cholesterol meds only)? Yes, quantity updated to 100 days

## 2024-01-25 RX ORDER — AMLODIPINE BESYLATE 10 MG/1
TABLET ORAL
Qty: 100 TABLET | Refills: 3 | Status: SHIPPED | OUTPATIENT
Start: 2024-01-25 | End: 2024-03-26

## 2024-02-13 ENCOUNTER — HOSPITAL ENCOUNTER (OUTPATIENT)
Dept: LAB | Facility: MEDICAL CENTER | Age: 84
End: 2024-02-13
Attending: STUDENT IN AN ORGANIZED HEALTH CARE EDUCATION/TRAINING PROGRAM
Payer: MEDICARE

## 2024-02-13 DIAGNOSIS — N18.31 CHRONIC KIDNEY DISEASE, STAGE 3A: ICD-10-CM

## 2024-02-13 DIAGNOSIS — E11.69 TYPE 2 DIABETES MELLITUS WITH OTHER SPECIFIED COMPLICATION, WITH LONG-TERM CURRENT USE OF INSULIN (HCC): ICD-10-CM

## 2024-02-13 DIAGNOSIS — Z79.4 TYPE 2 DIABETES MELLITUS WITH OTHER SPECIFIED COMPLICATION, WITH LONG-TERM CURRENT USE OF INSULIN (HCC): ICD-10-CM

## 2024-02-13 LAB
25(OH)D3 SERPL-MCNC: 41 NG/ML (ref 30–100)
ALBUMIN SERPL BCP-MCNC: 4.4 G/DL (ref 3.2–4.9)
ALBUMIN/GLOB SERPL: 1.3 G/DL
ALP SERPL-CCNC: 68 U/L (ref 30–99)
ALT SERPL-CCNC: 18 U/L (ref 2–50)
ANION GAP SERPL CALC-SCNC: 11 MMOL/L (ref 7–16)
AST SERPL-CCNC: 23 U/L (ref 12–45)
BASOPHILS # BLD AUTO: 0.2 % (ref 0–1.8)
BASOPHILS # BLD: 0.02 K/UL (ref 0–0.12)
BILIRUB SERPL-MCNC: 1.2 MG/DL (ref 0.1–1.5)
BUN SERPL-MCNC: 21 MG/DL (ref 8–22)
CALCIUM ALBUM COR SERPL-MCNC: 9.3 MG/DL (ref 8.5–10.5)
CALCIUM SERPL-MCNC: 9.6 MG/DL (ref 8.5–10.5)
CHLORIDE SERPL-SCNC: 108 MMOL/L (ref 96–112)
CHOLEST SERPL-MCNC: 140 MG/DL (ref 100–199)
CO2 SERPL-SCNC: 20 MMOL/L (ref 20–33)
CREAT SERPL-MCNC: 0.89 MG/DL (ref 0.5–1.4)
CREAT UR-MCNC: 114.47 MG/DL
EOSINOPHIL # BLD AUTO: 0.1 K/UL (ref 0–0.51)
EOSINOPHIL NFR BLD: 1.1 % (ref 0–6.9)
ERYTHROCYTE [DISTWIDTH] IN BLOOD BY AUTOMATED COUNT: 44.6 FL (ref 35.9–50)
EST. AVERAGE GLUCOSE BLD GHB EST-MCNC: 123 MG/DL
FASTING STATUS PATIENT QL REPORTED: NORMAL
GFR SERPLBLD CREATININE-BSD FMLA CKD-EPI: 65 ML/MIN/1.73 M 2
GLOBULIN SER CALC-MCNC: 3.4 G/DL (ref 1.9–3.5)
GLUCOSE SERPL-MCNC: 86 MG/DL (ref 65–99)
HBA1C MFR BLD: 5.9 % (ref 4–5.6)
HCT VFR BLD AUTO: 45.5 % (ref 37–47)
HDLC SERPL-MCNC: 67 MG/DL
HGB BLD-MCNC: 15.4 G/DL (ref 12–16)
IMM GRANULOCYTES # BLD AUTO: 0.02 K/UL (ref 0–0.11)
IMM GRANULOCYTES NFR BLD AUTO: 0.2 % (ref 0–0.9)
LDLC SERPL CALC-MCNC: 55 MG/DL
LYMPHOCYTES # BLD AUTO: 1.35 K/UL (ref 1–4.8)
LYMPHOCYTES NFR BLD: 15.4 % (ref 22–41)
MCH RBC QN AUTO: 31 PG (ref 27–33)
MCHC RBC AUTO-ENTMCNC: 33.8 G/DL (ref 32.2–35.5)
MCV RBC AUTO: 91.5 FL (ref 81.4–97.8)
MICROALBUMIN UR-MCNC: 8.1 MG/DL
MICROALBUMIN/CREAT UR: 71 MG/G (ref 0–30)
MONOCYTES # BLD AUTO: 0.68 K/UL (ref 0–0.85)
MONOCYTES NFR BLD AUTO: 7.8 % (ref 0–13.4)
NEUTROPHILS # BLD AUTO: 6.59 K/UL (ref 1.82–7.42)
NEUTROPHILS NFR BLD: 75.3 % (ref 44–72)
NRBC # BLD AUTO: 0 K/UL
NRBC BLD-RTO: 0 /100 WBC (ref 0–0.2)
PHOSPHATE SERPL-MCNC: 3.6 MG/DL (ref 2.5–4.5)
PLATELET # BLD AUTO: 244 K/UL (ref 164–446)
PMV BLD AUTO: 10.4 FL (ref 9–12.9)
POTASSIUM SERPL-SCNC: 3.9 MMOL/L (ref 3.6–5.5)
PROT SERPL-MCNC: 7.8 G/DL (ref 6–8.2)
PTH-INTACT SERPL-MCNC: 46.6 PG/ML (ref 14–72)
RBC # BLD AUTO: 4.97 M/UL (ref 4.2–5.4)
SODIUM SERPL-SCNC: 139 MMOL/L (ref 135–145)
TRIGL SERPL-MCNC: 91 MG/DL (ref 0–149)
WBC # BLD AUTO: 8.8 K/UL (ref 4.8–10.8)

## 2024-02-13 PROCEDURE — 85025 COMPLETE CBC W/AUTO DIFF WBC: CPT

## 2024-02-13 PROCEDURE — 82306 VITAMIN D 25 HYDROXY: CPT

## 2024-02-13 PROCEDURE — 80061 LIPID PANEL: CPT

## 2024-02-13 PROCEDURE — 83970 ASSAY OF PARATHORMONE: CPT

## 2024-02-13 PROCEDURE — 80053 COMPREHEN METABOLIC PANEL: CPT

## 2024-02-13 PROCEDURE — 82043 UR ALBUMIN QUANTITATIVE: CPT

## 2024-02-13 PROCEDURE — 84100 ASSAY OF PHOSPHORUS: CPT

## 2024-02-13 PROCEDURE — 83036 HEMOGLOBIN GLYCOSYLATED A1C: CPT

## 2024-02-13 PROCEDURE — 36415 COLL VENOUS BLD VENIPUNCTURE: CPT

## 2024-02-13 PROCEDURE — 82570 ASSAY OF URINE CREATININE: CPT

## 2024-02-16 ENCOUNTER — OFFICE VISIT (OUTPATIENT)
Dept: MEDICAL GROUP | Facility: LAB | Age: 84
End: 2024-02-16
Payer: MEDICARE

## 2024-02-16 VITALS
DIASTOLIC BLOOD PRESSURE: 70 MMHG | RESPIRATION RATE: 20 BRPM | HEIGHT: 64 IN | TEMPERATURE: 97.3 F | OXYGEN SATURATION: 97 % | SYSTOLIC BLOOD PRESSURE: 158 MMHG | HEART RATE: 84 BPM | WEIGHT: 148 LBS | BODY MASS INDEX: 25.27 KG/M2

## 2024-02-16 DIAGNOSIS — L84 CALLUS OF FOOT: ICD-10-CM

## 2024-02-16 DIAGNOSIS — N18.2 STAGE 2 CHRONIC KIDNEY DISEASE: ICD-10-CM

## 2024-02-16 DIAGNOSIS — E78.5 HYPERLIPIDEMIA ASSOCIATED WITH TYPE 2 DIABETES MELLITUS (HCC): ICD-10-CM

## 2024-02-16 DIAGNOSIS — E11.69 TYPE 2 DIABETES MELLITUS WITH OTHER SPECIFIED COMPLICATION, WITH LONG-TERM CURRENT USE OF INSULIN (HCC): ICD-10-CM

## 2024-02-16 DIAGNOSIS — R80.9 MICROALBUMINURIA: ICD-10-CM

## 2024-02-16 DIAGNOSIS — I70.0 ATHEROSCLEROSIS OF AORTA (HCC): ICD-10-CM

## 2024-02-16 DIAGNOSIS — Z79.4 TYPE 2 DIABETES MELLITUS WITH OTHER SPECIFIED COMPLICATION, WITH LONG-TERM CURRENT USE OF INSULIN (HCC): ICD-10-CM

## 2024-02-16 DIAGNOSIS — E11.59 HYPERTENSION ASSOCIATED WITH TYPE 2 DIABETES MELLITUS (HCC): ICD-10-CM

## 2024-02-16 DIAGNOSIS — Q66.72 PES CAVUS OF BOTH FEET: ICD-10-CM

## 2024-02-16 DIAGNOSIS — E11.69 HYPERLIPIDEMIA ASSOCIATED WITH TYPE 2 DIABETES MELLITUS (HCC): ICD-10-CM

## 2024-02-16 DIAGNOSIS — Q66.71 PES CAVUS OF BOTH FEET: ICD-10-CM

## 2024-02-16 DIAGNOSIS — I15.2 HYPERTENSION ASSOCIATED WITH TYPE 2 DIABETES MELLITUS (HCC): ICD-10-CM

## 2024-02-16 PROCEDURE — 3078F DIAST BP <80 MM HG: CPT | Performed by: STUDENT IN AN ORGANIZED HEALTH CARE EDUCATION/TRAINING PROGRAM

## 2024-02-16 PROCEDURE — 99214 OFFICE O/P EST MOD 30 MIN: CPT | Performed by: STUDENT IN AN ORGANIZED HEALTH CARE EDUCATION/TRAINING PROGRAM

## 2024-02-16 PROCEDURE — 3077F SYST BP >= 140 MM HG: CPT | Performed by: STUDENT IN AN ORGANIZED HEALTH CARE EDUCATION/TRAINING PROGRAM

## 2024-02-16 RX ORDER — METOPROLOL SUCCINATE 25 MG/1
25 TABLET, EXTENDED RELEASE ORAL DAILY
Qty: 100 TABLET | Refills: 3 | Status: SHIPPED | OUTPATIENT
Start: 2024-02-16

## 2024-02-16 ASSESSMENT — FIBROSIS 4 INDEX: FIB4 SCORE: 1.8

## 2024-02-16 ASSESSMENT — PATIENT HEALTH QUESTIONNAIRE - PHQ9: CLINICAL INTERPRETATION OF PHQ2 SCORE: 0

## 2024-02-16 NOTE — PATIENT INSTRUCTIONS
Dapagliflozin 10mg daily (Farxiga)  Empagliflozin 10-25mg daily (Jardiance)    Make a podiatry appointment!    Ideal glucose ranges: fasting , random (1-2 hours after eating) <180. If glucose <70, we need to adjust your medication to prevent continuing low blood sugar.    Ideal blood pressure <130/80 and at least <140/90.  If <100 for the top number we are over treating.  Severe range >180/120, with symptoms=ER  Add metoprolol SR (TOPROL XL) 25 MG TABLET SR 24 HR; Take 1 Tablet by mouth every day.     Long-acting insulin:   Adjust dose according to FASTING BLOOD GLUCOSE target  mg/dL  (1) Increase the insulin dose every 3-4 days as needed.   (2) Increase by 2 units if FBG average concentration is 131-170 mg/dL.   (3) Increase by 4 units if FBG average concentration is 171-210 mg/dL.   (4) Increase by 6 units if FBG average concentration is 221-260 mg/dL.   (5) Increase by 8 units if FBG average concentration is greater than 261mg/dL and call us.      Consider cutting back by 1-2 units to previous dose if glucose concentration is below 80 mg/dL or symptoms of hypoglycemia.

## 2024-02-16 NOTE — PROGRESS NOTES
"Subjective:     Chief Complaint   Patient presents with    Follow-Up     HPI:   Roya presents today for follow-up and lab review.    Patient states that her blood pressure has been mostly above 130 at home.  Continues with amlodipine 10 mg daily and losartan 100 mg daily.    Continues with insulin 20units at night. Glucose ranges fasting . Max 148 (rare). Not using chart provided to titrate and continues with 20 units nightly without changes but denies any hypoglycemia or symptoms suggestive of.  Wonders if she can consider alternatives to insulin.  Overdue for follow-up with podiatry.    Review of Systems   Constitutional:  Negative for chills, fever, malaise/fatigue and weight loss.   Respiratory:  Negative for cough and shortness of breath.    Cardiovascular:  Negative for chest pain, palpitations and leg swelling.   Gastrointestinal:  Negative for abdominal pain, diarrhea, nausea and vomiting.   Skin:  Negative for rash.   Neurological:  Negative for tingling and sensory change.     Objective:     Exam:  BP (!) 158/70 (BP Location: Left arm, Patient Position: Sitting, BP Cuff Size: Adult)   Pulse 84   Temp 36.3 °C (97.3 °F) (Temporal)   Resp 20   Ht 1.626 m (5' 4\")   Wt 67.1 kg (148 lb)   SpO2 97%   BMI 25.40 kg/m²  Body mass index is 25.4 kg/m².    Physical Exam  Vitals reviewed.   Constitutional:       General: She is not in acute distress.     Appearance: Normal appearance. She is not ill-appearing.   HENT:      Head: Normocephalic and atraumatic.      Nose: Nose normal.      Mouth/Throat:      Mouth: Mucous membranes are moist.   Eyes:      Conjunctiva/sclera: Conjunctivae normal.   Cardiovascular:      Rate and Rhythm: Normal rate and regular rhythm.      Heart sounds: Normal heart sounds. No murmur heard.  Pulmonary:      Effort: Pulmonary effort is normal. No respiratory distress.      Breath sounds: Normal breath sounds. No stridor. No wheezing, rhonchi or rales.   Musculoskeletal:      " Cervical back: Normal range of motion and neck supple. No rigidity or tenderness.      Right lower leg: No edema.      Left lower leg: No edema.      Right foot: Bunion present.      Left foot: Bunion present.   Feet:      Comments: Monofilament testing with a 10 gram force: sensation intact: intact bilaterally  Visual Inspection: Feet without maceration, ulcers, fissures. Callus left great toe medially, plantar surface right forefoot, 4-5th digit left toe.  Pedal pulses: intact bilaterally  Pes cavus bilaterally.    Neurological:      General: No focal deficit present.      Mental Status: She is alert and oriented to person, place, and time.   Psychiatric:         Mood and Affect: Mood normal.         Behavior: Behavior normal.         Thought Content: Thought content normal.     Labs: Reviewed from 4/18/2023, 2/13/2024    Assessment & Plan:     83 y.o. female with the following -     1. Type 2 diabetes mellitus with other specified complication, with long-term current use of insulin (HCC)  Chronic, controlled.  Patient desires to continue medication(s) as below after discussion of alternatives.  Encouraged diabetic diet and exercise as tolerated.  - A1C in 6 months. Discussed ideal glucose ranges and again provided with chart to titrate insulin if needed in the after visit summary.    SEMGLEE, YFGN, 100 UNIT/ML Solution Pen-injector, Inject 20 Units under the skin every evening., Disp: 9 mL, Rfl: 3    2. Microalbuminuria  3. Stage 2 chronic kidney disease  Chronic kidney disease and microalbuminuria, improved from prior.  BP as below, continue ARB. Avoid nephrotoxic medications. Trend Cr in 6m.    4. Hypertension associated with type 2 diabetes mellitus (HCC)  Chronic, uncontrolled.  Previously with hypokalemia from hydrochlorothiazide in metabolic acidosis from spironolactone so avoiding diuretics.  Continue below medication(s) without changes as prior and add metoprolol as below. Discussed ideal ranges/return  precautions.  - metoprolol SR (TOPROL XL) 25 MG TABLET SR 24 HR; Take 1 Tablet by mouth every day.  Dispense: 100 Tablet; Refill: 3    amLODIPine (NORVASC) 10 MG Tab, TAKE ONE TABLET BY MOUTH DAILY for Blood pressure, Disp: 100 Tablet, Rfl: 3    losartan (COZAAR) 100 MG Tab, Take 1 Tablet by mouth every day. FOR BLOOD PRESSURE, Disp: 100 Tablet, Rfl: 3    5. Pes cavus of both feet  6. Callus of foot  Chronic, discussed diabetic footcare.  Overdue for follow-up with podiatry-advised.    7. Hyperlipidemia associated with type 2 diabetes mellitus (HCC)  8. Atherosclerosis of aorta (HCC)  Chronic, controlled. Continue medication(s) as below.    rosuvastatin (CRESTOR) 10 MG Tab, TAKE 1 TABLET BY MOUTH EVERY DAY IN THE EVENING, Disp: 100 Tablet, Rfl: 3    aspirin 81 MG EC tablet, Take 81 mg by mouth every day at 6 PM., Disp: , Rfl:     Return in about 4 weeks (around 3/15/2024), or if symptoms worsen or fail to improve, for Blood pressure.    Please note that this dictation was created using voice recognition software. I have made every reasonable attempt to correct obvious errors, but I expect that there are errors of grammar and possibly content that I did not discover before finalizing the note.

## 2024-02-17 ASSESSMENT — ENCOUNTER SYMPTOMS
NAUSEA: 0
CHILLS: 0
ABDOMINAL PAIN: 0
SHORTNESS OF BREATH: 0
PALPITATIONS: 0
VOMITING: 0
TINGLING: 0
SENSORY CHANGE: 0
COUGH: 0
WEIGHT LOSS: 0
DIARRHEA: 0
FEVER: 0

## 2024-03-19 DIAGNOSIS — E78.5 HYPERLIPIDEMIA ASSOCIATED WITH TYPE 2 DIABETES MELLITUS (HCC): ICD-10-CM

## 2024-03-19 DIAGNOSIS — I15.2 HYPERTENSION ASSOCIATED WITH TYPE 2 DIABETES MELLITUS (HCC): ICD-10-CM

## 2024-03-19 DIAGNOSIS — E11.59 HYPERTENSION ASSOCIATED WITH TYPE 2 DIABETES MELLITUS (HCC): ICD-10-CM

## 2024-03-19 DIAGNOSIS — E03.9 HYPOTHYROIDISM, UNSPECIFIED TYPE: ICD-10-CM

## 2024-03-19 DIAGNOSIS — E11.69 HYPERLIPIDEMIA ASSOCIATED WITH TYPE 2 DIABETES MELLITUS (HCC): ICD-10-CM

## 2024-03-19 DIAGNOSIS — I10 ESSENTIAL HYPERTENSION: ICD-10-CM

## 2024-03-20 ENCOUNTER — HOSPITAL ENCOUNTER (OUTPATIENT)
Dept: LAB | Facility: MEDICAL CENTER | Age: 84
End: 2024-03-20
Attending: STUDENT IN AN ORGANIZED HEALTH CARE EDUCATION/TRAINING PROGRAM
Payer: MEDICARE

## 2024-03-20 ENCOUNTER — OFFICE VISIT (OUTPATIENT)
Dept: MEDICAL GROUP | Facility: LAB | Age: 84
End: 2024-03-20
Payer: MEDICARE

## 2024-03-20 VITALS
BODY MASS INDEX: 25.25 KG/M2 | WEIGHT: 147.93 LBS | TEMPERATURE: 97.5 F | OXYGEN SATURATION: 95 % | HEART RATE: 74 BPM | DIASTOLIC BLOOD PRESSURE: 80 MMHG | SYSTOLIC BLOOD PRESSURE: 144 MMHG | HEIGHT: 64 IN

## 2024-03-20 DIAGNOSIS — E03.9 HYPOTHYROIDISM, UNSPECIFIED TYPE: ICD-10-CM

## 2024-03-20 DIAGNOSIS — I49.5 SICK SINUS SYNDROME (HCC): ICD-10-CM

## 2024-03-20 DIAGNOSIS — E11.59 HYPERTENSION ASSOCIATED WITH TYPE 2 DIABETES MELLITUS (HCC): ICD-10-CM

## 2024-03-20 DIAGNOSIS — I15.2 HYPERTENSION ASSOCIATED WITH TYPE 2 DIABETES MELLITUS (HCC): ICD-10-CM

## 2024-03-20 DIAGNOSIS — E11.69 TYPE 2 DIABETES MELLITUS WITH OTHER SPECIFIED COMPLICATION, WITH LONG-TERM CURRENT USE OF INSULIN (HCC): ICD-10-CM

## 2024-03-20 DIAGNOSIS — Z79.4 TYPE 2 DIABETES MELLITUS WITH OTHER SPECIFIED COMPLICATION, WITH LONG-TERM CURRENT USE OF INSULIN (HCC): ICD-10-CM

## 2024-03-20 LAB — TSH SERPL DL<=0.005 MIU/L-ACNC: 2.53 UIU/ML (ref 0.38–5.33)

## 2024-03-20 PROCEDURE — 84443 ASSAY THYROID STIM HORMONE: CPT

## 2024-03-20 PROCEDURE — 3079F DIAST BP 80-89 MM HG: CPT | Performed by: STUDENT IN AN ORGANIZED HEALTH CARE EDUCATION/TRAINING PROGRAM

## 2024-03-20 PROCEDURE — 3077F SYST BP >= 140 MM HG: CPT | Performed by: STUDENT IN AN ORGANIZED HEALTH CARE EDUCATION/TRAINING PROGRAM

## 2024-03-20 PROCEDURE — 99214 OFFICE O/P EST MOD 30 MIN: CPT | Performed by: STUDENT IN AN ORGANIZED HEALTH CARE EDUCATION/TRAINING PROGRAM

## 2024-03-20 PROCEDURE — 36415 COLL VENOUS BLD VENIPUNCTURE: CPT

## 2024-03-20 RX ORDER — LEVOTHYROXINE SODIUM 112 UG/1
112 TABLET ORAL
Qty: 90 TABLET | Refills: 3 | Status: SHIPPED | OUTPATIENT
Start: 2024-03-20 | End: 2024-03-26

## 2024-03-20 RX ORDER — INSULIN GLARGINE-YFGN 100 [IU]/ML
20 INJECTION, SOLUTION SUBCUTANEOUS NIGHTLY
Qty: 9 ML | Refills: 3 | Status: SHIPPED | OUTPATIENT
Start: 2024-03-20

## 2024-03-20 ASSESSMENT — FIBROSIS 4 INDEX: FIB4 SCORE: 1.8

## 2024-03-20 NOTE — PATIENT INSTRUCTIONS
Increase metoprolol from 25mg to 50mg daily, ok to increase by 25mg weekly to 100mg per day if not at work.    If you want to come in to any [facility] clinic to have your blood pressure checked before you increase again that would be reasonable.    Ideal blood pressure <130/80 and at least <140/90.  If <100 for the top number we are over treating.  Severe range >180/120, with symptoms=ER  Bring in machine to cross check.    Long-acting insulin:   Adjust dose according to FASTING BLOOD GLUCOSE target  mg/dL  (1) Increase the insulin dose every 3-4 days as needed.   (2) Increase by 2 units if FBG average concentration is 131-170 mg/dL.   (3) Increase by 4 units if FBG average concentration is 171-210 mg/dL.   (4) Increase by 6 units if FBG average concentration is 221-260 mg/dL.   (5) Increase by 8 units if FBG average concentration is greater than 261mg/dL and call us.      Consider cutting back by 1-2 units to previous dose if glucose concentration is below 80 mg/dL or symptoms of hypoglycemia.

## 2024-03-20 NOTE — PROGRESS NOTES
"Subjective:     Chief Complaint   Patient presents with    Medication Refill     Levothyroxine   Semglee    Follow-Up     HPI:   Roya presents today for blood pressure follow-up and medication refill.    Patient continues with amlodipine and losartan.  She started metoprolol 25 mg XL daily.  Denies any adverse side effects.  Brings in her blood pressure log for my review.  Blood pressure this morning 165/79 (30min after BP medication).    Review of Systems   Constitutional:  Negative for chills, fever, malaise/fatigue and weight loss.   Respiratory:  Negative for cough and shortness of breath.    Cardiovascular:  Negative for chest pain, palpitations and leg swelling.   Gastrointestinal:  Negative for abdominal pain, diarrhea, nausea and vomiting.   Skin:  Negative for rash.   Neurological:  Negative for dizziness, tingling, sensory change and headaches.     Objective:     Exam:  BP (!) 144/80 (BP Location: Right arm, Patient Position: Sitting, BP Cuff Size: Adult)   Pulse 74   Temp 36.4 °C (97.5 °F) (Temporal)   Ht 1.626 m (5' 4\")   Wt 67.1 kg (147 lb 14.9 oz)   SpO2 95%   BMI 25.39 kg/m²  Body mass index is 25.39 kg/m².    Physical Exam  Vitals reviewed.   Constitutional:       General: She is not in acute distress.     Appearance: Normal appearance. She is not ill-appearing.   HENT:      Head: Normocephalic and atraumatic.      Nose: Nose normal.      Mouth/Throat:      Mouth: Mucous membranes are moist.   Eyes:      Conjunctiva/sclera: Conjunctivae normal.   Cardiovascular:      Rate and Rhythm: Normal rate and regular rhythm.      Heart sounds: Normal heart sounds. No murmur heard.  Pulmonary:      Effort: Pulmonary effort is normal. No respiratory distress.      Breath sounds: Normal breath sounds. No stridor. No wheezing, rhonchi or rales.   Musculoskeletal:      Cervical back: Normal range of motion and neck supple. No rigidity or tenderness.      Right lower leg: No edema.      Left lower leg: No " edema.   Neurological:      General: No focal deficit present.      Mental Status: She is alert and oriented to person, place, and time.   Psychiatric:         Mood and Affect: Mood normal.         Behavior: Behavior normal.         Thought Content: Thought content normal.       Labs: Reviewed from 2/13/2024    Assessment & Plan:     83 y.o. female with the following -     1. Hypertension associated with type 2 diabetes mellitus (HCC)  Chronic, uncontrolled.  Patient to increase metoprolol from 25 to 50 mg daily, continue Norvasc and losartan at prior dose.  Avoiding diuretics due to side effects prior to hydrochlorothiazide and spironolactone.  Discussed ideal ranges/return precautions.  If poorly controlled still after 1 week she can increase her metoprolol up to 75 mg daily and if still uncontrolled 1 week after can titrate on her own to 100 mg daily before follow-up.  She is uncertain about how accurate her machine is so advised her to have it crosscheck prior to making dose adjustments.    amLODIPine (NORVASC) 10 MG Tab, TAKE ONE TABLET BY MOUTH DAILY for Blood pressure, Disp: 100 Tablet, Rfl: 3    losartan (COZAAR) 100 MG Tab, Take 1 Tablet by mouth every day. FOR BLOOD PRESSURE, Disp: 100 Tablet, Rfl: 3    2. Hypothyroidism, unspecified type  Chronic, controlled. Continue medication(s) as below.  - TSH WITH REFLEX TO FT4; Future  - levothyroxine (SYNTHROID) 112 MCG Tab; Take 1 Tablet by mouth every morning on an empty stomach.  Dispense: 90 Tablet; Refill: 3    3. Type 2 diabetes mellitus with other specified complication, with long-term current use of insulin (HCC)  Chronic, controlled.  Patient desires to continue insulin rather than alternatives. Encouraged diabetic diet and exercise as tolerated.  - A1C in 6 months  - SEMGLEE, YFGN, 100 UNIT/ML Solution Pen-injector; Inject 20 Units under the skin every evening.  Dispense: 9 mL; Refill: 3    Return in about 4 weeks (around 4/17/2024), or if symptoms  worsen or fail to improve, for Blood pressure.    HCC Gap Form    Diagnosis: I49.5 - Sick sinus syndrome (HCC)  Assessment and plan: Chronic, stable, as based on today's assessment and impact on other conditions evaluated today. Continue with current treatment plan: s/p pacemaker, had scheduled pacemaker check 5/20. Follow-up with specialist as directed, but at least Q3-6m.  Last edited 03/22/24 07:04 PDT by Sho Rodriguez D.O.       Please note that this dictation was created using voice recognition software. I have made every reasonable attempt to correct obvious errors, but I expect that there are errors of grammar and possibly content that I did not discover before finalizing the note.

## 2024-03-22 ASSESSMENT — ENCOUNTER SYMPTOMS
CHILLS: 0
HEADACHES: 0
DIARRHEA: 0
TINGLING: 0
FEVER: 0
DIZZINESS: 0
PALPITATIONS: 0
SENSORY CHANGE: 0
WEIGHT LOSS: 0
SHORTNESS OF BREATH: 0
ABDOMINAL PAIN: 0
COUGH: 0
NAUSEA: 0
VOMITING: 0

## 2024-03-26 ENCOUNTER — TELEPHONE (OUTPATIENT)
Dept: MEDICAL GROUP | Facility: LAB | Age: 84
End: 2024-03-26
Payer: MEDICARE

## 2024-03-26 DIAGNOSIS — E11.9 TYPE 2 DIABETES MELLITUS WITHOUT COMPLICATION, WITH LONG-TERM CURRENT USE OF INSULIN (HCC): ICD-10-CM

## 2024-03-26 DIAGNOSIS — Z79.4 TYPE 2 DIABETES MELLITUS WITHOUT COMPLICATION, WITH LONG-TERM CURRENT USE OF INSULIN (HCC): ICD-10-CM

## 2024-03-26 RX ORDER — AMLODIPINE BESYLATE 10 MG/1
TABLET ORAL
Qty: 100 TABLET | Refills: 3 | Status: SHIPPED | OUTPATIENT
Start: 2024-03-26

## 2024-03-26 RX ORDER — ROSUVASTATIN CALCIUM 10 MG/1
TABLET, COATED ORAL
Qty: 100 TABLET | Refills: 3 | Status: SHIPPED | OUTPATIENT
Start: 2024-03-26

## 2024-03-26 RX ORDER — LEVOTHYROXINE SODIUM 112 UG/1
TABLET ORAL
Qty: 90 TABLET | Refills: 3 | Status: SHIPPED | OUTPATIENT
Start: 2024-03-26

## 2024-03-26 RX ORDER — LOSARTAN POTASSIUM 100 MG/1
100 TABLET ORAL
Qty: 100 TABLET | Refills: 3 | Status: SHIPPED | OUTPATIENT
Start: 2024-03-26

## 2024-03-26 RX ORDER — PEN NEEDLE, DIABETIC 32GX 5/32"
NEEDLE, DISPOSABLE MISCELLANEOUS
Qty: 200 EACH | Refills: 3 | Status: SHIPPED | OUTPATIENT
Start: 2024-03-26

## 2024-03-26 NOTE — TELEPHONE ENCOUNTER
Phone Number Called: [phone] (home)      Call outcome: Spoke to patient regarding message below.    Message

## 2024-03-26 NOTE — TELEPHONE ENCOUNTER
Received request via: Pharmacy    Was the patient seen in the last year in this department? Yes    Does the patient have an active prescription (recently filled or refills available) for medication(s) requested? No    Pharmacy Name: [facility]    Does the patient have assisted Plus and need 100 day supply (blood pressure, diabetes and cholesterol meds only)? Yes, quantity updated to 100 days

## 2024-04-23 DIAGNOSIS — Z79.4 TYPE 2 DIABETES MELLITUS WITH OTHER SPECIFIED COMPLICATION, WITH LONG-TERM CURRENT USE OF INSULIN (HCC): ICD-10-CM

## 2024-04-23 DIAGNOSIS — E11.69 TYPE 2 DIABETES MELLITUS WITH OTHER SPECIFIED COMPLICATION, WITH LONG-TERM CURRENT USE OF INSULIN (HCC): ICD-10-CM

## 2024-04-24 RX ORDER — INSULIN GLARGINE-YFGN 100 [IU]/ML
20 INJECTION, SOLUTION SUBCUTANEOUS NIGHTLY
Qty: 15 ML | Refills: 3 | Status: SHIPPED | OUTPATIENT
Start: 2024-04-24

## 2024-05-03 ENCOUNTER — PATIENT MESSAGE (OUTPATIENT)
Dept: MEDICAL GROUP | Facility: LAB | Age: 84
End: 2024-05-03
Payer: MEDICARE

## 2024-05-03 DIAGNOSIS — E11.69 TYPE 2 DIABETES MELLITUS WITH OTHER SPECIFIED COMPLICATION, WITH LONG-TERM CURRENT USE OF INSULIN (HCC): ICD-10-CM

## 2024-05-03 DIAGNOSIS — Z79.4 TYPE 2 DIABETES MELLITUS WITH OTHER SPECIFIED COMPLICATION, WITH LONG-TERM CURRENT USE OF INSULIN (HCC): ICD-10-CM

## 2024-05-08 ENCOUNTER — TELEPHONE (OUTPATIENT)
Dept: MEDICAL GROUP | Facility: LAB | Age: 84
End: 2024-05-08

## 2024-05-08 ENCOUNTER — OFFICE VISIT (OUTPATIENT)
Dept: MEDICAL GROUP | Facility: LAB | Age: 84
End: 2024-05-08
Payer: MEDICARE

## 2024-05-08 VITALS
OXYGEN SATURATION: 98 % | DIASTOLIC BLOOD PRESSURE: 80 MMHG | BODY MASS INDEX: 24.75 KG/M2 | HEART RATE: 81 BPM | HEIGHT: 64 IN | SYSTOLIC BLOOD PRESSURE: 150 MMHG | RESPIRATION RATE: 20 BRPM | TEMPERATURE: 97.6 F | WEIGHT: 145 LBS

## 2024-05-08 DIAGNOSIS — E11.69 HYPERLIPIDEMIA ASSOCIATED WITH TYPE 2 DIABETES MELLITUS (HCC): ICD-10-CM

## 2024-05-08 DIAGNOSIS — Z79.4 TYPE 2 DIABETES MELLITUS WITH OTHER SPECIFIED COMPLICATION, WITH LONG-TERM CURRENT USE OF INSULIN (HCC): ICD-10-CM

## 2024-05-08 DIAGNOSIS — I15.2 HYPERTENSION ASSOCIATED WITH TYPE 2 DIABETES MELLITUS (HCC): ICD-10-CM

## 2024-05-08 DIAGNOSIS — I70.0 ATHEROSCLEROSIS OF AORTA (HCC): ICD-10-CM

## 2024-05-08 DIAGNOSIS — Z95.0 CARDIAC PACEMAKER IN SITU: ICD-10-CM

## 2024-05-08 DIAGNOSIS — R80.9 MICROALBUMINURIA: ICD-10-CM

## 2024-05-08 DIAGNOSIS — E78.5 HYPERLIPIDEMIA ASSOCIATED WITH TYPE 2 DIABETES MELLITUS (HCC): ICD-10-CM

## 2024-05-08 DIAGNOSIS — E11.59 HYPERTENSION ASSOCIATED WITH TYPE 2 DIABETES MELLITUS (HCC): ICD-10-CM

## 2024-05-08 DIAGNOSIS — I27.20 PULMONARY HYPERTENSION (HCC): ICD-10-CM

## 2024-05-08 DIAGNOSIS — I49.5 SICK SINUS SYNDROME (HCC): ICD-10-CM

## 2024-05-08 DIAGNOSIS — E11.69 TYPE 2 DIABETES MELLITUS WITH OTHER SPECIFIED COMPLICATION, WITH LONG-TERM CURRENT USE OF INSULIN (HCC): ICD-10-CM

## 2024-05-08 PROBLEM — I35.8 AORTIC VALVE SCLEROSIS: Status: ACTIVE | Noted: 2024-05-08

## 2024-05-08 PROCEDURE — 99214 OFFICE O/P EST MOD 30 MIN: CPT | Performed by: STUDENT IN AN ORGANIZED HEALTH CARE EDUCATION/TRAINING PROGRAM

## 2024-05-08 PROCEDURE — 3077F SYST BP >= 140 MM HG: CPT | Performed by: STUDENT IN AN ORGANIZED HEALTH CARE EDUCATION/TRAINING PROGRAM

## 2024-05-08 PROCEDURE — 3079F DIAST BP 80-89 MM HG: CPT | Performed by: STUDENT IN AN ORGANIZED HEALTH CARE EDUCATION/TRAINING PROGRAM

## 2024-05-08 RX ORDER — METOPROLOL SUCCINATE 100 MG/1
100 TABLET, EXTENDED RELEASE ORAL DAILY
Qty: 100 TABLET | Refills: 3 | Status: SHIPPED | OUTPATIENT
Start: 2024-05-08 | End: 2024-05-22

## 2024-05-08 ASSESSMENT — FIBROSIS 4 INDEX: FIB4 SCORE: 1.8

## 2024-05-08 NOTE — PATIENT INSTRUCTIONS
Ideal glucose ranges: fasting , random (1-2 hours after eating) <180. If glucose <70, we need to adjust your medication to prevent continuing low blood sugar.    If you would like to continue with Toprol 50 mg daily and see how you do on 1 to 2 weeks of Jardiance if it improves your blood pressure rather than increasing that would be fine, if not well-controlled then can go up to 100 mg daily metoprolol.  Continue your other medications along with this.    Ideal blood pressure <130/80 and at least <140/90.  If <100 for the top number we are over treating.  Severe range >180/120, with symptoms=ER    STOP INSULIN for now-let me know if you are having any issues getting the Jardiance  Long-acting insulin:   Adjust dose according to FASTING BLOOD GLUCOSE target  mg/dL  (1) Increase the insulin dose every 3-4 days as needed.   (2) Increase by 2 units if FBG average concentration is 131-170 mg/dL.   (3) Increase by 4 units if FBG average concentration is 171-210 mg/dL.   (4) Increase by 6 units if FBG average concentration is 221-260 mg/dL.   (5) Increase by 8 units if FBG average concentration is greater than 261mg/dL and call us.      Consider cutting back by 1-2 units to previous dose if glucose concentration is below 80 mg/dL or symptoms of hypoglycemia.

## 2024-05-08 NOTE — TELEPHONE ENCOUNTER
Patient  made a 6 weeks follow up appt. But the earliest available in provider's schedule was 7/10  in provider's schedule which is beyond that time frame.    If a sooner appt. Needs to be made, please contact patient to reschedule.    Thank you

## 2024-05-08 NOTE — PROGRESS NOTES
"Subjective:     Chief Complaint   Patient presents with    Hypertension Follow-up     HPI:   Roya is a 83 y.o. female who presents today with    Hypertension associated with type 2 diabetes mellitus (HCC)  Chronic. Patient continues with amlodipine 10mg daily and losartan 100mg daily.  Last visit we increased metoprolol from 25 mg XL daily to 50mg daily. Takes all in the AM    Brings in her blood pressure log for my review (most all PM).  Blood pressure this morning 150/86 (1hr after taking medication).    Type 2 diabetes mellitus with other specified complication (HCC)  This is a chronic condition.  Upon discharge from the hospital in 2021 she was sent home with Lantus which she has been continuing with at 20 units nightly.  States that it has been difficult for her to get her medication and now wondering if she should switch to something as an alternative.  States that she has not had any hypoglycemia below 70 on that regimen.  Since being off her fasting glucose has increased slightly but not significantly.    Review of Systems   Constitutional:  Negative for chills, fever, malaise/fatigue and weight loss.   Respiratory:  Negative for cough and shortness of breath.    Cardiovascular:  Negative for chest pain, palpitations and leg swelling.   Gastrointestinal:  Negative for abdominal pain, diarrhea, nausea and vomiting.   Skin:  Negative for rash.   Neurological:  Negative for dizziness.     Objective:     Exam:  BP (!) 150/80 (BP Location: Left arm, Patient Position: Sitting, BP Cuff Size: Adult)   Pulse 81   Temp 36.4 °C (97.6 °F) (Temporal)   Resp 20   Ht 1.626 m (5' 4\")   Wt 65.8 kg (145 lb)   SpO2 98%   BMI 24.89 kg/m²  Body mass index is 24.89 kg/m².    Physical Exam  Vitals reviewed.   Constitutional:       General: She is not in acute distress.     Appearance: Normal appearance. She is not ill-appearing.   HENT:      Head: Normocephalic and atraumatic.      Nose: Nose normal.      Mouth/Throat:     "  Mouth: Mucous membranes are moist.   Eyes:      Conjunctiva/sclera: Conjunctivae normal.   Cardiovascular:      Rate and Rhythm: Normal rate and regular rhythm.      Heart sounds: Normal heart sounds. No murmur heard.  Pulmonary:      Effort: Pulmonary effort is normal. No respiratory distress.      Breath sounds: Normal breath sounds. No stridor. No wheezing, rhonchi or rales.   Musculoskeletal:      Cervical back: Normal range of motion and neck supple. No rigidity or tenderness.      Right lower leg: No edema.      Left lower leg: No edema.   Neurological:      General: No focal deficit present.      Mental Status: She is alert and oriented to person, place, and time.   Psychiatric:         Mood and Affect: Mood normal.         Behavior: Behavior normal.         Thought Content: Thought content normal.       Labs: Reviewed from 2/13/2024  Imaging: Reviewed from echocardiogram 2014    Assessment & Plan:     83 y.o. female with the following -     1. Hypertension associated with type 2 diabetes mellitus (HCC)  Chronic, uncontrolled.  Will be starting Jardiance as below which may be helpful, if still uncontrolled at home patient to increase metoprolol from 50 mg to 100 mg daily, continue Norvasc and losartan at prior dose.  Avoiding diuretics due to side effects prior to hydrochlorothiazide and spironolactone.  Discussed ideal ranges/return precautions.   - metoprolol SR (TOPROL XL) 100 MG TABLET SR 24 HR; Take 1 Tablet by mouth every day.  Dispense: 100 Tablet; Refill: 3  - REFERRAL TO CARDIOLOGY  - EC-ECHOCARDIOGRAM COMPLETE W/O CONT; Future    2. Microalbuminuria  3. Type 2 diabetes mellitus with other specified complication, with long-term current use of insulin (HCC)  Chronic, controlled diabetes with 20 units of insulin nightly.  Patient has not been on any other medication in the past and given mild microalbuminuria as well as atherosclerosis would benefit more from Jardiance below.  Will stop insulin and  start Jardiance.  Discussed ideal glucose ranges and return precautions, consider monitoring A1c sooner given this change.  Blood pressure as above, continue losartan  - Empagliflozin (JARDIANCE) 10 MG Tab tablet; Take 1 Tablet by mouth every day.  Dispense: 100 Tablet; Refill: 3    4. Atherosclerosis of aorta (HCC)  5. Hyperlipidemia associated with type 2 diabetes mellitus (HCC)  Chronic, controlled with rosuvastatin 10 mg daily.  May have additional benefit from Jardiance starting for diabetes and microalbuminuria as above.  - Empagliflozin (JARDIANCE) 10 MG Tab tablet; Take 1 Tablet by mouth every day.  Dispense: 100 Tablet; Refill: 3  - REFERRAL TO CARDIOLOGY    6. Pulmonary hypertension (HCC)  Remote history, asymptomatic.  Will update echocardiogram which was recommended by cardiology prior and refer back to reestablish since she has been lost to follow-up.  - REFERRAL TO CARDIOLOGY  - EC-ECHOCARDIOGRAM COMPLETE W/O CONT; Future    7. Sick sinus syndrome (HCC)  8. Cardiac pacemaker in situ  Chronic, patient has an upcoming pacemaker check but has been lost to follow-up to cardiology.  Referred to reestablish.  - REFERRAL TO CARDIOLOGY    I spent a total of 30 minutes with record review, exam, communication with the patient, and documentation of this encounter.    Return in about 6 weeks (around 6/19/2024), or if symptoms worsen or fail to improve, for Blood pressure, Diabetes.    Please note that this dictation was created using voice recognition software. I have made every reasonable attempt to correct obvious errors, but I expect that there are errors of grammar and possibly content that I did not discover before finalizing the note.

## 2024-05-08 NOTE — ASSESSMENT & PLAN NOTE
Chronic. Patient continues with amlodipine 10mg daily and losartan 100mg daily.  Last visit we increased metoprolol from 25 mg XL daily to 50mg daily. Takes all in the AM    Brings in her blood pressure log for my review (most all PM).  Blood pressure this morning 150/86 (1hr after taking medication).

## 2024-05-09 ASSESSMENT — ENCOUNTER SYMPTOMS
CHILLS: 0
COUGH: 0
DIZZINESS: 0
NAUSEA: 0
DIARRHEA: 0
WEIGHT LOSS: 0
FEVER: 0
SHORTNESS OF BREATH: 0
VOMITING: 0
PALPITATIONS: 0
ABDOMINAL PAIN: 0

## 2024-05-09 NOTE — ASSESSMENT & PLAN NOTE
This is a chronic condition.  Upon discharge from the hospital in 2021 she was sent home with Lantus which she has been continuing with at 20 units nightly.  States that it has been difficult for her to get her medication and now wondering if she should switch to something as an alternative.  States that she has not had any hypoglycemia below 70 on that regimen.  Since being off her fasting glucose has increased slightly but not significantly.

## 2024-05-18 NOTE — ASSESSMENT & PLAN NOTE
Refused medication due to burning sensation by IV and cause burning in her throat  Recheck in AM  Agreed on taking if still low   Unable to Assess

## 2024-05-20 ENCOUNTER — NON-PROVIDER VISIT (OUTPATIENT)
Dept: CARDIOLOGY | Facility: MEDICAL CENTER | Age: 84
End: 2024-05-20

## 2024-05-20 ENCOUNTER — NON-PROVIDER VISIT (OUTPATIENT)
Dept: CARDIOLOGY | Facility: MEDICAL CENTER | Age: 84
End: 2024-05-20
Attending: STUDENT IN AN ORGANIZED HEALTH CARE EDUCATION/TRAINING PROGRAM
Payer: MEDICARE

## 2024-05-20 NOTE — PROGRESS NOTES
Normal device function and no changes made. See flowsheet.   VTE Assessment already completed for this visit

## 2024-05-22 ENCOUNTER — OFFICE VISIT (OUTPATIENT)
Dept: CARDIOLOGY | Facility: MEDICAL CENTER | Age: 84
End: 2024-05-22
Attending: STUDENT IN AN ORGANIZED HEALTH CARE EDUCATION/TRAINING PROGRAM
Payer: MEDICARE

## 2024-05-22 VITALS
DIASTOLIC BLOOD PRESSURE: 62 MMHG | OXYGEN SATURATION: 94 % | HEART RATE: 66 BPM | HEIGHT: 64 IN | SYSTOLIC BLOOD PRESSURE: 133 MMHG | BODY MASS INDEX: 24.59 KG/M2 | RESPIRATION RATE: 16 BRPM | WEIGHT: 144 LBS

## 2024-05-22 DIAGNOSIS — I15.2 HYPERTENSION ASSOCIATED WITH TYPE 2 DIABETES MELLITUS (HCC): ICD-10-CM

## 2024-05-22 DIAGNOSIS — E78.5 DYSLIPIDEMIA: ICD-10-CM

## 2024-05-22 DIAGNOSIS — Z95.0 CARDIAC PACEMAKER IN SITU: ICD-10-CM

## 2024-05-22 DIAGNOSIS — I49.5 SICK SINUS SYNDROME (HCC): ICD-10-CM

## 2024-05-22 DIAGNOSIS — E11.59 HYPERTENSION ASSOCIATED WITH TYPE 2 DIABETES MELLITUS (HCC): ICD-10-CM

## 2024-05-22 DIAGNOSIS — I70.0 ATHEROSCLEROSIS OF AORTA (HCC): ICD-10-CM

## 2024-05-22 RX ORDER — INSULIN GLARGINE-YFGN 100 [IU]/ML
INJECTION, SOLUTION SUBCUTANEOUS
COMMUNITY
Start: 2024-05-09 | End: 2024-05-22

## 2024-05-22 RX ORDER — METOPROLOL SUCCINATE 25 MG/1
50 TABLET, EXTENDED RELEASE ORAL DAILY
COMMUNITY

## 2024-05-22 ASSESSMENT — ENCOUNTER SYMPTOMS
PALPITATIONS: 0
SHORTNESS OF BREATH: 0
HEADACHES: 0
DYSPNEA ON EXERTION: 0
NEAR-SYNCOPE: 0
SYNCOPE: 0
LIGHT-HEADEDNESS: 0
PND: 0
DIZZINESS: 0
ORTHOPNEA: 0

## 2024-05-22 ASSESSMENT — FIBROSIS 4 INDEX: FIB4 SCORE: 1.8

## 2024-05-22 NOTE — PROGRESS NOTES
Chief Complaint   Patient presents with    Hypertension     Follow up           Subjective:   Roya Muniz is a 83 y.o. female who presents today for follow-up of her cardiac care.     Patient of Dr. Roberson.  Patient was last seen in clinic 11/11/2021 and was re-referred by her PCP due to being lost to follow up. She has a past medical history of HTN, dyslipidemia, SSS post [employer] permanent pacemaker 2014 by Dr. De Santiago, DVT, and diabetes. She is followed in our device clinic.    Today's visit:  Patient doing overall well from a cardiac perspective. She denies chest pain, shortness of breath, PND, orthopnea, dizziness, lightheadedness or swelling in her legs or feet. She started taking Jardiance for her diabetes and since her blood pressures at home have gone down and ranging 127-134 systolic. She has not increased her dose of metoprolol to 100 mg due to seeing better blood pressure control.     She walks for an hour daily without limitation as well as her gardening and uses a stationary bike.         Cardiovascular Risk Factors:  1. Smoking status: Never  2. Type II Diabetes Mellitus: Yes   Lab Results   Component Value Date/Time    HBA1C 5.9 (H) 02/13/2024 10:19 AM    HBA1C 5.6 07/20/2023 10:57 AM     3. Hypertension: Yes  4. Dyslipidemia: Yes   Cholesterol,Tot   Date Value Ref Range Status   02/13/2024 140 100 - 199 mg/dL Final     LDL   Date Value Ref Range Status   02/13/2024 55 <100 mg/dL Final     HDL   Date Value Ref Range Status   02/13/2024 67 >=40 mg/dL Final     Triglycerides   Date Value Ref Range Status   02/13/2024 91 0 - 149 mg/dL Final     5. Family history of early Coronary Artery Disease in a first degree relative (Male less than 55 years of age; Female less than 65 years of age): No  6.  Obesity and/or Metabolic Syndrome: No  7. Sedentary lifestyle: No    Past Medical History:   Diagnosis Date    Cardiac pacemaker in situ     Diabetes (HCC)     History of DVT of lower extremity  6/5/2014    Hyperlipidemia     Hypertension     Hypokalemia 1/12/2014    Metabolic acidosis 9/13/2023    Neck mass 3/12/2021    Pancreatic cyst     Pancreatitis     Sinus node dysfunction (HCC)     Skin lesion 3/12/2021    Thyroid disease     hypothyroidism    Vision problem 3/12/2021         Family History   Problem Relation Age of Onset    Heart Disease Mother         enlarged heart, heart failure    Cancer Father         liver    No Known Problems Sister     Heart Disease Brother         heart failure    Lung Disease Brother         heavy smoker    Alcohol/Drug Brother         etoh    Cancer Paternal Aunt         breast cancer     Cancer Maternal Grandmother         liver     Heart Disease Maternal Grandfather         CHF     No Known Problems Paternal Grandmother     No Known Problems Paternal Grandfather     No Known Problems Sister     No Known Problems Sister     Stroke Sister     No Known Problems Son     No Known Problems Son     No Known Problems Son          Social History     Tobacco Use    Smoking status: Never    Smokeless tobacco: Never    Tobacco comments:     avoid all tobacco products   Vaping Use    Vaping status: Never Used   Substance Use Topics    Alcohol use: Not Currently     Alcohol/week: 0.0 oz     Comment: occasionally    Drug use: No         Allergies   Allergen Reactions    Cephalexin [Keflex] Hives, Rash, Itching and Unspecified     Photosensitivity           Current Outpatient Medications   Medication Sig    metoprolol SR (TOPROL XL) 25 MG TABLET SR 24 HR Take 50 mg by mouth every day.    Empagliflozin (JARDIANCE) 10 MG Tab tablet Take 1 Tablet by mouth every day.    levothyroxine (SYNTHROID) 112 MCG Tab TAKE 1 TABLET BY MOUTH EVERY DAY IN THE MORNING ON AN EMPTY STOMACH    amLODIPine (NORVASC) 10 MG Tab TAKE 1 TABLET BY MOUTH EVERY DAY FOR BLOOD PRESSURE    rosuvastatin (CRESTOR) 10 MG Tab TAKE 1 TABLET BY MOUTH EVERY DAY IN THE EVENING    losartan (COZAAR) 100 MG Tab TAKE 1 TABLET BY  "MOUTH EVERY DAY FOR BLOOD PRESSURE    aspirin 81 MG EC tablet Take 81 mg by mouth every day at 6 PM.    Acetaminophen (TYLENOL 8 HOUR PO) Take 500 mg by mouth 1 time a day as needed (HA or body aches).    Vitamin D, Cholecalciferol, (CHOLECALCIFEROL) 25 MCG (1000 UT) Tab Take 4,000 Units by mouth every day at 6 PM.    Insulin Pen Needle 32 G x 4 mm (BD PEN NEEDLE ANTONI 2ND GEN) USE ONE PEN NEEDLE IN PEN DEVICE TO INJECT INSULIN    glucose blood (FREESTYLE LITE) strip TEST 3 TIMES A DAY or as needed for high/low blood sugar    Lancets Use one lancet to test blood sugar 3 times/day    Alcohol Swabs Wipe site with prep pad prior to injection.    Blood Glucose Monitoring Suppl (FREESTYLE LITE) Device Test blood sugar as directed.         Review of Systems   Constitutional: Negative for malaise/fatigue.   Cardiovascular:  Negative for chest pain, dyspnea on exertion, leg swelling, near-syncope, orthopnea, palpitations, paroxysmal nocturnal dyspnea and syncope.   Respiratory:  Negative for shortness of breath.    Neurological:  Negative for dizziness, headaches and light-headedness.           Objective:   /62 (BP Location: Left arm, Patient Position: Sitting)   Pulse 66   Resp 16   Ht 1.626 m (5' 4\")   Wt 65.3 kg (144 lb)   SpO2 94%  Body mass index is 24.72 kg/m².         Physical Exam  Constitutional:       General: She is not in acute distress.  HENT:      Head: Normocephalic and atraumatic.   Cardiovascular:      Rate and Rhythm: Normal rate and regular rhythm.      Pulses: Normal pulses.      Heart sounds: Murmur heard.   Pulmonary:      Effort: Pulmonary effort is normal. No respiratory distress.      Breath sounds: Normal breath sounds.   Musculoskeletal:      Right lower leg: No edema.      Left lower leg: No edema.   Neurological:      Mental Status: She is alert.      Gait: Gait normal.             Lab Results   Component Value Date/Time    SODIUM 139 02/13/2024 10:19 AM    POTASSIUM 3.9 02/13/2024 " "10:19 AM    CHLORIDE 108 02/13/2024 10:19 AM    CO2 20 02/13/2024 10:19 AM    GLUCOSE 86 02/13/2024 10:19 AM    BUN 21 02/13/2024 10:19 AM    CREATININE 0.89 02/13/2024 10:19 AM    CREATININE 1.59 (H) 02/10/2011 08:40 AM    BUNCREATRAT 21 02/10/2011 08:40 AM    GLOMRATE 32 (L) 02/10/2011 08:40 AM      Lab Results   Component Value Date/Time    WBC 8.8 02/13/2024 10:19 AM    RBC 4.97 02/13/2024 10:19 AM    HEMOGLOBIN 15.4 02/13/2024 10:19 AM    HEMATOCRIT 45.5 02/13/2024 10:19 AM    MCV 91.5 02/13/2024 10:19 AM    MCH 31.0 02/13/2024 10:19 AM    MCHC 33.8 02/13/2024 10:19 AM    MPV 10.4 02/13/2024 10:19 AM    NEUTSPOLYS 75.30 (H) 02/13/2024 10:19 AM    LYMPHOCYTES 15.40 (L) 02/13/2024 10:19 AM    MONOCYTES 7.80 02/13/2024 10:19 AM    EOSINOPHILS 1.10 02/13/2024 10:19 AM    BASOPHILS 0.20 02/13/2024 10:19 AM    HYPOCHROMIA 1+ 02/21/2014 10:15 AM    ANISOCYTOSIS 3+ 02/28/2014 01:59 AM      Lab Results   Component Value Date/Time    CHOLSTRLTOT 140 02/13/2024 10:19 AM    LDL 55 02/13/2024 10:19 AM    HDL 67 02/13/2024 10:19 AM    TRIGLYCERIDE 91 02/13/2024 10:19 AM       Lab Results   Component Value Date/Time    TROPONINT 14 04/10/2021 0355     No results found for: \"NTPROBNP\"  Assessment:   1. Hypertension associated with type 2 diabetes mellitus (HCC)    2. Atherosclerosis of aorta (HCC)    3. Cardiac pacemaker in situ    4. Sick sinus syndrome (HCC)    5. Dyslipidemia    Other orders  - metoprolol SR (TOPROL XL) 25 MG TABLET SR 24 HR; Take 50 mg by mouth every day.        Medical Decision Making:  Today's Assessment / Plan:   Hypertension  - Poor control. Patient blood pressure being managed currently by PCP.   - continue Metoprolol 50 mg daily  -continue losartan 100 mg daily  -Continue amlodipine 10 mg daily  - goal < 130/80  -Patient to continue to check blood pressure at home and keep a log  -Consider changing losartan to olmesartan if still having poor control  -Repeat ECHO ordered by PCP Sho Rodriguez. Results " will go to her and reach out if any concerns or questions.    Atherosclerosis of aorta  Dyslipidemia  -Most recent LDL 55  -Continue rosuvastatin 10 mg daily  -Goal of less than 100  -Check lipid panel in 6 months  -Annual labs through PCP    Cardiac Murmur  -Previous echo in 2014 showed aortic sclerosis and moderate tricuspid regurgitation. Repeat ECHO ordered by PCP    SSS  Cardiac Pacemaker in situ  -Continue follow up in device clinic    Return in about 1 year (around 5/22/2025).  Sooner if problems.    ISABELLE Moran.

## 2024-05-29 ENCOUNTER — HOSPITAL ENCOUNTER (OUTPATIENT)
Dept: RADIOLOGY | Facility: MEDICAL CENTER | Age: 84
End: 2024-05-29
Attending: STUDENT IN AN ORGANIZED HEALTH CARE EDUCATION/TRAINING PROGRAM
Payer: MEDICARE

## 2024-05-29 DIAGNOSIS — Z12.31 VISIT FOR SCREENING MAMMOGRAM: ICD-10-CM

## 2024-06-12 ENCOUNTER — HOSPITAL ENCOUNTER (OUTPATIENT)
Dept: CARDIOLOGY | Facility: MEDICAL CENTER | Age: 84
End: 2024-06-12
Attending: STUDENT IN AN ORGANIZED HEALTH CARE EDUCATION/TRAINING PROGRAM
Payer: MEDICARE

## 2024-06-12 DIAGNOSIS — I27.20 PULMONARY HYPERTENSION (HCC): ICD-10-CM

## 2024-06-12 DIAGNOSIS — E11.59 HYPERTENSION ASSOCIATED WITH TYPE 2 DIABETES MELLITUS (HCC): ICD-10-CM

## 2024-06-12 DIAGNOSIS — I15.2 HYPERTENSION ASSOCIATED WITH TYPE 2 DIABETES MELLITUS (HCC): ICD-10-CM

## 2024-06-12 LAB
LV EJECT FRACT  99904: 70
LV EJECT FRACT MOD 2C 99903: 77.8
LV EJECT FRACT MOD 4C 99902: 68.06
LV EJECT FRACT MOD BP 99901: 71.94

## 2024-06-12 PROCEDURE — 93306 TTE W/DOPPLER COMPLETE: CPT | Mod: 26 | Performed by: INTERNAL MEDICINE

## 2024-06-12 PROCEDURE — 93306 TTE W/DOPPLER COMPLETE: CPT

## 2024-07-18 ENCOUNTER — TELEPHONE (OUTPATIENT)
Dept: HEALTH INFORMATION MANAGEMENT | Facility: OTHER | Age: 84
End: 2024-07-18

## 2024-08-07 ENCOUNTER — APPOINTMENT (OUTPATIENT)
Dept: MEDICAL GROUP | Facility: LAB | Age: 84
End: 2024-08-07
Payer: MEDICARE

## 2024-08-19 ENCOUNTER — NON-PROVIDER VISIT (OUTPATIENT)
Dept: CARDIOLOGY | Facility: MEDICAL CENTER | Age: 84
End: 2024-08-19
Attending: NURSE PRACTITIONER
Payer: MEDICARE

## 2024-08-19 ENCOUNTER — TELEPHONE (OUTPATIENT)
Dept: CARDIOLOGY | Facility: MEDICAL CENTER | Age: 84
End: 2024-08-19

## 2024-08-19 DIAGNOSIS — Z95.0 CARDIAC PACEMAKER IN SITU: ICD-10-CM

## 2024-08-19 PROCEDURE — 93280 PM DEVICE PROGR EVAL DUAL: CPT | Mod: 26 | Performed by: NURSE PRACTITIONER

## 2024-08-19 PROCEDURE — 93280 PM DEVICE PROGR EVAL DUAL: CPT | Performed by: NURSE PRACTITIONER

## 2024-08-19 NOTE — PROGRESS NOTES
8/19/2024  [id number]  Roya Muniz    Patient here for annual in office device check    Device Type: Medtronic Dual Chamber Pacemaker   AP%:  80.6  %: 2.7  Battery Longevity: <5months  Lead Impedance: stable and within normal limits  Indication: sick sinus  Events: none    Device interrogated and programmed by Sean Kruse     Orders placed for generator change. Risks reviewed with patient to include but not be limited to: infection, bleeding, stroke, death.

## 2024-08-19 NOTE — TELEPHONE ENCOUNTER
Patient scheduled for PM gen change on 10-15-24 with Dr. De Santiago. Patient has been instructed to check in at 11:00 for 1:00 case time. Hold Jardiance am of. Message sent to qi Kothari with [employer] notified.

## 2024-08-19 NOTE — TELEPHONE ENCOUNTER
----- Message from Nurse Practitioner NICHOLAS Alberto sent at 8/19/2024 11:06 AM PDT -----  Regarding: gen change  Orders placed for gen change on pacemaker battery less than 5 months

## 2024-09-18 ENCOUNTER — APPOINTMENT (OUTPATIENT)
Dept: ADMISSIONS | Facility: MEDICAL CENTER | Age: 84
End: 2024-09-18
Attending: NURSE PRACTITIONER
Payer: MEDICARE

## 2024-09-19 ENCOUNTER — PRE-ADMISSION TESTING (OUTPATIENT)
Dept: ADMISSIONS | Facility: MEDICAL CENTER | Age: 84
End: 2024-09-19
Attending: NURSE PRACTITIONER
Payer: MEDICARE

## 2024-09-19 RX ORDER — VITAMIN B COMPLEX
2000 TABLET ORAL DAILY
COMMUNITY

## 2024-09-19 RX ORDER — ANTIOX #8/OM3/DHA/EPA/LUT/ZEAX 250-2.5 MG
1 CAPSULE ORAL 2 TIMES DAILY
COMMUNITY

## 2024-10-10 ENCOUNTER — APPOINTMENT (OUTPATIENT)
Dept: ADMISSIONS | Facility: MEDICAL CENTER | Age: 84
End: 2024-10-10
Attending: NURSE PRACTITIONER
Payer: MEDICARE

## 2024-10-10 DIAGNOSIS — Z01.812 PRE-OPERATIVE LABORATORY EXAMINATION: ICD-10-CM

## 2024-10-10 DIAGNOSIS — Z01.810 PRE-OPERATIVE CARDIOVASCULAR EXAMINATION: ICD-10-CM

## 2024-10-11 ENCOUNTER — PRE-ADMISSION TESTING (OUTPATIENT)
Dept: ADMISSIONS | Facility: MEDICAL CENTER | Age: 84
End: 2024-10-11
Attending: NURSE PRACTITIONER
Payer: MEDICARE

## 2024-10-11 DIAGNOSIS — Z01.810 PRE-OPERATIVE CARDIOVASCULAR EXAMINATION: ICD-10-CM

## 2024-10-11 DIAGNOSIS — Z01.812 PRE-OPERATIVE LABORATORY EXAMINATION: ICD-10-CM

## 2024-10-11 LAB
ALBUMIN SERPL BCP-MCNC: 4.1 G/DL (ref 3.2–4.9)
ALBUMIN/GLOB SERPL: 1.2 G/DL
ALP SERPL-CCNC: 83 U/L (ref 30–99)
ALT SERPL-CCNC: 20 U/L (ref 2–50)
ANION GAP SERPL CALC-SCNC: 13 MMOL/L (ref 7–16)
AST SERPL-CCNC: 20 U/L (ref 12–45)
BILIRUB SERPL-MCNC: 1.3 MG/DL (ref 0.1–1.5)
BUN SERPL-MCNC: 26 MG/DL (ref 8–22)
CALCIUM ALBUM COR SERPL-MCNC: 9.3 MG/DL (ref 8.5–10.5)
CALCIUM SERPL-MCNC: 9.4 MG/DL (ref 8.5–10.5)
CHLORIDE SERPL-SCNC: 105 MMOL/L (ref 96–112)
CO2 SERPL-SCNC: 20 MMOL/L (ref 20–33)
CREAT SERPL-MCNC: 1.22 MG/DL (ref 0.5–1.4)
EKG IMPRESSION: NORMAL
ERYTHROCYTE [DISTWIDTH] IN BLOOD BY AUTOMATED COUNT: 44.4 FL (ref 35.9–50)
GFR SERPLBLD CREATININE-BSD FMLA CKD-EPI: 44 ML/MIN/1.73 M 2
GLOBULIN SER CALC-MCNC: 3.4 G/DL (ref 1.9–3.5)
GLUCOSE SERPL-MCNC: 217 MG/DL (ref 65–99)
HCT VFR BLD AUTO: 45.2 % (ref 37–47)
HGB BLD-MCNC: 15.2 G/DL (ref 12–16)
INR PPP: 0.99 (ref 0.87–1.13)
MCH RBC QN AUTO: 30.4 PG (ref 27–33)
MCHC RBC AUTO-ENTMCNC: 33.6 G/DL (ref 32.2–35.5)
MCV RBC AUTO: 90.4 FL (ref 81.4–97.8)
PLATELET # BLD AUTO: 269 K/UL (ref 164–446)
PMV BLD AUTO: 9.6 FL (ref 9–12.9)
POTASSIUM SERPL-SCNC: 3.2 MMOL/L (ref 3.6–5.5)
PROT SERPL-MCNC: 7.5 G/DL (ref 6–8.2)
PROTHROMBIN TIME: 13.1 SEC (ref 12–14.6)
RBC # BLD AUTO: 5 M/UL (ref 4.2–5.4)
SODIUM SERPL-SCNC: 138 MMOL/L (ref 135–145)
WBC # BLD AUTO: 7.6 K/UL (ref 4.8–10.8)

## 2024-10-11 PROCEDURE — 36415 COLL VENOUS BLD VENIPUNCTURE: CPT

## 2024-10-11 PROCEDURE — 93005 ELECTROCARDIOGRAM TRACING: CPT

## 2024-10-11 PROCEDURE — 85610 PROTHROMBIN TIME: CPT

## 2024-10-11 PROCEDURE — 80053 COMPREHEN METABOLIC PANEL: CPT

## 2024-10-11 PROCEDURE — 85027 COMPLETE CBC AUTOMATED: CPT

## 2024-10-11 PROCEDURE — 93010 ELECTROCARDIOGRAM REPORT: CPT | Performed by: INTERNAL MEDICINE

## 2024-10-15 ENCOUNTER — APPOINTMENT (OUTPATIENT)
Dept: CARDIOLOGY | Facility: MEDICAL CENTER | Age: 84
End: 2024-10-15
Attending: NURSE PRACTITIONER
Payer: MEDICARE

## 2024-10-15 ENCOUNTER — HOSPITAL ENCOUNTER (OUTPATIENT)
Facility: MEDICAL CENTER | Age: 84
End: 2024-10-15
Attending: INTERNAL MEDICINE | Admitting: INTERNAL MEDICINE
Payer: MEDICARE

## 2024-10-15 VITALS
TEMPERATURE: 97.4 F | HEART RATE: 65 BPM | HEIGHT: 64 IN | BODY MASS INDEX: 23.9 KG/M2 | RESPIRATION RATE: 16 BRPM | SYSTOLIC BLOOD PRESSURE: 131 MMHG | DIASTOLIC BLOOD PRESSURE: 71 MMHG | OXYGEN SATURATION: 97 % | WEIGHT: 139.99 LBS

## 2024-10-15 DIAGNOSIS — Z95.0 CARDIAC PACEMAKER IN SITU: ICD-10-CM

## 2024-10-15 PROCEDURE — 700101 HCHG RX REV CODE 250

## 2024-10-15 PROCEDURE — 99152 MOD SED SAME PHYS/QHP 5/>YRS: CPT | Performed by: INTERNAL MEDICINE

## 2024-10-15 PROCEDURE — 700111 HCHG RX REV CODE 636 W/ 250 OVERRIDE (IP)

## 2024-10-15 PROCEDURE — 160035 HCHG PACU - 1ST 60 MINS PHASE I

## 2024-10-15 PROCEDURE — 33228 REMV&REPLC PM GEN DUAL LEAD: CPT | Performed by: INTERNAL MEDICINE

## 2024-10-15 PROCEDURE — 160002 HCHG RECOVERY MINUTES (STAT)

## 2024-10-15 PROCEDURE — 99153 MOD SED SAME PHYS/QHP EA: CPT

## 2024-10-15 RX ORDER — DOXYCYCLINE 100 MG/1
100 CAPSULE ORAL 2 TIMES DAILY
Qty: 8 CAPSULE | Refills: 0 | Status: SHIPPED | OUTPATIENT
Start: 2024-10-15 | End: 2024-10-31

## 2024-10-15 RX ORDER — CEFAZOLIN SODIUM 1 G/3ML
INJECTION, POWDER, FOR SOLUTION INTRAMUSCULAR; INTRAVENOUS
Status: COMPLETED
Start: 2024-10-15 | End: 2024-10-15

## 2024-10-15 RX ORDER — MIDAZOLAM HYDROCHLORIDE 1 MG/ML
INJECTION INTRAMUSCULAR; INTRAVENOUS
Status: COMPLETED
Start: 2024-10-15 | End: 2024-10-15

## 2024-10-15 RX ORDER — LIDOCAINE HYDROCHLORIDE 20 MG/ML
INJECTION, SOLUTION INFILTRATION; PERINEURAL
Status: COMPLETED
Start: 2024-10-15 | End: 2024-10-15

## 2024-10-15 RX ADMIN — CEFAZOLIN 3000 MG: 1 INJECTION, POWDER, FOR SOLUTION INTRAMUSCULAR; INTRAVENOUS at 13:06

## 2024-10-15 RX ADMIN — MIDAZOLAM HYDROCHLORIDE 2 MG: 1 INJECTION, SOLUTION INTRAMUSCULAR; INTRAVENOUS at 13:15

## 2024-10-15 RX ADMIN — LIDOCAINE HYDROCHLORIDE: 20 INJECTION, SOLUTION INFILTRATION; PERINEURAL at 13:06

## 2024-10-15 RX ADMIN — FENTANYL CITRATE 100 MCG: 50 INJECTION, SOLUTION INTRAMUSCULAR; INTRAVENOUS at 13:33

## 2024-10-15 ASSESSMENT — FIBROSIS 4 INDEX: FIB4 SCORE: 1.36

## 2024-10-22 ENCOUNTER — NON-PROVIDER VISIT (OUTPATIENT)
Dept: CARDIOLOGY | Facility: MEDICAL CENTER | Age: 84
End: 2024-10-22

## 2024-10-22 ENCOUNTER — NON-PROVIDER VISIT (OUTPATIENT)
Dept: CARDIOLOGY | Facility: MEDICAL CENTER | Age: 84
End: 2024-10-22
Attending: NURSE PRACTITIONER
Payer: MEDICARE

## 2024-10-22 DIAGNOSIS — I49.5 SICK SINUS SYNDROME (HCC): ICD-10-CM

## 2024-10-22 DIAGNOSIS — Z95.0 CARDIAC PACEMAKER IN SITU: ICD-10-CM

## 2024-10-22 PROCEDURE — 93280 PM DEVICE PROGR EVAL DUAL: CPT | Performed by: INTERNAL MEDICINE

## 2024-10-29 ENCOUNTER — APPOINTMENT (OUTPATIENT)
Dept: URGENT CARE | Facility: PHYSICIAN GROUP | Age: 84
End: 2024-10-29
Payer: MEDICARE

## 2024-10-29 DIAGNOSIS — R30.0 DYSURIA: ICD-10-CM

## 2024-10-31 ENCOUNTER — OFFICE VISIT (OUTPATIENT)
Dept: MEDICAL GROUP | Facility: LAB | Age: 84
End: 2024-10-31
Payer: MEDICARE

## 2024-10-31 ENCOUNTER — HOSPITAL ENCOUNTER (OUTPATIENT)
Facility: MEDICAL CENTER | Age: 84
End: 2024-10-31
Attending: STUDENT IN AN ORGANIZED HEALTH CARE EDUCATION/TRAINING PROGRAM
Payer: MEDICARE

## 2024-10-31 ENCOUNTER — HOSPITAL ENCOUNTER (OUTPATIENT)
Dept: LAB | Facility: MEDICAL CENTER | Age: 84
End: 2024-10-31
Attending: STUDENT IN AN ORGANIZED HEALTH CARE EDUCATION/TRAINING PROGRAM
Payer: MEDICARE

## 2024-10-31 VITALS
OXYGEN SATURATION: 98 % | DIASTOLIC BLOOD PRESSURE: 82 MMHG | HEIGHT: 64 IN | SYSTOLIC BLOOD PRESSURE: 126 MMHG | BODY MASS INDEX: 24.24 KG/M2 | HEART RATE: 91 BPM | WEIGHT: 142 LBS | RESPIRATION RATE: 20 BRPM | TEMPERATURE: 98.1 F

## 2024-10-31 DIAGNOSIS — N30.00 ACUTE CYSTITIS WITHOUT HEMATURIA: ICD-10-CM

## 2024-10-31 DIAGNOSIS — E87.6 HYPOKALEMIA: ICD-10-CM

## 2024-10-31 DIAGNOSIS — E11.69 TYPE 2 DIABETES MELLITUS WITH OTHER SPECIFIED COMPLICATION, WITHOUT LONG-TERM CURRENT USE OF INSULIN (HCC): ICD-10-CM

## 2024-10-31 DIAGNOSIS — R80.9 MICROALBUMINURIA: ICD-10-CM

## 2024-10-31 DIAGNOSIS — N18.32 STAGE 3B CHRONIC KIDNEY DISEASE: ICD-10-CM

## 2024-10-31 DIAGNOSIS — N81.9 FEMALE GENITAL PROLAPSE, UNSPECIFIED TYPE: ICD-10-CM

## 2024-10-31 PROBLEM — I51.7 MILD CONCENTRIC LEFT VENTRICULAR HYPERTROPHY: Status: ACTIVE | Noted: 2024-10-31

## 2024-10-31 PROBLEM — I35.8 AORTIC VALVE SCLEROSIS: Status: RESOLVED | Noted: 2024-05-08 | Resolved: 2024-10-31

## 2024-10-31 LAB
ANION GAP SERPL CALC-SCNC: 17 MMOL/L (ref 7–16)
APPEARANCE UR: CLEAR
BILIRUB UR STRIP-MCNC: NEGATIVE MG/DL
BUN SERPL-MCNC: 22 MG/DL (ref 8–22)
CALCIUM SERPL-MCNC: 9.7 MG/DL (ref 8.5–10.5)
CHLORIDE SERPL-SCNC: 105 MMOL/L (ref 96–112)
CO2 SERPL-SCNC: 16 MMOL/L (ref 20–33)
COLOR UR AUTO: YELLOW
CREAT SERPL-MCNC: 0.99 MG/DL (ref 0.5–1.4)
GFR SERPLBLD CREATININE-BSD FMLA CKD-EPI: 57 ML/MIN/1.73 M 2
GLUCOSE SERPL-MCNC: 153 MG/DL (ref 65–99)
GLUCOSE UR STRIP.AUTO-MCNC: 500 MG/DL
HBA1C MFR BLD: 7 % (ref ?–5.8)
KETONES UR STRIP.AUTO-MCNC: NEGATIVE MG/DL
LEUKOCYTE ESTERASE UR QL STRIP.AUTO: NORMAL
MAGNESIUM SERPL-MCNC: 2.3 MG/DL (ref 1.5–2.5)
NITRITE UR QL STRIP.AUTO: NEGATIVE
PH UR STRIP.AUTO: 5.5 [PH] (ref 5–8)
POCT INT CON NEG: NEGATIVE
POCT INT CON POS: POSITIVE
POTASSIUM SERPL-SCNC: 4 MMOL/L (ref 3.6–5.5)
PROT UR QL STRIP: 30 MG/DL
RBC UR QL AUTO: NORMAL
SODIUM SERPL-SCNC: 138 MMOL/L (ref 135–145)
SP GR UR STRIP.AUTO: 1.01
UROBILINOGEN UR STRIP-MCNC: 0.2 MG/DL

## 2024-10-31 PROCEDURE — 36415 COLL VENOUS BLD VENIPUNCTURE: CPT

## 2024-10-31 PROCEDURE — 83735 ASSAY OF MAGNESIUM: CPT

## 2024-10-31 PROCEDURE — 87086 URINE CULTURE/COLONY COUNT: CPT

## 2024-10-31 PROCEDURE — 80048 BASIC METABOLIC PNL TOTAL CA: CPT

## 2024-10-31 RX ORDER — SULFAMETHOXAZOLE AND TRIMETHOPRIM 800; 160 MG/1; MG/1
1 TABLET ORAL ONCE
Qty: 1 TABLET | Refills: 0 | Status: SHIPPED | OUTPATIENT
Start: 2024-10-31 | End: 2024-10-31

## 2024-10-31 RX ORDER — SULFAMETHOXAZOLE AND TRIMETHOPRIM 400; 80 MG/1; MG/1
TABLET ORAL
Qty: 5 TABLET | Refills: 0 | Status: SHIPPED | OUTPATIENT
Start: 2024-10-31

## 2024-10-31 ASSESSMENT — ENCOUNTER SYMPTOMS
ABDOMINAL PAIN: 1
FEVER: 0
COUGH: 0
CHILLS: 0
SHORTNESS OF BREATH: 0
WEIGHT LOSS: 0
NAUSEA: 0
VOMITING: 0
CONSTIPATION: 0
DIARRHEA: 1

## 2024-10-31 ASSESSMENT — FIBROSIS 4 INDEX: FIB4 SCORE: 1.36

## 2024-11-02 LAB
BACTERIA UR CULT: NORMAL
SIGNIFICANT IND 70042: NORMAL
SITE SITE: NORMAL
SOURCE SOURCE: NORMAL

## 2024-11-07 ENCOUNTER — OFFICE VISIT (OUTPATIENT)
Dept: URGENT CARE | Facility: PHYSICIAN GROUP | Age: 84
End: 2024-11-07
Payer: MEDICARE

## 2024-11-07 VITALS
SYSTOLIC BLOOD PRESSURE: 138 MMHG | TEMPERATURE: 97 F | HEART RATE: 91 BPM | DIASTOLIC BLOOD PRESSURE: 70 MMHG | RESPIRATION RATE: 14 BRPM | BODY MASS INDEX: 23.9 KG/M2 | OXYGEN SATURATION: 97 % | WEIGHT: 140 LBS | HEIGHT: 64 IN

## 2024-11-07 DIAGNOSIS — N30.00 ACUTE CYSTITIS WITHOUT HEMATURIA: ICD-10-CM

## 2024-11-07 DIAGNOSIS — N39.0 ACUTE UTI: ICD-10-CM

## 2024-11-07 PROCEDURE — 99214 OFFICE O/P EST MOD 30 MIN: CPT

## 2024-11-07 PROCEDURE — 3078F DIAST BP <80 MM HG: CPT

## 2024-11-07 PROCEDURE — 3075F SYST BP GE 130 - 139MM HG: CPT

## 2024-11-07 PROCEDURE — 81002 URINALYSIS NONAUTO W/O SCOPE: CPT

## 2024-11-07 RX ORDER — CIPROFLOXACIN 500 MG/1
500 TABLET, FILM COATED ORAL 2 TIMES DAILY
Qty: 14 TABLET | Refills: 0 | Status: SHIPPED | OUTPATIENT
Start: 2024-11-07 | End: 2024-11-14

## 2024-11-07 ASSESSMENT — ENCOUNTER SYMPTOMS
COUGH: 0
SORE THROAT: 0
DIARRHEA: 0
ABDOMINAL PAIN: 0
FLANK PAIN: 0
SHORTNESS OF BREATH: 0
CHILLS: 0
VOMITING: 0
MYALGIAS: 0
HEADACHES: 0
NAUSEA: 0
FEVER: 0

## 2024-11-07 ASSESSMENT — FIBROSIS 4 INDEX: FIB4 SCORE: 1.36

## 2024-11-07 NOTE — TELEPHONE ENCOUNTER
Received request via: Pharmacy    Was the patient seen in the last year in this department? Yes    Does the patient have an active prescription (recently filled or refills available) for medication(s) requested? No    Pharmacy Name: [facility]    Does the patient have correction Plus and need 100-day supply? (This applies to ALL medications)

## 2024-11-08 ENCOUNTER — HOSPITAL ENCOUNTER (OUTPATIENT)
Facility: MEDICAL CENTER | Age: 84
End: 2024-11-08
Payer: MEDICARE

## 2024-11-08 DIAGNOSIS — N39.0 ACUTE UTI: ICD-10-CM

## 2024-11-08 LAB
APPEARANCE UR: NORMAL
BILIRUB UR STRIP-MCNC: NORMAL MG/DL
COLOR UR AUTO: NORMAL
GLUCOSE UR STRIP.AUTO-MCNC: NORMAL MG/DL
KETONES UR STRIP.AUTO-MCNC: NORMAL MG/DL
LEUKOCYTE ESTERASE UR QL STRIP.AUTO: NORMAL
NITRITE UR QL STRIP.AUTO: NORMAL
PH UR STRIP.AUTO: 6 [PH] (ref 5–8)
PROT UR QL STRIP: 300 MG/DL
RBC UR QL AUTO: NORMAL
SP GR UR STRIP.AUTO: 1.03
UROBILINOGEN UR STRIP-MCNC: 0.2 MG/DL

## 2024-11-08 PROCEDURE — 87086 URINE CULTURE/COLONY COUNT: CPT

## 2024-11-08 RX ORDER — SULFAMETHOXAZOLE AND TRIMETHOPRIM 400; 80 MG/1; MG/1
TABLET ORAL
Qty: 5 TABLET | Refills: 0 | OUTPATIENT
Start: 2024-11-08

## 2024-11-08 NOTE — PROGRESS NOTES
Subjective:   Roya Muniz is a 84 y.o. female who presents for UTI (Painful urination, bladder pain , )      UTI  This is a new problem. The current episode started 1 to 4 weeks ago. The problem has been gradually worsening. Associated symptoms include urinary symptoms. Pertinent negatives include no abdominal pain, chest pain, chills, congestion, coughing, fever, headaches, myalgias, nausea, rash, sore throat or vomiting. Treatments tried: Patient recently saw PCP and was provided Bactrim at that time. The treatment provided mild relief.       Review of Systems   Constitutional:  Negative for chills, fever and malaise/fatigue.   HENT:  Negative for congestion, ear pain, hearing loss and sore throat.    Respiratory:  Negative for cough and shortness of breath.    Cardiovascular:  Negative for chest pain.   Gastrointestinal:  Negative for abdominal pain, diarrhea, nausea and vomiting.   Genitourinary:  Positive for dysuria and urgency. Negative for flank pain and hematuria.   Musculoskeletal:  Negative for myalgias.   Skin:  Negative for rash.   Neurological:  Negative for headaches.       Allergies   Allergen Reactions    Cephalexin [Keflex] Hives, Rash, Itching and Unspecified     Photosensitivity         Patient Active Problem List    Diagnosis Date Noted    Prolapse of female pelvic organs 10/31/2024    Stage 3b chronic kidney disease 10/31/2024    Mild concentric left ventricular hypertrophy 10/31/2024    BMI 24.0-24.9, adult 07/26/2023    Nonspecific abnormal function study, cardiovascular 07/19/2023    Microalbuminuria 01/18/2023    Pes cavus of both feet 01/18/2023    Sick sinus syndrome (HCC) 03/17/2021    Osteopenia 03/12/2021    History of colon polyps 03/12/2021    Lesion of left upper eyelid 01/31/2020    Callus of foot 01/31/2020    Toenail fungus 01/31/2020    Anxiety and depression 09/07/2018    Type 2 diabetes mellitus with other specified complication (HCC) 02/22/2018    Irritable bowel  syndrome with diarrhea 02/16/2018    Hypertension associated with type 2 diabetes mellitus (HCC)     Hypothyroidism 08/28/2015    Atherosclerosis of aorta (HCC) 06/18/2015    Cardiac pacemaker in situ 02/25/2014    Vitamin D deficiency 11/17/2011    Hyperlipidemia associated with type 2 diabetes mellitus (HCC) 10/21/2010       Current Outpatient Medications Ordered in Epic   Medication Sig Dispense Refill    ciprofloxacin (CIPRO) 500 MG Tab Take 1 Tablet by mouth 2 times a day for 7 days. 14 Tablet 0    Empagliflozin 25 MG Tab Take 1 Tablet by mouth every day. 100 Tablet 3    sulfamethoxazole-trimethoprim (BACTRIM) 400-80 MG Tab Start 1 tablet by mouth as directed every 12 hours for a total of 3 days 5 Tablet 0    vitamin D3 (CHOLECALCIFEROL) 1000 Unit (25 mcg) Tab Take 2,000 Units by mouth every day.      Multiple Vitamins-Minerals (PRESERVISION AREDS 2) Cap Take 1 Capsule by mouth 2 times a day.      NON SPECIFIED       levothyroxine (SYNTHROID) 112 MCG Tab TAKE 1 TABLET BY MOUTH EVERY DAY IN THE MORNING ON AN EMPTY STOMACH 90 Tablet 3    amLODIPine (NORVASC) 10 MG Tab TAKE 1 TABLET BY MOUTH EVERY DAY FOR BLOOD PRESSURE (Patient taking differently: Take 10 mg by mouth every day. TAKE 1 TABLET BY MOUTH EVERY DAY FOR BLOOD PRESSURE) 100 Tablet 3    rosuvastatin (CRESTOR) 10 MG Tab TAKE 1 TABLET BY MOUTH EVERY DAY IN THE EVENING (Patient taking differently: Take 10 mg by mouth every day. TAKE 1 TABLET BY MOUTH EVERY DAY) 100 Tablet 3    losartan (COZAAR) 100 MG Tab TAKE 1 TABLET BY MOUTH EVERY DAY FOR BLOOD PRESSURE 100 Tablet 3    Insulin Pen Needle 32 G x 4 mm (BD PEN NEEDLE ANTONI 2ND GEN) USE ONE PEN NEEDLE IN PEN DEVICE TO INJECT INSULIN 200 Each 3    glucose blood (FREESTYLE LITE) strip TEST 3 TIMES A DAY or as needed for high/low blood sugar 300 Strip 0    Lancets Use one lancet to test blood sugar 3 times/day 300 Each 0    aspirin 81 MG EC tablet Take 81 mg by mouth every day at 6 PM.      Acetaminophen  (TYLENOL 8 HOUR PO) Take 500 mg by mouth 1 time a day as needed (HA or body aches).      Alcohol Swabs Wipe site with prep pad prior to injection. 200 Each 0    Blood Glucose Monitoring Suppl (FREESTYLE LITE) Device Test blood sugar as directed. 1 Each 0     No current [facility]-ordered facility-administered medications on file.       Past Surgical History:   Procedure Laterality Date    GASTROSCOPY-ENDO  2/21/2014    Performed by Steve Islas Jr., M.D. at ENDOSCOPY [facility]    GASTROSCOPY-ENDO  2/20/2014    Performed by Antonio Espinoza M.D. at ENDOSCOPY [facility]    EXPLORATORY LAPAROTOMY  2/10/2014    Performed by Rigo Garcia M.D. at SURGERY [facility]    CHOLECYSTECTOMY  2/10/2014    Performed by Rigo Garcia M.D. at SURGERY [facility]    PANCREATECTOMY  2/10/2014    Performed by Rigo Garcia M.D. at SURGERY [facility]    GASTROSTOMY FEEDING  2/10/2014    Performed by Rigo Garcia M.D. at SURGERY [facility]    OTHER ORTHOPEDIC SURGERY      foot surgery       Social History     Tobacco Use    Smoking status: Never    Smokeless tobacco: Never    Tobacco comments:     avoid all tobacco products   Vaping Use    Vaping status: Never Used   Substance Use Topics    Alcohol use: Not Currently     Alcohol/week: 0.0 oz     Comment: occasionally    Drug use: No       family history includes Alcohol/Drug in her brother; Cancer in her father, maternal grandmother, and paternal aunt; Heart Disease in her brother, maternal grandfather, and mother; Lung Disease in her brother; No Known Problems in her paternal grandfather, paternal grandmother, sister, sister, sister, son, son, and son; Stroke in her sister.     Problem list, medications, and allergies reviewed by myself today in Epic.     Objective:   /70 (BP Location: Left arm, Patient Position: Sitting, BP Cuff Size: Adult)   Pulse 91   Temp 36.1 °C (97 °F) (Temporal)   Resp 14   Ht  "1.626 m (5' 4\")   Wt 63.5 kg (140 lb)   SpO2 97%   BMI 24.03 kg/m²     Physical Exam  Vitals and nursing note reviewed.   Constitutional:       General: She is not in acute distress.     Appearance: Normal appearance. She is not ill-appearing.   HENT:      Head: Normocephalic and atraumatic.      Right Ear: Tympanic membrane normal.      Left Ear: Tympanic membrane normal.      Nose: Nose normal.      Mouth/Throat:      Mouth: Mucous membranes are moist.   Eyes:      Conjunctiva/sclera: Conjunctivae normal.   Cardiovascular:      Rate and Rhythm: Normal rate.      Heart sounds: Normal heart sounds.   Pulmonary:      Effort: Pulmonary effort is normal.   Abdominal:      General: Abdomen is flat.      Palpations: Abdomen is soft.      Tenderness: There is no abdominal tenderness. There is no right CVA tenderness, left CVA tenderness or guarding.   Genitourinary:     Vagina: No vaginal discharge.   Musculoskeletal:         General: Normal range of motion.      Cervical back: Normal range of motion.   Skin:     General: Skin is warm and dry.      Capillary Refill: Capillary refill takes less than 2 seconds.   Neurological:      Mental Status: She is alert and oriented to person, place, and time.   Psychiatric:         Mood and Affect: Mood normal.         Behavior: Behavior normal.       Results for orders placed or performed in visit on 11/07/24   POCT Urinalysis    Collection Time: 11/08/24  9:00 AM   Result Value Ref Range    POC Color other Negative    POC Appearance cloudy Negative    POC Glucose neg Negative mg/dL    POC Bilirubin neg Negative mg/dL    POC Ketones neg Negative mg/dL    POC Specific Gravity 1.030 <1.005 - >1.030    POC Blood mod Negative    POC Urine PH 6.0 5.0 - 8.0    POC Protein 300 Negative mg/dL    POC Urobiligen 0.2 Negative (0.2) mg/dL    POC Nitrites neg Negative    POC Leukocyte Esterase mod Negative     *Note: Due to a large number of results and/or encounters for the requested time " period, some results have not been displayed. A complete set of results can be found in Results Review.       Assessment/Plan:     1. Acute UTI  ciprofloxacin (CIPRO) 500 MG Tab    POCT Urinalysis        After assessment patient symptoms to correlate with acute UTI.  Patient was recently treated by her primary care and provided Bactrim.  Patient did have a urine culture performed which was negative.  Patient reports that she had minimal relief of symptoms on the Bactrim and symptoms never fully went away.  Patient denies any abdominal or back/flank pain.  Patient denies any fevers.  Patient at this time was unable to get a urine sample.  Patient does report that she will bring a urine sample in in the morning so that we can run this and verify UTI along with sending it for culture.  Since patient was previously on Bactrim with no relief of symptoms and does have allergies to Keflex patient at this time was placed on ciprofloxacin.  Patient instructed take as prescribed.  Patient instructed to increase fluid intake at this time.  Patient instructed to monitor for any worsening signs and symptoms of any other concerns patient was instructed to return to urgent care for reevaluation.    Patient was able to bring fresh urine sample and this morning and did show moderate amount of blood along with moderate amount of leukocytes.  Patient's urine will be sent for culture and patient was instructed to continue use of antibiotics that were prescribed.    Differential diagnosis, natural history, and supportive care discussed. We also reviewed side effects of medication including allergic response, GI upset, tendon injury, rash, sedation etc. Patient and/or guardian voices understanding.      Advised the patient to follow-up with the primary care physician for recheck, reevaluation, and consideration of further management.    Please note that this dictation was created using voice recognition software. I have made every  reasonable attempt to correct obvious errors, but I expect that there are errors of grammar and possibly content that I did not discover before finalizing the note.    This note was electronically signed by NICHOLAS Sloan

## 2024-11-10 LAB
BACTERIA UR CULT: NORMAL
SIGNIFICANT IND 70042: NORMAL
SITE SITE: NORMAL
SOURCE SOURCE: NORMAL

## 2024-11-15 ENCOUNTER — HOSPITAL ENCOUNTER (OUTPATIENT)
Facility: MEDICAL CENTER | Age: 84
End: 2024-11-15
Attending: NURSE PRACTITIONER
Payer: MEDICARE

## 2024-11-15 ENCOUNTER — OFFICE VISIT (OUTPATIENT)
Dept: URGENT CARE | Facility: PHYSICIAN GROUP | Age: 84
End: 2024-11-15
Payer: MEDICARE

## 2024-11-15 VITALS
OXYGEN SATURATION: 97 % | SYSTOLIC BLOOD PRESSURE: 160 MMHG | RESPIRATION RATE: 14 BRPM | TEMPERATURE: 97 F | HEART RATE: 96 BPM | HEIGHT: 64 IN | DIASTOLIC BLOOD PRESSURE: 86 MMHG | BODY MASS INDEX: 24.41 KG/M2 | WEIGHT: 143 LBS

## 2024-11-15 DIAGNOSIS — R30.0 DYSURIA: ICD-10-CM

## 2024-11-15 DIAGNOSIS — R39.9 UTI SYMPTOMS: ICD-10-CM

## 2024-11-15 LAB
APPEARANCE UR: NORMAL
BILIRUB UR STRIP-MCNC: NORMAL MG/DL
CANDIDA DNA VAG QL PROBE+SIG AMP: POSITIVE
COLOR UR AUTO: NORMAL
G VAGINALIS DNA VAG QL PROBE+SIG AMP: NEGATIVE
GLUCOSE UR STRIP.AUTO-MCNC: NORMAL MG/DL
KETONES UR STRIP.AUTO-MCNC: NORMAL MG/DL
LEUKOCYTE ESTERASE UR QL STRIP.AUTO: NORMAL
NITRITE UR QL STRIP.AUTO: NORMAL
PH UR STRIP.AUTO: 5.5 [PH] (ref 5–8)
PROT UR QL STRIP: 300 MG/DL
RBC UR QL AUTO: NORMAL
SP GR UR STRIP.AUTO: 1.02
T VAGINALIS DNA VAG QL PROBE+SIG AMP: NEGATIVE
UROBILINOGEN UR STRIP-MCNC: 0.2 MG/DL

## 2024-11-15 PROCEDURE — 87510 GARDNER VAG DNA DIR PROBE: CPT

## 2024-11-15 PROCEDURE — 3079F DIAST BP 80-89 MM HG: CPT | Performed by: NURSE PRACTITIONER

## 2024-11-15 PROCEDURE — 87480 CANDIDA DNA DIR PROBE: CPT

## 2024-11-15 PROCEDURE — 81002 URINALYSIS NONAUTO W/O SCOPE: CPT | Performed by: NURSE PRACTITIONER

## 2024-11-15 PROCEDURE — 87086 URINE CULTURE/COLONY COUNT: CPT

## 2024-11-15 PROCEDURE — 3077F SYST BP >= 140 MM HG: CPT | Performed by: NURSE PRACTITIONER

## 2024-11-15 PROCEDURE — 87660 TRICHOMONAS VAGIN DIR PROBE: CPT

## 2024-11-15 PROCEDURE — 99214 OFFICE O/P EST MOD 30 MIN: CPT | Performed by: NURSE PRACTITIONER

## 2024-11-15 RX ORDER — CIPROFLOXACIN 500 MG/1
500 TABLET, FILM COATED ORAL 2 TIMES DAILY
Qty: 10 TABLET | Refills: 0 | Status: SHIPPED | OUTPATIENT
Start: 2024-11-15 | End: 2024-11-20

## 2024-11-15 ASSESSMENT — ENCOUNTER SYMPTOMS
FEVER: 0
FLANK PAIN: 0
DIARRHEA: 0
BACK PAIN: 0
DIZZINESS: 0
HEADACHES: 0
WEAKNESS: 0
ABDOMINAL PAIN: 0
CHILLS: 0
VOMITING: 0
MYALGIAS: 0
CONSTIPATION: 0
NAUSEA: 0

## 2024-11-15 ASSESSMENT — FIBROSIS 4 INDEX: FIB4 SCORE: 1.36

## 2024-11-15 NOTE — PROGRESS NOTES
"Subjective     Roya Muniz is a 84 y.o. female who presents with UTI (Burning,frequency,2 wks )            UTI  Pertinent negatives include no abdominal pain, chills, fever, headaches, myalgias, nausea, rash, vomiting or weakness.   Roya has come into into urgent care today for discomfort with urination.  She was recently seen in urgent care for similar symptoms.  Has recently finished antibiotic for UTI symptoms 1 week ago and is continuing to have symptoms.  Denies urgency or frequency or hematuria.  No history of yeast infection or vaginal dryness.  History of diabetes but states this is controlled.  States did stop her Jardiance but states her blood sugars stay between 99-120s.   has been seen by her primary care provider for UTI symptoms and was referred to gynecology.  Patient has not received any information regarding this. States history of \"ulcerative vaginitis\" with vaginal prolapse.  Recent urine cultures have been negative for UTI. History of IBS, states antibiotic have helped with symptoms with this.     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  MEDS:   Current Outpatient Medications:     ciprofloxacin (CIPRO) 500 MG Tab, Take 1 Tablet by mouth 2 times a day for 5 days., Disp: 10 Tablet, Rfl: 0    Empagliflozin 25 MG Tab, Take 1 Tablet by mouth every day., Disp: 100 Tablet, Rfl: 3    sulfamethoxazole-trimethoprim (BACTRIM) 400-80 MG Tab, Start 1 tablet by mouth as directed every 12 hours for a total of 3 days, Disp: 5 Tablet, Rfl: 0    vitamin D3 (CHOLECALCIFEROL) 1000 Unit " (25 mcg) Tab, Take 2,000 Units by mouth every day., Disp: , Rfl:     Multiple Vitamins-Minerals (PRESERVISION AREDS 2) Cap, Take 1 Capsule by mouth 2 times a day., Disp: , Rfl:     NON SPECIFIED, , Disp: , Rfl:     levothyroxine (SYNTHROID) 112 MCG Tab, TAKE 1 TABLET BY MOUTH EVERY DAY IN THE MORNING ON AN EMPTY STOMACH, Disp: 90 Tablet, Rfl: 3    amLODIPine (NORVASC) 10 MG Tab, TAKE 1 TABLET BY MOUTH EVERY DAY FOR BLOOD PRESSURE (Patient taking differently: Take 10 mg by mouth every day. TAKE 1 TABLET BY MOUTH EVERY DAY FOR BLOOD PRESSURE), Disp: 100 Tablet, Rfl: 3    rosuvastatin (CRESTOR) 10 MG Tab, TAKE 1 TABLET BY MOUTH EVERY DAY IN THE EVENING (Patient taking differently: Take 10 mg by mouth every day. TAKE 1 TABLET BY MOUTH EVERY DAY), Disp: 100 Tablet, Rfl: 3    losartan (COZAAR) 100 MG Tab, TAKE 1 TABLET BY MOUTH EVERY DAY FOR BLOOD PRESSURE, Disp: 100 Tablet, Rfl: 3    Insulin Pen Needle 32 G x 4 mm (BD PEN NEEDLE ANTONI 2ND GEN), USE ONE PEN NEEDLE IN PEN DEVICE TO INJECT INSULIN, Disp: 200 Each, Rfl: 3    glucose blood (FREESTYLE LITE) strip, TEST 3 TIMES A DAY or as needed for high/low blood sugar, Disp: 300 Strip, Rfl: 0    Lancets, Use one lancet to test blood sugar 3 times/day, Disp: 300 Each, Rfl: 0    aspirin 81 MG EC tablet, Take 81 mg by mouth every day at 6 PM., Disp: , Rfl:     Acetaminophen (TYLENOL 8 HOUR PO), Take 500 mg by mouth 1 time a day as needed (HA or body aches)., Disp: , Rfl:     Alcohol Swabs, Wipe site with prep pad prior to injection., Disp: 200 Each, Rfl: 0    Blood Glucose Monitoring Suppl (FREESTYLE LITE) Device, Test blood sugar as directed., Disp: 1 Each, Rfl: 0  ALLERGIES:   Allergies   Allergen Reactions    Cephalexin [Keflex] Hives, Rash, Itching and Unspecified     Photosensitivity       SURGHX:   Past Surgical History:   Procedure Laterality Date    GASTROSCOPY-ENDO  2/21/2014    Performed by Steve Islas Jr., M.D. at ENDOSCOPY [facility]    GASTROSCOPY-ENDO   "2/20/2014    Performed by Antonio Espinoza M.D. at ENDOSCOPY [facility]S    EXPLORATORY LAPAROTOMY  2/10/2014    Performed by Rigo Garcia M.D. at SURGERY [facility] ORS    CHOLECYSTECTOMY  2/10/2014    Performed by Rigo Garcia M.D. at SURGERY [facility]    PANCREATECTOMY  2/10/2014    Performed by Rigo Garcia M.D. at SURGERY [facility]    GASTROSTOMY FEEDING  2/10/2014    Performed by Rigo Garcia M.D. at SURGERY [facility]    OTHER ORTHOPEDIC SURGERY      foot surgery     SOCHX:  reports that she has never smoked. She has never used smokeless tobacco. She reports that she does not currently use alcohol. She reports that she does not use drugs.  FH: Family history was reviewed, no pertinent findings to report    Review of Systems   Constitutional:  Negative for chills, fever and malaise/fatigue.   Gastrointestinal:  Negative for abdominal pain, constipation, diarrhea, nausea and vomiting.   Genitourinary:  Positive for dysuria. Negative for flank pain, frequency, hematuria and urgency.        Dysuria only after urination, no urgency or frequency.    Musculoskeletal:  Negative for back pain and myalgias.   Skin:  Negative for itching and rash.   Neurological:  Negative for dizziness, weakness and headaches.   All other systems reviewed and are negative.             Objective     BP (!) 160/86 (BP Location: Left arm, Patient Position: Sitting, BP Cuff Size: Adult)   Pulse 96   Temp 36.1 °C (97 °F) (Temporal)   Resp 14   Ht 1.626 m (5' 4\")   Wt 64.9 kg (143 lb)   SpO2 97%   BMI 24.55 kg/m²      Physical Exam  Vitals reviewed.   Constitutional:       General: She is awake. She is not in acute distress.     Appearance: Normal appearance. She is well-developed. She is not ill-appearing, toxic-appearing or diaphoretic.   Cardiovascular:      Rate and Rhythm: Normal rate.   Pulmonary:      Effort: Pulmonary effort is normal.   Abdominal:      " General: There is no distension.      Palpations: Abdomen is soft.      Tenderness: There is no abdominal tenderness. There is no right CVA tenderness, left CVA tenderness, guarding or rebound.   Musculoskeletal:         General: Normal range of motion.   Skin:     General: Skin is warm and dry.   Neurological:      Mental Status: She is alert and oriented to person, place, and time.   Psychiatric:         Attention and Perception: Attention normal.         Mood and Affect: Mood normal.         Speech: Speech normal.         Behavior: Behavior normal. Behavior is cooperative.                             Assessment & Plan        Assessment & Plan  Dysuria    Orders:    POCT Urinalysis    URINE CULTURE(NEW); Future    ciprofloxacin (CIPRO) 500 MG Tab; Take 1 Tablet by mouth 2 times a day for 5 days.    VAGINAL PATHOGENS DNA PANEL; Future    UTI symptoms    Orders:    URINE CULTURE(NEW); Future    ciprofloxacin (CIPRO) 500 MG Tab; Take 1 Tablet by mouth 2 times a day for 5 days.     -I will agree to initiate antibiotic again until urine culture is resulted in approximately 72 hours-patient will be notified of this result via MyChart, patient understands this  -Patient given information for women's health for follow-up with gynecology per referral information in chart ordered by her primary care provider  -Discussed possible yeast infection versus bacterial vaginosis versus cross-contamination from IBS that may be contributing to dysuria without true UTI  -Maintain hydration/water intake  -Urinate more frequently and empty bladder completely  -Practice good toileting hygiene after bowel movements and  urination   -Monitor for back/flank pain, difficulty or pain with urinating, blood in urine or vaginal discharge - need re-evaluation in urgent care    MyChart release of urine culture results as well as vaginal swab, patient notified    -Education provided: see above    -Return to urgent care as needed if new or worsening  symptoms  -Patient expresses understanding to treatment and plan of care  -Questions were encouraged and answered  -Recommended over the counter meds were written down when requested   -Advised to follow-up with the primary care provider for recheck, reevaluation, and consideration of further management as needed     -Time spent evaluating this patient was at least 30 minutes. This time comprises of the following: preparing for visit/chart review, patient history taken into account pertinent to symptoms, patient counseling/education for needed treatment outpatient, exam/evaluation/treatment plan provided, ordering any appropriate lab/test/procedures/meds, independent interpretation of any clinic testing, medication management with any other listed medications and chart documentation.

## 2024-11-17 DIAGNOSIS — B37.31 YEAST VAGINITIS: ICD-10-CM

## 2024-11-17 LAB
BACTERIA UR CULT: NORMAL
SIGNIFICANT IND 70042: NORMAL
SITE SITE: NORMAL
SOURCE SOURCE: NORMAL

## 2024-11-17 RX ORDER — FLUCONAZOLE 150 MG/1
150 TABLET ORAL ONCE
Qty: 1 TABLET | Refills: 0 | Status: SHIPPED | OUTPATIENT
Start: 2024-11-17 | End: 2024-11-17

## 2024-11-20 ENCOUNTER — HOSPITAL ENCOUNTER (OUTPATIENT)
Facility: MEDICAL CENTER | Age: 84
End: 2024-11-20
Attending: OBSTETRICS & GYNECOLOGY
Payer: MEDICARE

## 2024-11-20 ENCOUNTER — GYNECOLOGY VISIT (OUTPATIENT)
Dept: OBGYN | Facility: CLINIC | Age: 84
End: 2024-11-20
Attending: STUDENT IN AN ORGANIZED HEALTH CARE EDUCATION/TRAINING PROGRAM
Payer: MEDICARE

## 2024-11-20 VITALS
BODY MASS INDEX: 24.81 KG/M2 | WEIGHT: 145.3 LBS | HEIGHT: 64 IN | HEART RATE: 88 BPM | DIASTOLIC BLOOD PRESSURE: 76 MMHG | SYSTOLIC BLOOD PRESSURE: 148 MMHG

## 2024-11-20 DIAGNOSIS — K64.8 ULCERATED HEMORRHOID: ICD-10-CM

## 2024-11-20 DIAGNOSIS — N90.89 VULVAR LESION: ICD-10-CM

## 2024-11-20 DIAGNOSIS — N90.89 VULVAR ADHESIONS: ICD-10-CM

## 2024-11-20 DIAGNOSIS — R30.0 DYSURIA: ICD-10-CM

## 2024-11-20 DIAGNOSIS — N81.2 CYSTOCELE AND RECTOCELE WITH INCOMPLETE UTEROVAGINAL PROLAPSE: ICD-10-CM

## 2024-11-20 LAB — PATHOLOGY CONSULT NOTE: NORMAL

## 2024-11-20 PROCEDURE — 88312 SPECIAL STAINS GROUP 1: CPT

## 2024-11-20 PROCEDURE — 88305 TISSUE EXAM BY PATHOLOGIST: CPT

## 2024-11-20 PROCEDURE — 88342 IMHCHEM/IMCYTCHM 1ST ANTB: CPT

## 2024-11-20 PROCEDURE — 3077F SYST BP >= 140 MM HG: CPT | Performed by: OBSTETRICS & GYNECOLOGY

## 2024-11-20 PROCEDURE — 3078F DIAST BP <80 MM HG: CPT | Performed by: OBSTETRICS & GYNECOLOGY

## 2024-11-20 PROCEDURE — 56605 BIOPSY OF VULVA/PERINEUM: CPT | Performed by: OBSTETRICS & GYNECOLOGY

## 2024-11-20 PROCEDURE — 99204 OFFICE O/P NEW MOD 45 MIN: CPT | Performed by: OBSTETRICS & GYNECOLOGY

## 2024-11-20 RX ORDER — PHENAZOPYRIDINE HYDROCHLORIDE 200 MG/1
200 TABLET, FILM COATED ORAL 3 TIMES DAILY PRN
Qty: 6 TABLET | Refills: 0 | Status: SHIPPED | OUTPATIENT
Start: 2024-11-20

## 2024-11-20 ASSESSMENT — FIBROSIS 4 INDEX: FIB4 SCORE: 1.36

## 2024-11-20 ASSESSMENT — ENCOUNTER SYMPTOMS: CONSTIPATION: 1

## 2024-11-20 NOTE — PROGRESS NOTES
New Gynecological Visit    Roya Muniz    84 y.o.    Chief complaint    No chief complaint on file.      HPI    Patient is an 85 yo  who presents as a referral from her PCP for vulvovaginal prolapse. Patient describes that at least for the past decade has had vaginal prolapse that has worsened gradually over the years.   Most recently has had several UTIs that were treated with various antibiotic regimens and had to go to Urgent Care most recently. Her latest urine culture on 11/15 showed no bacterial growth.   She has had significant constipation and does have bleeding hemorrhoids.   Last colonoscopy was about 3 years ago.         Review of Systems:  Review of Systems   Gastrointestinal:  Positive for constipation.   Genitourinary:  Positive for dysuria.        Past Obstetrical History:     x 3    Past Gynecological History:    Last pap: Unsure  H/o abnormal pap: No  H/o STIs: No  DEXA: 3/6/23 - osteopenia  Last Mammogram: 26  LMP: No LMP recorded. Patient is postmenopausal.  BCM: POstmenopause    Past Medical History    Past Medical History:   Diagnosis Date    Aortic valve sclerosis 2024    Cardiac pacemaker in situ     Diabetes (HCC)     Type 2 Drx'd at age 80    History of DVT of lower extremity 2014    Hyperlipidemia     Hypertension     Hypokalemia 2014    Metabolic acidosis 2023    Neck mass 2021    Pancreatic cyst     Pancreatitis     Pulmonary hypertension (HCC) 2015    Stable, continue current plan of care with current medications.          Sinus node dysfunction (HCC)     Skin lesion 2021    Thyroid disease     hypothyroidism    Vision problem 2021       Past Surgical History    Past Surgical History:   Procedure Laterality Date    GASTROSCOPY-ENDO  2014    Performed by Steve Islas Jr., M.D. at ENDOSCOPY [facility]    GASTROSCOPY-ENDO  2014    Performed by Antonio Espinoza M.D. at ENDOSCOPY [facility]     EXPLORATORY LAPAROTOMY  2/10/2014    Performed by Rigo Garcia M.D. at SURGERY [facility]    CHOLECYSTECTOMY  2/10/2014    Performed by Rigo Garcia M.D. at SURGERY [facility]    PANCREATECTOMY  2/10/2014    Performed by Rigo Garcia M.D. at SURGERY [facility]    GASTROSTOMY FEEDING  2/10/2014    Performed by Rigo Garcia M.D. at SURGERY [facility]    OTHER ORTHOPEDIC SURGERY      foot surgery       Family History   Problem Relation Age of Onset    Heart Disease Mother         enlarged heart, heart failure    Cancer Father         liver    No Known Problems Sister     Heart Disease Brother         heart failure    Lung Disease Brother         heavy smoker    Alcohol/Drug Brother         etoh    Cancer Paternal Aunt         breast cancer     Cancer Maternal Grandmother         liver     Heart Disease Maternal Grandfather         CHF     No Known Problems Paternal Grandmother     No Known Problems Paternal Grandfather     No Known Problems Sister     No Known Problems Sister     Stroke Sister     No Known Problems Son     No Known Problems Son     No Known Problems Son        Allergies    Allergies   Allergen Reactions    Cephalexin [Keflex] Hives, Rash, Itching and Unspecified     Photosensitivity         Medications    Current Outpatient Medications   Medication Sig Dispense Refill    vitamin D3 (CHOLECALCIFEROL) 1000 Unit (25 mcg) Tab Take 2,000 Units by mouth every day.      levothyroxine (SYNTHROID) 112 MCG Tab TAKE 1 TABLET BY MOUTH EVERY DAY IN THE MORNING ON AN EMPTY STOMACH 90 Tablet 3    amLODIPine (NORVASC) 10 MG Tab TAKE 1 TABLET BY MOUTH EVERY DAY FOR BLOOD PRESSURE (Patient taking differently: Take 10 mg by mouth every day. TAKE 1 TABLET BY MOUTH EVERY DAY FOR BLOOD PRESSURE) 100 Tablet 3    rosuvastatin (CRESTOR) 10 MG Tab TAKE 1 TABLET BY MOUTH EVERY DAY IN THE EVENING (Patient taking differently: Take 10 mg by mouth every day. TAKE 1  TABLET BY MOUTH EVERY DAY) 100 Tablet 3    losartan (COZAAR) 100 MG Tab TAKE 1 TABLET BY MOUTH EVERY DAY FOR BLOOD PRESSURE 100 Tablet 3    aspirin 81 MG EC tablet Take 81 mg by mouth every day at 6 PM.      Acetaminophen (TYLENOL 8 HOUR PO) Take 500 mg by mouth 1 time a day as needed (HA or body aches).      Alcohol Swabs Wipe site with prep pad prior to injection. 200 Each 0    ciprofloxacin (CIPRO) 500 MG Tab Take 1 Tablet by mouth 2 times a day for 5 days. (Patient not taking: Reported on 11/20/2024) 10 Tablet 0    Empagliflozin 25 MG Tab Take 1 Tablet by mouth every day. 100 Tablet 3    sulfamethoxazole-trimethoprim (BACTRIM) 400-80 MG Tab Start 1 tablet by mouth as directed every 12 hours for a total of 3 days 5 Tablet 0    Multiple Vitamins-Minerals (PRESERVISION AREDS 2) Cap Take 1 Capsule by mouth 2 times a day. (Patient not taking: Reported on 11/20/2024)      NON SPECIFIED       Insulin Pen Needle 32 G x 4 mm (BD PEN NEEDLE ANTONI 2ND GEN) USE ONE PEN NEEDLE IN PEN DEVICE TO INJECT INSULIN (Patient not taking: Reported on 11/20/2024) 200 Each 3    glucose blood (FREESTYLE LITE) strip TEST 3 TIMES A DAY or as needed for high/low blood sugar 300 Strip 0    Lancets Use one lancet to test blood sugar 3 times/day (Patient not taking: Reported on 11/20/2024) 300 Each 0    Blood Glucose Monitoring Suppl (FREESTYLE LITE) Device Test blood sugar as directed. (Patient not taking: Reported on 11/20/2024) 1 Each 0     No current facility-administered medications for this visit.       Social  Social History     Tobacco Use    Smoking status: Never    Smokeless tobacco: Never    Tobacco comments:     avoid all tobacco products   Vaping Use    Vaping status: Never Used   Substance Use Topics    Alcohol use: Not Currently     Alcohol/week: 0.0 oz     Comment: occasionally    Drug use: No        OBJECTIVE:    Vitals    BP (!) 148/76 (BP Location: Left arm, Patient Position: Sitting, BP Cuff Size: Large adult)   Pulse 88    "Ht 5' 4\"   Wt 145 lb 4.8 oz   BMI 24.94 kg/m²     Physical Exam    GENERAL: Well developed, well nourished, female in no acute distress.    HEENT: NCAT, mucus membranes moist    Neck: Supple, nontender, no JAVIER, no thyromegaly    Chest unlabored    Abdomen: Soft ND NT.    Ext: TINAJERO    : Normal Vulva, vagina with visible large bulge at vaginal introitus when laying down, appears to be grade 2-3 cystocele. Vaginal epithelium atrophic.  No lesions present. No abnormal discharge. No blood.    Area of white thickened epithelium in midline of posterior introitus extending down perineal body to anus.     Urethral meatus normal    Cervix smooth pink no lesions, abnormal discharge or blood. Cervix prolapse to mid to lower 1/3 of vaginal vault.     Uterus small midline mobile nontender prolapsed to mid vagina.     Adnexa no masses or tenderness.     Anus with white epithelium surrounding with hemorrhoids and a prolapsed vs pedunculated fleshy erythematous and friable hemorrhoid vs other lesion at anus.     Brief Procedure Note    Patient consented for vulvar biopsy.  Risks of bleeding, infection, scarring discussed.   Patient placed in dorsal lithotomy. Posterior fourchette cleansed with betadiine x 3. 1% lidocaine with epi about 1 ml injected just under discoloration. Two 4 mm biopsies adjacent to each other taken at the lesion located in midline of posterior fourchette.   Application of silver nitrate, pressure and 4-0 monocryl suture placed for hemostasis. Hemostasis observed.   Biopsy sent for pathology specimen. Patient tolerated procedure well. EBL < 1 ml.         Physical Exam  Genitourinary:              Labs/Pathology:     Latest Reference Range & Units 10/31/24 10:50   Sodium 135 - 145 mmol/L 138   Potassium 3.6 - 5.5 mmol/L 4.0   Chloride 96 - 112 mmol/L 105   Co2 20 - 33 mmol/L 16 (L)   Anion Gap 7.0 - 16.0  17.0 (H)   Glucose 65 - 99 mg/dL 153 (H)   Bun 8 - 22 mg/dL 22   Creatinine 0.50 - 1.40 mg/dL 0.99   GFR " (CKD-EPI) >60 mL/min/1.73 m 2 57 !   (L): Data is abnormally low  (H): Data is abnormally high  !: Data is abnormal   Latest Reference Range & Units 10/31/24 10:01 11/08/24 09:00 11/15/24 08:21   POC Color Negative  Yellow other light Yellow   POC Appearance Negative  Clear cloudy slightly- cloudy   POC Specific Gravity <1.005 - >1.030  1.010 1.030 1.025   POC Urine PH 5.0 - 8.0  5.5 6.0 5.5   POC Glucose Negative mg/dL 500 neg neg   POC Ketones Negative mg/dL Negative neg neg   POC Protein Negative mg/dL 30 300 300   POC Nitrites Negative  Negative neg neg   POC Leukocyte Esterase Negative  Small mod large   POC Blood Negative  Moderate mod mod   POC Bilirubin Negative mg/dL Negative neg neg   POC Urobiligen Negative (0.2) mg/dL 0.2 0.2 0.2      Latest Reference Range & Units 11/15/24 08:54   Candida species DNA Probe Negative  POSITIVE !   Gardnerella vaginalis DNA Probe Negative  Negative   Trichamonas vaginalis DNA Probe Negative  Negative   !: Data is abnormal    11/15/24      Component 5 d ago   Significant Indicator NEG   Source UR   Site URINE, CLEAN CATCH   Culture Result No growth at 48 hours.   Resulting Agency M        A/P    Patient Active Problem List   Diagnosis    Hyperlipidemia associated with type 2 diabetes mellitus (HCC)    Vitamin D deficiency    Cardiac pacemaker in situ    Atherosclerosis of aorta (HCC)    Hypothyroidism    Hypertension associated with type 2 diabetes mellitus (HCC)    Irritable bowel syndrome with diarrhea    Type 2 diabetes mellitus with other specified complication (HCC)    Anxiety and depression    Lesion of left upper eyelid    Callus of foot    Toenail fungus    Osteopenia    History of colon polyps    Sick sinus syndrome (HCC)    Microalbuminuria    Pes cavus of both feet    Nonspecific abnormal function study, cardiovascular    BMI 24.0-24.9, adult    Prolapse of female pelvic organs    Stage 3b chronic kidney disease    Mild concentric left ventricular hypertrophy        Roya Muniz    84 y.o.  with symptomatic grade 2-3 vaginal prolapse with cystocele.   Her dysuria symptoms are likely from prolapse, atrophy.   On exam, there was concern for also white discoloration starting at the introitus and extending towards anus.   Biopsy at posterior vaginal introitus taken as above to r/o malignancy.   She would be a good candidate for pessary management and she is interested in this eventually. Discussed we would need to follow up biopsy taken today before pessary can be fitted.   Hemorrhoid at anus also appears quite concerning, friable and bleeding. Referral to general surgery placed for further evaluation. Discussed with patient.     1. Vulvar adhesions  Consent for all Surgical, Special Diagnostic or Therapeutic Procedures      2. Vulvar lesion  Pathology Specimen      3. Cystocele and rectocele with incomplete uterovaginal prolapse        4. Dysuria  phenazopyridine (PYRIDIUM) 200 MG Tab      5. Ulcerated hemorrhoid  Referral to General Surgery          Time spent: 30 minutes        Daquan Melnedez M.D.    Obstetrics and Gynecology    :46 PM

## 2024-11-26 DIAGNOSIS — R30.0 DYSURIA: ICD-10-CM

## 2024-11-26 DIAGNOSIS — R39.9 UTI SYMPTOMS: ICD-10-CM

## 2024-11-26 DIAGNOSIS — N30.00 ACUTE CYSTITIS WITHOUT HEMATURIA: ICD-10-CM

## 2024-11-26 RX ORDER — SULFAMETHOXAZOLE AND TRIMETHOPRIM 400; 80 MG/1; MG/1
TABLET ORAL
Qty: 5 TABLET | Refills: 0 | OUTPATIENT
Start: 2024-11-26

## 2024-11-26 RX ORDER — CIPROFLOXACIN 500 MG/1
500 TABLET, FILM COATED ORAL 2 TIMES DAILY
Qty: 10 TABLET | Refills: 0 | OUTPATIENT
Start: 2024-11-26 | End: 2024-12-01

## 2024-11-26 NOTE — TELEPHONE ENCOUNTER
Received request via: Pharmacy    Was the patient seen in the last year in this department? Yes    Does the patient have an active prescription (recently filled or refills available) for medication(s) requested? No    Pharmacy Name: [facility]    Does the patient have retirement Plus and need 100-day supply? (This applies to ALL medications)

## 2024-11-27 ENCOUNTER — NON-PROVIDER VISIT (OUTPATIENT)
Dept: CARDIOLOGY | Facility: MEDICAL CENTER | Age: 84
End: 2024-11-27

## 2024-11-27 ENCOUNTER — NON-PROVIDER VISIT (OUTPATIENT)
Dept: CARDIOLOGY | Facility: MEDICAL CENTER | Age: 84
End: 2024-11-27
Attending: INTERNAL MEDICINE
Payer: MEDICARE

## 2024-11-27 DIAGNOSIS — Z95.0 CARDIAC PACEMAKER IN SITU: ICD-10-CM

## 2024-11-27 DIAGNOSIS — I51.7 MILD CONCENTRIC LEFT VENTRICULAR HYPERTROPHY: ICD-10-CM

## 2024-11-27 PROCEDURE — 93280 PM DEVICE PROGR EVAL DUAL: CPT | Performed by: STUDENT IN AN ORGANIZED HEALTH CARE EDUCATION/TRAINING PROGRAM

## 2024-11-27 NOTE — PROGRESS NOTES
Wound site is healing well. Pt advised to watch for increased redness, swelling, oozing or fever. Pt verbalized understanding.Normal device function and no changes made. See flowsheet.

## 2024-11-27 NOTE — CARDIAC REMOTE MONITOR - SCAN
Device transmission reviewed. Device demonstrated appropriate function.       Electronically Signed by: Brett Mc MD, PhD    11/27/2024  11:07 AM

## 2024-12-05 ENCOUNTER — GYNECOLOGY VISIT (OUTPATIENT)
Dept: GYNECOLOGY | Facility: CLINIC | Age: 84
End: 2024-12-05
Payer: MEDICARE

## 2024-12-05 VITALS
HEART RATE: 90 BPM | BODY MASS INDEX: 24.28 KG/M2 | SYSTOLIC BLOOD PRESSURE: 157 MMHG | DIASTOLIC BLOOD PRESSURE: 85 MMHG | WEIGHT: 142.2 LBS | HEIGHT: 64 IN

## 2024-12-05 DIAGNOSIS — K58.0 IRRITABLE BOWEL SYNDROME WITH DIARRHEA: ICD-10-CM

## 2024-12-05 DIAGNOSIS — N81.6 RECTOCELE: ICD-10-CM

## 2024-12-05 DIAGNOSIS — K64.8 OTHER HEMORRHOIDS: ICD-10-CM

## 2024-12-05 DIAGNOSIS — N81.2 UTEROVAGINAL PROLAPSE, INCOMPLETE: ICD-10-CM

## 2024-12-05 DIAGNOSIS — N81.10 CYSTOCELE, UNSPECIFIED: ICD-10-CM

## 2024-12-05 DIAGNOSIS — R15.9 INCONTINENCE OF FECES, UNSPECIFIED FECAL INCONTINENCE TYPE: ICD-10-CM

## 2024-12-05 DIAGNOSIS — L90.0 LICHEN SCLEROSUS: ICD-10-CM

## 2024-12-05 PROCEDURE — 99205 OFFICE O/P NEW HI 60 MIN: CPT | Performed by: STUDENT IN AN ORGANIZED HEALTH CARE EDUCATION/TRAINING PROGRAM

## 2024-12-05 RX ORDER — CLOBETASOL PROPIONATE 0.5 MG/G
OINTMENT TOPICAL
Qty: 1 EACH | Refills: 3 | Status: SHIPPED | OUTPATIENT
Start: 2024-12-05

## 2024-12-05 ASSESSMENT — FIBROSIS 4 INDEX: FIB4 SCORE: 1.36

## 2024-12-05 NOTE — PROGRESS NOTES
Urogynecology & Reconstructive Pelvic Surgery - Consultation Visit    Roya Muniz [id number]    Referred by: Daquan Melendez    Reason for Visit:   Chief Complaint   Patient presents with    Procedure         Subjective     History of Presenting Illness:  Roya Muniz is a 84 y.o. P3 pacemaker who presents for the evaluation and management of prolapse.     Reports vaginal bulge that is worse at the end of the day. Goes back with laying down. Interferes with urination, sometimes has to reduce or urine gets trapped behind. Has noticed for the last 10years. Denies any urinary leakage. Feels like sitting on a balloon because at times it does protrude very far outside vagina.     Prior Pelvic surgery:   None     Prior treatment:   None    Fluid intake:   Water: 24oz    Coffee: 1 cup   Soda: occ  Juice: sugar free every 1-3 days     Pelvic floor symptom review:     Bladder:   Voids per day: 4 Voids per night: 0-1      Urinary incontinence episodes per day: 0    Urge leakage:  None   Stress leakage: None   Continuous / insensible urine loss: No    Nocturnal enuresis: No    Leakage volume: n/a   Number of pads/day: 0    Bladder emptying: Complete   Voiding symptoms: Crede to Void   UTI in last 12 months: 0   Other urologic history: none      Prolapse:     Prolapse symptoms: Bulge   Degree of prolapse: inside vagina    Duration of prolapse symptoms: 10+yrs       Bowel:    Constipation: No   Bowel movements per day: 1-3 , stool quality: liquid (fiber, tried OTC meds but can't remember)   Straining to empty bowels: No    Splinting to evacuate: No    Painful evacuation: No    Difficulty emptying rectum: No    Incontinence to stool: Yes daily, multiple times, liquid stool    Blood in stool: No    Hemorrhoids: Yes   Bowel conditions: IBS - diarrhea    Most recent colonoscopy:  wnl        Sexual function:    Sexually active: No  no interest in the future    Gender of partners: Male   Pain with  intercourse: No   History of abuse: No        Pelvic Pain: None      Past medical and surgical history    Past obstetric history   Number of vaginal deliveries: 3   Number of  deliveries: 0   History of vacuum/forceps: No    History of obstetric anal sphincter injury: No     Past gynecological history:    Last menstrual period/Menopause: No LMP recorded. Patient is postmenopausal.   History of abnormal uterine bleeding: No    History of fibroids: No    History of endometrial polyps:  No    History of endometriosis: No    History of cervical dysplasia: No    Last pap: ~68yo wnl    Current contraception: none       Past medical history:  Past Medical History:   Diagnosis Date    Aortic valve sclerosis 2024    Metabolic acidosis 2023    Vision problem 2021    Skin lesion 2021    Neck mass 2021    Pulmonary hypertension (HCC) 2015    Stable, continue current plan of care with current medications.          History of DVT of lower extremity 2014    Hypokalemia 2014    Cardiac pacemaker in situ     Diabetes (HCC)     Type 2 Drx'd at age 80    Hyperlipidemia     Hypertension     Pancreatic cyst     Pancreatitis     Sinus node dysfunction (HCC)     Thyroid disease     hypothyroidism     Past surgical history:  Past Surgical History:   Procedure Laterality Date    GASTROSCOPY-ENDO  2014    Performed by Steve Islas Jr., M.D. at ENDOSCOPY [facility]    GASTROSCOPY-ENDO  2014    Performed by Antonio Espinoza M.D. at ENDOSCOPY [facility]    EXPLORATORY LAPAROTOMY  2/10/2014    Performed by Rigo Garcia M.D. at SURGERY [facility]    CHOLECYSTECTOMY  2/10/2014    Performed by Rigo Garcia M.D. at SURGERY [facility]    PANCREATECTOMY  2/10/2014    Performed by Rigo Garcia M.D. at SURGERY [facility]    GASTROSTOMY FEEDING  2/10/2014    Performed by Rigo Garcia M.D. at SURGERY [facility]  "   OTHER ORTHOPEDIC SURGERY      foot surgery     Medications:has a current medication list which includes the following prescription(s): phenazopyridine, empagliflozin, sulfamethoxazole-trimethoprim, vitamin d3, preservision areds 2, NON SPECIFIED, levothyroxine, amlodipine, rosuvastatin, losartan, bd pen needle zev 2nd gen, freestyle lite, lancets, aspirin, acetaminophen, alcohol swabs, and freestyle lite.  Allergies:Cephalexin [keflex]  Family history:  Family History   Problem Relation Age of Onset    Heart Disease Mother         enlarged heart, heart failure    Cancer Father         liver    No Known Problems Sister     Heart Disease Brother         heart failure    Lung Disease Brother         heavy smoker    Alcohol/Drug Brother         etoh    Cancer Paternal Aunt         breast cancer     Cancer Maternal Grandmother         liver     Heart Disease Maternal Grandfather         CHF     No Known Problems Paternal Grandmother     No Known Problems Paternal Grandfather     No Known Problems Sister     No Known Problems Sister     Stroke Sister     No Known Problems Son     No Known Problems Son     No Known Problems Son      Social history: reports that she has never smoked. She has never used smokeless tobacco. She reports that she does not currently use alcohol. She reports that she does not use drugs.    Review of systems: A full review of systems was performed, and negative with the exception of want is noted above in the HPI.        Objective        BP (!) 157/85 (BP Location: Right arm, Patient Position: Sitting, BP Cuff Size: Adult)   Pulse 90   Ht 5' 4\"   Wt 142 lb 3.2 oz   BMI 24.41 kg/m²     Physical Exam  Constitutional:       Appearance: Normal appearance. She is normal weight.   HENT:      Head: Normocephalic.      Nose: Nose normal.   Eyes:      Pupils: Pupils are equal, round, and reactive to light.   Pulmonary:      Effort: Pulmonary effort is normal.   Abdominal:      Palpations: Abdomen is " soft.   Musculoskeletal:         General: Normal range of motion.      Cervical back: Normal range of motion.   Skin:     General: Skin is warm.   Neurological:      General: No focal deficit present.      Mental Status: She is alert.   Psychiatric:         Mood and Affect: Mood normal.         Genitourinary:    Vulva: Atrophic   Bulbocavernosus reflex: Intact   Anal wink reflex: Intact   Perineal sensation: WNL   Urethra: WNL   Vagina: Atrophic   Atrophy: Moderate   Cough stress test: Negative     Chaperone was present throughout the physical exam.     Pelvic floor:    POP-Q: minimally valsalva effort throughout exam  Aa 0 Ba 0 C -7   GH 4 PB 1.5 TVL 8.5   Ap -2 Bp -2 D -7      Prolapse stage: 2   Cervical elongation: No    Urethral tenderness: No    Bladder/ suprapubic tenderness: No    Levator tenderness: None   Levator muscle tone: WNL   Pelvic floor contraction strength (modified Oxford scale): 1=Flicker   Pelvic floor contraction duration: Brief    Bimanual exam: Small, Mobile Uterus   Rectal: multiple external hemorrhoids and red internal hemorrhoid prolapsing out of rectum, nontender, nonfriable. White thickened perianal plaque at 1:00.    Vaginal band/stricture: No     Procedure Performed: No    Chaperone was present throughout the physical exam.     Diagnostic test and records review:    Urine dipstick: n/a      Post-void residual: n/a    Labs:   Glycohemoglobin   Date Value Ref Range Status   10/31/2024 7.0 (A) <=5.8 % Final       Lab Results   Component Value Date/Time    SODIUM 138 10/31/2024 1050    POTASSIUM 4.0 10/31/2024 1050    CHLORIDE 105 10/31/2024 1050    CO2 16 (L) 10/31/2024 1050    GLUCOSE 153 (H) 10/31/2024 1050    BUN 22 10/31/2024 1050    CREATININE 0.99 10/31/2024 1050    CALCIUM 9.7 10/31/2024 1050    ANION 17.0 (H) 10/31/2024 1050       Lab Results   Component Value Date/Time    WBC 7.6 10/11/2024 09:32 AM    RBC 5.00 10/11/2024 09:32 AM    HEMOGLOBIN 15.2 10/11/2024 09:32 AM    MCV  90.4 10/11/2024 09:32 AM    MCH 30.4 10/11/2024 09:32 AM    MCHC 33.6 10/11/2024 09:32 AM    RDW 44.4 10/11/2024 09:32 AM    MPV 9.6 10/11/2024 09:32 AM         Radiology: None    Procedural: None    Documentation reviewed: Prior EMR Records    Outside records reviewed: 0 pages      Assessment & Plan     Roya Muniz is a 84 y.o. P3 with at least S2POP, lichen sclerosis, hemorrhoids and IBS-D. We discussed my recommendations for further diagnosis and treatment at length today.     Assessment & Plan  Uterovaginal prolapse, incomplete  Cystocele  Rectocele  She has symptomatic pelvic organ prolapse, anterior predominant.  In most cases, this is not a dangerous condition that necessitates treatment in all patients, but treatment is offered when it impacts quality of life, bladder function, or bowel function.  I reviewed the clinical findings and discussed the pathogenesis extensively, including genetic tendency, aging, menopause and childbirth injuries. We discussed options for management, including both nonsurgical and surgical options.   Nonsurgical options include both expectant management, pelvic floor physical therapy to prevent progression, and pessary use. I discussed different types of pessary as well as pessary care.   We reviewed all surgical options including both vaginal and laparoscopic reconstructive approaches, and those using native tissue (non-mesh) and mesh augmented approaches.  We also discussed uterine-sparing approaches and those which involve hysterectomy. We discussed recurrent/retreatment risk for all surgical approaches.  Native tissue (nonmesh) approaches have a retreatment rate in excess of 20% long-term, and this is reduced with mesh augmentation (approximately 5-8%).  Current forms of surgical mesh have been extensively evaluated for their safety, but there is a 1 to 5% risk long-term of mesh complications, the majority of which include mesh exposure, which was discussed with  her.  She had poor valsalva mounted during exam so I suspect her prolapse is more severe than what I saw today, especially because she mentions it can protrude ~4in outside her vagina.   She is most interested in definitive management with an obliterative procedure as she does not want to be sexually active in the future and declines pessary. Given that I suspect her prolapse to be more severe, her age and her comorbidities, I believe a  Lefort colpocleisis would be a reasonable option, however if not possible would consider vaginal vault hysteropexy. She has had no PMB and no abnormal pap smears. Pt understands she would need clearance from cardiology given her pacemaker and A1c <8% prior to proceeding with elective surgery,.   Recommend urodynamics for further evaluation of bladder function.          Lichen sclerosus  Pt has evidence of LS on exam and biopsy performed by Dr. Melendez confirms diagnosis. Recommend starting clobetasol daily with taper on vulva, perineum, perianal area.         Other hemorrhoids  Pt has significant hemorrhoids on exam, though do not appear thrombosed. Has appt with surgeon regarding this coming up.         Incontinence of feces, unspecified fecal incontinence type  Irritable bowel syndrome with diarrhea  The patient suffers from fecal incontinence, predominantly passive. I discussed with the patient possible etiologies and treatments related to her diagnosis, including:   Diet: Instructions were given to experiment with her diet to see if certain foods worsen the situation. In particular, an excessive high fiber diet (too much bran, cereal, fruit, etc.), too much caffeine or alcohol and a lot of artificial sweeteners can worsen fecal incontinence. I advised the patient to avoid foods and supplements that have laxative properties which may exacerbate fecal incontinence symptoms.   Behavioral: Management of fecal incontinence can be helped by ensuring bowel movements at regular time(s)  during the day. Correct positioning when emptying bowels and how to empty the bowels without straining was reviewed. Kegel exercises were also reviewed.   Medications for stool bulking: The patient was also instructed on the use of Loperamide OTC, starting at a low dose (half tablet) and titrating up, holding for constipation.  Physical therapy: I offered patient pelvic floor physical therapy with or without biofeedback which can improve pelvic floor function and possibly improve incontinence symptoms.   Surgical: We discussed the full range of possible surgical interventions including sacral neuromodulation and revision of the anal sphincter.   Plan: recommend adding fiber back to diet to bulk up stool. Also recommend referral to GI for IBS-D management, but pt declines at this time. Would like to start with fiber first.              Savannah Snow MD  Urogynecology and Reconstructive Pelvic Surgery  Department of Obstetrics and Gynecology  [facility]

## 2024-12-05 NOTE — PROGRESS NOTES
PT here today for pessary fitting  PT States  Hysterectomy: no  Good #: [phone]   Pharmacy Verified

## 2024-12-11 ENCOUNTER — OFFICE VISIT (OUTPATIENT)
Dept: SURGICAL ONCOLOGY | Facility: MEDICAL CENTER | Age: 84
End: 2024-12-11
Payer: MEDICARE

## 2024-12-11 VITALS
OXYGEN SATURATION: 98 % | SYSTOLIC BLOOD PRESSURE: 152 MMHG | DIASTOLIC BLOOD PRESSURE: 80 MMHG | HEIGHT: 64 IN | TEMPERATURE: 98.3 F | BODY MASS INDEX: 24.31 KG/M2 | HEART RATE: 85 BPM | WEIGHT: 142.4 LBS

## 2024-12-11 DIAGNOSIS — N81.9 FEMALE GENITAL PROLAPSE, UNSPECIFIED TYPE: ICD-10-CM

## 2024-12-11 DIAGNOSIS — K62.0 ANAL POLYP: ICD-10-CM

## 2024-12-11 PROCEDURE — 3077F SYST BP >= 140 MM HG: CPT | Performed by: SURGERY

## 2024-12-11 PROCEDURE — 3079F DIAST BP 80-89 MM HG: CPT | Performed by: SURGERY

## 2024-12-11 PROCEDURE — 99214 OFFICE O/P EST MOD 30 MIN: CPT | Performed by: SURGERY

## 2024-12-11 ASSESSMENT — FIBROSIS 4 INDEX: FIB4 SCORE: 1.36

## 2024-12-11 NOTE — PROGRESS NOTES
Subjective:   12/11/2024  7:43 AM  Primary care physician:Sho Rodriguez D.O.  Urogynecologist: Savannah Snow      Chief Complaint:   Chief Complaint   Patient presents with    New Patient     hemorrhoids  does not remember when last colonoscopy was          History of presenting illness:  Roya Muniz  is a pleasant 84 y.o. year old female who presented with en enlarged hemorrhoid in the setting of ongoing pelvic floor prolapse.  She has been seen and evaluated already by Dr Snow and is planning to have an elective surgery repair of her rectocele and cystocele in the coming weeks.  She was noted on recent exam to have an enlarged prolapse hemorrhoid though and was referred to colorectal surgery for evaluation and possible joint surgery.  Pt states that the is not terribly symptomatic from the hemorrhoid but she does notice it sticking out a lot of the time.  She has some chronic issues with straining and difficulty defecating due to her baseline IBD as well.  She has been coached to add fiber to her diet to help with this.      Her last colonoscopy was completed 6 years ago and she did not have any neoplasms or polyps noted at that time.  She was not recommended to continue screening due to her age.          Past Medical History:   Diagnosis Date    Aortic valve sclerosis 05/08/2024    Cardiac pacemaker in situ     Diabetes (HCC)     Type 2 Drx'd at age 80    History of DVT of lower extremity 06/05/2014    Hyperlipidemia     Hypertension     Hypokalemia 01/12/2014    Metabolic acidosis 09/13/2023    Neck mass 03/12/2021    Pancreatic cyst     Pancreatitis     Pulmonary hypertension (HCC) 06/18/2015    Stable, continue current plan of care with current medications.          Sinus node dysfunction (HCC)     Skin lesion 03/12/2021    Thyroid disease     hypothyroidism    Vision problem 03/12/2021     Past Surgical History:   Procedure Laterality Date    GASTROSCOPY-ENDO  2/21/2014    Performed by Steve Islas Jr.,  M.D. at ENDOSCOPY [facility]    GASTROSCOPY-ENDO  2/20/2014    Performed by Antonio Espinoza M.D. at ENDOSCOPY [facility]    EXPLORATORY LAPAROTOMY  2/10/2014    Performed by Rigo Garcia M.D. at SURGERY [facility]    CHOLECYSTECTOMY  2/10/2014    Performed by Rigo Garcia M.D. at SURGERY [facility]    PANCREATECTOMY  2/10/2014    Performed by Rigo Garcia M.D. at SURGERY [facility]    GASTROSTOMY FEEDING  2/10/2014    Performed by Rigo Garcia M.D. at SURGERY [facility]    OTHER ORTHOPEDIC SURGERY      foot surgery     Allergies   Allergen Reactions    Cephalexin [Keflex] Hives, Rash, Itching and Unspecified     Photosensitivity       Current Outpatient Medications   Medication Sig    clobetasol (TEMOVATE) 0.05 % Ointment Place small amount to external vulva twice per day for 1 month, once per day for one month, then twice per week.    phenazopyridine (PYRIDIUM) 200 MG Tab Take 1 Tablet by mouth 3 times a day as needed for Moderate Pain.    Empagliflozin 25 MG Tab Take 1 Tablet by mouth every day.    sulfamethoxazole-trimethoprim (BACTRIM) 400-80 MG Tab Start 1 tablet by mouth as directed every 12 hours for a total of 3 days (Patient not taking: Reported on 12/11/2024)    vitamin D3 (CHOLECALCIFEROL) 1000 Unit (25 mcg) Tab Take 2,000 Units by mouth every day.    Multiple Vitamins-Minerals (PRESERVISION AREDS 2) Cap Take 1 Capsule by mouth 2 times a day. (Patient not taking: Reported on 11/20/2024)    NON SPECIFIED     levothyroxine (SYNTHROID) 112 MCG Tab TAKE 1 TABLET BY MOUTH EVERY DAY IN THE MORNING ON AN EMPTY STOMACH    amLODIPine (NORVASC) 10 MG Tab TAKE 1 TABLET BY MOUTH EVERY DAY FOR BLOOD PRESSURE (Patient taking differently: Take 10 mg by mouth every day. TAKE 1 TABLET BY MOUTH EVERY DAY FOR BLOOD PRESSURE)    rosuvastatin (CRESTOR) 10 MG Tab TAKE 1 TABLET BY MOUTH EVERY DAY IN THE EVENING (Patient taking differently: Take 10 mg  by mouth every day. TAKE 1 TABLET BY MOUTH EVERY DAY)    losartan (COZAAR) 100 MG Tab TAKE 1 TABLET BY MOUTH EVERY DAY FOR BLOOD PRESSURE    glucose blood (FREESTYLE LITE) strip TEST 3 TIMES A DAY or as needed for high/low blood sugar    aspirin 81 MG EC tablet Take 81 mg by mouth every day at 6 PM.    Acetaminophen (TYLENOL 8 HOUR PO) Take 500 mg by mouth 1 time a day as needed (HA or body aches).    Alcohol Swabs Wipe site with prep pad prior to injection.     Social History     Socioeconomic History    Marital status:      Spouse name: Not on file    Number of children: Not on file    Years of education: Not on file    Highest education level: Not on file   Occupational History    Not on file   Tobacco Use    Smoking status: Never    Smokeless tobacco: Never    Tobacco comments:     avoid all tobacco products   Vaping Use    Vaping status: Never Used   Substance and Sexual Activity    Alcohol use: Not Currently     Alcohol/week: 0.0 oz     Comment: occasionally    Drug use: No    Sexual activity: Not Currently     Partners: Male   Other Topics Concern    Not on file   Social History Narrative    Worked as a , 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 Is , has a son in [place], one in [place] and one in [place]. She enjoyed riding quads with her  and gardening. No tob, drugs and only rare etoh.      Social Drivers of Health     Financial Resource Strain: Low Risk  (4/12/2021)    Overall Financial Resource Strain (CARDIA)     Difficulty of Paying Living Expenses: Not hard at all   Food Insecurity: No Food Insecurity (4/12/2021)    Hunger Vital Sign     Worried About Running Out of Food in the Last Year: Never true     Ran Out of Food in the Last Year: Never true   Transportation Needs: No Transportation Needs (4/12/2021)    PRAPARE - Transportation     Lack of Transportation (Medical): No     Lack of Transportation (Non-Medical): No   Physical Activity: Not on file   Stress: Not  "on file   Social Connections: Not on file   Intimate Partner Violence: Not on file   Housing Stability: Not on file      Family History   Problem Relation Age of Onset    Heart Disease Mother         enlarged heart, heart failure    Cancer Father         liver    No Known Problems Sister     Heart Disease Brother         heart failure    Lung Disease Brother         heavy smoker    Alcohol/Drug Brother         etoh    Cancer Paternal Aunt         breast cancer     Cancer Maternal Grandmother         liver     Heart Disease Maternal Grandfather         CHF     No Known Problems Paternal Grandmother     No Known Problems Paternal Grandfather     No Known Problems Sister     No Known Problems Sister     Stroke Sister     No Known Problems Son     No Known Problems Son     No Known Problems Son        ROS: Negative except as detailed in HPI.     Objective:   BP (!) 152/80 (BP Location: Left arm, Patient Position: Sitting, BP Cuff Size: Adult)   Pulse 85   Temp 36.8 °C (98.3 °F) (Temporal)   Ht 1.626 m (5' 4\")   Wt 64.6 kg (142 lb 6.4 oz)   SpO2 98%   BMI 24.44 kg/m²     Physical Exam:  Constitutional: Well-developed and well-nourished. Not diaphoretic. No distress.   Skin: Skin is warm and dry. No rash noted.  Head: Atraumatic without lesions.  Eyes: Conjunctivae and extraocular motions are normal. No scleral icterus.   Nose: Nares patent. Septum midline. No discharge.   Neck: Supple, trachea midline. Normal range of motion. No thyromegaly present. No lymphadenopathy--cervical or supraclavicular.  Cardiovascular: Regular rate and rhythm, 2+ pulses   Lungs: Effort normal. No chest wall masses.   Abdomen: Soft, non tender, and without distention. No rebound, guarding, masses.  Rectal:  Grade III hemorrhoid present in the right posterior position with polyp on the hemorrhoid column, 2x1cm.  Reducible, no firm masses or fixation noted.  No rectal prolapse.  Hemorrhoidal skin tags also present.  There is a large " cystocele present with valsalva.  Extremities: Warm and well perfused, no pitting edema  Musculoskeletal: All major joints AROM full in all directions without pain.  Neurological: Alert and oriented x 3.   Psychiatric:  Behavior, mood, and affect are appropriate.        Diagnosis:     Assessment & Plan  Anal polyp         Female genital prolapse, unspecified type               Medical Decision Making:  Today's Assessment / Status / Plan:     84y F here with prolapsing hemorrhoid with an associated polyp.  Due to the risk of malignant degeneration, this should be removed with hemorrhoidectomy.  We discussed the planned surgery in detail.  Risks/benefits/alternatives of planned surgery were discussed with the patient.  They understand issues involved and agree with the surgical treatment plan.  Will arrange for surgery at the next available time.    This procedure can be performed at the same time as her planned pelvic floor surgery.  It may add some pain, bleeding and post-op discomfort to her recovery which she understands.  Will contact Dr Snow and try to co-ordinate surgery for the same anesthesia.    Hemorrhoidectomy  Lithotomy

## 2024-12-11 NOTE — PATIENT INSTRUCTIONS
High fiber diet recommended now and after surgery.  Goal of 30G of fiber a day, take a daily fiber supplement as well.    Warm baths recommended for pain and to decrease inflammation after surgery.

## 2024-12-12 ENCOUNTER — TELEPHONE (OUTPATIENT)
Dept: CARDIOLOGY | Facility: MEDICAL CENTER | Age: 84
End: 2024-12-12
Payer: MEDICARE

## 2024-12-12 NOTE — TELEPHONE ENCOUNTER
Last OV: 05.22.2024  Proposed Surgery: LeFort Colpocleisis, Cystourethroscopy, Possible Extraperitoneal Vaginal Colopexy, Possible Anterior Repair, Possible Posterior Repair, Perineorrhaphy, Possible Mid Urethral Sling  Surgery Date: TBD  Requesting Office Name: [facility]  Fax Number: [phone]  Preference of Location (default is surgery center unless specified by Cardiologist or NOA)  Prior Clearance Addressed: No    Is this a general clearance? YES   Anticoags/Antiplatelets: Aspirin  Anticoags/Antiplatelet managed by Cardiology? YES    Outstanding Cardiac Imaging : No  Ablation, Cardioversion, Stent, Cardiac Devices, Catheterization, Watchman: Yes  Within the last 6 months. Forward to provider to review.  TAVR/Valve, Mitral Clip, Watchman (including open heart),: N/A   Recent Cardiac Hospitalization: No            When: N/A  History (cardiac history):   Past Medical History:   Diagnosis Date    Aortic valve sclerosis 05/08/2024    Cardiac pacemaker in situ     Diabetes (HCC)     Type 2 Drx'd at age 80    History of DVT of lower extremity 06/05/2014    Hyperlipidemia     Hypertension     Hypokalemia 01/12/2014    Metabolic acidosis 09/13/2023    Neck mass 03/12/2021    Pancreatic cyst     Pancreatitis     Pulmonary hypertension (HCC) 06/18/2015    Stable, continue current plan of care with current medications.          Sinus node dysfunction (HCC)     Skin lesion 03/12/2021    Thyroid disease     hypothyroidism    Vision problem 03/12/2021           Is this a dental clearance? NO  Ablation, Cardioversion, Watchman, Stents, Cath, Devices within the last 3 months? No   If yes- Send dental wait letter, do not forward to provider for review.     TAVR / Valve, Mitral clip within the last 6 months? No  If yes- Send dental wait letter, do not forward to provider for review.     If completing a general clearance, continue per protocol.           Surgical Clearance Letter Sent: No Provider to advise.    **Scan clearance request letter into [facility].**

## 2024-12-12 NOTE — LETTER
PROCEDURE/SURGERY CLEARANCE FORM  Encounter Date: 12/12/2024    Patient: Roya Muniz  YOB: 1940  CARDIOLOGIST:  ISABELLE Moran.    REFERRING DOCTOR:  No ref. provider found    The procedure/surgery: LeFort Colpocleisis, Cystourethroscopy, Possible Extraperitoneal Vaginal Colopexy, Possible Anterior Repair, Possible Posterior Repair, Perineorrhaphy, Possible Mid Urethral Sling                                            Additional comments:   Patient is clear for surgery. Patient is at low risk for low to moderate risk surgery. No cardiac testing needs to be completed prior to surgery.     PROCEDURE/SURGERY CLEARANCE FORM  Date: 1/7/2025   Patient Name: Roya Muniz    Dear Surgeon or Proceduralist,      Thank you for your request for cardiac stratification of our mutual patient Roya Muniz 1940. We have reviewed their [facility] records; and to the best of our understanding this patient has not had stenting, ablation, watchman, cardiothoracic surgery or hospitalization for cardiovascular reasons in the past 6 months.  Roya Muniz has been seen within the past 15 months and is considered to have non-modifiable cardiac risk for this low-risk procedure/surgery. They may proceed from a cardiovascular standpoint and may hold their antiplatelet/anticoagulation as briefly as possible. Please have patient resume this medication when hemodynamically stable to do so.     Aspirin or Prasugrel   - hold 7 days prior to procedure/surgery, resume when hemodynamically stable      Clopidrogrel or Ticagrelor  - hold 7 days for all neurological procedures, hold 5 days prior to all other procedure/surgery,  resume when hemodynamically stable     Warfarin - hold 7 days for all neurological procedures, hold 5 days prior to all other procedure/surgery and coordinate with [facility] ([phone]) INR testing and dose management.       Pradaxa/Xarelto/Eliquis/Savesya - hold 1 day prior to procedure for low bleeding risk procedure, 2 days for high bleeding risk procedure, or consider holding 3 days or longer for patients with reduced kidney function (CrCl <30mL/min) or spinal/cranial surgeries/procedures.      If they have a mechanical heart valve, please coordinate with [facility] ([phone]) the proper management of their anticoagulant in the periprocedural or perioperative period.      Some patients have higher risk for cardiovascular complications or holding medication. If our patient has had prior complications of holding antiplatelet or anticoagulants in the past and we have seen them after these events, we have addressed these concerns with the patient. They are at an unknown degree of increased risk for recurrent complication.  You may hold anticoagulation/antiplatelets for the procedure or surgery if the benefits of the procedure or surgery outweigh this nonmodifiable risk.      If Roya Muniz 1940 has new symptoms of heart failure decompensation, unstable arrythmia, or angina please reach out and we will assess the patient.      If you have other patient-specific concerns, please feel free to reach out to the patient's cardiologist directly at [phone].     Thank you,       [facility]              Electronically Signed        MD Signature   ISABELLE Moran.

## 2024-12-16 ENCOUNTER — TELEPHONE (OUTPATIENT)
Dept: OBGYN | Facility: CLINIC | Age: 84
End: 2024-12-16
Payer: MEDICARE

## 2024-12-16 NOTE — TELEPHONE ENCOUNTER
----- Message from Physician Daquan Melendez M.D. sent at 12/13/2024  1:07 PM PST -----  Biopsy showing lichen sclerosus. Please schedule a follow up visit with me. Thanks      Called pt and informed as above. Pt stated Dr. Snow already prescribed her Clobetasol ointment and she doesn't understand why Dr. Melendez wants to see her. Explained to pt Dr. Melendez would like to f/u with her after the biopsy. Explained to pt this RN can message Dr. Melendez and verified if she is recommending a f/u any ways since Dr. Snow already Rx'd the ointment and then this RN will call pt back to notified. Explained to pt we weill kepp her appt for 1/14/25 at 1530 for now until we hear from Dr. Melendez. Pt agreed and verbalized understanding.   Message sent to Dr. Melendez.     12/17/24  1520 Consulted with Dr. Melendez and advised for pt to f/u in 2-3 months.   1527 Called pt back and notified as above. Pt stated she will have a procedure with Dr. Snow late January or early February 2025. Explained too pt she could f/u with Dr. Snow about how the ointment is work at the same time. Pt agreed and verbalized understanding and will also keep in touch with Dr. Melendez.

## 2025-01-07 ENCOUNTER — TELEPHONE (OUTPATIENT)
Dept: CARDIOLOGY | Facility: MEDICAL CENTER | Age: 85
End: 2025-01-07
Payer: MEDICARE

## 2025-01-15 ENCOUNTER — NON-PROVIDER VISIT (OUTPATIENT)
Dept: CARDIOLOGY | Facility: MEDICAL CENTER | Age: 85
End: 2025-01-15
Payer: MEDICARE

## 2025-01-15 PROCEDURE — 93294 REM INTERROG EVL PM/LDLS PM: CPT | Performed by: INTERNAL MEDICINE

## 2025-01-27 NOTE — PROCEDURES
Procedure note: Complex urodynamic testing    Procedure performed:    -     13413 Complex Uroflowmetry  02173 Complex CMG w/ voiding pressure study AND urethral pressure  84548 EMG studies anal or urethral sphincter   57375 Intraabdominal catheter       Indication: Roya Muniz is a 84 y.o. year old with Overactive bladder.   Bladder symptoms:               Voids per day: 4          Voids per night: 0-1                 Urinary incontinence episodes per day: 0               Urge leakage:  None              Stress leakage: None              Continuous / insensible urine loss: No               Nocturnal enuresis: No               Leakage volume: n/a              Number of pads/day: 0               Bladder emptying: Complete              Voiding symptoms: Crede to Void              UTI in last 12 months: 0              Other urologic history: none    She presents for complex urodynamic testing today to fully elucidate her bladder function and symptom pathophysiology, in order to guide further treatment, and to evaluate if an anti-incontinence procedure is indicated at her upcoming prolapse repair.     Verbal consent was obtained after review of risk and benefit.     Chaperone:  Javier Fernandez MA, Sanjuanita Wallace MA    Procedure: The patient was taken to the urodynamic suite and placed in the urodynamic chair. She underwent sterile prep with betadine prior to catheterization. There was a negative urinalysis. Air-charged catheters were placed in the urethra/bladder and vagina. Urodynamics were performed using routine techniques. Prolapse was reduced with a cotton scopette for stress testing. There were no complications.     Antibiotic given: None    Urodynamic findings:     Preliminary Uroflometry     Flow pattern: Patient was unable to void  Post-void residual: 319 mL    Filling cystometrogram    First sensation: 39 mL  First desire: 112 mL  Strong Desire: 209 mL  Urodynamic capacity: 323 mL    Stress leakage: No  Uninhibited detrusor contractions present: No  Leakage with DO: No  Leak point pressures  At 150mL volume: 63 cm H2O, no leak with cough or valsalva  At 300mL volume: 71 cm H2O, no leak with or without urethral catheter with cough or valsalva  Compliance: Normal    Urethral pressure profile    Maximum urethral closing pressure (MUCP): 48 cm H2O  Morphology: Weaker    Pressure voiding study    The patient's voiding mechanism was accomplished by detrusor contraction  Max flow: 30 mL/sec  Average flow: 7.2 mL/sec  Post-void residual: 0 mL  Pdet at peak flow: 14 cm H2O  Flow time: 47 sec  Pelvic floor EMG silenced during voiding: Yes    Pelvic floor EMG: Normal       UDS procedure performed by HOLLI Grayson RNFA      Assessment:     She has completed urodynamic testing, which was uncomplicated.     Filling Phase: Normal sensation. Normal compliance. Absent DO. Absent UDSI. Normal capacity.  Voiding Phase: Voids via detrusor. PVR normal (0). Normal voiding pressures, no evidence of obstruction.      Plan:   See office encounter for full procedural counseling  Counseled on normal post-UDS symptoms including burning and possible hematuria. If this persists after 2 days she should call or send [email or web address] message.       Savannah Snow MD  Urogynecology and Reconstructive Pelvic Surgery  Department of Obstetrics and Gynecology  [facility]

## 2025-01-29 ENCOUNTER — OFFICE VISIT (OUTPATIENT)
Dept: MEDICAL GROUP | Facility: LAB | Age: 85
End: 2025-01-29
Payer: MEDICARE

## 2025-01-29 VITALS
WEIGHT: 139 LBS | BODY MASS INDEX: 23.73 KG/M2 | DIASTOLIC BLOOD PRESSURE: 78 MMHG | HEART RATE: 84 BPM | OXYGEN SATURATION: 96 % | RESPIRATION RATE: 20 BRPM | TEMPERATURE: 97.5 F | HEIGHT: 64 IN | SYSTOLIC BLOOD PRESSURE: 124 MMHG

## 2025-01-29 DIAGNOSIS — E78.5 HYPERLIPIDEMIA ASSOCIATED WITH TYPE 2 DIABETES MELLITUS (HCC): ICD-10-CM

## 2025-01-29 DIAGNOSIS — E11.69 TYPE 2 DIABETES MELLITUS WITH OTHER SPECIFIED COMPLICATION, WITHOUT LONG-TERM CURRENT USE OF INSULIN (HCC): ICD-10-CM

## 2025-01-29 DIAGNOSIS — N18.31 STAGE 3A CHRONIC KIDNEY DISEASE: ICD-10-CM

## 2025-01-29 DIAGNOSIS — E11.59 HYPERTENSION ASSOCIATED WITH TYPE 2 DIABETES MELLITUS (HCC): ICD-10-CM

## 2025-01-29 DIAGNOSIS — E11.69 HYPERLIPIDEMIA ASSOCIATED WITH TYPE 2 DIABETES MELLITUS (HCC): ICD-10-CM

## 2025-01-29 DIAGNOSIS — I49.5 SICK SINUS SYNDROME (HCC): ICD-10-CM

## 2025-01-29 DIAGNOSIS — I15.2 HYPERTENSION ASSOCIATED WITH TYPE 2 DIABETES MELLITUS (HCC): ICD-10-CM

## 2025-01-29 DIAGNOSIS — L84 CALLUS OF FOOT: ICD-10-CM

## 2025-01-29 ASSESSMENT — ENCOUNTER SYMPTOMS
NAUSEA: 0
ABDOMINAL PAIN: 0
DIARRHEA: 0
FEVER: 0
CHILLS: 0
VOMITING: 0
COUGH: 0
SHORTNESS OF BREATH: 0
WEIGHT LOSS: 1

## 2025-01-29 ASSESSMENT — PATIENT HEALTH QUESTIONNAIRE - PHQ9: CLINICAL INTERPRETATION OF PHQ2 SCORE: 0

## 2025-01-29 ASSESSMENT — FIBROSIS 4 INDEX: FIB4 SCORE: 1.36

## 2025-01-29 NOTE — PATIENT INSTRUCTIONS
Ideal blood pressure <130/80 and at least <140/90.  If <100 for the top number we may be over treating.  Severe range >180/120, with symptoms=ER    Ideal glucose ranges: fasting (8-10 hours) , random (1-2 hours after eating) <180. If glucose <70, we need to adjust your medication to prevent continuing low blood sugar.     fasting labs due next week

## 2025-01-29 NOTE — PROGRESS NOTES
Verbal consent was acquired by the patient to use [employer] ambient listening note generation during this visit Yes.    Subjective:     Chief Complaint   Patient presents with    Diabetes Follow-up     HPI:     History of Present Illness  The patient is an 84-year-old female who presents for a diabetes follow-up.    She reports overall good health but has not been monitoring her blood glucose levels recently. She recalls a reading of 230 during midday a few weeks ago. She notes that her blood glucose levels were well-controlled when she was on insulin, she prior would check her glucose three times daily. She attributes this control to the insulin dose. She expresses a preference for completing her current Jardiance prescription, which will last for another 1.5 to 2 months. She reports no changes in her dietary habits, which include frequent salad consumption and avoidance of fast food. She has experienced a weight loss of 5 pounds over the past month.     She has a history of urinary tract infection (UTI) in 10/2024, which she believes was due to medication given during a pacemaker check. She reports no current urinary issues and is scheduled for a urology appointment next week. She does not experience urinary leakage, frequent urination, painful urination, sudden strong urges to urinate, problems starting a urine stream, or problems emptying the bladder. She has a prolapsed uterus and vagina, which occasionally obstructs urination until the prolapse is repositioned. She is under the care of urology and general surgery.      She reports no foot pain and has been under the care of a podiatrist for callus management and uses wide-toe box shoes due to bunions. She discontinued a callus cream due to associated pain.    Her blood pressure has been consistently elevated, with home readings reaching as high as 135. She is currently on losartan and amlodipine for blood pressure management. She does not take metoprolol  "anymore because it was canceled at both of the pharmacies.    Review of Systems   Constitutional:  Positive for weight loss. Negative for chills, fever and malaise/fatigue.   Respiratory:  Negative for cough and shortness of breath.    Cardiovascular:  Negative for chest pain.   Gastrointestinal:  Negative for abdominal pain, diarrhea, nausea and vomiting.   Genitourinary:  Negative for dysuria, frequency, hematuria and urgency.   Skin:  Negative for rash.     Objective:     Exam:  /78 (BP Location: Left arm, Patient Position: Sitting, BP Cuff Size: Adult)   Pulse 84   Temp 36.4 °C (97.5 °F) (Temporal)   Resp 20   Ht 1.626 m (5' 4\")   Wt 63 kg (139 lb)   SpO2 96%   BMI 23.86 kg/m²  Body mass index is 23.86 kg/m².    Physical Exam  Vitals reviewed.   Constitutional:       General: She is not in acute distress.     Appearance: Normal appearance. She is not ill-appearing.   HENT:      Head: Normocephalic and atraumatic.      Nose: Nose normal.      Mouth/Throat:      Mouth: Mucous membranes are moist.   Eyes:      Conjunctiva/sclera: Conjunctivae normal.   Cardiovascular:      Rate and Rhythm: Normal rate and regular rhythm.      Pulses:           Dorsalis pedis pulses are 2+ on the right side and 2+ on the left side.        Posterior tibial pulses are 2+ on the right side and 2+ on the left side.      Heart sounds: Normal heart sounds. No murmur heard.  Pulmonary:      Effort: Pulmonary effort is normal. No respiratory distress.      Breath sounds: Normal breath sounds. No stridor. No wheezing, rhonchi or rales.   Musculoskeletal:      Cervical back: Normal range of motion and neck supple. No rigidity or tenderness.      Right lower leg: No edema.      Left lower leg: No edema.   Feet:      Comments: Monofilament testing with a 10 gram force: sensation intact: intact bilaterally  Visual Inspection: Feet without maceration, ulcers, fissures.  Calluses noted bilaterally, mild.  Left bunion.  Pedal pulses: " intact bilaterally    Skin:     General: Skin is warm and dry.   Neurological:      General: No focal deficit present.      Mental Status: She is alert and oriented to person, place, and time.   Psychiatric:         Mood and Affect: Mood normal.         Behavior: Behavior normal.         Thought Content: Thought content normal.       Labs: Reviewed from 2/13/2024, 10/31/2024    Assessment & Plan:     84 y.o. female with the following -     1. Type 2 diabetes mellitus with other specified complication, without long-term current use of insulin ([facility])  Chronic, last A1C 7%. Her A1c level is not yet due for evaluation. She reported a blood sugar level of 230 mg/dL a couple of weeks ago indicating potential lack of control.  Previously was on insulin but switched to Jardiance given CKD with microalbuminuria.  Her kidney function improved in October 2024. She has experienced a weight loss of 5 pounds over the past month, which could indicate uncontrolled diabetes.  Patient advised to monitor her blood sugars, discussed ideal ranges.  She is encouraged to maintain a healthy diet and exercise regimen.  Will plan to continue Jardiance regardless of A1c results unless contraindications develop, currently without UTI symptoms.  If her A1c is above 7, she may need to consider adding another medication or returning to insulin therapy-attempted to discuss options but patient seemed quite overwhelmed so we will come up with a plan pending the results. A comprehensive set of fasting labs will be ordered, including an A1c test, to be conducted after 01/31/2025.  Will follow-up at least every 6 months, sooner pending A1c results.  - Comp Metabolic Panel; Future  - Lipid Profile; Future  - CBC WITH DIFFERENTIAL; Future  - MICROALBUMIN CREAT RATIO URINE; Future  - HEMOGLOBIN A1C; Future    Empagliflozin 25 MG Tab, Take 1 Tablet by mouth every day., Disp: 100 Tablet, Rfl: 3    2. Hypertension associated with type 2 diabetes mellitus  (HCC)  Chronic, controlled. Continue below medication(s). Discussed ideal ranges/return precautions.  Monitor in clinic at least every 6 months.    amLODIPine (NORVASC) 10 MG Tab, TAKE 1 TABLET BY MOUTH EVERY DAY FOR BLOOD PRESSURE     losartan (COZAAR) 100 MG Tab, TAKE 1 TABLET BY MOUTH EVERY DAY FOR BLOOD PRESSURE, Disp: 100 Tablet, Rfl: 3    3. Hyperlipidemia associated with type 2 diabetes mellitus (HCC)  Chronic, controlled. Continue medication(s) as below.  Trend labs annually.  - TSH WITH REFLEX TO FT4; Future    rosuvastatin (CRESTOR) 10 MG Tab, TAKE 1 TABLET BY MOUTH EVERY DAY IN THE EVENING     4. Stage 3a chronic kidney disease  Chronic condition, improved. BP well controlled, continue ARB.  Diabetes as above, continue Jardiance.  Avoid nephrotoxic medications. Trend GFR with next labs, at least every 6 months.  - VITAMIN D,25 HYDROXY (DEFICIENCY); Future  - PTH INTACT (PTH ONLY); future    5. Callus of foot  Chronic, continues her care with podiatry for footcare.      Return in about 7 months (around 8/19/2025), or if symptoms worsen or fail to improve, for Annual Medicare Visit, Diabetes.    Please note that this dictation was created using voice recognition software. I have made every reasonable attempt to correct obvious errors, but I expect that there are errors of grammar and possibly content that I did not discover before finalizing the note.    [facility] Form    Diagnosis to address: I49.5 - Sick sinus syndrome (HCC)  Assessment and plan: Chronic, stable, as based on today's assessment and impact on other conditions evaluated today. Continue with current treatment plan: Has pacemaker, pacemaker check as directed by cardiology.  Follow-up with specialist as directed, but at least annually.  Last edited 01/31/25 14:53 PST by Sho Rodriguez D.O.

## 2025-02-05 ENCOUNTER — HOSPITAL ENCOUNTER (OUTPATIENT)
Dept: LAB | Facility: MEDICAL CENTER | Age: 85
End: 2025-02-05
Attending: STUDENT IN AN ORGANIZED HEALTH CARE EDUCATION/TRAINING PROGRAM
Payer: MEDICARE

## 2025-02-05 DIAGNOSIS — N18.31 STAGE 3A CHRONIC KIDNEY DISEASE: ICD-10-CM

## 2025-02-05 DIAGNOSIS — E11.69 HYPERLIPIDEMIA ASSOCIATED WITH TYPE 2 DIABETES MELLITUS (HCC): ICD-10-CM

## 2025-02-05 DIAGNOSIS — E11.69 TYPE 2 DIABETES MELLITUS WITH OTHER SPECIFIED COMPLICATION, WITHOUT LONG-TERM CURRENT USE OF INSULIN (HCC): ICD-10-CM

## 2025-02-05 DIAGNOSIS — E78.5 HYPERLIPIDEMIA ASSOCIATED WITH TYPE 2 DIABETES MELLITUS (HCC): ICD-10-CM

## 2025-02-05 LAB
25(OH)D3 SERPL-MCNC: 33 NG/ML (ref 30–100)
ALBUMIN SERPL BCP-MCNC: 4.3 G/DL (ref 3.2–4.9)
ALBUMIN/GLOB SERPL: 1.3 G/DL
ALP SERPL-CCNC: 78 U/L (ref 30–99)
ALT SERPL-CCNC: 27 U/L (ref 2–50)
ANION GAP SERPL CALC-SCNC: 12 MMOL/L (ref 7–16)
AST SERPL-CCNC: 24 U/L (ref 12–45)
BASOPHILS # BLD AUTO: 0.3 % (ref 0–1.8)
BASOPHILS # BLD: 0.02 K/UL (ref 0–0.12)
BILIRUB SERPL-MCNC: 1 MG/DL (ref 0.1–1.5)
BUN SERPL-MCNC: 20 MG/DL (ref 8–22)
CALCIUM ALBUM COR SERPL-MCNC: 9.3 MG/DL (ref 8.5–10.5)
CALCIUM SERPL-MCNC: 9.5 MG/DL (ref 8.5–10.5)
CHLORIDE SERPL-SCNC: 109 MMOL/L (ref 96–112)
CHOLEST SERPL-MCNC: 135 MG/DL (ref 100–199)
CO2 SERPL-SCNC: 19 MMOL/L (ref 20–33)
CREAT SERPL-MCNC: 1.04 MG/DL (ref 0.5–1.4)
EOSINOPHIL # BLD AUTO: 0.15 K/UL (ref 0–0.51)
EOSINOPHIL NFR BLD: 2 % (ref 0–6.9)
ERYTHROCYTE [DISTWIDTH] IN BLOOD BY AUTOMATED COUNT: 45.6 FL (ref 35.9–50)
GFR SERPLBLD CREATININE-BSD FMLA CKD-EPI: 54 ML/MIN/1.73 M 2
GLOBULIN SER CALC-MCNC: 3.3 G/DL (ref 1.9–3.5)
GLUCOSE SERPL-MCNC: 147 MG/DL (ref 65–99)
HCT VFR BLD AUTO: 48.7 % (ref 37–47)
HDLC SERPL-MCNC: 57 MG/DL
HGB BLD-MCNC: 16.2 G/DL (ref 12–16)
IMM GRANULOCYTES # BLD AUTO: 0.02 K/UL (ref 0–0.11)
IMM GRANULOCYTES NFR BLD AUTO: 0.3 % (ref 0–0.9)
LDLC SERPL CALC-MCNC: 53 MG/DL
LYMPHOCYTES # BLD AUTO: 1.77 K/UL (ref 1–4.8)
LYMPHOCYTES NFR BLD: 23.7 % (ref 22–41)
MCH RBC QN AUTO: 31.6 PG (ref 27–33)
MCHC RBC AUTO-ENTMCNC: 33.3 G/DL (ref 32.2–35.5)
MCV RBC AUTO: 95.1 FL (ref 81.4–97.8)
MONOCYTES # BLD AUTO: 0.82 K/UL (ref 0–0.85)
MONOCYTES NFR BLD AUTO: 11 % (ref 0–13.4)
NEUTROPHILS # BLD AUTO: 4.69 K/UL (ref 1.82–7.42)
NEUTROPHILS NFR BLD: 62.7 % (ref 44–72)
NRBC # BLD AUTO: 0 K/UL
NRBC BLD-RTO: 0 /100 WBC (ref 0–0.2)
PHOSPHATE SERPL-MCNC: 3.3 MG/DL (ref 2.5–4.5)
PLATELET # BLD AUTO: 253 K/UL (ref 164–446)
PMV BLD AUTO: 10.5 FL (ref 9–12.9)
POTASSIUM SERPL-SCNC: 3.5 MMOL/L (ref 3.6–5.5)
PROT SERPL-MCNC: 7.6 G/DL (ref 6–8.2)
PTH-INTACT SERPL-MCNC: 39.3 PG/ML (ref 14–72)
RBC # BLD AUTO: 5.12 M/UL (ref 4.2–5.4)
SODIUM SERPL-SCNC: 140 MMOL/L (ref 135–145)
TRIGL SERPL-MCNC: 125 MG/DL (ref 0–149)
TSH SERPL DL<=0.005 MIU/L-ACNC: 2.42 UIU/ML (ref 0.38–5.33)
WBC # BLD AUTO: 7.5 K/UL (ref 4.8–10.8)

## 2025-02-05 PROCEDURE — 82570 ASSAY OF URINE CREATININE: CPT

## 2025-02-05 PROCEDURE — 82043 UR ALBUMIN QUANTITATIVE: CPT

## 2025-02-05 PROCEDURE — 84443 ASSAY THYROID STIM HORMONE: CPT

## 2025-02-05 PROCEDURE — 80053 COMPREHEN METABOLIC PANEL: CPT

## 2025-02-05 PROCEDURE — 82306 VITAMIN D 25 HYDROXY: CPT

## 2025-02-05 PROCEDURE — 85025 COMPLETE CBC W/AUTO DIFF WBC: CPT

## 2025-02-05 PROCEDURE — 84100 ASSAY OF PHOSPHORUS: CPT

## 2025-02-05 PROCEDURE — 83970 ASSAY OF PARATHORMONE: CPT

## 2025-02-05 PROCEDURE — 83036 HEMOGLOBIN GLYCOSYLATED A1C: CPT

## 2025-02-05 PROCEDURE — 80061 LIPID PANEL: CPT

## 2025-02-05 PROCEDURE — 36415 COLL VENOUS BLD VENIPUNCTURE: CPT

## 2025-02-06 ENCOUNTER — PROCEDURE VISIT (OUTPATIENT)
Dept: GYNECOLOGY | Facility: CLINIC | Age: 85
End: 2025-02-06
Payer: MEDICARE

## 2025-02-06 VITALS
HEIGHT: 64 IN | HEART RATE: 76 BPM | WEIGHT: 140 LBS | DIASTOLIC BLOOD PRESSURE: 81 MMHG | SYSTOLIC BLOOD PRESSURE: 130 MMHG | BODY MASS INDEX: 23.9 KG/M2

## 2025-02-06 DIAGNOSIS — N81.6 RECTOCELE: ICD-10-CM

## 2025-02-06 DIAGNOSIS — L90.0 LICHEN SCLEROSUS: ICD-10-CM

## 2025-02-06 DIAGNOSIS — K58.0 IRRITABLE BOWEL SYNDROME WITH DIARRHEA: ICD-10-CM

## 2025-02-06 DIAGNOSIS — R15.9 INCONTINENCE OF FECES, UNSPECIFIED FECAL INCONTINENCE TYPE: ICD-10-CM

## 2025-02-06 DIAGNOSIS — N81.2 UTEROVAGINAL PROLAPSE, INCOMPLETE: ICD-10-CM

## 2025-02-06 DIAGNOSIS — N32.81 OVERACTIVE BLADDER: ICD-10-CM

## 2025-02-06 DIAGNOSIS — N81.10 CYSTOCELE, UNSPECIFIED: ICD-10-CM

## 2025-02-06 LAB
APPEARANCE UR: CLEAR
BILIRUB UR STRIP-MCNC: NEGATIVE MG/DL
COLOR UR AUTO: YELLOW
CREAT UR-MCNC: 96.9 MG/DL
GLUCOSE UR STRIP.AUTO-MCNC: 1000 MG/DL
KETONES UR STRIP.AUTO-MCNC: NEGATIVE MG/DL
LEUKOCYTE ESTERASE UR QL STRIP.AUTO: NEGATIVE
MICROALBUMIN UR-MCNC: 12.4 MG/DL
MICROALBUMIN/CREAT UR: 128 MG/G (ref 0–30)
NITRITE UR QL STRIP.AUTO: NEGATIVE
PH UR STRIP.AUTO: 5.5 [PH] (ref 5–8)
PROT UR QL STRIP: NEGATIVE MG/DL
RBC UR QL AUTO: NEGATIVE
SP GR UR STRIP.AUTO: 1.01
UROBILINOGEN UR STRIP-MCNC: 0.2 MG/DL

## 2025-02-06 PROCEDURE — 51729 CYSTOMETROGRAM W/VP&UP: CPT | Performed by: STUDENT IN AN ORGANIZED HEALTH CARE EDUCATION/TRAINING PROGRAM

## 2025-02-06 PROCEDURE — 81002 URINALYSIS NONAUTO W/O SCOPE: CPT | Performed by: NURSE PRACTITIONER

## 2025-02-06 PROCEDURE — 99214 OFFICE O/P EST MOD 30 MIN: CPT | Mod: 25 | Performed by: STUDENT IN AN ORGANIZED HEALTH CARE EDUCATION/TRAINING PROGRAM

## 2025-02-06 PROCEDURE — 51784 ANAL/URINARY MUSCLE STUDY: CPT | Performed by: STUDENT IN AN ORGANIZED HEALTH CARE EDUCATION/TRAINING PROGRAM

## 2025-02-06 PROCEDURE — 51797 INTRAABDOMINAL PRESSURE TEST: CPT | Performed by: STUDENT IN AN ORGANIZED HEALTH CARE EDUCATION/TRAINING PROGRAM

## 2025-02-06 PROCEDURE — 51741 ELECTRO-UROFLOWMETRY FIRST: CPT | Performed by: STUDENT IN AN ORGANIZED HEALTH CARE EDUCATION/TRAINING PROGRAM

## 2025-02-06 RX ORDER — HEPARIN SODIUM 5000 [USP'U]/ML
5000 INJECTION, SOLUTION INTRAVENOUS; SUBCUTANEOUS ONCE
Status: COMPLETED | OUTPATIENT
Start: 2025-03-10 | End: 2025-03-10

## 2025-02-06 ASSESSMENT — FIBROSIS 4 INDEX: FIB4 SCORE: 1.5

## 2025-02-06 NOTE — PROGRESS NOTES
Urogynecology & Reconstructive Pelvic Surgery - Consultation Visit    Roya Muniz MRN:[id number]    Referred by: Daquan Melendez    Reason for Visit:   Chief Complaint   Patient presents with    Procedure     UDS         Subjective     History of Presenting Illness:  Roya Muniz is a 84 y.o. P3 cardiac disease (pacemaker), pHTN, h/o DVT, DM2, HTN, thyroid disease, hemorrhoids, IBS-D, S2POP and lichen sclerosus here for preop and UDS for scheduled Lefort colpocleisis, possible vaginal vault suspension, possible anterior/posterior repair, perineal repair, possible midurethral sling, cystourethroscopy on 3/10/25    POP symptoms unchanged.    Lichen sclerosus: Using clobetasol once a day now.      IBS-D: tried metamucil but caused fecal urgency at night and fecal accidents. So she stopped. Will try fiber gummies.     Cards clearance: non-modifiable cardiac risk and may proceed with surgery (25). Hold ASA 7 days prior to surgery (3/3/25)    PCP Clearance received 24.     Allergies: keeflex  Preop: +h/o DVT and on ASA.  Denies MI, stroke, lung disease, or anticoagulation.     Initial symptoms:    Reports vaginal bulge that is worse at the end of the day. Goes back with laying down. Interferes with urination, sometimes has to reduce or urine gets trapped behind. Has noticed for the last 10years. Denies any urinary leakage. Feels like sitting on a balloon because at times it does protrude very far outside vagina.     Prior Pelvic surgery:   None     Prior treatment:   None    Fluid intake:   Water: 24oz    Coffee: 1 cup   Soda: occ  Juice: sugar free every 1-3 days     Pelvic floor symptom review:     Bladder:   Voids per day: 4 Voids per night: 0-1      Urinary incontinence episodes per day: 0    Urge leakage:  None   Stress leakage: None   Continuous / insensible urine loss: No    Nocturnal enuresis: No    Leakage volume: n/a   Number of pads/day: 0    Bladder emptying:  Complete   Voiding symptoms: Crede to Void   UTI in last 12 months: 0   Other urologic history: none      Prolapse:     Prolapse symptoms: Bulge   Degree of prolapse: inside vagina    Duration of prolapse symptoms: 10+yrs       Bowel:    Constipation: No   Bowel movements per day: 1-3 , stool quality: liquid (fiber, tried OTC meds but can't remember)   Straining to empty bowels: No    Splinting to evacuate: No    Painful evacuation: No    Difficulty emptying rectum: No    Incontinence to stool: Yes daily, multiple times, liquid stool    Blood in stool: No    Hemorrhoids: Yes   Bowel conditions: IBS - diarrhea    Most recent colonoscopy:  wnl        Sexual function:    Sexually active: No  no interest in the future    Gender of partners: Male   Pain with intercourse: No   History of abuse: No        Pelvic Pain: None      Past medical and surgical history    Past obstetric history   Number of vaginal deliveries: 3   Number of  deliveries: 0   History of vacuum/forceps: No    History of obstetric anal sphincter injury: No     Past gynecological history:    Last menstrual period/Menopause: No LMP recorded. Patient is postmenopausal.   History of abnormal uterine bleeding: No    History of fibroids: No    History of endometrial polyps:  No    History of endometriosis: No    History of cervical dysplasia: No    Last pap: ~68yo wnl    Current contraception: none       Past medical history:  Past Medical History:   Diagnosis Date    Aortic valve sclerosis 2024    Metabolic acidosis 2023    Vision problem 2021    Skin lesion 2021    Neck mass 2021    Pulmonary hypertension (HCC) 2015    Stable, continue current plan of care with current medications.          History of DVT of lower extremity 2014    Hypokalemia 2014    Cardiac pacemaker in situ     Diabetes (HCC)     Type 2 Drx'd at age 80    Hyperlipidemia     Hypertension     Pancreatic cyst     Pancreatitis      Sinus node dysfunction (HCC)     Thyroid disease     hypothyroidism     Past surgical history:  Past Surgical History:   Procedure Laterality Date    GASTROSCOPY-ENDO  2/21/2014    Performed by Steve Islas Jr., M.D. at ENDOSCOPY [facility]    GASTROSCOPY-ENDO  2/20/2014    Performed by Antonio Espinoza M.D. at ENDOSCOPY [facility]    EXPLORATORY LAPAROTOMY  2/10/2014    Performed by Rigo Garcia M.D. at SURGERY [facility]    CHOLECYSTECTOMY  2/10/2014    Performed by Rigo Garcia M.D. at SURGERY [facility]    PANCREATECTOMY  2/10/2014    Performed by Rigo Garcia M.D. at SURGERY [facility]    GASTROSTOMY FEEDING  2/10/2014    Performed by Rigo Garcia M.D. at SURGERY [facility]    OTHER ORTHOPEDIC SURGERY      foot surgery     Medications:has a current medication list which includes the following prescription(s): clobetasol, empagliflozin, vitamin d3, preservision areds 2, NON SPECIFIED, levothyroxine, amlodipine, rosuvastatin, losartan, freestyle lite, aspirin, acetaminophen, and alcohol swabs.  Allergies:Cephalexin [keflex]  Family history:  Family History   Problem Relation Age of Onset    Heart Disease Mother         enlarged heart, heart failure    Cancer Father         liver    No Known Problems Sister     Heart Disease Brother         heart failure    Lung Disease Brother         heavy smoker    Alcohol/Drug Brother         etoh    Cancer Paternal Aunt         breast cancer     Cancer Maternal Grandmother         liver     Heart Disease Maternal Grandfather         CHF     No Known Problems Paternal Grandmother     No Known Problems Paternal Grandfather     No Known Problems Sister     No Known Problems Sister     Stroke Sister     No Known Problems Son     No Known Problems Son     No Known Problems Son      Social history: reports that she has never smoked. She has never used smokeless tobacco. She reports that she does not  "currently use alcohol. She reports that she does not use drugs.    Review of systems: A full review of systems was performed, and negative with the exception of want is noted above in the HPI.        Objective        /81 (BP Location: Left arm, Patient Position: Sitting, BP Cuff Size: Adult)   Pulse 76   Ht 5' 4\"   Wt 140 lb   BMI 24.03 kg/m²     Physical Exam  Constitutional:       Appearance: Normal appearance. She is normal weight.   HENT:      Head: Normocephalic.      Nose: Nose normal.   Eyes:      Pupils: Pupils are equal, round, and reactive to light.   Pulmonary:      Effort: Pulmonary effort is normal.   Abdominal:      Palpations: Abdomen is soft.   Musculoskeletal:         General: Normal range of motion.      Cervical back: Normal range of motion.   Skin:     General: Skin is warm.   Neurological:      General: No focal deficit present.      Mental Status: She is alert.   Psychiatric:         Mood and Affect: Mood normal.         Genitourinary:    Vulva: Atrophic   Bulbocavernosus reflex: Intact   Anal wink reflex: Intact   Perineal sensation: WNL   Urethra: WNL   Vagina: Atrophic   Atrophy: Moderate   Cough stress test: Negative     Chaperone was present throughout the physical exam.     Pelvic floor:    POP-Q: minimally valsalva effort throughout exam  Aa 0 Ba 0 C -7   GH 4 PB 1.5 TVL 8.5   Ap -2 Bp -2 D -7      Prolapse stage: 2   Cervical elongation: No    Urethral tenderness: No    Bladder/ suprapubic tenderness: No    Levator tenderness: None   Levator muscle tone: WNL   Pelvic floor contraction strength (modified Oxford scale): 1=Flicker   Pelvic floor contraction duration: Brief    Bimanual exam: Small, Mobile Uterus   Rectal: multiple external hemorrhoids and red internal hemorrhoid prolapsing out of rectum, nontender, nonfriable. White thickened perianal plaque at 1:00.    Vaginal band/stricture: No     Procedure Performed: No    Chaperone was present throughout the physical exam. "     Diagnostic test and records review:    Urine dipstick: n/a      Post-void residual: n/a    Labs:   Glycohemoglobin   Date Value Ref Range Status   02/05/2025 6.7 (H) 4.0 - 5.6 % Final     Comment:     Increased risk for diabetes:  5.7 -6.4%  Diabetes:  >6.4%  Glycemic control for adults with diabetes:  <7.0%    The above interpretations are per ADA guidelines.  Diagnosis  of diabetes mellitus on the basis of elevated Hemoglobin A1c  should be confirmed by repeating the Hb A1c test.         Lab Results   Component Value Date/Time    SODIUM 138 10/31/2024 1050    POTASSIUM 4.0 10/31/2024 1050    CHLORIDE 105 10/31/2024 1050    CO2 16 (L) 10/31/2024 1050    GLUCOSE 153 (H) 10/31/2024 1050    BUN 22 10/31/2024 1050    CREATININE 0.99 10/31/2024 1050    CALCIUM 9.7 10/31/2024 1050    ANION 17.0 (H) 10/31/2024 1050       Lab Results   Component Value Date/Time    WBC 7.5 02/05/2025 06:49 AM    RBC 5.12 02/05/2025 06:49 AM    HEMOGLOBIN 16.2 (H) 02/05/2025 06:49 AM    MCV 95.1 02/05/2025 06:49 AM    MCH 31.6 02/05/2025 06:49 AM    MCHC 33.3 02/05/2025 06:49 AM    RDW 45.6 02/05/2025 06:49 AM    MPV 10.5 02/05/2025 06:49 AM         Radiology: None    Procedural:   02/06/25 UDS:   Filling Phase: Normal sensation. Normal compliance. Absent DO. Absent UDSI. Normal capacity.  Voiding Phase: Voids via detrusor. PVR normal (0). Normal voiding pressures, no evidence of obstruction.      Documentation reviewed: Prior EMR Records    Outside records reviewed: 0 pages      Assessment & Plan     Roya Muniz is a 84 y.o. P3 with at least S2POP, lichen sclerosis, hemorrhoids and IBS-D. We discussed my recommendations for further diagnosis and treatment at length today.     Assessment & Plan  Uterovaginal prolapse, incomplete  Cystocele  Rectocele  At her prior visit as well as today we reviewed all treatment options for prolapse including conservative/observation, pessary, and both vaginal and laparoscopic approaches as  well as native tissue and mesh augmented approaches. After detailed counseling about all prolapse treatment options and shared decision-making, she opts for an obliterative reconstructive approach to prolapse repair via:Lefort colpocleisis, possible vaginal vault suspension, possible anterior/posterior repair, perineal repair, cystourethroscopy and all indicated procedures.  She has reviewed all preoperative written materials and expressed understanding about the procedure, risks, benefits, and recovery    Pre-operative urodynamics did not demonstrate stress urinary incontinence with reduction of prolapse and she was counseled against a concomitant midurethral sling.     Benefits of surgery were reviewed, including functional outcomes (bladder/bowel/sexual). Risks of surgery were also discussed including anesthesia, bleeding, infection, damage to surrounding organs (bladder, ureter, urethra, bowel, blood vessel, nerves), possible blood transfusion, recurrent prolapse, inability to have further penetrative sex after surgery (irreversible), transient voiding dysfunction requiring catheterization, new/worsening urinary incontinence.    Specifically she was counselled as to what to expect on the day of surgery in the holding area, counseled that medical students may be involved in her care. She will likely go home on the same day as the surgery. She was counseled on what to expect in the recovery room including voiding trial and possible vaginal packing removal, as well as what to expect if voiding trial is unsuccessful, which would be transient catheterization. Discussed trajectory of recovery, including maximizing NSAIDs and Tylenol, and that narcotics are not routinely given.  Discussed restrictions including heavy lifting that requires straining, driving while on narcotics, nothing in the vagina and no bathing/swimming for at least 6 weeks until evaluated in the office. Return to work discussed.  *Please see the  corresponding after visit summary counseling packet for detailed counseling provided for the patient.   Labs:GFR 54, Cr 1.04, A1c 6.7%  Pre-op meds: acetaminophen 1000mg PO, phenazopyridine 200mg PO, Gent/Clinda   Post-op medication plan: decreased dose of ibuprofen, tylenol, miralax   Home medication review: She should additionally hold all NSAIDs and supplements for 1 week prior to surgery.  Stop aspirin 1 week prior to surgery (stop 3/3/25)  DVT ppx: Caprini score 9 = highest risk for VTE (10.7%). Will need heparin 5000u in preop +SCDs and 30 days of lovenox 40mg.   Pt is having a joint hemorrhoidectomy with Dr. Nash. Will send msg regarding VTE ppx and bleeding risk to determine plan.           Irritable bowel syndrome with diarrhea  Incontinence of feces, unspecified fecal incontinence type  She will try fiber gummies or adding more fiber rich foods to her diet.        Lichen sclerosus  Continue clobetasol twice weekly.            Savannah Snow MD  Urogynecology and Reconstructive Pelvic Surgery  Department of Obstetrics and Gynecology  [facility]  [facility]

## 2025-02-06 NOTE — PATIENT INSTRUCTIONS
STOP TAKING ASPIRIN ON 3/3/25 before your SURGERY.  Main Procedure:    Vaginal colpocleisis with extended perineorrhaphy   (close vaginal walls and narrow vaginal opening)       The entire procedure will be completed in a minimally invasive manner, with all incisions  made inside the vagina. Once you are asleep, a thorough exam will be performed to confirm the areas needing repair. Your surgeon will close the inside of the vagina with sutures to create a shorter vagina. The muscles surrounding the vagina will then be strengthened (extended perineorrhaphy), narrowing the vagina. A camera will then be placed in the bladder to ensure no damage was done, and that your ureters (the tubes that carry urine from the kidneys to the bladder) are working properly.     The general risks of this surgery are the same as listed in your pre-operative counseling pamphlet. You will not be able to have vaginal sexual intercourse after this surgery due to the short and narrow vagina.  However, sensation and clitoral function will remain unchanged.        After this procedure is complete, you will be re-examined and then proceed with the following possible procedures: vaginal vault suspension, anterior repair, posterior repair, midurethral sling    Possible Surgical Procedure:    Vaginal vault suspension - sacrospinous, iliococcygeus, uterosacral  (support top of the vagina by attaching it to ligament/muscle)       The entire procedure will be completed in a minimally invasive manner, with all incisions inside the vagina. Once you are asleep with anesthesia, a thorough exam will be performed to confirm the areas needing repair. Your surgeon will carefully dissect into the space between your vagina and other pelvic organs, and place sutures through a ligament (sacrospinous, uterosacral) or a muscle (iliococcygeus) in your buttock. These sutures will be connected to the apex (top) of your vagina, lifting it back up to a more normal  position. This may be done with a small permanent suture, or sometimes, an absorbable suture.    In addition to the general surgical risks listed in the pre-operative counseling pamphlet, this procedure carries the risk of:  Buttock pain: The suture in the ligament sometimes causes an aching buttock discomfort as it heals. This is temporary, meaning it should resolve over a few weeks. Rarely, this leads to severe pain from nerve entrapment, which is repaired on the same day of surgery by removing the suture.    Hematoma (pooling of blood) in the areas behind the vagina: This due to the many small blood vessels in this area. This may require a “packing” (long piece of gauze) to be placed in the vagina to put more pressure on the bleeding areas. This will be removed a couple hours after the surgery or the next day. More rarely, a large blood clot may require another procedure to drain and repair the area.    After this procedure is complete, you will be re-examined and then proceed with the following possible procedures:Main Surgical Procedure  Anterior repair   (fix prolapse of the vagina/bladder)       The entire procedure will be completed in a minimally invasive manner with all incisions made inside the vagina. Once you are asleep with anesthesia, a thorough exam will be performed to confirm the areas needing repair. A cut will be made along the front wall of your vagina where the bladder is pushing down, and your skin is  from the underlying supportive tissue. This stronger tissue underneath will then be repaired using sutures that will dissolve over time.   Sometimes, excess skin is removed. The skin is then closed. A cystoscopy (camera in bladder) will be performed to confirm the repair is successful and no injury has occurred.     Specific risks for this procedure include damage to the bladder, urethra (the tube where urine comes out when you pee) or ureters (the tubes that bring urine from the  "kidneys to the bladder). This is uncommon. Bladder damage may result in having a catheter in the bladder for a longer amount of time.         Main surgical procedure:  Posterior repair, perineorrhaphy  (fix prolapse of the back of the vagina/rectum and pelvic muscles)        The entire procedure will be completed in a minimally invasive manner, with all incisions inside of the vagina. Once you are asleep, a thorough exam will be performed to confirm the areas needing repair. A cut is made along the back wall of the vagina where the rectum is pushing down, and the skin above the rectum is  from the underlying supportive tissue. This stronger tissue underneath is then repaired using sutures that will dissolve over time. Sometimes excess skin is removed. The skin is then closed.     If the area between the vagina and the anus (called the perineum) is weak, then sutures will be placed to make that area stronger. This is called a \"perineorrhaphy.\" This will be just like a repair of an episiotomy or a vaginal tear after having a baby.     The risk of these procedures is similar to those mentioned in the “pre-op” leaflet. However, any surgery to the back wall of the vagina carries a higher risk of pain with sexual intercourse after surgery (up to 10%) due to scar formation. Great care is taken not to narrow the vagina more than necessary. The perineorrhaphy (or episiotomy type of repair) can be uncomfortable and may be difficult to sit normally for up to 6 weeks after surgery. You may use a doughnut cushion to sit on if needed.          Post-op: What to expect after your surgery    For less-urgent matters (Monday - Friday), you may send a message through [place] or call the general Women's Health line at [phone].     For urgent post-operative questions or concerns after hours, please call Subspecialty post op phone line [phone].    Bladder function  Try to empty your bladder (urinate) at regular " intervals by sitting on the toilet and relaxing.  You may need to adjust your positioning (lean forward or back) to empty the bladder fully. It is important that you do not push or strain to empty your bladder.     Call the surgeon at the number above if you cannot urinate. Also, call for treatment if you have signs and symptoms of a urinary tract infection, including:   Burning with urination  Bladder pain  Worsening need to urinate right away  Urine with a bad smell    If you are sent home with a catheter:   Empty the catheter bag when it becomes full. The bag should be kept at a level below your hips to drain properly. When you are asleep, the bag should dangle off the bed. and should dangle off of the bed while you are asleep. You will be called the day after you go home to schedule an office visit to test your bladder and remove the catheter.     Vaginal care:   Do not go swimming, take sitting baths, or have sexual intercourse for 6 weeks after surgery. Do not place anything in the vagina except vaginal estrogen cream, if instructed to do so by your surgeon.     Vaginal discharge and bleeding/spotting is also normal through the entire 6-week recovery. Sometimes small sutures will fall out of the vagina as they dissolve. This is normal. Contact the office with any heavy bleeding soaking through pads, bad-smelling discharge, or worsening pain.     Pain management:  Surgical pain is controlled in most patients with only non-steroidal anti-inflammatory drugs (NSAIDs, such as ibuprofen, “Advil”), and acetaminophen (Tylenol). These drugs can be taken together without interaction.     In the hospital, your nurse will give you these medications at regular intervals to both treat and to prevent pain. If these are not controlling your pain, you may ask the nurse for additional medication.     When you go home, you will also take NSAIDs and acetaminophen for pain management. I recommend the following at-home pain  regimen:    Take ibuprofen 400mg three times per day, along with tylenol 1000mg three times per day, for the first 5-7 days after surgery to prevent and treat pain and inflammation.   After 5-7 days, you may take 400mg iburpfen and 500mg tylenol as-needed     Sometimes, a short course of narcotics such as oxycodone and hydromorphone is  required, but this is not routine. We do not recommend using narcotics regularly as it can lead to constipation or dizziness, and falls.     Do not drive or operate heavy machinery while using narcotics. You are unlikely to become addicted if you need to take a narcotic medication a few times within the first week of your surgery.  After the first few days to one week, your pain should decrease and you should not have pain severe enough to need narcotics. If you continue having severe pain, contact your surgeon for re-evaluation.     Bowel function  Constipation is common after surgery. This means it may take several days before having a normal bowel movement. It is important to take extra steps to keep your stools soft to avoid straining with bowel movements. Straining may damage the prolapse repair before it has healed. Most patients will be given a prescription for a stool softener (docusate) as well as a gentle laxative (Miralax or lactulose). These mediations adds water to the stool to make it easier to pass. Take them daily throughout your recovery. Hold for a day if you develop diarrhea.     Call us if you experience any repeated episodes of vomiting, worsening abdominal pain/bloating, or are unable to have a bowel movement for more than 3 days.     Activity restrictions  During the first 6 weeks avoid any type of heavy lifting that requires you to strain.  Gentle walking is good exercise. Start with about 10 minutes a day when you feel ready and build up gradually. Avoid repetitive squatting or bending at the waist. Avoid any fitness-type training, aerobics, etc. for at least  6 weeks after surgery. Generally, you will need 4-6 weeks off work. This period may be longer if you have a very physical job.    Return to sex  When your surgeon clears you to resume sex (if desired), begin slowly and to use enough lubrication to help ease the discomfort. Because your vagina and pelvis have been re-structured, and it can take time to get used to sex after surgery. As scar tissue softens over time you will feel less discomfort. If discomfort does not go away, contact the office to schedule an exam and to discuss other options. In some cases, your surgeon may prescribe a low dose of vaginal estrogen to help with vaginal dryness and pain that may happen with sex.

## 2025-02-06 NOTE — ASSESSMENT & PLAN NOTE
Incontinence of feces, unspecified fecal incontinence type  She will try fiber gummies or adding more fiber rich foods to her diet.

## 2025-02-07 LAB
EST. AVERAGE GLUCOSE BLD GHB EST-MCNC: 180 MG/DL
HBA1C MFR BLD: 7.9 % (ref 4–5.6)

## 2025-02-10 ENCOUNTER — APPOINTMENT (OUTPATIENT)
Dept: ADMISSIONS | Facility: MEDICAL CENTER | Age: 85
End: 2025-02-10
Payer: MEDICARE

## 2025-02-11 ENCOUNTER — APPOINTMENT (OUTPATIENT)
Dept: ADMISSIONS | Facility: MEDICAL CENTER | Age: 85
End: 2025-02-11
Attending: STUDENT IN AN ORGANIZED HEALTH CARE EDUCATION/TRAINING PROGRAM
Payer: MEDICARE

## 2025-02-18 ENCOUNTER — PRE-ADMISSION TESTING (OUTPATIENT)
Dept: ADMISSIONS | Facility: MEDICAL CENTER | Age: 85
End: 2025-02-18
Attending: STUDENT IN AN ORGANIZED HEALTH CARE EDUCATION/TRAINING PROGRAM
Payer: MEDICARE

## 2025-02-18 NOTE — OR NURSING
Spoke with MA at Dr Snow's office,  recent HAIC from 2/5 is elevated at 7.9.  Roya will call her PCP and discuss the results of the recent bloodwork.   EKG from 10/2024 will be used for Anesthesia Guidelines.

## 2025-02-18 NOTE — PREADMIT AVS NOTE
Current Medications   Medication Instructions    Empagliflozin 25 MG Tab Stop 4 days before surgery    vitamin D3 (CHOLECALCIFEROL) 1000 Unit (25 mcg) Tab Stop 7 days before surgery    Multiple Vitamins-Minerals (PRESERVISION AREDS 2) Cap Stop 7 days before surgery    levothyroxine (SYNTHROID) 112 MCG Tab Continue taking medication as prescribed, including morning of procedure     amLODIPine (NORVASC) 10 MG Tab Continue taking medication as prescribed, including morning of procedure     rosuvastatin (CRESTOR) 10 MG Tab Continue taking medication as prescribed, including morning of procedure     losartan (COZAAR) 100 MG Tab Stop 24 hours before surgery    aspirin 81 MG EC tablet Follow instructions from surgeon or specialist.    Acetaminophen (TYLENOL 8 HOUR PO) As needed medication, may take if needed, including morning of procedure

## 2025-02-20 ENCOUNTER — OFFICE VISIT (OUTPATIENT)
Dept: MEDICAL GROUP | Facility: LAB | Age: 85
End: 2025-02-20
Payer: MEDICARE

## 2025-02-20 VITALS
DIASTOLIC BLOOD PRESSURE: 66 MMHG | TEMPERATURE: 98.1 F | HEIGHT: 64 IN | BODY MASS INDEX: 23.8 KG/M2 | RESPIRATION RATE: 16 BRPM | HEART RATE: 86 BPM | SYSTOLIC BLOOD PRESSURE: 116 MMHG | WEIGHT: 139.4 LBS | OXYGEN SATURATION: 96 %

## 2025-02-20 DIAGNOSIS — E11.69 TYPE 2 DIABETES MELLITUS WITH OTHER SPECIFIED COMPLICATION, WITHOUT LONG-TERM CURRENT USE OF INSULIN (HCC): ICD-10-CM

## 2025-02-20 DIAGNOSIS — E11.69 HYPERLIPIDEMIA ASSOCIATED WITH TYPE 2 DIABETES MELLITUS (HCC): ICD-10-CM

## 2025-02-20 DIAGNOSIS — N18.31 STAGE 3A CHRONIC KIDNEY DISEASE: ICD-10-CM

## 2025-02-20 DIAGNOSIS — E78.5 HYPERLIPIDEMIA ASSOCIATED WITH TYPE 2 DIABETES MELLITUS (HCC): ICD-10-CM

## 2025-02-20 DIAGNOSIS — E87.6 HYPOKALEMIA: ICD-10-CM

## 2025-02-20 DIAGNOSIS — R80.9 MICROALBUMINURIA DUE TO TYPE 2 DIABETES MELLITUS (HCC): ICD-10-CM

## 2025-02-20 DIAGNOSIS — E11.29 MICROALBUMINURIA DUE TO TYPE 2 DIABETES MELLITUS (HCC): ICD-10-CM

## 2025-02-20 PROCEDURE — 3078F DIAST BP <80 MM HG: CPT | Performed by: STUDENT IN AN ORGANIZED HEALTH CARE EDUCATION/TRAINING PROGRAM

## 2025-02-20 PROCEDURE — 99214 OFFICE O/P EST MOD 30 MIN: CPT | Performed by: STUDENT IN AN ORGANIZED HEALTH CARE EDUCATION/TRAINING PROGRAM

## 2025-02-20 PROCEDURE — 3074F SYST BP LT 130 MM HG: CPT | Performed by: STUDENT IN AN ORGANIZED HEALTH CARE EDUCATION/TRAINING PROGRAM

## 2025-02-20 ASSESSMENT — FIBROSIS 4 INDEX: FIB4 SCORE: 1.5

## 2025-02-20 NOTE — PROGRESS NOTES
Verbal consent was acquired by the patient to use [employer] ambient listening note generation during this visit Yes.    Subjective:     Chief Complaint   Patient presents with    Lab Results    Diabetes Follow-up     HPI:     History of Present Illness  The patient is an 84-year-old female who presents to discuss lab results and diabetes.    She was surprised by her recent A1c level of 7.9, which is the highest it has ever been. She recalls that prior to her diabetes diagnosis, her A1c levels never exceeded 7 with insulin. She attributes this increase to dietary changes and a lack of exercise, as she is currently unable to use her exercise machine due to her health condition. She has been on the maximum dose of Jardiance for less than one prescription's worth of time. She has been monitoring her blood sugar levels diligently, with a reading of 143 this morning after consuming toast and a small amount of fruit.     She was previously on a regimen of 20 units of insulin, which she discontinued due to pharmacy supply issues. She reports that her blood sugar levels were well-controlled while on insulin, with readings consistently below 150.     She has a history of chronic diarrhea, which she reports is consistent regardless of her dietary intake. She was advised to take Metamucil which she reports did not significantly impact her condition.    She has a history of low potassium levels. She was given potassium supplements during her hospital stay, which she found difficult to swallow and caused throat irritation.    Review of Systems   Constitutional:  Negative for chills, fever, malaise/fatigue and weight loss.   Respiratory:  Negative for cough and shortness of breath.    Cardiovascular:  Negative for chest pain.   Gastrointestinal:  Positive for diarrhea. Negative for abdominal pain, blood in stool, constipation, nausea and vomiting.   Skin:  Negative for rash.       Objective:     Exam:  /66 (BP Location:  "Left arm, Patient Position: Sitting, BP Cuff Size: Adult)   Pulse 86   Temp 36.7 °C (98.1 °F) (Temporal)   Resp 16   Ht 1.626 m (5' 4\")   Wt 63.2 kg (139 lb 6.4 oz)   SpO2 96%   BMI 23.93 kg/m²  Body mass index is 23.93 kg/m².    Physical Exam  Vitals reviewed.   Constitutional:       General: She is not in acute distress.     Appearance: Normal appearance. She is not ill-appearing.   HENT:      Head: Normocephalic and atraumatic.      Nose: Nose normal.      Mouth/Throat:      Mouth: Mucous membranes are moist.   Pulmonary:      Effort: Pulmonary effort is normal.   Neurological:      General: No focal deficit present.      Mental Status: She is alert and oriented to person, place, and time.   Psychiatric:         Mood and Affect: Mood normal.         Behavior: Behavior normal.         Thought Content: Thought content normal.       Labs: Reviewed from 2/5/2025    Assessment & Plan:     84 y.o. female with the following -     1. Type 2 diabetes mellitus with other specified complication, without long-term current use of insulin (HCC)  2. Microalbuminuria due to type 2 diabetes mellitus (HCC)  Chronic, uncontrolled. Highest A1C 18.3% at diagnosis. Her A1c level has increased from 7 to 7.9%, indicating inadequate control of her diabetes with Jardiance 25mg daily alone. She was previously well-controlled on lantus 20 units QHS which was discontinued in part due to availability from the pharmacy but also with comorbid CKD with microalbuminuria was switched to Jardiance. Her kidney function has remained stable, but there is an increase in proteinuria, likely due to elevated A1c levels. Various treatment options were discussed, including metformin, glipizide, Januvia, semaglutide, Ozempic, Mounjaro, Trulicity, and Actos. The potential side effects of each medication were also discussed. She expressed a preference to resume insulin therapy and discontinue Jardiance. She will start with 10 units of insulin at night " and adjust the dose every third day based on her fasting blood sugar levels. She will monitor her blood sugar levels daily fasting and as needed. Provided with chart in AVS to titrate insulin. Encouraged DM diet. A follow-up appointment will be scheduled in 3 months to monitor her A1c levels.    insulin glargine 100 UNIT/ML SC SOPN injection, Inject 10 Units under the skin every evening. May titrate up to 20units/night, Disp: 15 mL, Rfl: 3    3. Stage 3a chronic kidney disease  Chronic condition, GRF improved. BP well controlled, continue ARB-stopping jardiance as above. Avoid nephrotoxic medications. Trend in GFR Q6m or prn. Proteinuria will be monitored again in 6 months to assess improvement with better diabetes control.     4. Hypokalemia  Her potassium levels have been fluctuating, with a recent slight decrease, possibly due to fasting and IBS-D. She was advised to consume potassium-rich foods-declines potassium supplementation. Labs will be rechecked in a week to ensure potassium levels are normal before her surgery.   - Basic Metabolic Panel; Future  - MAGNESIUM; Future    5. Hyperlipidemia associated with type 2 diabetes mellitus (HCC)  Chronic condition, well-controlled.  Continue rosuvastatin 10 mg nightly.  Monitor annually.    Return in about 3 months (around 5/20/2025), or if symptoms worsen or fail to improve, for Diabetes.    Please note that this dictation was created using voice recognition software. I have made every reasonable attempt to correct obvious errors, but I expect that there are errors of grammar and possibly content that I did not discover before finalizing the note.

## 2025-02-20 NOTE — PATIENT INSTRUCTIONS
non-fasting labs in 1 week to check potassium    Ideal glucose ranges: fasting (8-10 hours) , random (1-2 hours after eating) <180. If glucose <70, we need to adjust your medication to prevent continuing low blood sugar.     Long-acting insulin (start at 10 units nightly)  Adjust dose according to FASTING BLOOD GLUCOSE target  mg/dL  (1) Increase the insulin dose every 3 days as needed.   (2) Increase by 2 units if FBG average concentration is 131-170 mg/dL.   (3) Increase by 4 units if FBG average concentration is 171-210 mg/dL.   (4) Increase by 6 units if FBG average concentration is 221-260 mg/dL.   (5) Increase by 8 units if FBG average concentration is greater than 261mg/dL and call us.      Consider cutting back by 1-2 units to previous dose if glucose concentration is below 80 mg/dL or symptoms of hypoglycemia.

## 2025-02-21 ASSESSMENT — ENCOUNTER SYMPTOMS
CONSTIPATION: 0
NAUSEA: 0
VOMITING: 0
WEIGHT LOSS: 0
ABDOMINAL PAIN: 0
FEVER: 0
COUGH: 0
CHILLS: 0
BLOOD IN STOOL: 0
DIARRHEA: 1
SHORTNESS OF BREATH: 0

## 2025-03-06 NOTE — H&P
Urogynecology & Reconstructive Pelvic Surgery - H&P    Roya Muniz [id number]    Referred by: Daquan Melendez      Subjective     History of Presenting Illness:  Roya Muniz is a 84 y.o. P3 cardiac disease (pacemaker), pHTN, h/o DVT, DM2, HTN, thyroid disease, hemorrhoids, IBS-D, S2POP and lichen sclerosus here for scheduled Lefort colpocleisis, possible vaginal vault suspension, possible anterior/posterior repair, perineal repair, possible midurethral sling, cystourethroscopy.    POP symptoms unchanged.    Lichen sclerosus: Using clobetasol once a day now.      IBS-D: tried metamucil but caused fecal urgency at night and fecal accidents. So she stopped. Will try fiber gummies.     Cards clearance: non-modifiable cardiac risk and may proceed with surgery (25). Hold ASA 7 days prior to surgery (3/3/25)    PCP Clearance received 24.     Allergies: keeflex  Preop: +h/o DVT and on ASA.  Denies MI, stroke, lung disease, or anticoagulation.     Initial symptoms:    Reports vaginal bulge that is worse at the end of the day. Goes back with laying down. Interferes with urination, sometimes has to reduce or urine gets trapped behind. Has noticed for the last 10years. Denies any urinary leakage. Feels like sitting on a balloon because at times it does protrude very far outside vagina.     Prior Pelvic surgery:   None     Prior treatment:   None    Fluid intake:   Water: 24oz    Coffee: 1 cup   Soda: occ  Juice: sugar free every 1-3 days     Pelvic floor symptom review:     Bladder:   Voids per day: 4 Voids per night: 0-1      Urinary incontinence episodes per day: 0    Urge leakage:  None   Stress leakage: None   Continuous / insensible urine loss: No    Nocturnal enuresis: No    Leakage volume: n/a   Number of pads/day: 0    Bladder emptying: Complete   Voiding symptoms: Crede to Void   UTI in last 12 months: 0   Other urologic history: none      Prolapse:     Prolapse symptoms:  Bulge   Degree of prolapse: inside vagina    Duration of prolapse symptoms: 10+yrs       Bowel:    Constipation: No   Bowel movements per day: 1-3 , stool quality: liquid (fiber, tried OTC meds but can't remember)   Straining to empty bowels: No    Splinting to evacuate: No    Painful evacuation: No    Difficulty emptying rectum: No    Incontinence to stool: Yes daily, multiple times, liquid stool    Blood in stool: No    Hemorrhoids: Yes   Bowel conditions: IBS - diarrhea    Most recent colonoscopy:  wnl        Sexual function:    Sexually active: No  no interest in the future    Gender of partners: Male   Pain with intercourse: No   History of abuse: No        Pelvic Pain: None      Past medical and surgical history    Past obstetric history   Number of vaginal deliveries: 3   Number of  deliveries: 0   History of vacuum/forceps: No    History of obstetric anal sphincter injury: No     Past gynecological history:    Last menstrual period/Menopause: No LMP recorded. Patient is postmenopausal.   History of abnormal uterine bleeding: No    History of fibroids: No    History of endometrial polyps:  No    History of endometriosis: No    History of cervical dysplasia: No    Last pap: ~68yo wnl    Current contraception: none       Past medical history:  Past Medical History:   Diagnosis Date    Aortic valve sclerosis 2024    Metabolic acidosis 2023    Vision problem 2021    Skin lesion 2021    Neck mass 2021    Pulmonary hypertension (HCC) 2015    Stable, continue current plan of care with current medications.          History of DVT of lower extremity 2014    Hypokalemia 2014    Cardiac pacemaker in situ     Diabetes (HCC)     Type 2 Drx'd at age 80    Hyperlipidemia     Hypertension     Pancreatic cyst     Pancreatitis     Sinus node dysfunction (HCC)     Thyroid disease     hypothyroidism     Past surgical history:  Past Surgical History:   Procedure  Laterality Date    GASTROSCOPY-ENDO  2/21/2014    Performed by Steve Islas Jr., M.D. at ENDOSCOPY [facility]    GASTROSCOPY-ENDO  2/20/2014    Performed by Antonio Espinoza M.D. at ENDOSCOPY [facility]    EXPLORATORY LAPAROTOMY  2/10/2014    Performed by Rigo Garcia M.D. at SURGERY [facility]    CHOLECYSTECTOMY  2/10/2014    Performed by Rigo Garcia M.D. at SURGERY [facility]    PANCREATECTOMY  2/10/2014    Performed by Rigo Garcia M.D. at SURGERY [facility]    GASTROSTOMY FEEDING  2/10/2014    Performed by Rigo Garcia M.D. at SURGERY [facility]    OTHER ORTHOPEDIC SURGERY      foot surgery     Medications:has a current medication list which includes the following prescription(s): clobetasol, empagliflozin, vitamin d3, preservision areds 2, NON SPECIFIED, levothyroxine, amlodipine, rosuvastatin, losartan, freestyle lite, aspirin, acetaminophen, and alcohol swabs.  Allergies:Cephalexin [keflex]  Family history:  Family History   Problem Relation Age of Onset    Heart Disease Mother         enlarged heart, heart failure    Cancer Father         liver    No Known Problems Sister     Heart Disease Brother         heart failure    Lung Disease Brother         heavy smoker    Alcohol/Drug Brother         etoh    Cancer Paternal Aunt         breast cancer     Cancer Maternal Grandmother         liver     Heart Disease Maternal Grandfather         CHF     No Known Problems Paternal Grandmother     No Known Problems Paternal Grandfather     No Known Problems Sister     No Known Problems Sister     Stroke Sister     No Known Problems Son     No Known Problems Son     No Known Problems Son      Social history: reports that she has never smoked. She has never used smokeless tobacco. She reports that she does not currently use alcohol. She reports that she does not use drugs.    Review of systems: A full review of systems was performed, and  negative with the exception of want is noted above in the HPI.        Objective        There were no vitals taken for this visit.    Physical Exam  Constitutional:       Appearance: Normal appearance. She is normal weight.   HENT:      Head: Normocephalic.      Nose: Nose normal.   Eyes:      Pupils: Pupils are equal, round, and reactive to light.   Pulmonary:      Effort: Pulmonary effort is normal.   Abdominal:      Palpations: Abdomen is soft.   Musculoskeletal:         General: Normal range of motion.      Cervical back: Normal range of motion.   Skin:     General: Skin is warm.   Neurological:      General: No focal deficit present.      Mental Status: She is alert.   Psychiatric:         Mood and Affect: Mood normal.         Genitourinary:    Vulva: Atrophic   Bulbocavernosus reflex: Intact   Anal wink reflex: Intact   Perineal sensation: WNL   Urethra: WNL   Vagina: Atrophic   Atrophy: Moderate   Cough stress test: Negative     Chaperone was present throughout the physical exam.     Pelvic floor:    POP-Q: minimally valsalva effort throughout exam  Aa 0 Ba 0 C -7   GH 4 PB 1.5 TVL 8.5   Ap -2 Bp -2 D -7      Prolapse stage: 2   Cervical elongation: No    Urethral tenderness: No    Bladder/ suprapubic tenderness: No    Levator tenderness: None   Levator muscle tone: WNL   Pelvic floor contraction strength (modified Oxford scale): 1=Flicker   Pelvic floor contraction duration: Brief    Bimanual exam: Small, Mobile Uterus   Rectal: multiple external hemorrhoids and red internal hemorrhoid prolapsing out of rectum, nontender, nonfriable. White thickened perianal plaque at 1:00.    Vaginal band/stricture: No     Procedure Performed: No    Chaperone was present throughout the physical exam.     Diagnostic test and records review:    Urine dipstick: n/a      Post-void residual: n/a    Labs:   Glycohemoglobin   Date Value Ref Range Status   02/05/2025 7.9 (H) 4.0 - 5.6 % Corrected     Comment:     Corrected result;  previously reported as 6.7 on 02/05/2025 at 18:57  Increased risk for diabetes:  5.7 -6.4%  Diabetes:  >6.4%  Glycemic control for adults with diabetes:  <7.0%    The above interpretations are per ADA guidelines.  Diagnosis  of diabetes mellitus on the basis of elevated Hemoglobin A1c  should be confirmed by repeating the Hb A1c test.         Lab Results   Component Value Date/Time    SODIUM 138 10/31/2024 1050    POTASSIUM 4.0 10/31/2024 1050    CHLORIDE 105 10/31/2024 1050    CO2 16 (L) 10/31/2024 1050    GLUCOSE 153 (H) 10/31/2024 1050    BUN 22 10/31/2024 1050    CREATININE 0.99 10/31/2024 1050    CALCIUM 9.7 10/31/2024 1050    ANION 17.0 (H) 10/31/2024 1050       Lab Results   Component Value Date/Time    WBC 7.5 02/05/2025 06:49 AM    RBC 5.12 02/05/2025 06:49 AM    HEMOGLOBIN 16.2 (H) 02/05/2025 06:49 AM    MCV 95.1 02/05/2025 06:49 AM    MCH 31.6 02/05/2025 06:49 AM    MCHC 33.3 02/05/2025 06:49 AM    RDW 45.6 02/05/2025 06:49 AM    MPV 10.5 02/05/2025 06:49 AM         Radiology: None    Procedural:   02/06/25 UDS:   Filling Phase: Normal sensation. Normal compliance. Absent DO. Absent UDSI. Normal capacity.  Voiding Phase: Voids via detrusor. PVR normal (0). Normal voiding pressures, no evidence of obstruction.      Documentation reviewed: Prior EMR Records    Outside records reviewed: 0 pages      Assessment & Plan     Roya Muniz is a 84 y.o. P3 with at least S2POP, lichen sclerosis, hemorrhoids and IBS-D. We discussed my recommendations for further diagnosis and treatment at length today.     Assessment & Plan    S2POP  - Proceed with scheduled LeFort colpocleisis, possible vaginal vault suspension, possible anterior/posterior repair, perineal repair, cystourethroscopy and all indicated procedures.   - Gent/Clinda for abx   - Stop aspirin 1 week prior to surgery (stop 3/3/25)  - DVT ppx: Caprini score 9 = highest risk for VTE (10.7%). Will need heparin 5000u in preop +SCDs and 30 days of  lovenox 40mg.     Hemorrhoids  - Hemorrhoidectomy with Dr. Charity Snow MD  Urogynecology and Reconstructive Pelvic Surgery  Department of Obstetrics and Gynecology  [facility]

## 2025-03-07 ENCOUNTER — TELEPHONE (OUTPATIENT)
Dept: OBGYN | Facility: CLINIC | Age: 85
End: 2025-03-07
Payer: MEDICARE

## 2025-03-07 ENCOUNTER — HOSPITAL ENCOUNTER (OUTPATIENT)
Facility: MEDICAL CENTER | Age: 85
End: 2025-03-07
Attending: STUDENT IN AN ORGANIZED HEALTH CARE EDUCATION/TRAINING PROGRAM
Payer: MEDICARE

## 2025-03-07 DIAGNOSIS — N39.0 ACUTE UTI: ICD-10-CM

## 2025-03-07 PROCEDURE — 87077 CULTURE AEROBIC IDENTIFY: CPT

## 2025-03-07 PROCEDURE — 87186 SC STD MICRODIL/AGAR DIL: CPT

## 2025-03-07 PROCEDURE — 87086 URINE CULTURE/COLONY COUNT: CPT

## 2025-03-07 RX ORDER — SULFAMETHOXAZOLE AND TRIMETHOPRIM 800; 160 MG/1; MG/1
1 TABLET ORAL 2 TIMES DAILY
Qty: 6 TABLET | Refills: 0 | Status: SHIPPED | OUTPATIENT
Start: 2025-03-07 | End: 2025-03-11

## 2025-03-07 RX ORDER — CLINDAMYCIN PHOSPHATE 600 MG/50ML
600 INJECTION, SOLUTION INTRAVENOUS
Status: COMPLETED | OUTPATIENT
Start: 2025-03-10 | End: 2025-03-10

## 2025-03-07 ASSESSMENT — FIBROSIS 4 INDEX: FIB4 SCORE: 1.5

## 2025-03-07 NOTE — TELEPHONE ENCOUNTER
Caller Name: Roya Muniz   Call Back Number: [phone]     How would the patient prefer to be contacted with a response: Phone call do NOT leave a detailed message    Pt called because she believes she has a UTI, she has some burning at the end of urination. Pt states no urgency at this time. Pt has surgery with Dr. Snow this upcoming Monday and would like to clear out infection. I informed the pt that I will forward this conversation to Dr. Snow to place and order for her to leave a urine and will call her back once she has done so. Pt verbalized understanding of conversation.

## 2025-03-07 NOTE — TELEPHONE ENCOUNTER
Patient had questions regarding surgery with Dr. Snow on Monday. I transferred the call to Dr. Snow's SOPHIE Zamora.

## 2025-03-07 NOTE — TELEPHONE ENCOUNTER
Called pt to inform her that Dr. Snow placed a urine culture for the pt to leave her urine at the lab. I also informed the pt that Dr. Snow placed an antibiotic for the pt to start taking after she leaves her urine at the lab first. Pt verbalized understanding.

## 2025-03-09 ENCOUNTER — ANESTHESIA EVENT (OUTPATIENT)
Dept: SURGERY | Facility: MEDICAL CENTER | Age: 85
End: 2025-03-09
Payer: MEDICARE

## 2025-03-10 ENCOUNTER — APPOINTMENT (OUTPATIENT)
Dept: RADIOLOGY | Facility: MEDICAL CENTER | Age: 85
End: 2025-03-10
Attending: STUDENT IN AN ORGANIZED HEALTH CARE EDUCATION/TRAINING PROGRAM
Payer: MEDICARE

## 2025-03-10 ENCOUNTER — PHARMACY VISIT (OUTPATIENT)
Dept: PHARMACY | Facility: MEDICAL CENTER | Age: 85
End: 2025-03-10
Payer: COMMERCIAL

## 2025-03-10 ENCOUNTER — ANESTHESIA (OUTPATIENT)
Dept: SURGERY | Facility: MEDICAL CENTER | Age: 85
End: 2025-03-10
Payer: MEDICARE

## 2025-03-10 ENCOUNTER — HOSPITAL ENCOUNTER (OUTPATIENT)
Facility: MEDICAL CENTER | Age: 85
End: 2025-03-10
Attending: STUDENT IN AN ORGANIZED HEALTH CARE EDUCATION/TRAINING PROGRAM | Admitting: STUDENT IN AN ORGANIZED HEALTH CARE EDUCATION/TRAINING PROGRAM
Payer: MEDICARE

## 2025-03-10 VITALS
BODY MASS INDEX: 23.45 KG/M2 | OXYGEN SATURATION: 94 % | TEMPERATURE: 97.3 F | DIASTOLIC BLOOD PRESSURE: 58 MMHG | RESPIRATION RATE: 18 BRPM | HEART RATE: 65 BPM | SYSTOLIC BLOOD PRESSURE: 112 MMHG | WEIGHT: 137.35 LBS | HEIGHT: 64 IN

## 2025-03-10 DIAGNOSIS — Z78.9 DEEP VEIN THROMBOSIS (DVT) PROPHYLAXIS PRESCRIBED AT DISCHARGE: ICD-10-CM

## 2025-03-10 DIAGNOSIS — G89.18 POST-OP PAIN: ICD-10-CM

## 2025-03-10 DIAGNOSIS — G89.18 ACUTE POST-OPERATIVE PAIN: ICD-10-CM

## 2025-03-10 DIAGNOSIS — M62.838 MUSCLE SPASM: ICD-10-CM

## 2025-03-10 PROBLEM — N81.6 RECTOCELE: Status: ACTIVE | Noted: 2025-03-10

## 2025-03-10 PROBLEM — N81.2 UTEROVAGINAL PROLAPSE, INCOMPLETE: Status: ACTIVE | Noted: 2025-03-10

## 2025-03-10 PROBLEM — N81.10 CYSTOCELE, UNSPECIFIED: Status: ACTIVE | Noted: 2025-03-10

## 2025-03-10 LAB
BACTERIA UR CULT: ABNORMAL
BACTERIA UR CULT: ABNORMAL
EST. AVERAGE GLUCOSE BLD GHB EST-MCNC: 169 MG/DL
GLUCOSE BLD STRIP.AUTO-MCNC: 190 MG/DL (ref 65–99)
GLUCOSE BLD STRIP.AUTO-MCNC: 217 MG/DL (ref 65–99)
HBA1C MFR BLD: 7.5 % (ref 4–5.6)
PATHOLOGY CONSULT NOTE: NORMAL
SIGNIFICANT IND 70042: ABNORMAL
SITE SITE: ABNORMAL
SOURCE SOURCE: ABNORMAL

## 2025-03-10 PROCEDURE — 110371 HCHG SHELL REV 272: Performed by: STUDENT IN AN ORGANIZED HEALTH CARE EDUCATION/TRAINING PROGRAM

## 2025-03-10 PROCEDURE — 700111 HCHG RX REV CODE 636 W/ 250 OVERRIDE (IP): Performed by: STUDENT IN AN ORGANIZED HEALTH CARE EDUCATION/TRAINING PROGRAM

## 2025-03-10 PROCEDURE — 160028 HCHG SURGERY MINUTES - 1ST 30 MINS LEVEL 3: Performed by: STUDENT IN AN ORGANIZED HEALTH CARE EDUCATION/TRAINING PROGRAM

## 2025-03-10 PROCEDURE — 46260 REMOVE IN/EX HEM GROUPS 2+: CPT | Performed by: SURGERY

## 2025-03-10 PROCEDURE — 700102 HCHG RX REV CODE 250 W/ 637 OVERRIDE(OP)

## 2025-03-10 PROCEDURE — 160025 RECOVERY II MINUTES (STATS): Performed by: STUDENT IN AN ORGANIZED HEALTH CARE EDUCATION/TRAINING PROGRAM

## 2025-03-10 PROCEDURE — 700105 HCHG RX REV CODE 258: Performed by: STUDENT IN AN ORGANIZED HEALTH CARE EDUCATION/TRAINING PROGRAM

## 2025-03-10 PROCEDURE — 700102 HCHG RX REV CODE 250 W/ 637 OVERRIDE(OP): Performed by: STUDENT IN AN ORGANIZED HEALTH CARE EDUCATION/TRAINING PROGRAM

## 2025-03-10 PROCEDURE — 700111 HCHG RX REV CODE 636 W/ 250 OVERRIDE (IP)

## 2025-03-10 PROCEDURE — 88304 TISSUE EXAM BY PATHOLOGIST: CPT | Mod: 26 | Performed by: PATHOLOGY

## 2025-03-10 PROCEDURE — 160002 HCHG RECOVERY MINUTES (STAT): Performed by: STUDENT IN AN ORGANIZED HEALTH CARE EDUCATION/TRAINING PROGRAM

## 2025-03-10 PROCEDURE — 83036 HEMOGLOBIN GLYCOSYLATED A1C: CPT

## 2025-03-10 PROCEDURE — 160035 HCHG PACU - 1ST 60 MINS PHASE I: Performed by: STUDENT IN AN ORGANIZED HEALTH CARE EDUCATION/TRAINING PROGRAM

## 2025-03-10 PROCEDURE — 160015 HCHG STAT PREOP MINUTES: Performed by: STUDENT IN AN ORGANIZED HEALTH CARE EDUCATION/TRAINING PROGRAM

## 2025-03-10 PROCEDURE — 160048 HCHG OR STATISTICAL LEVEL 1-5: Performed by: STUDENT IN AN ORGANIZED HEALTH CARE EDUCATION/TRAINING PROGRAM

## 2025-03-10 PROCEDURE — RXMED WILLOW AMBULATORY MEDICATION CHARGE: Performed by: NURSE PRACTITIONER

## 2025-03-10 PROCEDURE — 160046 HCHG PACU - 1ST 60 MINS PHASE II: Performed by: STUDENT IN AN ORGANIZED HEALTH CARE EDUCATION/TRAINING PROGRAM

## 2025-03-10 PROCEDURE — 88304 TISSUE EXAM BY PATHOLOGIST: CPT | Performed by: PATHOLOGY

## 2025-03-10 PROCEDURE — RXMED WILLOW AMBULATORY MEDICATION CHARGE: Performed by: SURGERY

## 2025-03-10 PROCEDURE — 57120 COLPOCLEISIS LE FORT TYPE: CPT | Performed by: STUDENT IN AN ORGANIZED HEALTH CARE EDUCATION/TRAINING PROGRAM

## 2025-03-10 PROCEDURE — 57120 COLPOCLEISIS LE FORT TYPE: CPT | Mod: AS | Performed by: NURSE PRACTITIONER

## 2025-03-10 PROCEDURE — 160009 HCHG ANES TIME/MIN: Performed by: STUDENT IN AN ORGANIZED HEALTH CARE EDUCATION/TRAINING PROGRAM

## 2025-03-10 PROCEDURE — 700111 HCHG RX REV CODE 636 W/ 250 OVERRIDE (IP): Mod: JZ | Performed by: STUDENT IN AN ORGANIZED HEALTH CARE EDUCATION/TRAINING PROGRAM

## 2025-03-10 PROCEDURE — 700101 HCHG RX REV CODE 250: Performed by: SURGERY

## 2025-03-10 PROCEDURE — 160039 HCHG SURGERY MINUTES - EA ADDL 1 MIN LEVEL 3: Performed by: STUDENT IN AN ORGANIZED HEALTH CARE EDUCATION/TRAINING PROGRAM

## 2025-03-10 PROCEDURE — 82962 GLUCOSE BLOOD TEST: CPT

## 2025-03-10 PROCEDURE — 160047 HCHG PACU  - EA ADDL 30 MINS PHASE II: Performed by: STUDENT IN AN ORGANIZED HEALTH CARE EDUCATION/TRAINING PROGRAM

## 2025-03-10 PROCEDURE — 36415 COLL VENOUS BLD VENIPUNCTURE: CPT

## 2025-03-10 PROCEDURE — 700101 HCHG RX REV CODE 250: Performed by: STUDENT IN AN ORGANIZED HEALTH CARE EDUCATION/TRAINING PROGRAM

## 2025-03-10 PROCEDURE — 71045 X-RAY EXAM CHEST 1 VIEW: CPT

## 2025-03-10 PROCEDURE — A9270 NON-COVERED ITEM OR SERVICE: HCPCS | Performed by: STUDENT IN AN ORGANIZED HEALTH CARE EDUCATION/TRAINING PROGRAM

## 2025-03-10 RX ORDER — BUPIVACAINE HYDROCHLORIDE AND EPINEPHRINE 2.5; 5 MG/ML; UG/ML
INJECTION, SOLUTION EPIDURAL; INFILTRATION; INTRACAUDAL; PERINEURAL
Status: DISCONTINUED
Start: 2025-03-10 | End: 2025-03-10 | Stop reason: HOSPADM

## 2025-03-10 RX ORDER — METHOCARBAMOL 500 MG/1
500 TABLET, FILM COATED ORAL EVERY 8 HOURS PRN
Qty: 15 TABLET | Refills: 0 | Status: SHIPPED | OUTPATIENT
Start: 2025-03-10 | End: 2025-03-15

## 2025-03-10 RX ORDER — HYDROMORPHONE HYDROCHLORIDE 2 MG/ML
INJECTION, SOLUTION INTRAMUSCULAR; INTRAVENOUS; SUBCUTANEOUS PRN
Status: DISCONTINUED | OUTPATIENT
Start: 2025-03-10 | End: 2025-03-10 | Stop reason: SURG

## 2025-03-10 RX ORDER — VASOPRESSIN 20 [USP'U]/ML
INJECTION, SOLUTION INTRAVENOUS
Status: DISCONTINUED
Start: 2025-03-10 | End: 2025-03-10 | Stop reason: HOSPADM

## 2025-03-10 RX ORDER — ACETAMINOPHEN 500 MG
1000 TABLET ORAL ONCE
Status: COMPLETED | OUTPATIENT
Start: 2025-03-10 | End: 2025-03-10

## 2025-03-10 RX ORDER — BUPIVACAINE HYDROCHLORIDE AND EPINEPHRINE 5; 5 MG/ML; UG/ML
INJECTION, SOLUTION EPIDURAL; INTRACAUDAL; PERINEURAL
Status: DISCONTINUED
Start: 2025-03-10 | End: 2025-03-10 | Stop reason: HOSPADM

## 2025-03-10 RX ORDER — OXYCODONE HCL 5 MG/5 ML
10 SOLUTION, ORAL ORAL
Status: DISCONTINUED | OUTPATIENT
Start: 2025-03-10 | End: 2025-03-10 | Stop reason: HOSPADM

## 2025-03-10 RX ORDER — ENOXAPARIN SODIUM 100 MG/ML
40 INJECTION SUBCUTANEOUS DAILY
Qty: 30 EACH | Refills: 0 | Status: SHIPPED | OUTPATIENT
Start: 2025-03-11

## 2025-03-10 RX ORDER — ONDANSETRON 2 MG/ML
4 INJECTION INTRAMUSCULAR; INTRAVENOUS
Status: DISCONTINUED | OUTPATIENT
Start: 2025-03-10 | End: 2025-03-10 | Stop reason: HOSPADM

## 2025-03-10 RX ORDER — OXYCODONE HCL 5 MG/5 ML
5 SOLUTION, ORAL ORAL
Status: DISCONTINUED | OUTPATIENT
Start: 2025-03-10 | End: 2025-03-10 | Stop reason: HOSPADM

## 2025-03-10 RX ORDER — BUPIVACAINE HYDROCHLORIDE 2.5 MG/ML
INJECTION, SOLUTION EPIDURAL; INFILTRATION; INTRACAUDAL; PERINEURAL
Status: DISCONTINUED
Start: 2025-03-10 | End: 2025-03-10 | Stop reason: HOSPADM

## 2025-03-10 RX ORDER — DEXTROSE MONOHYDRATE 25 G/50ML
25 INJECTION, SOLUTION INTRAVENOUS
Status: DISCONTINUED | OUTPATIENT
Start: 2025-03-10 | End: 2025-03-10 | Stop reason: HOSPADM

## 2025-03-10 RX ORDER — LIDOCAINE HYDROCHLORIDE 10 MG/ML
INJECTION, SOLUTION EPIDURAL; INFILTRATION; INTRACAUDAL; PERINEURAL
Status: DISCONTINUED
Start: 2025-03-10 | End: 2025-03-10 | Stop reason: HOSPADM

## 2025-03-10 RX ORDER — PHENAZOPYRIDINE HYDROCHLORIDE 200 MG/1
200 TABLET, FILM COATED ORAL
Status: COMPLETED | OUTPATIENT
Start: 2025-03-10 | End: 2025-03-10

## 2025-03-10 RX ORDER — LABETALOL HYDROCHLORIDE 5 MG/ML
5 INJECTION, SOLUTION INTRAVENOUS
Status: DISCONTINUED | OUTPATIENT
Start: 2025-03-10 | End: 2025-03-10 | Stop reason: HOSPADM

## 2025-03-10 RX ORDER — HALOPERIDOL 5 MG/ML
INJECTION INTRAMUSCULAR PRN
Status: DISCONTINUED | OUTPATIENT
Start: 2025-03-10 | End: 2025-03-10 | Stop reason: SURG

## 2025-03-10 RX ORDER — HYDROMORPHONE HYDROCHLORIDE 1 MG/ML
0.1 INJECTION, SOLUTION INTRAMUSCULAR; INTRAVENOUS; SUBCUTANEOUS
Status: DISCONTINUED | OUTPATIENT
Start: 2025-03-10 | End: 2025-03-10 | Stop reason: HOSPADM

## 2025-03-10 RX ORDER — BUPIVACAINE HYDROCHLORIDE AND EPINEPHRINE 5; 5 MG/ML; UG/ML
INJECTION, SOLUTION EPIDURAL; INTRACAUDAL; PERINEURAL
Status: DISCONTINUED | OUTPATIENT
Start: 2025-03-10 | End: 2025-03-10 | Stop reason: HOSPADM

## 2025-03-10 RX ORDER — METHOCARBAMOL 500 MG/1
500 TABLET, FILM COATED ORAL 3 TIMES DAILY
Qty: 15 TABLET | Refills: 0 | Status: SHIPPED | OUTPATIENT
Start: 2025-03-10 | End: 2025-03-10

## 2025-03-10 RX ORDER — HYDRALAZINE HYDROCHLORIDE 20 MG/ML
5 INJECTION INTRAMUSCULAR; INTRAVENOUS
Status: DISCONTINUED | OUTPATIENT
Start: 2025-03-10 | End: 2025-03-10 | Stop reason: HOSPADM

## 2025-03-10 RX ORDER — INSULIN LISPRO 100 [IU]/ML
2-9 INJECTION, SOLUTION INTRAVENOUS; SUBCUTANEOUS EVERY 6 HOURS
Status: DISCONTINUED | OUTPATIENT
Start: 2025-03-10 | End: 2025-03-10 | Stop reason: HOSPADM

## 2025-03-10 RX ORDER — EPHEDRINE SULFATE 50 MG/ML
INJECTION, SOLUTION INTRAVENOUS PRN
Status: DISCONTINUED | OUTPATIENT
Start: 2025-03-10 | End: 2025-03-10 | Stop reason: SURG

## 2025-03-10 RX ORDER — HALOPERIDOL 5 MG/ML
1 INJECTION INTRAMUSCULAR
Status: DISCONTINUED | OUTPATIENT
Start: 2025-03-10 | End: 2025-03-10 | Stop reason: HOSPADM

## 2025-03-10 RX ORDER — LIDOCAINE HYDROCHLORIDE 20 MG/ML
INJECTION, SOLUTION EPIDURAL; INFILTRATION; INTRACAUDAL; PERINEURAL PRN
Status: DISCONTINUED | OUTPATIENT
Start: 2025-03-10 | End: 2025-03-10 | Stop reason: SURG

## 2025-03-10 RX ORDER — INSULIN LISPRO 100 [IU]/ML
INJECTION, SOLUTION INTRAVENOUS; SUBCUTANEOUS
Status: COMPLETED
Start: 2025-03-10 | End: 2025-03-10

## 2025-03-10 RX ORDER — HYDROMORPHONE HYDROCHLORIDE 1 MG/ML
0.4 INJECTION, SOLUTION INTRAMUSCULAR; INTRAVENOUS; SUBCUTANEOUS
Status: DISCONTINUED | OUTPATIENT
Start: 2025-03-10 | End: 2025-03-10 | Stop reason: HOSPADM

## 2025-03-10 RX ORDER — ENOXAPARIN SODIUM 100 MG/ML
40 INJECTION SUBCUTANEOUS DAILY
Qty: 30 EACH | Refills: 0 | Status: SHIPPED | OUTPATIENT
Start: 2025-03-10 | End: 2025-03-10

## 2025-03-10 RX ORDER — ONDANSETRON 2 MG/ML
INJECTION INTRAMUSCULAR; INTRAVENOUS PRN
Status: DISCONTINUED | OUTPATIENT
Start: 2025-03-10 | End: 2025-03-10 | Stop reason: SURG

## 2025-03-10 RX ORDER — OXYCODONE AND ACETAMINOPHEN 5; 325 MG/1; MG/1
1 TABLET ORAL EVERY 4 HOURS PRN
Qty: 30 TABLET | Refills: 0 | Status: SHIPPED | OUTPATIENT
Start: 2025-03-10 | End: 2025-03-17

## 2025-03-10 RX ORDER — ALBUTEROL SULFATE 5 MG/ML
2.5 SOLUTION RESPIRATORY (INHALATION)
Status: DISCONTINUED | OUTPATIENT
Start: 2025-03-10 | End: 2025-03-10 | Stop reason: HOSPADM

## 2025-03-10 RX ORDER — LIDOCAINE 40 MG/G
1 CREAM TOPICAL ONCE
Qty: 15 G | Refills: 3 | Status: SHIPPED | OUTPATIENT
Start: 2025-03-10 | End: 2025-03-10

## 2025-03-10 RX ORDER — HEPARIN SODIUM 5000 [USP'U]/ML
INJECTION, SOLUTION INTRAVENOUS; SUBCUTANEOUS
Status: COMPLETED
Start: 2025-03-10 | End: 2025-03-10

## 2025-03-10 RX ORDER — EPHEDRINE SULFATE 50 MG/ML
5 INJECTION, SOLUTION INTRAVENOUS
Status: DISCONTINUED | OUTPATIENT
Start: 2025-03-10 | End: 2025-03-10 | Stop reason: HOSPADM

## 2025-03-10 RX ORDER — SODIUM CHLORIDE, SODIUM LACTATE, POTASSIUM CHLORIDE, CALCIUM CHLORIDE 600; 310; 30; 20 MG/100ML; MG/100ML; MG/100ML; MG/100ML
INJECTION, SOLUTION INTRAVENOUS CONTINUOUS
Status: ACTIVE | OUTPATIENT
Start: 2025-03-10 | End: 2025-03-10

## 2025-03-10 RX ORDER — HYDROMORPHONE HYDROCHLORIDE 1 MG/ML
0.2 INJECTION, SOLUTION INTRAMUSCULAR; INTRAVENOUS; SUBCUTANEOUS
Status: DISCONTINUED | OUTPATIENT
Start: 2025-03-10 | End: 2025-03-10 | Stop reason: HOSPADM

## 2025-03-10 RX ORDER — GABAPENTIN 100 MG/1
200 CAPSULE ORAL
Status: COMPLETED | OUTPATIENT
Start: 2025-03-10 | End: 2025-03-10

## 2025-03-10 RX ORDER — PHENYLEPHRINE HYDROCHLORIDE 10 MG/ML
INJECTION, SOLUTION INTRAMUSCULAR; INTRAVENOUS; SUBCUTANEOUS PRN
Status: DISCONTINUED | OUTPATIENT
Start: 2025-03-10 | End: 2025-03-10 | Stop reason: SURG

## 2025-03-10 RX ADMIN — ROCURONIUM BROMIDE 50 MG: 10 INJECTION, SOLUTION INTRAVENOUS at 07:50

## 2025-03-10 RX ADMIN — PROPOFOL 30 MG: 10 INJECTION, EMULSION INTRAVENOUS at 10:17

## 2025-03-10 RX ADMIN — GENTAMICIN SULFATE 80 MG: 40 INJECTION, SOLUTION INTRAMUSCULAR; INTRAVENOUS at 07:52

## 2025-03-10 RX ADMIN — CLINDAMYCIN IN 5 PERCENT DEXTROSE 600 MG: 12 INJECTION, SOLUTION INTRAVENOUS at 07:08

## 2025-03-10 RX ADMIN — HALOPERIDOL LACTATE 1 MG: 5 INJECTION, SOLUTION INTRAMUSCULAR at 08:21

## 2025-03-10 RX ADMIN — FENTANYL CITRATE 50 MCG: 50 INJECTION, SOLUTION INTRAMUSCULAR; INTRAVENOUS at 07:47

## 2025-03-10 RX ADMIN — ROCURONIUM BROMIDE 20 MG: 10 INJECTION, SOLUTION INTRAVENOUS at 09:47

## 2025-03-10 RX ADMIN — ACETAMINOPHEN 1000 MG: 500 TABLET ORAL at 07:04

## 2025-03-10 RX ADMIN — EPHEDRINE SULFATE 10 MG: 50 INJECTION, SOLUTION INTRAVENOUS at 08:33

## 2025-03-10 RX ADMIN — HEPARIN SODIUM 5000 UNITS: 5000 INJECTION, SOLUTION INTRAVENOUS; SUBCUTANEOUS at 07:04

## 2025-03-10 RX ADMIN — PROPOFOL 100 MG: 10 INJECTION, EMULSION INTRAVENOUS at 07:50

## 2025-03-10 RX ADMIN — HYDROMORPHONE HYDROCHLORIDE 0.5 MG: 2 INJECTION INTRAMUSCULAR; INTRAVENOUS; SUBCUTANEOUS at 09:05

## 2025-03-10 RX ADMIN — PHENAZOPYRIDINE 200 MG: 200 TABLET ORAL at 07:04

## 2025-03-10 RX ADMIN — SODIUM CHLORIDE, POTASSIUM CHLORIDE, SODIUM LACTATE AND CALCIUM CHLORIDE: 600; 310; 30; 20 INJECTION, SOLUTION INTRAVENOUS at 07:43

## 2025-03-10 RX ADMIN — EPHEDRINE SULFATE 10 MG: 50 INJECTION, SOLUTION INTRAVENOUS at 09:00

## 2025-03-10 RX ADMIN — LIDOCAINE HYDROCHLORIDE 60 MG: 20 INJECTION, SOLUTION EPIDURAL; INFILTRATION; INTRACAUDAL; PERINEURAL at 07:50

## 2025-03-10 RX ADMIN — ONDANSETRON 4 MG: 2 INJECTION INTRAMUSCULAR; INTRAVENOUS at 08:21

## 2025-03-10 RX ADMIN — PHENYLEPHRINE HYDROCHLORIDE 100 MCG: 10 INJECTION INTRAVENOUS at 08:26

## 2025-03-10 RX ADMIN — INSULIN LISPRO 3 UNITS: 100 INJECTION, SOLUTION INTRAVENOUS; SUBCUTANEOUS at 10:42

## 2025-03-10 RX ADMIN — FENTANYL CITRATE 50 MCG: 50 INJECTION, SOLUTION INTRAMUSCULAR; INTRAVENOUS at 07:56

## 2025-03-10 RX ADMIN — PHENYLEPHRINE HYDROCHLORIDE 100 MCG: 10 INJECTION INTRAVENOUS at 07:50

## 2025-03-10 RX ADMIN — GABAPENTIN 200 MG: 100 CAPSULE ORAL at 07:04

## 2025-03-10 ASSESSMENT — PAIN DESCRIPTION - PAIN TYPE: TYPE: SURGICAL PAIN

## 2025-03-10 ASSESSMENT — FIBROSIS 4 INDEX: FIB4 SCORE: 1.5

## 2025-03-10 NOTE — OR NURSING
1023 patient arrived from OR, report received, attached to monitoring. VSS, patient oxygenating well on 6 L simple mask, bazzi cath in place, surgicel packed to rectum, dry and intact, patient asleep     1036 fsbs assessed, medicated per EMAR    1040 patient denies pain and nausea, returns to sleep     1110 telephone updates to spouse, Colin, no answer, will try again later    1115 patient drinking water, tolerates well     1150 telephone updates to spouse, Colin     1225  brought bedside, pt sleeping off and on, VSS, room air.     1330 patient backfill void trial complete, patient backfilled with 300 cc sterile water and was able to void 200    1331 notified Dr. Snow of blood noted on pad, per MD not of concern unless saturating a pad in less than one hour, will monitor    1420 dc instructions discussed with patient and patient spouse, questions answered       1430 patient with less blood noted on pad, Dr. Snow notified with no further orders, patient denies pain and nausea, states readiness to dc home, piv dc tip intact     1433 patient dc home in stable condition at this time, vss, surgical sites cdi, peripad with minimal blood noted, all belongings with patient and accounted for, escorted out via wc with CCT.

## 2025-03-10 NOTE — ANESTHESIA POSTPROCEDURE EVALUATION
Patient: Roya Muniz    Procedure Summary       Date: 03/10/25 Room / Location: [facility] ROOM 21 / SURGERY SAME DAY [facility]    Anesthesia Start: 0743 Anesthesia Stop: 1025    Procedures:       LEFORT COLPOCLEISIS, CYSTOURETHROSCOPY, PERINEORRHAPHY (Vagina )      HEMORRHOIDECTOMY (Anus) Diagnosis: (INCOMPLETE UTEROVAGINAL PROLAPSE, CYSTOCELE MIDLINE, RETROCELE, STRESS URINARY INCONTINENCE, HEMMOHROIDS)    Surgeons: Savannah Snow M.D.; Kenny Nash M.D. Responsible Provider: Mil Moore M.D.    Anesthesia Type: general ASA Status: 3            Final Anesthesia Type: general  Last vitals  BP   Blood Pressure : 123/65    Temp   36.3 °C (97.3 °F)    Pulse   63   Resp   17    SpO2   93 %      Anesthesia Post Evaluation    Patient location during evaluation: PACU  Patient participation: complete - patient participated  Level of consciousness: awake and alert    Airway patency: patent  Anesthetic complications: no  Cardiovascular status: hemodynamically stable  Respiratory status: acceptable  Hydration status: euvolemic    PONV: none          There were no known notable events for this encounter.     Nurse Pain Score: 0 (NPRS)

## 2025-03-10 NOTE — ANESTHESIA PREPROCEDURE EVALUATION
Case: [id number] Date/Time: 03/10/25 0715    Procedures:       LEFORT COLPOCLEISIS, CYSTOURETHROSCOPY, POSSIBLE EXTRAPERITONEAL VAGINAL COLPOPEXY- SACROSPINOUS LIGAMENT, POSSIBLE ANTERIOR REPAIR, POSSIBLE POSTERIOR REPAIR, PERINEORRHAPHY, POSSIBLE MID URETHRAL SLING      SLING OPERATION, WITH CYSTOURETHROSCOPY, FOR FEMALE STRESS INCONTINENCE      HEMORRHOIDECTOMY    Pre-op diagnosis:       INCOMPLETE UTEROVAGINAL PROLAPSE, CYSTOCELE MIDLINE, RECTOCELE, STRESS URINARY INCONTINENCE      ANAL POLYP    Location: [facility] ROOM 21 / SURGERY SAME DAY [facility]    Surgeons: Savannah Snow M.D.; Kenny Nash M.D.            Relevant Problems   CARDIAC   (positive) Atherosclerosis of aorta (HCC)   (positive) Cardiac pacemaker in situ   (positive) Hypertension associated with type 2 diabetes mellitus (HCC)   (positive) Sick sinus syndrome (HCC)         (positive) Microalbuminuria due to type 2 diabetes mellitus (HCC)   (positive) Stage 3a chronic kidney disease      ENDO   (positive) Hypothyroidism   (positive) Type 2 diabetes mellitus with other specified complication (HCC)       Physical Exam    Airway   Mallampati: III  TM distance: >3 FB  Neck ROM: full       Cardiovascular - normal exam  Rhythm: regular  Rate: normal  (-) murmur     Dental - normal exam           Pulmonary - normal exam  Breath sounds clear to auscultation     Abdominal    Neurological - normal exam                   Anesthesia Plan    ASA 3   ASA physical status 3 criteria: implanted pacemaker    Plan - general       Airway plan will be ETT          Induction: intravenous    Postoperative Plan: Postoperative administration of opioids is intended.    Pertinent diagnostic labs and testing reviewed    Informed Consent:    Anesthetic plan and risks discussed with patient.    Use of blood products discussed with: patient whom consented to blood products.

## 2025-03-10 NOTE — ANESTHESIA TIME REPORT
Anesthesia Start and Stop Event Times       Date Time Event    3/10/2025 0708 Ready for Procedure     0743 Anesthesia Start     1025 Anesthesia Stop          Responsible Staff  03/10/25      Name Role Begin End    Mil Moore M.D. Anesth 0743 1025          Overtime Reason:  no overtime (within assigned shift)    Comments:

## 2025-03-10 NOTE — OP REPORT
DATE OF SERVICE:  03/10/2025     PREOPERATIVE DIAGNOSIS:  Symptomatic hemorrhoid with polyp.     POSTOPERATIVE DIAGNOSIS:  Symptomatic hemorrhoid with polyp.     PROCEDURE:  Two column hemorrhoidectomy.     SURGEON:  Kenny Nash MD     ASSISTANT:  None.     ANESTHESIA:  General endotracheal anesthesia.     ESTIMATED BLOOD LOSS:  5 mL.     SPECIMENS:  Hemorrhoids with polyp.     COMPLICATIONS:  None.     CONDITION:  Stable.     INDICATIONS FOR PROCEDURE:  This is an 84-year-old female with a symptomatic   large right anterior hemorrhoid with an associated polyp.  The patient is   undergoing a simultaneous surgery with Dr. Snow for a vaginal prolapse.  Risks,   benefits and alternatives of hemorrhoidectomy due to this being refractory to   medical management were explained to her.  She presents now for elective   surgery.     OPERATIVE FINDINGS:  Enlarged right anterior hemorrhoid column with associated   polyp removed with hemostasis.  Left lateral column removed as well.     OPERATIVE TECHNIQUE:  After informed consent was obtained, the patient was   taken to the operating room and placed in supine position.  After adequate   endotracheal anesthesia was achieved, the patient was switched to lithotomy   position.  Anus and perineum were prepped and draped in sterile fashion.    Operation was begun by performing a digital rectal and Hill-Curran retractor   exam.  We identified an enlarged hemorrhoid in the right anterior position   with an associated polyp on the mucosa. We used cautery to incise the external   hemorrhoid skin tag tissue.  We then used a LigaSure device to divide the   hemorrhoid column leaving the underlying sphincter muscles intact.  Once this   was completed, this was sent as a surgical specimen. After additional   application of cautery, hemostasis was noted.  We also removed part of the   left lateral hemorrhoid columns as there were some enlarged internal   hemorrhoids with sequela of  recent bleeding present.  No other enlarged   hemorrhoid columns were noted.  Hemostasis was noted throughout the operative   field.  We then placed a 4 x 8 sheet of Surgicel in the anal canal and a local   anesthetic block was given with 30 mL of 0.5% Marcaine with epinephrine.    This concluded this portion of the procedure.  The patient then underwent   surgery with Dr. Snow.  See separate dictated operative report for details.    After conclusion of both surgeries, she was returned to the PACU in stable   condition.  All instrument counts were correct at the end of the procedure.        ______________________________  MD DOV Castellanos/GUSTAVO    DD:  03/10/2025 09:19  DT:  03/10/2025 10:07    Job#:  [id number]

## 2025-03-10 NOTE — DISCHARGE INSTRUCTIONS
Post-op: What to expect after your surgery    FOR ALL RECTAL PAIN PLEASE CALL DR. BEE'S OFFICE.     FOR ANY VAGINAL SYMPTOMS PLEASE CONTACT DR. GAN'S OFFICE BELOW.    For urgent post-operative questions or concerns, please call  After hours [facility]  at [phone].  If needed, be sure to inform the answering service that you recently had surgery.     For less-urgent matters (Monday - Friday), you may send a message through [facility] or call the general Women's Health line at [phone].     Bladder function  Try to empty your bladder (urinate) at regular intervals by sitting on the toilet and relaxing.  You may need to adjust your positioning (lean forward or back) to empty the bladder fully. It is important that you do not push or strain to empty your bladder.     Call the surgeon at the number above if you cannot urinate. Also, call for treatment if you have signs and symptoms of a urinary tract infection, including:   Burning with urination  Bladder pain  Worsening need to urinate right away  Urine with a bad smell    If you are sent home with a catheter:   Empty the catheter bag when it becomes full. The bag should be kept at a level below your hips to drain properly. When you are asleep, the bag should dangle off the bed. and should dangle off of the bed while you are asleep. You will be called the day after you go home to schedule an office visit to test your bladder and remove the catheter.     Vaginal care:   Do not go swimming, take sitting baths, or have sexual intercourse for 6 weeks after surgery. Do not place anything in the vagina except vaginal estrogen cream, if instructed to do so by your surgeon.     Vaginal discharge and bleeding/spotting is also normal through the entire 6-week recovery. Sometimes small sutures will fall out of the vagina as they dissolve. This is normal. Contact the office with any heavy bleeding soaking through pads, bad-smelling discharge,  or worsening pain.     Pain management:  Surgical pain is controlled in most patients with only non-steroidal anti-inflammatory drugs (NSAIDs, such as ibuprofen, “Advil”), and acetaminophen (Tylenol). These drugs can be taken together without interaction.     In the hospital, your nurse will give you these medications at regular intervals to both treat and to prevent pain. If these are not controlling your pain, you may ask the nurse for additional medication.     When you go home, you will also take NSAIDs and acetaminophen for pain management. I recommend the following at-home pain regimen:    Take tylenol 1000mg three times per day (every 8 hrs), for the first 5-7 days after surgery to prevent and treat pain and inflammation.   After 5-7 days, you may take 500mg tylenol as-needed   Start Enoxaparin (Lovenox) 40 mg subcutaneously once a day for 30 days on 3/11/25.   Do not use NSAIDs or Aspirin while using Lovenox. You may resume Aspirin 81 mg qd on 4/11/25 after you complete Lovenox.   You may also take methocarbamol (Robaxin) 500 mg three times per day (every 8 hrs) as needed to help with acute post operative pain/spasms.     Sometimes, a short course of narcotics such as oxycodone and hydromorphone is  required, but this is not routine. We do not recommend using narcotics regularly as it can lead to constipation or dizziness, and falls.     Do not drive or operate heavy machinery while using narcotics. You are unlikely to become addicted if you need to take a narcotic medication a few times within the first week of your surgery.  After the first few days to one week, your pain should decrease and you should not have pain severe enough to need narcotics. If you continue having severe pain, contact your surgeon for re-evaluation.     Bowel function  Constipation is common after surgery. This means it may take several days before having a normal bowel movement. It is important to take extra steps to keep your  stools soft to avoid straining with bowel movements. Straining may damage the prolapse repair before it has healed. Most patients will be given a prescription for a stool softener (docusate) as well as a gentle laxative (Miralax or lactulose). These mediations adds water to the stool to make it easier to pass. Take them daily throughout your recovery. Hold for a day if you develop diarrhea.     Call us if you experience any repeated episodes of vomiting, worsening abdominal pain/bloating, or are unable to have a bowel movement for more than 3 days.     Activity restrictions  During the first 6 weeks avoid any type of heavy lifting that requires you to strain.  Gentle walking is good exercise. Start with about 10 minutes a day when you feel ready and build up gradually. Avoid repetitive squatting or bending at the waist. Avoid any fitness-type training, aerobics, etc. for at least 6 weeks after surgery. Generally, you will need 4-6 weeks off work. This period may be longer if you have a very physical job.    Return to sex  You will no longer be able to have intercourse.         What to Expect Post Anesthesia    Rest and take it easy for the first 24 hours.  A responsible adult is recommended to remain with you during that time.  It is normal to feel sleepy.  We encourage you to not do anything that requires balance, judgment or coordination.    FOR 24 HOURS DO NOT:  Drive, operate machinery or run household appliances.  Drink beer or alcoholic beverages.  Make important decisions or sign legal documents.    To avoid nausea, slowly advance diet as tolerated, avoiding spicy or greasy foods for the first day.  Add more substantial food to your diet according to your provider's instructions. INCREASE FLUIDS AND FIBER TO AVOID CONSTIPATION.    MILD FLU-LIKE SYMPTOMS ARE NORMAL.  YOU MAY EXPERIENCE GENERALIZED MUSCLE ACHES, THROAT IRRITATION, HEADACHE AND/OR SOME NAUSEA.    If any questions arise, call your provider.  If  your provider is not available, please feel free to call the Surgical Center at [phone].    MEDICATIONS: Resume taking daily medication.  Take prescribed pain medication with food.  If no medication is prescribed, you may take non-aspirin pain medication if needed.  PAIN MEDICATION CAN BE VERY CONSTIPATING.  Take a stool softener or laxative such as senokot, pericolace, or milk of magnesia if needed.    Last pain medication given:

## 2025-03-10 NOTE — OP REPORT
OPERATIVE REPORT     Name: Roya Muniz   : 1940   MRN: [id number]       PRE-OP DIAGNOSIS:   Incomplete uterovaginal prolapse (N81.2)  Cystocele, midline (N81.11)  Rectocele (N81.6)  Hemorrhoids     POST-OP DIAGNOSIS:   Incomplete uterovaginal prolapse (N81.2)  Cystocele, midline (N81.11)  Rectocele (N81.6)  Hemorrhoids  -            PROCEDURE:   Lefort colpocleisis  Perineal repair  Cystourethroscopy  (47987)  Hemorrhoidectomy bu Dr. Nash             SURGEON:   Savannah Snow MD - Primary  Julien Beth APRN-RNFA - Assist    JUVENTINO Glover, MS3            ANESTHESIA: General            FINDINGS:     - Exam under anesthesia: Stage 3 prolapse, apical descent to 3, palpable rectocele and widened genital hiatus with attenuated perineal body. Bimanual exam with small mobile uterus and no palpable adnexal masses. At the completion of the procedure there was no prolapse, decreased vaginal length of 3cm and genital hiatus of 2cm.    - Cystourethroscopy: Performed after completion of relevant procedures. Normal appearing urothelium without lesions, masses, sutures, or perforations. Ureteral orifices noted in the normal orthotopic position, with brisk jets of urine bilaterally. Urethra was normal in appearance without evidence of stricture, perforation, or diverticulum.      ESTIMATED BLOOD LOSS: 25mL            DRAINS: 40 Hurtado                   SPECIMENS: * No specimens in log *             IMPLANTS: * No implants in log *             COMPLICATIONS: None            DISPOSITION: Discharge            CONDITION: Stable            INDICATION FOR SURGERY:   Ms. Edouard is a 74 year old with bothersome Stage 3 pelvic organ prolapse and rare LUCINA with history of prior midurethral sling. She was counseled on all approaches to prolapse treatment including observation, pessary and surgery. She was counseled on all methods of surgical prolapse repair including vaginal and abdominal/laparoscopic  reconstructive approaches, and obliterative approaches. She is no longer sexually active and does not desires future sexual function, has no postmenopausal bleeding or history of abnormal pap smears. She opts for Lefort colpocleisis, possible sacrospinous hysteropexy, possible anterior colporrhaphy, possible posterior colporrhaphy, perineal repair, cystourethroscopy and any indicated procedures . Pre-operative urodynamics demonstrated no leak and she was counseled against a concomitant midurethral sling. She was counseled on all risks including bleeding, infection, damage to surrounding organs including bladder, urethra, ureters, bowel, blood vessels and nerves, possible laparotomy or subsequent surgery, as well as risk of recurrent prolapse, new urinary incontinence, transient urinary retention requiring bazzi catheterization, pain with intercourse, urogenital fistula, and recurrent prolapse. Both printed and verbal counseling were provided. All questions were answered and consent was signed and witnessed.       TECHNIQUE:    I spoke with the patient in the preoperative holding area, where again risks, benefits, indications, and alternatives were reviewed and informed consent was obtained.  She was then transferred to the operative suite, where she was identified, procedure was confirmed.  She was placed in dorsal supine position, given general endotracheal anesthesia without difficulty.  She was then placed in a high dorsal lithotomy position  in yellowfin stirrups with all pressure points padded.  She was prepared and draped in usual sterile fashion with iodine prep with all pressure points padded.  An appropriate time-out was performed, where patient was identified, procedure was confirmed, and the operative team was introduced.  It was also confirmed that patient did receive clindamycin 600mg IV and gentamicin 80mg IV  for prophylaxis, and she also received DVT prophylaxis with heparin 5000units preoperatively  and sequential compression devices bilaterally throughout the case.  At this time, exam under anesthesia revealed findings noted above.    Dr. Nash then proceeded with a hemorrhoidectomy. Please see his operative report for details.     At the completion of the hemorrhoidectomy, the patient was redraped and reprepped with vaginal chlorhexidine.  A 16-Bruneian Hurtado catheter was then placed in the patient's  bladder for drainage throughout the procedure, and a LoneStar retractor was placed for retraction.     Attention was then turned to the LeForte Colpocleisis which proceed in the following fashion: A thorough exam under anesthesia was performed and the cervix was grasped with tenaculua and brought down into the operative field. Stage 3 prolapse was confirmed.   The area of vaginal epithelial denudation was then marked with a Bovie, with a rectangle anteriorly and a rectangle posteriorly with delineation of future epithelial tunnels, keeping with enough distal epithelium to allow a tension-free epithelial closure. The entire area was then infiltrated subepithelially with dilute vasopressin 20units in 100mL of injectable saline.  The anterior vaginal epithelium was then dissected away from the underlying pubocervical fibromuscular layer and discarded. The posterior vaginal epithelium was then dissected away from the underlying rectovaginal fibromuscular layer and discarded. Hemostasis was achieved with Bovie cautery. There was excellent hemostasis noted at the completion of the dissection.  The epithelium was then closed over the cervix in a series of running 0 Vicryl that was carried out laterally bilateral to create lateral tunnels. The ends of the tunnel were patent by passing the suture in the opposite direction and tied. The vaginal canal was then completely obliterated by reapproximating the pubocervical fibromuscular layer to the rectovaginal fibromuscular layer  in an anterior to posterior fashion in 4  sequentially imbricating layers using a 2-0 Vicryl suture in a interrupted fashion from anterior to posterior. The vaginal cuff was then closed using a series of 0 Vicryl interrupted figure-of-eight sutures with excellent closure of the new cuff with excellent hemostasis noted after iorrigation.  A cystourethroscopy was then performed.  The Hurtado catheter was removed and a 70 degree cystoscope was inserted to the bladder which was backfilled with sterile water.  A 360 degree survey did not reveal any sutures or perforations within the bladder.  Additionally there was excellent bilateral ureteral reflux visualized.  The cystoscopic fluid was drained the cystoscope removed and a Hurtado catheter was replaced.    Extended perineorrhaphy with levator plasty was then performed in the following fashion: Allis clamps were used to delineate to the posterior vaginal epithelium and perineal body.  This area was then infiltrated with dilute vasopressin and a hector-shaped incision was made to remove the vaginal epithelium.  Dissection was continued where the vaginal epithelium was dissected away from the underlying rectovaginal fibromuscular layer to the level of the levator fascia bilaterally.  A 0 Vicryl was then placed in the left levator ani, reefed across the rectovaginal fibromuscular layer, to the contralateral levator and held.  A second and then third 0-Vicryl was then placed in a similar fashion slightly more distally through the left levator ani across the rectovaginal fibromuscular layer to the contralateral levator ani and held.  Finally a 0 Vicryl suture was placed through the left superficial transverse perineal muscle, through the contralateral superficial transverse perineal muscle and held. The vaginal epithelial closure was then begun with care to integrate portions of the rectovaginal fibromuscular layer to obliterate dead space using a 2-0 Vicryl in a running fashion.  During this closure, the  perineorrhaphy and levator plasty sutures were tied down sequentially.  The vaginal epithelium closure layer was then completed and tied behind the hymenal ring.  Irrigation was performed with excellent hemostasis noted. At the completion of the procedure she had a 3cm vaginal length and 2cm genital hiatus. Rectovaginal examination did not palpate any sutures.     At the completion of the procedure all sponge lap and needle counts were correct x2.  The patient was then extubated successfully and brought to the postanesthesia care unit in stable condition.  Anticipate same-day discharge home.     Savannah Snow MD  Female Pelvic Medicine and Reconstructive Surgery  Department of Obstetrics and Gynecology  [facility] [employer]  [facility]

## 2025-03-11 ENCOUNTER — TELEPHONE (OUTPATIENT)
Dept: GYNECOLOGY | Facility: CLINIC | Age: 85
End: 2025-03-11
Payer: MEDICARE

## 2025-03-11 DIAGNOSIS — I15.2 HYPERTENSION ASSOCIATED WITH TYPE 2 DIABETES MELLITUS (HCC): ICD-10-CM

## 2025-03-11 DIAGNOSIS — E11.59 HYPERTENSION ASSOCIATED WITH TYPE 2 DIABETES MELLITUS (HCC): ICD-10-CM

## 2025-03-11 RX ORDER — AMLODIPINE BESYLATE 10 MG/1
10 TABLET ORAL
Qty: 100 TABLET | Refills: 3 | Status: SHIPPED | OUTPATIENT
Start: 2025-03-11

## 2025-03-11 NOTE — TELEPHONE ENCOUNTER
Post-operative phone call    I called Ms. Muniz and confirmed her identity. She is POD 1 s/p Lefort colpocleisis. Perineal repair. Cystourethroscopy. Hemorrhoidectomy bu Dr. Nash.     She reports doing well, pain controlled, ambulating, tolerating regular diet, not passing gas or  having bowel movement yet and will use Miralax, and is emptying her bladder well. She denies nausea, vomiting, fever, chills, SOB, or heavy vaginal bleeding. Most of her pain is due to hemorrhoidectomy procedure. Advised patient to use percocet and robaxin, but to take it easy and not drive when taking the pain medication. She is also advised to reach out to Dr. Nash should her pain not be well controlled with the pain medication regimen.   All questions answered. Advised patient to reach out via [employer] or call the office at [phone] should she have any questions or concerns prior to f/u.     She will follow up as scheduled on 4/22/25 with Dr. Snow, or earlier if any issues arise.      HOLLI Grayson, RNFA

## 2025-03-11 NOTE — TELEPHONE ENCOUNTER
Received request via: Pharmacy    Was the patient seen in the last year in this department? Yes    Does the patient have an active prescription (recently filled or refills available) for medication(s) requested? No    Pharmacy Name:   [facility]/pharmacy #[id number] - [place] - [address] [place] [address]  Phone: [phone] Fax: [phone]       Does the patient have long term Plus and need 100-day supply? (This applies to ALL medications) Yes, quantity updated to 100 days

## 2025-03-19 DIAGNOSIS — E11.69 TYPE 2 DIABETES MELLITUS WITH OTHER SPECIFIED COMPLICATION, WITHOUT LONG-TERM CURRENT USE OF INSULIN (HCC): ICD-10-CM

## 2025-03-19 RX ORDER — INSULIN GLARGINE 100 [IU]/ML
INJECTION, SOLUTION SUBCUTANEOUS
Qty: 45 ML | Refills: 2 | Status: SHIPPED | OUTPATIENT
Start: 2025-03-19 | End: 2025-03-20

## 2025-03-19 NOTE — TELEPHONE ENCOUNTER
Received request via: Pharmacy    Was the patient seen in the last year in this department? Yes    Does the patient have an active prescription (recently filled or refills available) for medication(s) requested? No    Pharmacy Name: [facility]     Does the patient have [facility] Plus and need 100-day supply? (This applies to ALL medications) Yes, quantity updated to 100 days

## 2025-03-31 ENCOUNTER — OFFICE VISIT (OUTPATIENT)
Facility: MEDICAL CENTER | Age: 85
End: 2025-03-31
Payer: MEDICARE

## 2025-03-31 VITALS
WEIGHT: 134.48 LBS | TEMPERATURE: 98.3 F | OXYGEN SATURATION: 96 % | BODY MASS INDEX: 22.96 KG/M2 | HEIGHT: 64 IN | HEART RATE: 87 BPM | DIASTOLIC BLOOD PRESSURE: 70 MMHG | SYSTOLIC BLOOD PRESSURE: 110 MMHG

## 2025-03-31 DIAGNOSIS — Z87.19 S/P HEMORRHOIDECTOMY: ICD-10-CM

## 2025-03-31 DIAGNOSIS — Z98.890 S/P HEMORRHOIDECTOMY: ICD-10-CM

## 2025-03-31 ASSESSMENT — ENCOUNTER SYMPTOMS
CONSTIPATION: 0
CONFUSION: 0
SHORTNESS OF BREATH: 0
DIARRHEA: 0
RECTAL PAIN: 0
UNEXPECTED WEIGHT CHANGE: 0
ABDOMINAL DISTENTION: 0
ABDOMINAL PAIN: 0
LIGHT-HEADEDNESS: 0
APPETITE CHANGE: 0
BLOOD IN STOOL: 0
NERVOUS/ANXIOUS: 0
CONSTITUTIONAL NEGATIVE: 1
BRUISES/BLEEDS EASILY: 0
FEVER: 0

## 2025-03-31 ASSESSMENT — FIBROSIS 4 INDEX: FIB4 SCORE: 1.5

## 2025-03-31 NOTE — PROGRESS NOTES
Subjective:   3/31/2025  8:54 AM  Primary care physician:Sho Rodriguez D.O.      Chief Complaint:   Chief Complaint   Patient presents with    Post-op     Two column hemorrhoidectomy 03/10/25         History of presenting illness:  Roya Muniz  is a pleasant 84 y.o. year old female s/p two column hemorrhoidectomy with Dr. Nash on 3/10/25. She also had a concurrent perineal repair and cystourethroscopy with Dr. Snow on this date. She reports feeling very well today, is pleased with her healing. She denies any ongoing rectal bleeding, discharge, or pain. She is not using any oral or topical medications for pain at this time. She is having daily, soft bowel movements. She also reports her urinary symptoms are largely resolved and her bowel and bladder control is normal to the point where she feels more confident running errands and leaving home.       Past Medical History:   Diagnosis Date    Aortic valve sclerosis 05/08/2024    Cardiac pacemaker in situ     Diabetes (HCC)     Type 2 Drx'd at age 80    History of DVT of lower extremity 06/05/2014    Hyperlipidemia     Hypertension     Hypokalemia 01/12/2014    Metabolic acidosis 09/13/2023    Neck mass 03/12/2021    Pancreatic cyst     Pancreatitis     Pulmonary hypertension (HCC) 06/18/2015    Stable, continue current plan of care with current medications.          Sinus node dysfunction (HCC)     Skin lesion 03/12/2021    Thyroid disease     hypothyroidism    Vision problem 03/12/2021     Past Surgical History:   Procedure Laterality Date    FL CLOSURE OF VAGINA  3/10/2025    Procedure: LEFORT COLPOCLEISIS, CYSTOURETHROSCOPY, PERINEORRHAPHY;  Surgeon: Savannah Snow M.D.;  Location: SURGERY SAME DAY [facility];  Service: Gynecology    HEMORRHOIDECTOMY  3/10/2025    Procedure: HEMORRHOIDECTOMY;  Surgeon: Kenny Nash M.D.;  Location: SURGERY SAME DAY [facility];  Service: General    GASTROSCOPY-ENDO  2/21/2014    Performed by Steve Islas Jr., M.D. at ENDOSCOPY  [facility]    GASTROSCOPY-ENDO  2/20/2014    Performed by Antonio Espinoza M.D. at ENDOSCOPY [facility]    EXPLORATORY LAPAROTOMY  2/10/2014    Performed by Rigo Garcia M.D. at SURGERY [facility]    CHOLECYSTECTOMY  2/10/2014    Performed by Rigo Garcia M.D. at SURGERY [facility]    PANCREATECTOMY  2/10/2014    Performed by Rigo Garcia M.D. at SURGERY [facility]    GASTROSTOMY FEEDING  2/10/2014    Performed by Rigo Garcia M.D. at SURGERY [facility]    OTHER ORTHOPEDIC SURGERY      foot surgery     Allergies   Allergen Reactions    Cephalexin [Keflex] Hives, Rash, Itching and Unspecified     Photosensitivity       Outpatient Encounter Medications as of 3/31/2025   Medication Sig Dispense Refill    amLODIPine (NORVASC) 10 MG Tab TAKE 1 TABLET BY MOUTH EVERY DAY FOR BLOOD PRESSURE 100 Tablet 3    enoxaparin (LOVENOX) 40 MG/0.4ML Solution Prefilled Syringe inj Inject 1 syringe (40 mg) under the skin every day at 6 PM. 30 Each 0    vitamin D3 (CHOLECALCIFEROL) 1000 Unit (25 mcg) Tab Take 2,000 Units by mouth every day.      Multiple Vitamins-Minerals (PRESERVISION AREDS 2) Cap Take 1 Capsule by mouth 2 times a day.      levothyroxine (SYNTHROID) 112 MCG Tab TAKE 1 TABLET BY MOUTH EVERY DAY IN THE MORNING ON AN EMPTY STOMACH 90 Tablet 3    losartan (COZAAR) 100 MG Tab TAKE 1 TABLET BY MOUTH EVERY DAY FOR BLOOD PRESSURE 100 Tablet 3    insulin glargine (LANTUS SOLOSTAR) 100 UNIT/ML Solution Pen-injector injection Inject 10 Units under the skin every evening. May titrate up to 20units/night 9 mL 3    sulfamethoxazole-trimethoprim (BACTRIM DS) 800-160 MG tablet TAKE 1 TABLET BY MOUTH TWICE A DAY FOR 3 DAYS (Patient not taking: Reported on 3/31/2025) 6 Tablet 0    clobetasol (TEMOVATE) 0.05 % Ointment Place small amount to external vulva twice per day for 1 month, once per day for one month, then twice per week. 1 Each 3    rosuvastatin  (CRESTOR) 10 MG Tab TAKE 1 TABLET BY MOUTH EVERY DAY IN THE EVENING (Patient taking differently: Take 10 mg by mouth every day. TAKE 1 TABLET BY MOUTH EVERY DAY) 100 Tablet 3    glucose blood (FREESTYLE LITE) strip TEST 3 TIMES A DAY or as needed for high/low blood sugar 300 Strip 0    [Paused] aspirin 81 MG EC tablet Take 81 mg by mouth every day at 6 PM.      Acetaminophen (TYLENOL 8 HOUR PO) Take 500 mg by mouth 1 time a day as needed (HA or body aches).      Alcohol Swabs Wipe site with prep pad prior to injection. 200 Each 0     No facility-administered encounter medications on file as of 3/31/2025.     Social History     Tobacco Use    Smoking status: Never    Smokeless tobacco: Never    Tobacco comments:     avoid all tobacco products   Vaping Use    Vaping status: Never Used   Substance Use Topics    Alcohol use: Yes     Comment: occasionally    Drug use: No      Social History     Substance and Sexual Activity   Sexual Activity Not Currently    Partners: Male          Family History   Problem Relation Age of Onset    Heart Disease Mother         enlarged heart, heart failure    Cancer Father         liver    No Known Problems Sister     Heart Disease Brother         heart failure    Lung Disease Brother         heavy smoker    Alcohol/Drug Brother         etoh    Cancer Paternal Aunt         breast cancer     Cancer Maternal Grandmother         liver     Heart Disease Maternal Grandfather         CHF     No Known Problems Paternal Grandmother     No Known Problems Paternal Grandfather     No Known Problems Sister     No Known Problems Sister     Stroke Sister     No Known Problems Son     No Known Problems Son     No Known Problems Son        Review of Systems   Constitutional: Negative.  Negative for appetite change, fever and unexpected weight change.   Respiratory:  Negative for shortness of breath.    Cardiovascular:  Negative for chest pain.   Gastrointestinal:  Negative for abdominal distention,  "abdominal pain, blood in stool, constipation, diarrhea and rectal pain.   Genitourinary:  Negative for difficulty urinating, dyspareunia, dysuria, frequency and hematuria.    Neurological:  Negative for extremity weakness and light-headedness.   Hematological:  Does not bruise/bleed easily.   Psychiatric/Behavioral:  Negative for confusion. The patient is not nervous/anxious.    All other systems reviewed and are negative.        Objective:   /70 (BP Location: Left arm, Patient Position: Sitting, BP Cuff Size: Adult)   Pulse 87   Temp 36.8 °C (98.3 °F) (Temporal)   Ht 1.626 m (5' 4\")   Wt 61 kg (134 lb 7.7 oz)   SpO2 96%   BMI 23.08 kg/m²     Physical Exam  Vitals and nursing note reviewed.   Constitutional:       Appearance: Normal appearance. She is normal weight.   HENT:      Head: Normocephalic.      Nose: Nose normal.      Mouth/Throat:      Mouth: Mucous membranes are moist.   Eyes:      General:         Left eye: Left eye discharge: s/p hem.     Extraocular Movements: Extraocular movements intact.   Cardiovascular:      Rate and Rhythm: Normal rate and regular rhythm.      Pulses: Normal pulses.   Pulmonary:      Effort: Pulmonary effort is normal.   Abdominal:      General: Abdomen is flat.      Palpations: Abdomen is soft.   Musculoskeletal:         General: Normal range of motion.      Cervical back: Normal range of motion.   Skin:     General: Skin is warm and dry.   Neurological:      Mental Status: She is alert and oriented to person, place, and time.   Psychiatric:         Mood and Affect: Mood normal.         Behavior: Behavior normal.         Thought Content: Thought content normal.         Judgment: Judgment normal.         Labs:  Lab Results   Component Value Date/Time    SODIUM 140 02/05/2025 06:49 AM    POTASSIUM 3.5 (L) 02/05/2025 06:49 AM    CHLORIDE 109 02/05/2025 06:49 AM    CO2 19 (L) 02/05/2025 06:49 AM    GLUCOSE 147 (H) 02/05/2025 06:49 AM    BUN 20 02/05/2025 06:49 AM    " CREATININE 1.04 02/05/2025 06:49 AM    CREATININE 1.59 (H) 02/10/2011 08:40 AM    BUNCREATRAT 21 02/10/2011 08:40 AM    GLOMRATE 32 (L) 02/10/2011 08:40 AM        Lab Results   Component Value Date/Time    PROTHROMBTM 13.1 10/11/2024 09:32 AM    INR 0.99 10/11/2024 09:32 AM        Lab Results   Component Value Date/Time    WBC 7.5 02/05/2025 06:49 AM    RBC 5.12 02/05/2025 06:49 AM    HEMOGLOBIN 16.2 (H) 02/05/2025 06:49 AM    HEMATOCRIT 48.7 (H) 02/05/2025 06:49 AM    MCV 95.1 02/05/2025 06:49 AM    MCH 31.6 02/05/2025 06:49 AM    MCHC 33.3 02/05/2025 06:49 AM    MPV 10.5 02/05/2025 06:49 AM    NEUTSPOLYS 62.70 02/05/2025 06:49 AM    LYMPHOCYTES 23.70 02/05/2025 06:49 AM    MONOCYTES 11.00 02/05/2025 06:49 AM    EOSINOPHILS 2.00 02/05/2025 06:49 AM    BASOPHILS 0.30 02/05/2025 06:49 AM    HYPOCHROMIA 1+ 02/21/2014 10:15 AM    ANISOCYTOSIS 3+ 02/28/2014 01:59 AM          Imaging:  DX-CHEST-PORTABLE (1 VIEW)  Narrative:   3/10/2025 7:05 AM    HISTORY/REASON FOR EXAM: Esophageal dysmotility.    TECHNIQUE/EXAM DESCRIPTION:  Single AP view of the chest.    COMPARISON: April 10, 2021    FINDINGS:    Position of medical devices appears stable. The cardiac silhouette appears within normal limits.    Atherosclerotic calcification of the aorta is noted.  The central pulmonary vasculature appears normal.    The lungs appear well expanded bilaterally.  Bilateral lungs are clear.    No significant pleural effusions are identified.    The bony structures appear age-appropriate.  Impression: 1.  No acute cardiopulmonary disease.  2.  Atherosclerosis      Pathology:  Pathology Results (Last result in 90 days)                03/10/25 0822  Narrative:                                                                                                                                       Pathology Specimen  [facility]                          DEPARTMENT OF PATHOLOGY                   [address] [place] [address]   "[id number]              Phone [phone]          Fax [phone]  Tonia Mathis M.D.     Prudence Oropeza M.D.     Ashley Benitez D.O.                            Juliana Block D.O.  Karla Shelley M.D.     Dajuan Fofana M.D.     Bob Randle D.O.    Patient:    JACQUE NATHAN     Specimen    [id number]                                           #:      Copies to:             WILDER BEE MD                        SURGICAL PATHOLOGY CONSULTATION      FINAL DIAGNOSIS:    A. Right anterior hemorrhoid with polyp:         Internal and external hemorrhoid with prolapse changes, erosion,          and granulation tissue.         No evidence of dysplasia or malignancy.                                          Diagnosis performed by:                                      JULIANA BLOCK, DO  ------------------------------------------------------------------------  ---------------------------------------------------------------  CODES: 2002x1  [id number]    SPECIMEN(S)  A. Right anterior hemorrhoid with polyp:    PREOPERATIVE DIAGNOSIS:  Incomplete uterovaginal prolapse, cystocele midline, rectocele, stress  urinary incontinence, hemorrhoids    POSTOPERATIVE DIAGNOSIS:  Incomplete uterovaginal prolapse, cystocele midline, rectocele, stress  urinary incontinence, hemorrhoids       GROSS DESCRIPTION:  A. Received in formalin labeled with the patient's name, medical record  number, and \"right anterior hemorrhoid W/polyp\" are 3 irregular  cauterized, rubbery tissue fragments ranging from 2.4-4.6 cm in maximum  dimensions.  Each appears surface by possible skin/mucosa, with focal  marked erythematous change present on the largest fragment.  Bases  differentially inked. RS3 /[id number] AGW    SLIDE KEY:  A1:  Representative central cross-sections, smallest fragments  A2-A3: Representative central cross-sections, largest fragment to  include region of erythematous " change    MICROSCOPIC DESCRIPTION:  Microscopic examination was performed.  Please see diagnosis.    Report electronically signed by:  JULAINA CHOI DO ,  Diagnosis performed at: [facility]  Pathology  Department, [address] [place] [address]  [id number]      Printed 00/00/0000 [id number] VENITA ROYA JOEL   Page 1 of 1 MRN#:[id number]                      Diagnosis:     Assessment & Plan  S/P hemorrhoidectomy               Medical Decision Making:  Today's Assessment / Status / Plan:       Roya is recovering well s/p hemorrhoidectomy. Reviewed the above pathology with her. Discussed continuing high fiber diet, ensuring adequate fluid intake, minimizing time spent on the toilet, and avoiding straining, constipation. Discussed contacting us if she develops continued rectal pain, bleeding, fever or chills. She verbalized understanding and agreement with plan of care. F/U PRN.

## 2025-04-06 DIAGNOSIS — I10 ESSENTIAL HYPERTENSION: ICD-10-CM

## 2025-04-07 RX ORDER — LOSARTAN POTASSIUM 100 MG/1
100 TABLET ORAL
Qty: 100 TABLET | Refills: 3 | Status: SHIPPED | OUTPATIENT
Start: 2025-04-07

## 2025-04-07 NOTE — TELEPHONE ENCOUNTER
Received request via: Pharmacy    Was the patient seen in the last year in this department? Yes    Does the patient have an active prescription (recently filled or refills available) for medication(s) requested? No    Pharmacy Name:   [facility]/pharmacy #[id number] - [place] - [address] [place] [address]  Phone: [phone] Fax: [phone]       Does the patient have intermediate Plus and need 100-day supply? (This applies to ALL medications) Yes, quantity updated to 100 days

## 2025-04-17 ENCOUNTER — NON-PROVIDER VISIT (OUTPATIENT)
Dept: CARDIOLOGY | Facility: MEDICAL CENTER | Age: 85
End: 2025-04-17
Payer: MEDICARE

## 2025-04-17 PROCEDURE — 93294 REM INTERROG EVL PM/LDLS PM: CPT | Performed by: INTERNAL MEDICINE

## 2025-04-17 NOTE — CARDIAC REMOTE MONITOR - SCAN
Device transmission reviewed. Device demonstrated appropriate function.       Electronically Signed by: Jill Garrett M.D.    4/20/2025  7:26 AM

## 2025-04-21 NOTE — ASSESSMENT & PLAN NOTE
Cystocele  Rectocele  Improved s/p Lefort colpocleisis without e/o recurrence or voiding dysfunction.   - Able to return to all activities without restrictions.   - FU prn

## 2025-04-21 NOTE — PROGRESS NOTES
Urogynecology & Reconstructive Pelvic Surgery - Consultation Visit    Roya Muniz MRN:[id number] :1940    Referred by: Daquan Melendez    Reason for Visit:   Chief Complaint   Patient presents with    Post-op     Surgery 03/10/2025         Subjective     History of Presenting Illness:  Roya Muniz is a 84 y.o. P3 cardiac disease (pacemaker), pHTN, h/o DVT, DM2, HTN, thyroid disease, hemorrhoids, IBS-D, S2POP and lichen sclerosus here for postop s/p Lefort colpocleisis, perineal repair, cystourethroscopy on 3/10/25 with Edy and hemorrhoidectomy with Dr. Nash.     Doing well, very happy that bulge is no longer there. Able to void a lot easier and empty bladder. No pain or bleeding. No bulge. No leaking or urine Normal BM.     Was rushing around to multiple appts this morning. Denies any HA, chest pain or SOB.     Initial symptoms:  Reports vaginal bulge that is worse at the end of the day. Goes back with laying down. Interferes with urination, sometimes has to reduce or urine gets trapped behind. Has noticed for the last 10years. Denies any urinary leakage. Feels like sitting on a balloon because at times it does protrude very far outside vagina.     Prior Pelvic surgery:   None     Prior treatment:   None    Fluid intake:   Water: 24oz    Coffee: 1 cup   Soda: occ  Juice: sugar free every 1-3 days     Pelvic floor symptom review:     Bladder:   Voids per day: 4 Voids per night: 0-1      Urinary incontinence episodes per day: 0    Urge leakage:  None   Stress leakage: None   Continuous / insensible urine loss: No    Nocturnal enuresis: No    Leakage volume: n/a   Number of pads/day: 0    Bladder emptying: Complete   Voiding symptoms: Crede to Void   UTI in last 12 months: 0   Other urologic history: none      Prolapse:     Prolapse symptoms: Bulge   Degree of prolapse: inside vagina    Duration of prolapse symptoms: 10+yrs       Bowel:    Constipation: No   Bowel movements per day: 1-3 ,  stool quality: liquid (fiber, tried OTC meds but can't remember)   Straining to empty bowels: No    Splinting to evacuate: No    Painful evacuation: No    Difficulty emptying rectum: No    Incontinence to stool: Yes daily, multiple times, liquid stool    Blood in stool: No    Hemorrhoids: Yes   Bowel conditions: IBS - diarrhea    Most recent colonoscopy:  wnl        Sexual function:    Sexually active: No  no interest in the future    Gender of partners: Male   Pain with intercourse: No   History of abuse: No        Pelvic Pain: None      Past medical and surgical history    Past obstetric history   Number of vaginal deliveries: 3   Number of  deliveries: 0   History of vacuum/forceps: No    History of obstetric anal sphincter injury: No     Past gynecological history:    Last menstrual period/Menopause: No LMP recorded. Patient is postmenopausal.   History of abnormal uterine bleeding: No    History of fibroids: No    History of endometrial polyps:  No    History of endometriosis: No    History of cervical dysplasia: No    Last pap: ~68yo wnl    Current contraception: none       Past medical history:  Past Medical History:   Diagnosis Date    Aortic valve sclerosis 2024    Metabolic acidosis 2023    Vision problem 2021    Skin lesion 2021    Neck mass 2021    Pulmonary hypertension (HCC) 2015    Stable, continue current plan of care with current medications.          History of DVT of lower extremity 2014    Hypokalemia 2014    Cardiac pacemaker in situ     Diabetes (HCC)     Type 2 Drx'd at age 80    Hyperlipidemia     Hypertension     Pancreatic cyst     Pancreatitis     Sinus node dysfunction (HCC)     Thyroid disease     hypothyroidism     Past surgical history:  Past Surgical History:   Procedure Laterality Date    ND CLOSURE OF VAGINA  3/10/2025    Procedure: LEFORT COLPOCLEISIS, CYSTOURETHROSCOPY, PERINEORRHAPHY;  Surgeon: Savannah Snow M.D.;   Location: SURGERY SAME DAY [facility];  Service: Gynecology    HEMORRHOIDECTOMY  3/10/2025    Procedure: HEMORRHOIDECTOMY;  Surgeon: Kenny Nash M.D.;  Location: SURGERY SAME DAY [facility];  Service: General    GASTROSCOPY-ENDO  2/21/2014    Performed by Steve Islas Jr., M.D. at ENDOSCOPY [facility]    GASTROSCOPY-ENDO  2/20/2014    Performed by Antonio Espinoza M.D. at ENDOSCOPY [facility]    EXPLORATORY LAPAROTOMY  2/10/2014    Performed by Rigo Garcia M.D. at SURGERY [facility]    CHOLECYSTECTOMY  2/10/2014    Performed by Rigo Garcia M.D. at SURGERY [facility]    PANCREATECTOMY  2/10/2014    Performed by Rigo Garcia M.D. at SURGERY [facility]    INSERTION GASTROSTOMY TUBE FOR FEEDING  2/10/2014    Performed by Rigo Garcia M.D. at SURGERY [facility]    OTHER ORTHOPEDIC SURGERY      foot surgery     Medications:has a current medication list which includes the following prescription(s): losartan, lantus solostar, amlodipine, sulfamethoxazole-trimethoprim, enoxaparin, clobetasol, vitamin d3, preservision areds 2, levothyroxine, rosuvastatin, freestyle lite, aspirin, acetaminophen, and alcohol swabs.  Allergies:Cephalexin [keflex]  Family history:  Family History   Problem Relation Age of Onset    Heart Disease Mother         enlarged heart, heart failure    Cancer Father         liver    No Known Problems Sister     Heart Disease Brother         heart failure    Lung Disease Brother         heavy smoker    Alcohol/Drug Brother         etoh    Cancer Paternal Aunt         breast cancer     Cancer Maternal Grandmother         liver     Heart Disease Maternal Grandfather         CHF     No Known Problems Paternal Grandmother     No Known Problems Paternal Grandfather     No Known Problems Sister     No Known Problems Sister     Stroke Sister     No Known Problems Son     No Known Problems Son     No Known Problems Son      Social  "history: reports that she has never smoked. She has never used smokeless tobacco. She reports current alcohol use. She reports that she does not use drugs.    Review of systems: A full review of systems was performed, and negative with the exception of want is noted above in the HPI.        Objective        BP (!) 152/90 (BP Location: Right arm, Patient Position: Sitting, BP Cuff Size: Adult)   Pulse 88   Ht 5' 4\"   Wt 134 lb 9.6 oz   BMI 23.10 kg/m²     Physical Exam  Constitutional:       Appearance: Normal appearance. She is normal weight.   HENT:      Head: Normocephalic.      Nose: Nose normal.   Eyes:      Pupils: Pupils are equal, round, and reactive to light.   Pulmonary:      Effort: Pulmonary effort is normal.   Abdominal:      Palpations: Abdomen is soft.   Musculoskeletal:         General: Normal range of motion.      Cervical back: Normal range of motion.   Skin:     General: Skin is warm.   Neurological:      General: No focal deficit present.      Mental Status: She is alert.   Psychiatric:         Mood and Affect: Mood normal.         Genitourinary:    Vulva: Atrophic   Bulbocavernosus reflex: Intact   Anal wink reflex: Intact   Perineal sensation: WNL   Urethra: WNL   Vagina: well healed with suture at vaginal cuff in place. Perineum well healed an intact.    Atrophy: Mild   Cough stress test: Negative --> negative      Chaperone was present throughout the physical exam.       Procedure Performed: No    Diagnostic test and records review:    Urine dipstick: n/a      Post-void residual: 5mL by bladder scanner     Labs:   Glycohemoglobin   Date Value Ref Range Status   03/10/2025 7.5 (H) 4.0 - 5.6 % Final     Comment:     Increased risk for diabetes:  5.7 -6.4%  Diabetes:  >6.4%  Glycemic control for adults with diabetes:  <7.0%    The above interpretations are per ADA guidelines.  Diagnosis  of diabetes mellitus on the basis of elevated Hemoglobin A1c  should be confirmed by repeating the Hb A1c " test.         Lab Results   Component Value Date/Time    SODIUM 138 10/31/2024 1050    POTASSIUM 4.0 10/31/2024 1050    CHLORIDE 105 10/31/2024 1050    CO2 16 (L) 10/31/2024 1050    GLUCOSE 153 (H) 10/31/2024 1050    BUN 22 10/31/2024 1050    CREATININE 0.99 10/31/2024 1050    CALCIUM 9.7 10/31/2024 1050    ANION 17.0 (H) 10/31/2024 1050       Lab Results   Component Value Date/Time    WBC 7.5 02/05/2025 06:49 AM    RBC 5.12 02/05/2025 06:49 AM    HEMOGLOBIN 16.2 (H) 02/05/2025 06:49 AM    MCV 95.1 02/05/2025 06:49 AM    MCH 31.6 02/05/2025 06:49 AM    MCHC 33.3 02/05/2025 06:49 AM    RDW 45.6 02/05/2025 06:49 AM    MPV 10.5 02/05/2025 06:49 AM         Radiology: None    Procedural:   02/06/25 UDS:   Filling Phase: Normal sensation. Normal compliance. Absent DO. Absent UDSI. Normal capacity.  Voiding Phase: Voids via detrusor. PVR normal (0). Normal voiding pressures, no evidence of obstruction.      Documentation reviewed: Prior EMR Records    Outside records reviewed: 0 pages      Assessment & Plan     Roya Muniz is a 84 y.o. P3 with at least S2POP, lichen sclerosis, hemorrhoids and IBS-D. We discussed my recommendations for further diagnosis and treatment at length today.     Assessment & Plan  Uterovaginal prolapse, incomplete  Cystocele  Rectocele  Improved s/p Lefort colpocleisis without e/o recurrence or voiding dysfunction.   - Able to return to all activities without restrictions.   - FU prn        Overactive bladder  No issues currently        Irritable bowel syndrome with diarrhea  Fecal incontinence  No current issues.        Elevated blood pressure reading in office with diagnosis of hypertension  BP elevated despite multiple readings without symptoms. Recommend she FU with PCP for evaluation and management.            Savannah Snow MD  Urogynecology and Reconstructive Pelvic Surgery  Department of Obstetrics and Gynecology  [facility]

## 2025-04-22 ENCOUNTER — GYNECOLOGY VISIT (OUTPATIENT)
Dept: GYNECOLOGY | Facility: CLINIC | Age: 85
End: 2025-04-22
Payer: MEDICARE

## 2025-04-22 VITALS
DIASTOLIC BLOOD PRESSURE: 90 MMHG | WEIGHT: 134.6 LBS | HEIGHT: 64 IN | SYSTOLIC BLOOD PRESSURE: 152 MMHG | BODY MASS INDEX: 22.98 KG/M2 | HEART RATE: 88 BPM

## 2025-04-22 DIAGNOSIS — N81.10 CYSTOCELE, UNSPECIFIED: ICD-10-CM

## 2025-04-22 DIAGNOSIS — N81.2 UTEROVAGINAL PROLAPSE, INCOMPLETE: ICD-10-CM

## 2025-04-22 DIAGNOSIS — N81.6 RECTOCELE: ICD-10-CM

## 2025-04-22 DIAGNOSIS — N32.81 OVERACTIVE BLADDER: ICD-10-CM

## 2025-04-22 DIAGNOSIS — I10 ELEVATED BLOOD PRESSURE READING IN OFFICE WITH DIAGNOSIS OF HYPERTENSION: ICD-10-CM

## 2025-04-22 DIAGNOSIS — R15.9 INCONTINENCE OF FECES, UNSPECIFIED FECAL INCONTINENCE TYPE: ICD-10-CM

## 2025-04-22 DIAGNOSIS — K58.0 IRRITABLE BOWEL SYNDROME WITH DIARRHEA: ICD-10-CM

## 2025-04-22 PROCEDURE — 3079F DIAST BP 80-89 MM HG: CPT | Performed by: STUDENT IN AN ORGANIZED HEALTH CARE EDUCATION/TRAINING PROGRAM

## 2025-04-22 PROCEDURE — 3077F SYST BP >= 140 MM HG: CPT | Performed by: STUDENT IN AN ORGANIZED HEALTH CARE EDUCATION/TRAINING PROGRAM

## 2025-04-22 PROCEDURE — 99024 POSTOP FOLLOW-UP VISIT: CPT | Performed by: STUDENT IN AN ORGANIZED HEALTH CARE EDUCATION/TRAINING PROGRAM

## 2025-04-22 ASSESSMENT — FIBROSIS 4 INDEX
FIB4 SCORE: 1.5
FIB4 SCORE: 1.5

## 2025-04-22 NOTE — PROGRESS NOTES
Patient here for Post Op Exam  Surgery Date:  03/10/2025  PVR : 5 mL  Sathya #: [phone]   Pharmacy Verified

## 2025-05-28 ENCOUNTER — NON-PROVIDER VISIT (OUTPATIENT)
Dept: CARDIOLOGY | Facility: MEDICAL CENTER | Age: 85
End: 2025-05-28
Attending: INTERNAL MEDICINE
Payer: MEDICARE

## 2025-05-28 ENCOUNTER — NON-PROVIDER VISIT (OUTPATIENT)
Dept: CARDIOLOGY | Facility: MEDICAL CENTER | Age: 85
End: 2025-05-28

## 2025-05-28 DIAGNOSIS — Z95.0 CARDIAC PACEMAKER IN SITU: ICD-10-CM

## 2025-05-28 DIAGNOSIS — I49.5 SICK SINUS SYNDROME (HCC): Primary | ICD-10-CM

## 2025-05-28 PROCEDURE — 93280 PM DEVICE PROGR EVAL DUAL: CPT | Performed by: STUDENT IN AN ORGANIZED HEALTH CARE EDUCATION/TRAINING PROGRAM

## 2025-05-28 NOTE — CARDIAC REMOTE MONITOR - SCAN
Device transmission reviewed. Device demonstrated appropriate function.       Electronically Signed by: Brett Mc MD, PhD    5/28/2025  12:17 PM

## 2025-06-03 ENCOUNTER — TELEPHONE (OUTPATIENT)
Dept: MEDICAL GROUP | Facility: LAB | Age: 85
End: 2025-06-03
Payer: MEDICARE

## 2025-06-03 DIAGNOSIS — Z79.4 TYPE 2 DIABETES MELLITUS WITH OTHER SPECIFIED COMPLICATION, WITH LONG-TERM CURRENT USE OF INSULIN (HCC): Primary | ICD-10-CM

## 2025-06-03 DIAGNOSIS — E78.5 HYPERLIPIDEMIA ASSOCIATED WITH TYPE 2 DIABETES MELLITUS (HCC): ICD-10-CM

## 2025-06-03 DIAGNOSIS — E11.69 HYPERLIPIDEMIA ASSOCIATED WITH TYPE 2 DIABETES MELLITUS (HCC): ICD-10-CM

## 2025-06-03 DIAGNOSIS — E03.9 HYPOTHYROIDISM, UNSPECIFIED TYPE: ICD-10-CM

## 2025-06-03 DIAGNOSIS — E11.69 TYPE 2 DIABETES MELLITUS WITH OTHER SPECIFIED COMPLICATION, WITH LONG-TERM CURRENT USE OF INSULIN (HCC): Primary | ICD-10-CM

## 2025-06-03 RX ORDER — LEVOTHYROXINE SODIUM 112 UG/1
112 TABLET ORAL
Qty: 100 TABLET | Refills: 3 | Status: SHIPPED | OUTPATIENT
Start: 2025-06-03

## 2025-06-03 RX ORDER — ROSUVASTATIN CALCIUM 10 MG/1
10 TABLET, COATED ORAL EVERY EVENING
Qty: 100 TABLET | Refills: 3 | Status: SHIPPED | OUTPATIENT
Start: 2025-06-03

## 2025-06-03 NOTE — TELEPHONE ENCOUNTER
Patient called asking for a refill of her Insulin Sirena pen. Looks like she discontinued in 2024.  Did she switch to Lantus? I need clarification before calling her

## 2025-07-01 ENCOUNTER — TELEPHONE (OUTPATIENT)
Dept: HEALTH INFORMATION MANAGEMENT | Facility: OTHER | Age: 85
End: 2025-07-01
Payer: MEDICARE

## 2025-07-18 ENCOUNTER — NON-PROVIDER VISIT (OUTPATIENT)
Dept: CARDIOLOGY | Facility: MEDICAL CENTER | Age: 85
End: 2025-07-18
Payer: MEDICARE

## 2025-07-18 PROCEDURE — 93294 REM INTERROG EVL PM/LDLS PM: CPT | Performed by: INTERNAL MEDICINE

## 2025-08-04 ENCOUNTER — OFFICE VISIT (OUTPATIENT)
Dept: MEDICAL GROUP | Facility: MEDICAL CENTER | Age: 85
End: 2025-08-04
Payer: MEDICARE

## 2025-08-04 VITALS
DIASTOLIC BLOOD PRESSURE: 60 MMHG | HEART RATE: 79 BPM | HEIGHT: 64 IN | BODY MASS INDEX: 23.35 KG/M2 | OXYGEN SATURATION: 96 % | WEIGHT: 136.8 LBS | TEMPERATURE: 97.6 F | SYSTOLIC BLOOD PRESSURE: 136 MMHG

## 2025-08-04 DIAGNOSIS — E11.59 HYPERTENSION ASSOCIATED WITH TYPE 2 DIABETES MELLITUS (HCC): ICD-10-CM

## 2025-08-04 DIAGNOSIS — I15.2 HYPERTENSION ASSOCIATED WITH TYPE 2 DIABETES MELLITUS (HCC): ICD-10-CM

## 2025-08-04 DIAGNOSIS — N18.31 STAGE 3A CHRONIC KIDNEY DISEASE: ICD-10-CM

## 2025-08-04 DIAGNOSIS — E11.69 TYPE 2 DIABETES MELLITUS WITH OTHER SPECIFIED COMPLICATION, WITH LONG-TERM CURRENT USE OF INSULIN (HCC): ICD-10-CM

## 2025-08-04 DIAGNOSIS — Z79.4 TYPE 2 DIABETES MELLITUS WITH OTHER SPECIFIED COMPLICATION, WITH LONG-TERM CURRENT USE OF INSULIN (HCC): ICD-10-CM

## 2025-08-04 DIAGNOSIS — I49.5 SICK SINUS SYNDROME (HCC): ICD-10-CM

## 2025-08-04 DIAGNOSIS — E03.9 HYPOTHYROIDISM, UNSPECIFIED TYPE: ICD-10-CM

## 2025-08-04 DIAGNOSIS — R23.9 RECENT SKIN CHANGES: ICD-10-CM

## 2025-08-04 DIAGNOSIS — E78.5 HYPERLIPIDEMIA ASSOCIATED WITH TYPE 2 DIABETES MELLITUS (HCC): ICD-10-CM

## 2025-08-04 DIAGNOSIS — E11.69 HYPERLIPIDEMIA ASSOCIATED WITH TYPE 2 DIABETES MELLITUS (HCC): ICD-10-CM

## 2025-08-04 DIAGNOSIS — Z95.0 CARDIAC PACEMAKER IN SITU: ICD-10-CM

## 2025-08-04 DIAGNOSIS — M85.89 OSTEOPENIA OF MULTIPLE SITES: ICD-10-CM

## 2025-08-04 PROCEDURE — 3075F SYST BP GE 130 - 139MM HG: CPT

## 2025-08-04 PROCEDURE — 3078F DIAST BP <80 MM HG: CPT

## 2025-08-04 PROCEDURE — 1126F AMNT PAIN NOTED NONE PRSNT: CPT

## 2025-08-04 PROCEDURE — G0439 PPPS, SUBSEQ VISIT: HCPCS

## 2025-08-04 SDOH — ECONOMIC STABILITY: FOOD INSECURITY: WITHIN THE PAST 12 MONTHS, YOU WORRIED THAT YOUR FOOD WOULD RUN OUT BEFORE YOU GOT THE MONEY TO BUY MORE.: NEVER TRUE

## 2025-08-04 SDOH — ECONOMIC STABILITY: FOOD INSECURITY: WITHIN THE PAST 12 MONTHS, THE FOOD YOU BOUGHT JUST DIDN'T LAST AND YOU DIDN'T HAVE MONEY TO GET MORE.: NEVER TRUE

## 2025-08-04 SDOH — ECONOMIC STABILITY: FOOD INSECURITY: HOW HARD IS IT FOR YOU TO PAY FOR THE VERY BASICS LIKE FOOD, HOUSING, MEDICAL CARE, AND HEATING?: NOT HARD AT ALL

## 2025-08-04 SDOH — ECONOMIC STABILITY: TRANSPORTATION INSECURITY: IN THE PAST 12 MONTHS, HAS LACK OF TRANSPORTATION KEPT YOU FROM MEDICAL APPOINTMENTS OR FROM GETTING MEDICATIONS?: NO

## 2025-08-04 ASSESSMENT — FIBROSIS 4 INDEX: FIB4 SCORE: 1.5

## 2025-08-04 ASSESSMENT — ENCOUNTER SYMPTOMS: GENERAL WELL-BEING: EXCELLENT

## 2025-08-04 ASSESSMENT — PATIENT HEALTH QUESTIONNAIRE - PHQ9: CLINICAL INTERPRETATION OF PHQ2 SCORE: 0

## 2025-08-04 ASSESSMENT — PAIN SCALES - GENERAL: PAINLEVEL_OUTOF10: NO PAIN

## 2025-08-04 ASSESSMENT — ACTIVITIES OF DAILY LIVING (ADL)
BATHING_REQUIRES_ASSISTANCE: 0
LACK_OF_TRANSPORTATION: NO

## 2025-08-20 ENCOUNTER — OFFICE VISIT (OUTPATIENT)
Dept: MEDICAL GROUP | Facility: LAB | Age: 85
End: 2025-08-20
Payer: MEDICARE

## 2025-08-20 VITALS
SYSTOLIC BLOOD PRESSURE: 122 MMHG | TEMPERATURE: 98.2 F | HEART RATE: 76 BPM | BODY MASS INDEX: 23.27 KG/M2 | OXYGEN SATURATION: 95 % | HEIGHT: 64 IN | DIASTOLIC BLOOD PRESSURE: 58 MMHG | WEIGHT: 136.3 LBS | RESPIRATION RATE: 16 BRPM

## 2025-08-20 DIAGNOSIS — N18.31 STAGE 3A CHRONIC KIDNEY DISEASE: ICD-10-CM

## 2025-08-20 DIAGNOSIS — E11.29 MICROALBUMINURIA DUE TO TYPE 2 DIABETES MELLITUS (HCC): ICD-10-CM

## 2025-08-20 DIAGNOSIS — Z02.89 ENCOUNTER FOR COMPLETION OF FORM WITH PATIENT: ICD-10-CM

## 2025-08-20 DIAGNOSIS — M85.89 OSTEOPENIA OF MULTIPLE SITES: ICD-10-CM

## 2025-08-20 DIAGNOSIS — Z00.00 ENCOUNTER FOR ANNUAL GENERAL MEDICAL EXAMINATION WITHOUT ABNORMAL FINDINGS IN ADULT: Primary | ICD-10-CM

## 2025-08-20 DIAGNOSIS — Z79.4 TYPE 2 DIABETES MELLITUS WITH OTHER SPECIFIED COMPLICATION, WITH LONG-TERM CURRENT USE OF INSULIN (HCC): ICD-10-CM

## 2025-08-20 DIAGNOSIS — R80.9 MICROALBUMINURIA DUE TO TYPE 2 DIABETES MELLITUS (HCC): ICD-10-CM

## 2025-08-20 DIAGNOSIS — E11.69 TYPE 2 DIABETES MELLITUS WITH OTHER SPECIFIED COMPLICATION, WITH LONG-TERM CURRENT USE OF INSULIN (HCC): ICD-10-CM

## 2025-08-20 LAB
HBA1C MFR BLD: 5.6 % (ref ?–5.8)
POCT INT CON NEG: NEGATIVE
POCT INT CON POS: POSITIVE

## 2025-08-20 PROCEDURE — 7101 PR PHYSICAL: Performed by: STUDENT IN AN ORGANIZED HEALTH CARE EDUCATION/TRAINING PROGRAM

## 2025-08-20 PROCEDURE — 3074F SYST BP LT 130 MM HG: CPT | Performed by: STUDENT IN AN ORGANIZED HEALTH CARE EDUCATION/TRAINING PROGRAM

## 2025-08-20 PROCEDURE — 83036 HEMOGLOBIN GLYCOSYLATED A1C: CPT | Performed by: STUDENT IN AN ORGANIZED HEALTH CARE EDUCATION/TRAINING PROGRAM

## 2025-08-20 PROCEDURE — 99397 PER PM REEVAL EST PAT 65+ YR: CPT | Performed by: STUDENT IN AN ORGANIZED HEALTH CARE EDUCATION/TRAINING PROGRAM

## 2025-08-20 PROCEDURE — 3078F DIAST BP <80 MM HG: CPT | Performed by: STUDENT IN AN ORGANIZED HEALTH CARE EDUCATION/TRAINING PROGRAM

## 2025-08-20 ASSESSMENT — ENCOUNTER SYMPTOMS
DIZZINESS: 0
VOMITING: 0
FEVER: 0
CHILLS: 0
NAUSEA: 0
DIARRHEA: 0
COUGH: 0
ABDOMINAL PAIN: 0
WEIGHT LOSS: 0
SHORTNESS OF BREATH: 0

## 2025-08-20 ASSESSMENT — FIBROSIS 4 INDEX: FIB4 SCORE: 1.5

## 2025-08-26 ENCOUNTER — HOSPITAL ENCOUNTER (OUTPATIENT)
Dept: LAB | Facility: MEDICAL CENTER | Age: 85
End: 2025-08-26
Attending: STUDENT IN AN ORGANIZED HEALTH CARE EDUCATION/TRAINING PROGRAM
Payer: MEDICARE

## 2025-08-26 DIAGNOSIS — E87.6 HYPOKALEMIA: ICD-10-CM

## 2025-08-26 DIAGNOSIS — R80.9 MICROALBUMINURIA DUE TO TYPE 2 DIABETES MELLITUS (HCC): ICD-10-CM

## 2025-08-26 DIAGNOSIS — E11.29 MICROALBUMINURIA DUE TO TYPE 2 DIABETES MELLITUS (HCC): ICD-10-CM

## 2025-08-26 LAB
ANION GAP SERPL CALC-SCNC: 14 MMOL/L (ref 7–16)
BUN SERPL-MCNC: 30 MG/DL (ref 8–22)
CALCIUM SERPL-MCNC: 9.5 MG/DL (ref 8.5–10.5)
CHLORIDE SERPL-SCNC: 108 MMOL/L (ref 96–112)
CO2 SERPL-SCNC: 16 MMOL/L (ref 20–33)
CREAT SERPL-MCNC: 1.03 MG/DL (ref 0.5–1.4)
CREAT UR-MCNC: 79.4 MG/DL
GFR SERPLBLD CREATININE-BSD FMLA CKD-EPI: 54 ML/MIN/1.73 M 2
GLUCOSE SERPL-MCNC: 102 MG/DL (ref 65–99)
MAGNESIUM SERPL-MCNC: 2.3 MG/DL (ref 1.5–2.5)
MICROALBUMIN UR-MCNC: 2.9 MG/DL
MICROALBUMIN/CREAT UR: 37 MG/G (ref 0–30)
POTASSIUM SERPL-SCNC: 3.8 MMOL/L (ref 3.6–5.5)
SODIUM SERPL-SCNC: 138 MMOL/L (ref 135–145)

## 2025-08-26 PROCEDURE — 83735 ASSAY OF MAGNESIUM: CPT

## 2025-08-26 PROCEDURE — 82570 ASSAY OF URINE CREATININE: CPT

## 2025-08-26 PROCEDURE — 36415 COLL VENOUS BLD VENIPUNCTURE: CPT

## 2025-08-26 PROCEDURE — 80048 BASIC METABOLIC PNL TOTAL CA: CPT

## 2025-08-26 PROCEDURE — 82043 UR ALBUMIN QUANTITATIVE: CPT

## (undated) DEVICE — BRIEF STRETCH MATERNITY M/L - FITS 20-60IN (5EA/BG 20BG/CA)

## (undated) DEVICE — SUTURE 2-0 VICRYL PLUS SH - 8 X 18 INCH (12/BX)

## (undated) DEVICE — TUBING CLEARLINK DUO-VENT - C-FLO (48EA/CA)

## (undated) DEVICE — SYRINGE 10 ML CONTROL LL (25EA/BX 4BX/CA)

## (undated) DEVICE — DRAPE UNDER BUTTOCKS FLUID - (20/CA)

## (undated) DEVICE — SET LEADWIRE 5 LEAD BEDSIDE DISPOSABLE ECG (1SET OF 5/EA)

## (undated) DEVICE — LIGASURE TISSUE FUSION - SINGLE USE (6/CA)

## (undated) DEVICE — GLOVE BIOGEL INDICATOR SZ 6.5 SURGICAL PF LTX - (50PR/BX 4BX/CA)

## (undated) DEVICE — Device

## (undated) DEVICE — GLOVE BIOGEL SZ 6 PF LATEX - (50EA/BX 4BX/CA)

## (undated) DEVICE — TOWEL STOP TIMEOUT SAFETY FLAG (40EA/CA)

## (undated) DEVICE — SENSOR OXIMETER ADULT SPO2 RD SET (20EA/BX)

## (undated) DEVICE — HEMOSTAT SURG ABSORBABLE - 2 X 3 IN SURGICEL (24EA/CA)

## (undated) DEVICE — WATER IRRIGATION STERILE 1000ML (12EA/CA)

## (undated) DEVICE — SUCTION INSTRUMENT YANKAUER OPEN TIP W/O VENT (50EA/CA)

## (undated) DEVICE — SUTURE GENERAL

## (undated) DEVICE — GAUZE FLUFF STERILE 2-PLY 36 X 36 (100EA/CA)

## (undated) DEVICE — MASK OXYGEN VNYL ADLT MED CONC WITH 7 FOOT TUBING - (50EA/CA)

## (undated) DEVICE — SUTURE 0 VICRYL PLUS UR-6 - 27 INCH (36/BX)

## (undated) DEVICE — COVER LIGHT HANDLE FLEXIBLE - SOFT (2EA/PK 80PK/CA)

## (undated) DEVICE — HEMOSTAT SURG ABSORBABLE - 4 X 8 IN SURGICEL (24EA/CA)

## (undated) DEVICE — GOWN WARMING STANDARD FLEX - (30/CA)

## (undated) DEVICE — SLEEVE VASO DVT COMPRESSION CALF MED - (10PR/CA)

## (undated) DEVICE — PAD SANITARY 11IN MAXI IND WRAPPED (12EA/PK 24PK/CA)

## (undated) DEVICE — COVER LIGHT HANDLE ALC PLUS DISP (18EA/BX)

## (undated) DEVICE — SUTURE 2-0 VICRYL PLUS SH - 27 INCH (36/BX)

## (undated) DEVICE — ELECTRODE DUAL RETURN W/ CORD - (50/PK)

## (undated) DEVICE — TRAY SRGPRP PVP IOD WT PRP - (20/CA)

## (undated) DEVICE — SET EXTENSION WITH 2 PORTS (48EA/CA) ***PART #2C8610 IS A SUBSTITUTE*****

## (undated) DEVICE — SUCTION INSTRUMENT YANKAUER BULBOUS TIP W/O VENT (50EA/CA)

## (undated) DEVICE — LACTATED RINGERS INJ 1000 ML - (14EA/CA 60CA/PF)

## (undated) DEVICE — TUBE CONNECTING SUCTION - CLEAR PLASTIC STERILE 72 IN (50EA/CA)

## (undated) DEVICE — SUTURE 4-0 MONOCRYL PLUS PS-2 - 27 INCH (36/BX)

## (undated) DEVICE — SET IRRIGATION CYSTOSCOPY TUBE L80 IN (20EA/CA)

## (undated) DEVICE — JELLY SURGILUBE STERILE TUBE 4.25 OZ (1/EA)

## (undated) DEVICE — SCRUB SOLUTION EXIDINE 4% 40Z - 48/CS CHLORAHEXADINE GLUCONATE

## (undated) DEVICE — CANISTER SUCTION 3000ML MECHANICAL FILTER AUTO SHUTOFF MEDI-VAC NONSTERILE LF DISP (40EA/CA)

## (undated) DEVICE — CANISTER SUCTION RIGID RED 1500CC (40EA/CA)

## (undated) DEVICE — SYRINGE ASEPTO - (50EA/CA

## (undated) DEVICE — KIT  I.V. START (100EA/CA)

## (undated) DEVICE — LEGGING LITHOTOMY 31 X 48 IN - (2EA/PK 20PK/CA)

## (undated) DEVICE — TRAY FOLEY CATHETER STATLOCK 16FR SURESTEP (10EA/CA)

## (undated) DEVICE — SODIUM CHL IRRIGATION 0.9% 1000ML (12EA/CA)

## (undated) DEVICE — GLOVE BIOGEL INDICATOR SZ 7SURGICAL PF LTX - (50/BX 4BX/CA)

## (undated) DEVICE — CLEANER ELECTRO-SURGICAL TIP - (25/BX 4BX/CA)

## (undated) DEVICE — BLADE SURGICAL #15 - (50/BX 3BX/CA)

## (undated) DEVICE — NEEDLE SAFETY 22 GA 1-1/2 IN (50/BX)

## (undated) DEVICE — SUTURE 0 VICRYL PLUS CT-2 - 27 INCH (36/BX)

## (undated) DEVICE — GLOVE BIOGEL SZ 7 SURGICAL PF LTX - (50PR/BX 4BX/CA)

## (undated) DEVICE — CANNULA O2 COMFORT SOFT EAR ADULT 7 FT TUBING (50/CA)